# Patient Record
Sex: FEMALE | Employment: UNEMPLOYED | URBAN - METROPOLITAN AREA
[De-identification: names, ages, dates, MRNs, and addresses within clinical notes are randomized per-mention and may not be internally consistent; named-entity substitution may affect disease eponyms.]

---

## 2017-01-22 ENCOUNTER — APPOINTMENT (OUTPATIENT)
Dept: MRI IMAGING | Age: 67
End: 2017-01-22
Attending: EMERGENCY MEDICINE
Payer: COMMERCIAL

## 2017-01-22 ENCOUNTER — HOSPITAL ENCOUNTER (EMERGENCY)
Age: 67
Discharge: HOME | End: 2017-01-22
Attending: EMERGENCY MEDICINE
Payer: COMMERCIAL

## 2017-01-22 ENCOUNTER — APPOINTMENT (OUTPATIENT)
Dept: CT IMAGING | Age: 67
End: 2017-01-22
Attending: EMERGENCY MEDICINE
Payer: COMMERCIAL

## 2017-01-22 VITALS
RESPIRATION RATE: 13 BRPM | HEART RATE: 58 BPM | HEIGHT: 62 IN | BODY MASS INDEX: 39.38 KG/M2 | DIASTOLIC BLOOD PRESSURE: 71 MMHG | WEIGHT: 214 LBS | TEMPERATURE: 98.5 F | OXYGEN SATURATION: 94 % | SYSTOLIC BLOOD PRESSURE: 141 MMHG

## 2017-01-22 DIAGNOSIS — N39.0 URINARY TRACT INFECTION, SITE UNSPECIFIED: ICD-10-CM

## 2017-01-22 DIAGNOSIS — R56.9 CONVULSIONS, UNSPECIFIED CONVULSION TYPE (HCC): Primary | ICD-10-CM

## 2017-01-22 DIAGNOSIS — G43.009 NONINTRACTABLE MIGRAINE, UNSPECIFIED MIGRAINE TYPE: ICD-10-CM

## 2017-01-22 LAB
ALBUMIN SERPL BCP-MCNC: 3.1 G/DL (ref 3.4–5)
ALBUMIN/GLOB SERPL: 0.9 {RATIO} (ref 0.8–1.7)
ALP SERPL-CCNC: 82 U/L (ref 45–117)
ALT SERPL-CCNC: 27 U/L (ref 13–56)
ANION GAP BLD CALC-SCNC: 8 MMOL/L (ref 3–18)
APPEARANCE UR: CLEAR
AST SERPL W P-5'-P-CCNC: 19 U/L (ref 15–37)
BACTERIA URNS QL MICRO: ABNORMAL /HPF
BASOPHILS # BLD AUTO: 0 K/UL (ref 0–0.06)
BASOPHILS # BLD: 0 % (ref 0–2)
BILIRUB DIRECT SERPL-MCNC: 0.1 MG/DL (ref 0–0.2)
BILIRUB SERPL-MCNC: 0.4 MG/DL (ref 0.2–1)
BILIRUB UR QL: NEGATIVE
BUN SERPL-MCNC: 21 MG/DL (ref 7–18)
BUN/CREAT SERPL: 23 (ref 12–20)
CALCIUM SERPL-MCNC: 8.6 MG/DL (ref 8.5–10.1)
CHLORIDE SERPL-SCNC: 104 MMOL/L (ref 100–108)
CK MB CFR SERPL CALC: 2.1 % (ref 0–4)
CK MB SERPL-MCNC: 1 NG/ML (ref 0.5–3.6)
CK SERPL-CCNC: 47 U/L (ref 26–192)
CO2 SERPL-SCNC: 28 MMOL/L (ref 21–32)
COLOR UR: ABNORMAL
CREAT SERPL-MCNC: 0.91 MG/DL (ref 0.6–1.3)
DIFFERENTIAL METHOD BLD: ABNORMAL
EOSINOPHIL # BLD: 0.1 K/UL (ref 0–0.4)
EOSINOPHIL NFR BLD: 1 % (ref 0–5)
EPITH CASTS URNS QL MICRO: ABNORMAL /LPF (ref 0–5)
ERYTHROCYTE [DISTWIDTH] IN BLOOD BY AUTOMATED COUNT: 14.3 % (ref 11.6–14.5)
GLOBULIN SER CALC-MCNC: 3.4 G/DL (ref 2–4)
GLUCOSE SERPL-MCNC: 119 MG/DL (ref 74–99)
GLUCOSE UR STRIP.AUTO-MCNC: NEGATIVE MG/DL
HCT VFR BLD AUTO: 38.6 % (ref 35–45)
HGB BLD-MCNC: 12.9 G/DL (ref 12–16)
HGB UR QL STRIP: NEGATIVE
KETONES UR QL STRIP.AUTO: NEGATIVE MG/DL
LEUKOCYTE ESTERASE UR QL STRIP.AUTO: ABNORMAL
LYMPHOCYTES # BLD AUTO: 19 % (ref 21–52)
LYMPHOCYTES # BLD: 1.6 K/UL (ref 0.9–3.6)
MAGNESIUM SERPL-MCNC: 2.3 MG/DL (ref 1.8–2.4)
MCH RBC QN AUTO: 32.3 PG (ref 24–34)
MCHC RBC AUTO-ENTMCNC: 33.4 G/DL (ref 31–37)
MCV RBC AUTO: 96.5 FL (ref 74–97)
MONOCYTES # BLD: 0.8 K/UL (ref 0.05–1.2)
MONOCYTES NFR BLD AUTO: 10 % (ref 3–10)
NEUTS SEG # BLD: 5.9 K/UL (ref 1.8–8)
NEUTS SEG NFR BLD AUTO: 70 % (ref 40–73)
NITRITE UR QL STRIP.AUTO: NEGATIVE
PH UR STRIP: 6.5 [PH] (ref 5–8)
PLATELET # BLD AUTO: 181 K/UL (ref 135–420)
PMV BLD AUTO: 9.5 FL (ref 9.2–11.8)
POTASSIUM SERPL-SCNC: 3.3 MMOL/L (ref 3.5–5.5)
PROT SERPL-MCNC: 6.5 G/DL (ref 6.4–8.2)
PROT UR STRIP-MCNC: NEGATIVE MG/DL
RBC # BLD AUTO: 4 M/UL (ref 4.2–5.3)
RBC #/AREA URNS HPF: ABNORMAL /HPF (ref 0–5)
SODIUM SERPL-SCNC: 140 MMOL/L (ref 136–145)
SP GR UR REFRACTOMETRY: 1.03 (ref 1–1.03)
TROPONIN I SERPL-MCNC: <0.02 NG/ML (ref 0–0.04)
UROBILINOGEN UR QL STRIP.AUTO: 1 EU/DL (ref 0.2–1)
VALPROATE SERPL-MCNC: 25 UG/ML (ref 50–100)
WBC # BLD AUTO: 8.4 K/UL (ref 4.6–13.2)
WBC URNS QL MICRO: ABNORMAL /HPF (ref 0–4)

## 2017-01-22 PROCEDURE — 82550 ASSAY OF CK (CPK): CPT | Performed by: EMERGENCY MEDICINE

## 2017-01-22 PROCEDURE — 74011250636 HC RX REV CODE- 250/636: Performed by: EMERGENCY MEDICINE

## 2017-01-22 PROCEDURE — 74011000250 HC RX REV CODE- 250: Performed by: EMERGENCY MEDICINE

## 2017-01-22 PROCEDURE — 85025 COMPLETE CBC W/AUTO DIFF WBC: CPT | Performed by: EMERGENCY MEDICINE

## 2017-01-22 PROCEDURE — 80076 HEPATIC FUNCTION PANEL: CPT | Performed by: EMERGENCY MEDICINE

## 2017-01-22 PROCEDURE — 96361 HYDRATE IV INFUSION ADD-ON: CPT

## 2017-01-22 PROCEDURE — 81001 URINALYSIS AUTO W/SCOPE: CPT | Performed by: EMERGENCY MEDICINE

## 2017-01-22 PROCEDURE — 96366 THER/PROPH/DIAG IV INF ADDON: CPT

## 2017-01-22 PROCEDURE — 74011250637 HC RX REV CODE- 250/637: Performed by: EMERGENCY MEDICINE

## 2017-01-22 PROCEDURE — 80164 ASSAY DIPROPYLACETIC ACD TOT: CPT | Performed by: EMERGENCY MEDICINE

## 2017-01-22 PROCEDURE — 80048 BASIC METABOLIC PNL TOTAL CA: CPT | Performed by: EMERGENCY MEDICINE

## 2017-01-22 PROCEDURE — 93005 ELECTROCARDIOGRAM TRACING: CPT

## 2017-01-22 PROCEDURE — 83735 ASSAY OF MAGNESIUM: CPT | Performed by: EMERGENCY MEDICINE

## 2017-01-22 PROCEDURE — 96365 THER/PROPH/DIAG IV INF INIT: CPT

## 2017-01-22 PROCEDURE — 70450 CT HEAD/BRAIN W/O DYE: CPT

## 2017-01-22 PROCEDURE — 74011000258 HC RX REV CODE- 258: Performed by: EMERGENCY MEDICINE

## 2017-01-22 PROCEDURE — 99285 EMERGENCY DEPT VISIT HI MDM: CPT

## 2017-01-22 RX ORDER — SULFAMETHOXAZOLE AND TRIMETHOPRIM 800; 160 MG/1; MG/1
1 TABLET ORAL
Status: COMPLETED | OUTPATIENT
Start: 2017-01-22 | End: 2017-01-22

## 2017-01-22 RX ORDER — ACETAMINOPHEN AND CODEINE PHOSPHATE 300; 30 MG/1; MG/1
2 TABLET ORAL
Status: COMPLETED | OUTPATIENT
Start: 2017-01-22 | End: 2017-01-22

## 2017-01-22 RX ORDER — HYDROCODONE BITARTRATE AND ACETAMINOPHEN 5; 325 MG/1; MG/1
1 TABLET ORAL ONCE
Status: COMPLETED | OUTPATIENT
Start: 2017-01-22 | End: 2017-01-22

## 2017-01-22 RX ORDER — POTASSIUM CHLORIDE 20MEQ/15ML
20 LIQUID (ML) ORAL
Status: COMPLETED | OUTPATIENT
Start: 2017-01-22 | End: 2017-01-22

## 2017-01-22 RX ORDER — TOPIRAMATE 50 MG/1
100 TABLET, FILM COATED ORAL 2 TIMES DAILY
Qty: 20 TAB | Refills: 0 | Status: SHIPPED | OUTPATIENT
Start: 2017-01-22 | End: 2020-01-23

## 2017-01-22 RX ORDER — SULFAMETHOXAZOLE AND TRIMETHOPRIM 800; 160 MG/1; MG/1
1 TABLET ORAL 2 TIMES DAILY
Qty: 14 TAB | Refills: 0 | Status: SHIPPED | OUTPATIENT
Start: 2017-01-22 | End: 2017-01-29

## 2017-01-22 RX ADMIN — POTASSIUM CHLORIDE 20 MEQ: 1.5 SOLUTION ORAL at 13:19

## 2017-01-22 RX ADMIN — SODIUM CHLORIDE 1000 MG: 900 INJECTION, SOLUTION INTRAVENOUS at 16:41

## 2017-01-22 RX ADMIN — ACETAMINOPHEN AND CODEINE PHOSPHATE 2 TABLET: 300; 30 TABLET ORAL at 13:19

## 2017-01-22 RX ADMIN — SODIUM CHLORIDE 500 ML: 900 INJECTION, SOLUTION INTRAVENOUS at 12:03

## 2017-01-22 RX ADMIN — SULFAMETHOXAZOLE AND TRIMETHOPRIM 1 TABLET: 800; 160 TABLET ORAL at 16:22

## 2017-01-22 RX ADMIN — HYDROCODONE BITARTRATE AND ACETAMINOPHEN 1 TABLET: 5; 325 TABLET ORAL at 17:06

## 2017-01-22 NOTE — ED NOTES
Called pharmacy to see if patient depacon was ready. Per Gal Flowers from the pharmacy, is not ready at this time. Manisha Kiser will call ED once medication is ready to be picked up.

## 2017-01-22 NOTE — ED TRIAGE NOTES
Per EMS patient was at [employer] standing up singing when she fell backwards and had a \"tonic clonic\" seizure. Per EMS once the shaking stopped the patient appeared confused and had another seizure soon after.

## 2017-01-22 NOTE — ED NOTES
I have reviewed discharge instructions with the patient. The patient verbalized understanding. Patient armband removed and given to patient to take home. Patient was informed of the privacy risks if armband lost or stolen    The patient Martin Nunez is a 78 y.o. female who  has a past medical history of Asthma; Bipolar 1 disorder ([facility] [address] Breast cancer ([facility] [address] (2012); Cancer ([facility] [address] Hypertension; Seizures ([facility] [address] and UTI (lower urinary tract infection). .  She   The patient's medications were reviewed and discussed prior to discharge. The patient was very interactive and did understand their medications. The following medications were discussed in details with the patient. The [facility] said [facility]. [unfilled]    ROSLYN [employer] Activation    Thank you for requesting access to [facility]. Please follow the instructions below to securely access and download your online medical record. [facility] allows you to send messages to your doctor, view your test results, renew your prescriptions, schedule appointments, and more. How Do I Sign Up? 1. In your internet browser, go to [email or web address]  2. Click on the First Time User? Click Here link in the Sign In box. You will be redirect to the New Member Sign Up page. 3. Enter your [facility] Access Code exactly as it appears below. You will not need to use this code after youve completed the sign-up process. If you do not sign up before the expiration date, you must request a new code. [facility] Access Code: [unfilled] (This is the date your [facility]ake access code will )    4. Enter the last four digits of your Social Security Number (xxxx) and Date of Birth (mm/dd/yyyy) as indicated and click Submit. You will be taken to the next sign-up page. 5. Create a [facility]ake ID. This will be your [facility]ake login ID and cannot be changed, so think of one that is secure and easy to remember.   6. Create a [facility] password. You can change your password at any time. 7. Enter your Password Reset Question and Answer. This can be used at a later time if you forget your password. 8. Enter your e-mail address. You will receive e-mail notification when new information is available in [address] 9. Click Sign Up. You can now view and download portions of your medical record. 10. Click the Download Summary menu link to download a portable copy of your medical information. Additional Information    If you have questions, please visit the Frequently Asked Questions section of the [employer] website at [email or web address] Remember, [employer] is NOT to be used for urgent needs. For medical emergencies, dial 911.

## 2017-01-22 NOTE — DISCHARGE INSTRUCTIONS
Recurring Migraine Headache: Care Instructions  Your Care Instructions  Migraines are painful, throbbing headaches. They often start on one side of the head. They may cause nausea and vomiting and make you sensitive to light, sound, or smell. Some people may have only a few migraines throughout life. Others have them as often as several times a month. The goal of treatment is to reduce the number of migraines you have and relieve your symptoms. Even with treatment, you may continue to have migraines. You play an important role in dealing with your headaches. Work on avoiding things that seem to trigger your migraines. When you feel a headache coming on, act quickly to stop it before it gets worse. Follow-up care is a key part of your treatment and safety. Be sure to make and go to all appointments, and call your doctor if you are having problems. It's also a good idea to know your test results and keep a list of the medicines you take. How can you care for yourself at home? · Do not drive if you have taken a prescription pain medicine. · Rest in a quiet, dark room until your headache is gone. Close your eyes and try to relax or go to sleep. Do not watch TV or read. · Put a cold, moist cloth or cold pack on the painful area for 10 to 20 minutes at a time. Put a thin cloth between the cold pack and your skin. · Have someone gently massage your neck and shoulders. · Take your medicines exactly as prescribed. Call your doctor if you think you are having a problem with your medicine. You will get more details on the specific medicines your doctor prescribes. To prevent migraines  · Keep a headache diary so you can figure out what triggers your headaches. Avoiding triggers may help you prevent headaches. Record when each headache began, how long it lasted, and what the pain was like. Use words like throbbing, aching, stabbing, or dull. Write down any other symptoms you had with the headache.  These may include nausea, flashing lights or dark spots, or sensitivity to bright light or loud noise. Note if the headache occurred near your period. List anything that might have triggered the headache. Triggers may include certain foods (chocolate, cheese, wine) or odors, smoke, bright light, stress, or lack of sleep. · If your doctor has prescribed medicine for your migraines, take it as directed. You may have medicine that you take only when you get a migraine and medicine that you take all the time to help prevent migraines. ¨ If your doctor has prescribed medicine for when you get a headache, take it at the first sign of a migraine, unless your doctor has given you other instructions. ¨ If your doctor has prescribed medicine to prevent migraines, take it exactly as prescribed. Call your doctor if you think you are having a problem with your medicine. · Find healthy ways to deal with stress. Migraines are most common during or right after stressful times. Take time to relax before and after you do something that has caused a migraine in the past.  · Try to keep your muscles relaxed by keeping good posture. Check your jaw, face, neck, and shoulder muscles for tension. Try to relax them. When sitting at a desk, change positions often. Stretch for 30 seconds each hour. · Get regular sleep and exercise. · Eat regular meals, and avoid foods and drinks that often trigger migraines. These include chocolate and alcohol, especially red wine and port. Chemicals used in food, such as aspartame and monosodium glutamate (MSG), also can trigger migraines. So can some food additives, such as those found in hot dogs, craig, cold cuts, aged cheeses, and pickled foods. · Limit caffeine by not drinking too much coffee, tea, or soda. Do not quit caffeine suddenly, because that can also give you migraines. · Do not smoke or allow others to smoke around you.  If you need help quitting, talk to your doctor about stop-smoking programs and medicines. These can increase your chances of quitting for good. · If you are taking birth control pills or hormone therapy, talk to your doctor about whether they are triggering your migraines. When should you call for help? Call 911 anytime you think you may need emergency care. For example, call if:  · You have symptoms of a stroke. These may include:  ¨ Sudden numbness, tingling, weakness, or loss of movement in your face, arm, or leg, especially on only one side of your body. ¨ Sudden vision changes. ¨ Sudden trouble speaking. ¨ Sudden confusion or trouble understanding simple statements. ¨ Sudden problems with walking or balance. ¨ A sudden, severe headache that is different from past headaches. Call your doctor now or seek immediate medical care if:  · You develop a fever and a stiff neck. · You have new nausea and vomiting, or you cannot keep down food or liquids. Watch closely for changes in your health, and be sure to contact your doctor if:  · You have a headache that does not get better within 1 or 2 days. · Your headaches get worse or happen more often. Where can you learn more? Go to http://ramón-ayden.info/. Enter V975 in the search box to learn more about \"Recurring Migraine Headache: Care Instructions. \"  Current as of: February 19, 2016  Content Version: 11.1  © 3387-7833 [employer]. Care instructions adapted under license by [employer] (which disclaims liability or warranty for this information). If you have questions about a medical condition or this instruction, always ask your healthcare professional. [place] [address] any warranty or liability for your use of this information. Seizure: Care Instructions  Your Care Instructions    Seizures are caused by abnormal patterns of electrical signals in the brain. They are different for each person. Seizures can affect movement, speech, vision, or awareness.  Some people have only slight shaking of a hand and do not pass out. Other people may pass out and have violent shaking of the whole body. Some people appear to stare into space. They are awake, but they can't respond normally. Later, they may not remember what happened. You may need tests to identify the type and cause of the seizures. A seizure may occur only once, or you may have them more than one time. Taking medicines as directed and following up with your doctor may help keep you from having more seizures. The doctor has checked you carefully, but problems can develop later. If you notice any problems or new symptoms, get medical treatment right away. Follow-up care is a key part of your treatment and safety. Be sure to make and go to all appointments, and call your doctor if you are having problems. It's also a good idea to know your test results and keep a list of the medicines you take. How can you care for yourself at home? · Be safe with medicines. Take your medicines exactly as prescribed. Call your doctor if you think you are having a problem with your medicine. · Do not do any activity that could be dangerous to you or others until your doctor says it is safe to do so. For example, do not drive a car, operate machinery, swim, or climb ladders. · Be sure that anyone treating you for any health problem knows that you have had a seizure and what medicines you are taking for it. · Identify and avoid things that may make you more likely to have a seizure. These may include lack of sleep, alcohol or drug use, stress, or not eating. · Make sure you go to your follow-up appointment. When should you call for help? Call 911 anytime you think you may need emergency care. For example, call if:  · You have another seizure. · You have more than one seizure in 24 hours. · You have new symptoms, such as trouble walking, speaking, or thinking clearly.   Call your doctor now or seek immediate medical care if:  · You are not acting normally. Watch closely for changes in your health, and be sure to contact your doctor if you have any problems. Where can you learn more? Go to http://ramón-ayden.info/. Enter E423 in the search box to learn more about \"Seizure: Care Instructions. \"  Current as of: February 19, 2016  Content Version: 11.1  © 3769-2265 [employer]. Care instructions adapted under license by [employer] (which disclaims liability or warranty for this information). If you have questions about a medical condition or this instruction, always ask your healthcare professional. [place] [address] any warranty or liability for your use of this information. Urinary Tract Infection in Women: Care Instructions  Your Care Instructions    A urinary tract infection, or UTI, is a general term for an infection anywhere between the kidneys and the urethra (where urine comes out). Most UTIs are bladder infections. They often cause pain or burning when you urinate. UTIs are caused by bacteria and can be cured with antibiotics. Be sure to complete your treatment so that the infection goes away. Follow-up care is a key part of your treatment and safety. Be sure to make and go to all appointments, and call your doctor if you are having problems. It's also a good idea to know your test results and keep a list of the medicines you take. How can you care for yourself at home? · Take your antibiotics as directed. Do not stop taking them just because you feel better. You need to take the full course of antibiotics. · Drink extra water and other fluids for the next day or two. This may help wash out the bacteria that are causing the infection. (If you have kidney, heart, or liver disease and have to limit fluids, talk with your doctor before you increase your fluid intake.)  · Avoid drinks that are carbonated or have caffeine.  They can irritate the bladder. · Urinate often. Try to empty your bladder each time. · To relieve pain, take a hot bath or lay a heating pad set on low over your lower belly or genital area. Never go to sleep with a heating pad in place. To prevent UTIs  · Drink plenty of water each day. This helps you urinate often, which clears bacteria from your system. (If you have kidney, heart, or liver disease and have to limit fluids, talk with your doctor before you increase your fluid intake.)  · Consider adding cranberry juice to your diet. · Urinate when you need to. · Urinate right after you have sex. · Change sanitary pads often. · Avoid douches, bubble baths, feminine hygiene sprays, and other feminine hygiene products that have deodorants. · After going to the bathroom, wipe from front to back. When should you call for help? Call your doctor now or seek immediate medical care if:  · Symptoms such as fever, chills, nausea, or vomiting get worse or appear for the first time. · You have new pain in your back just below your rib cage. This is called flank pain. · There is new blood or pus in your urine. · You have any problems with your antibiotic medicine. Watch closely for changes in your health, and be sure to contact your doctor if:  · You are not getting better after taking an antibiotic for 2 days. · Your symptoms go away but then come back. Where can you learn more? Go to http://ramón-ayden.info/. Enter E906 in the search box to learn more about \"Urinary Tract Infection in Women: Care Instructions. \"  Current as of: August 12, 2016  Content Version: 11.1  © 4459-6901 [employer]. Care instructions adapted under license by [employer] (which disclaims liability or warranty for this information).  If you have questions about a medical condition or this instruction, always ask your healthcare professional. [address] any warranty or liability for your use of this information.

## 2017-01-22 NOTE — ED PROVIDER NOTES
HPI Comments: 12:15 PM female 78 y.o. with a history of hypertension, seizure, asthma, bipolar disorder, UTI and breast cancer and migranes presenting to the ED for evaluation after 2x witessed seizure while standing in [employer] today. She was standing and singing a hymnal when she experienced the seizure. She states she fell into the pew and did not fall to the ground; she denies head trauma. On EMS arrival, she apparently had a second seizure with tonic clonic type movements. She now reports pounding headache which is worse than her usual pain with migraines and endorses photosensitivity which however is consistent with migraine. She takes topamax for migraines. She denies any speech difficulty or disorientation but is unable to identify the current date or year at this time. She cannot recall her last seizure occurrence but notes that her seizures usually onset \"when I get hot\". She currently takes Depakote which was prescribed by her PCP. She notes that she has a neurologist but that her neurologist \"refuses to treat my seizures\" and only treats her migraines. She denies any dizziness or lightheadedness at this time. No other associated symptoms or other concerns at this time. PCP: Frankey Kaska, MD          The history is provided by the patient.         Past Medical History:   Diagnosis Date    Asthma     Bipolar 1 disorder ([address]     Breast cancer ([address] 2012     left side    Cancer ([address]     Hypertension     Seizures ([address]     UTI (lower urinary tract infection)        Past Surgical History:   Procedure Laterality Date    Hx mastectomy Left 03/29/2012     complete    Hx back surgery       Dr Agustin Vazquez, july 30 2013    Hx orthopaedic       LEFT FOOT SX.    Hx cholecystectomy      Hx partial hysterectomy           Family History:   Problem Relation Age of Onset    Family history unknown: Yes       Social History     Social History    Marital status:      Spouse name: N/A    Number of children: N/A    Years of education: N/A     Occupational History    Not on file. Social History Main Topics    Smoking status: Never Smoker    Smokeless tobacco: Not on file    Alcohol use No    Drug use: No    Sexual activity: Not on file     Other Topics Concern    Not on file     Social History Narrative         ALLERGIES: Carvedilol; Cephalexin; Ciprofloxacin; Epipen [epinephrine]; Other medication; Percocet [oxycodone-acetaminophen]; Stings [sting, bee]; Talwin [pentazocine lactate]; Tetanus toxoid, adsorbed; and Trileptal [oxcarbazepine]    Review of Systems   Constitutional: Negative for fever. HENT: Negative for sore throat. Eyes: Negative for redness. Respiratory: Negative for shortness of breath and wheezing. Gastrointestinal: Negative for abdominal pain and nausea. Genitourinary: Negative for dysuria. Musculoskeletal: Negative for neck stiffness. Skin: Negative for pallor. Neurological: Positive for seizures and headaches. Negative for dizziness and speech difficulty. Hematological: Does not bruise/bleed easily. All other systems reviewed and are negative. Vitals:    01/22/17 1145   BP: 113/60   Pulse: 82   Resp: 20   Temp: 98.5 °F (36.9 °C)   SpO2: 96%   Weight: 97.1 kg (214 lb)   Height: 5' 2\" (1.575 m)            Physical Exam   Constitutional: She appears well-developed and well-nourished. No distress. HENT:   Head: Normocephalic and atraumatic. Mouth/Throat: Oropharynx is clear and moist.   No tongue laceration   Eyes: Conjunctivae and EOM are normal. Pupils are equal, round, and reactive to light. No scleral icterus. Pupil 4mm bilaterally   Neck: Normal range of motion. Neck supple. Cardiovascular: Intact distal pulses. Capillary refill < 3 seconds   Pulmonary/Chest: Effort normal and breath sounds normal. No respiratory distress. She has no wheezes. Abdominal: Soft. Bowel sounds are normal. She exhibits no distension. There is no tenderness. Musculoskeletal: Normal range of motion. She exhibits no edema. Lymphadenopathy:     She has no cervical adenopathy. Neurological: She is alert. She has normal strength. She displays tremor (resting tremor of the left hand). No cranial nerve deficit or sensory deficit. Alert. Oriented to name and place but not to day or year. No slurred speech. Abnormal finger to nose test. Strength 5/5. No drifting of the lower extremities. Pt somewhat slow to respond to some questions. Still appears postictal   Skin: Skin is warm and dry. She is not diaphoretic. Nursing note and vitals reviewed. MDM  Number of Diagnoses or Management Options  Convulsions, unspecified convulsion type ([facility]):   Nonintractable migraine, unspecified migraine type:   Urinary tract infection, site unspecified:   Diagnosis management comments: Seizure like activity at [place]. headache  Seizure precautions, IVF, labs, analgesia    Pt with left hand tremor on exam.     Will consult teleneurology. CT head  Pt appears somewhat post ictal in that she doesn't recall year or day. However when I entered room pt is reading a book. ?pseudo seizure               Amount and/or Complexity of Data Reviewed  Clinical lab tests: ordered and reviewed  Tests in the radiology section of CPT®: ordered and reviewed  Tests in the medicine section of CPT®: ordered and reviewed  Discussion of test results with the performing providers: yes  Discuss the patient with other providers: yes    Risk of Complications, Morbidity, and/or Mortality  Presenting problems: moderate  Diagnostic procedures: moderate  Management options: moderate    Patient Progress  Patient progress: stable    ED Course       Procedures    PROGRESS NOTES  12:15 PM: Waylon Wilkinson DO arrives to the bedside to evaluate the patient. Answered the patient's questions regarding the treatment plan. 12:47 PM Discussed care with Dr. Cherri Harper, Specialty: Teleneurology.  Standard discussion; including history of patients chief complaint, available diagnostic results and treatment course. Accepts consult and will evaluate the pt.    1:15 PM Discussed care with Dr. Paulo Tran, Specialty: Teleneurology. Standard discussion; including history of patients chief complaint, available diagnostic results and treatment course. Patient's symptoms and signs cease when she is directed to do something else. She does not recommend admission. She will review further notes about patient. If anything else comes back she will call back immediately. However, if everything is clear she will recommend discharge and have her follow up with neurologist tomorrow. 1:45 PM Discussed care with Dr. Paulo Tran, Specialty: Teleneurology. Standard discussion; including history of patients chief complaint, available diagnostic results and treatment course. She states pt has no recent MRI of the brain. In light of pt history of breast cancer, she recommend MRI with contrast of the brain for evaluation of possible brain mass which can be the cause of progressive seizures and headache. If non, recommends increase of Topomax to 100mg bid and f/u with neurologist    4:15 PM MRI tech called back and states htat in light of multiple stimulators, they are unable to do the MRI. Discussed care with Dr. Benjamin Arango, Specialty: Neurology. Standard discussion; including history of patients chief complaint, available diagnostic results and treatment course. She reviewed of the pt's chart. Discussed MRI's decision to not get the MRI due to multiple stimulators. She thinks it is appropriate to give 1g of Depacon IV and increase Topamax as neurology recommends making a ticket for follow up with Dr. Vasquez Bolanos and d/c pt home. 4:18 PM: Reassessed patient and patient is feeling well. Discussed their results and diagnosis. Patient will be discharged in stable condition. Patient is to return to the emergency department for any new or worsening conditions.  Patient understands and agrees with discharge plan. 4:56 PM Asked her question regarding allergy to Vicodin. She states she is not allergic to Vicodin and can in fact take it. Discussed with pt and will increase Topomax for her headaches. Discusssed her abd discomfort is likely due to UTI. Abdomen is soft with minimal tenderness. No guarding or rebound. I have reassessed the patient. I have discussed the workup, results and plan with the patient and patient is in agreement. Patient is feeling much better. Patient will be prescribed increased dose of topimax, and bactrim (she states she can take bactrim), sent rx's directly to her pharmacy of choice. Patient was discharge in stable condition. Patient was given outpatient follow up. Patient is to return to emergency department if any new or worsening condition. ED PHYSICIAN ORDERS  Orders Placed This Encounter    CT HEAD WO CONT     Standing Status:   Standing     Number of Occurrences:   1     Order Specific Question:   Transport     Answer:   Stretcher [[id number]]     Order Specific Question:   Is Patient Allergic to Contrast Dye? Answer:   No    MRI BRAIN W WO CONT     Standing Status:   Standing     Number of Occurrences:   1     Order Specific Question:   Transport     Answer:   Stretcher [5]     Order Specific Question:   Is Patient Allergic to Contrast Dye? Answer:   No     Order Specific Question:   Does patient have history of Renal Disease?      Answer:   No    CBC WITH AUTOMATED DIFF     Standing Status:   Standing     Number of Occurrences:   1    METABOLIC PANEL, BASIC     Standing Status:   Standing     Number of Occurrences:   1    MAGNESIUM     Standing Status:   Standing     Number of Occurrences:   1    URINALYSIS W/ RFLX MICROSCOPIC     Standing Status:   Standing     Number of Occurrences:   1    VALPROIC ACID     Standing Status:   Standing     Number of Occurrences:   1    HEPATIC FUNCTION PANEL     Standing Status:   Standing Number of Occurrences:   1    CARDIAC PANEL,(CK, CKMB & TROPONIN)     Standing Status:   Standing     Number of Occurrences:   1    URINE MICROSCOPIC ONLY     Standing Status:   Standing     Number of Occurrences:   1    CARDIAC MONITORING     Standing Status:   Standing     Number of Occurrences:   1     Order Specific Question:   Type: Answer:   Bedside    IP CONSULT TO TELE-NEUROLOGY     Standing Status:   Standing     Number of Occurrences:   1     Order Specific Question:   Reason for Consult: Answer:   multiple seizures     Order Specific Question:   Did you call or speak to the consulting provider? Answer:   No     Order Specific Question:   Consult To     Answer:   tele neurology     Order Specific Question:   Schedule When?      Answer:   TODAY    EKG, 12 LEAD, INITIAL     Standing Status:   Standing     Number of Occurrences:   1     Order Specific Question:   Reason for Exam:     Answer:   seizure    INSERT PERIPHERAL IV ONE TIME STAT     Standing Status:   Standing     Number of Occurrences:   1    SEIZURE PRECAUTIONS     Standing Status:   Standing     Number of Occurrences:   1    sodium chloride 0.9 % bolus infusion 500 mL    acetaminophen-codeine (TYLENOL #3) per tablet 2 Tab    potassium chloride (KAON 10%) 20 mEq/15 mL oral liquid 20 mEq    trimethoprim-sulfamethoxazole (BACTRIM DS, SEPTRA DS) 160-800 mg per tablet 1 Tab     Order Specific Question:   Antibiotic Indications     Answer:   Urinary Tract Infection    valproate (DEPACON) 1,000 mg in 0.9% sodium chloride 50 mL IVPB           MEDICATIONS ORDERED  Medications   trimethoprim-sulfamethoxazole (BACTRIM DS, SEPTRA DS) 160-800 mg per tablet 1 Tab (not administered)   valproate (DEPACON) 1,000 mg in 0.9% sodium chloride 50 mL IVPB (not administered)   sodium chloride 0.9 % bolus infusion 500 mL (0 mL IntraVENous IV Completed 1/22/17 1320)   acetaminophen-codeine (TYLENOL #3) per tablet 2 Tab (2 Tabs Oral Given 1/22/17 1319)   potassium chloride (KAON 10%) 20 mEq/15 mL oral liquid 20 mEq (20 mEq Oral Given 1/22/17 1319)           RADIOLOGY INTERPRETATIONS  CT HEAD WO CONT   Final Result   IMPRESSION:     1. No acute intracranial abnormalities. LABORATORY RESULTS  Recent Results (from the past 12 hour(s))   CBC WITH AUTOMATED DIFF    Collection Time: 01/22/17 11:40 AM   Result Value Ref Range    WBC 8.4 4.6 - 13.2 K/uL    RBC 4.00 (L) 4.20 - 5.30 M/uL    HGB 12.9 12.0 - 16.0 g/dL    HCT 38.6 35.0 - 45.0 %    MCV 96.5 74.0 - 97.0 FL    MCH 32.3 24.0 - 34.0 PG    MCHC 33.4 31.0 - 37.0 g/dL    RDW 14.3 11.6 - 14.5 %    PLATELET 996 360 - 645 K/uL    MPV 9.5 9.2 - 11.8 FL    NEUTROPHILS 70 40 - 73 %    LYMPHOCYTES 19 (L) 21 - 52 %    MONOCYTES 10 3 - 10 %    EOSINOPHILS 1 0 - 5 %    BASOPHILS 0 0 - 2 %    ABS. NEUTROPHILS 5.9 1.8 - 8.0 K/UL    ABS. LYMPHOCYTES 1.6 0.9 - 3.6 K/UL    ABS. MONOCYTES 0.8 0.05 - 1.2 K/UL    ABS. EOSINOPHILS 0.1 0.0 - 0.4 K/UL    ABS.  BASOPHILS 0.0 0.0 - 0.06 K/UL    DF AUTOMATED     METABOLIC PANEL, BASIC    Collection Time: 01/22/17 11:40 AM   Result Value Ref Range    Sodium 140 136 - 145 mmol/L    Potassium 3.3 (L) 3.5 - 5.5 mmol/L    Chloride 104 100 - 108 mmol/L    CO2 28 21 - 32 mmol/L    Anion gap 8 3.0 - 18 mmol/L    Glucose 119 (H) 74 - 99 mg/dL    BUN 21 (H) 7.0 - 18 MG/DL    Creatinine 0.91 0.6 - 1.3 MG/DL    BUN/Creatinine ratio 23 (H) 12 - 20      GFR est AA >60 >60 ml/min/1.73m2    GFR est non-AA >60 >60 ml/min/1.73m2    Calcium 8.6 8.5 - 10.1 MG/DL   MAGNESIUM    Collection Time: 01/22/17 11:40 AM   Result Value Ref Range    Magnesium 2.3 1.8 - 2.4 mg/dL   VALPROIC ACID    Collection Time: 01/22/17 11:40 AM   Result Value Ref Range    Valproic acid 25 (L) 50 - 100 ug/ml   HEPATIC FUNCTION PANEL    Collection Time: 01/22/17 11:40 AM   Result Value Ref Range    Protein, total 6.5 6.4 - 8.2 g/dL    Albumin 3.1 (L) 3.4 - 5.0 g/dL    Globulin 3.4 2.0 - 4.0 g/dL    A-G Ratio 0.9 0.8 - 1.7      Bilirubin, total 0.4 0.2 - 1.0 MG/DL    Bilirubin, direct 0.1 0.0 - 0.2 MG/DL    Alk. phosphatase 82 45 - 117 U/L    AST 19 15 - 37 U/L    ALT 27 13 - 56 U/L   CARDIAC PANEL,(CK, CKMB & TROPONIN)    Collection Time: 01/22/17 11:40 AM   Result Value Ref Range    CK 47 26 - 192 U/L    CK - MB 1.0 0.5 - 3.6 ng/ml    CK-MB Index 2.1 0.0 - 4.0 %    Troponin-I, Qt. <0.02 0.0 - 0.045 NG/ML   EKG, 12 LEAD, INITIAL    Collection Time: 01/22/17 12:06 PM   Result Value Ref Range    Ventricular Rate 80 BPM    Atrial Rate 80 BPM    P-R Interval 152 ms    QRS Duration 102 ms    Q-T Interval 386 ms    QTC Calculation (Bezet) 445 ms    Calculated P Axis 48 degrees    Calculated R Axis -38 degrees    Calculated T Axis 29 degrees    Diagnosis       Normal sinus rhythm  Left axis deviation  Voltage criteria for left ventricular hypertrophy  Abnormal ECG  When compared with ECG of 26-AUG-2016 21:02,  No significant change was found     URINALYSIS W/ RFLX MICROSCOPIC    Collection Time: 01/22/17  1:30 PM   Result Value Ref Range    Color DARK YELLOW      Appearance CLEAR      Specific gravity 1.028 1.005 - 1.030      pH (UA) 6.5 5.0 - 8.0      Protein NEGATIVE  NEG mg/dL    Glucose NEGATIVE  NEG mg/dL    Ketone NEGATIVE  NEG mg/dL    Bilirubin NEGATIVE  NEG      Blood NEGATIVE  NEG      Urobilinogen 1.0 0.2 - 1.0 EU/dL    Nitrites NEGATIVE  NEG      Leukocyte Esterase MODERATE (A) NEG     URINE MICROSCOPIC ONLY    Collection Time: 01/22/17  1:30 PM   Result Value Ref Range    WBC 21 to 35 0 - 4 /hpf    RBC 0 to 1 0 - 5 /hpf    Epithelial cells 1+ 0 - 5 /lpf    Bacteria 1+ (A) NEG /hpf             ED DIAGNOSIS & DISPOSITION INFORMATION  Diagnosis:   1. Convulsions, unspecified convulsion type ([address]    2. Urinary tract infection, site unspecified    3.  Nonintractable migraine, unspecified migraine type          Disposition: discharged    Follow-up Information     Follow up With Details Comments [address] [facility] EMERGENCY DEPT Go to If symptoms worsen [id number] Edilma Luke MD Call in 2 days FOR FOLLOW  [address]      Sheyla Perkins MD Call in 1 day FOR IMMEDIATE FOLLOW  SJessica Mehta.  [facility] [address]            Patient's Medications   Start Taking    No medications on file   Continue Taking    ALBUTEROL IN    Take  by inhalation. ASCORBIC ACID (VITAMIN C) 500 MG TABLET    Take 500 mg by mouth. ASPIRIN DELAYED-RELEASE 81 MG TABLET    Take  by mouth daily. CHLORTHALIDONE (HYGROTEN) 25 MG TABLET    Take 25 mg by mouth every other day. CICLOPIROX (LOPROX) 0.77 % TOPICAL GEL    Apply  to affected area two (2) times a day. CITALOPRAM (CELEXA) 10 MG TABLET    Take 10 mg by mouth nightly. DICYCLOMINE (BENTYL) 10 MG CAPSULE    Take 1 Cap by mouth four (4) times daily as needed (abd cramps). EPINEPHRINE (EPIPEN) 0.3 MG/0.3 ML (1:1,000) INJECTION    0.3 mg by IntraMUSCular route once as needed. ERYTHROMYCIN WITH ETHANOL (ERYGEL) 2 % TOPICAL GEL    Apply  to affected area two (2) times a day. FLUOCINONIDE (LIDEX) 0.05 % OINTMENT    Apply  to affected area two (2) times a day. FLUTICASONE-SALMETEROL (ADVAIR) 250-50 MCG/DOSE DISKUS INHALER    Take 1 Puff by inhalation every twelve (12) hours. GABAPENTIN (NEURONTIN) 100 MG CAPSULE    Take 200 mg by mouth two (2) times a day. GABAPENTIN (NEURONTIN) 100 MG CAPSULE    Take 300 mg by mouth nightly. GLIPIZIDE SR (GLUCOTROL) 5 MG CR TABLET    Take 5 mg by mouth daily. IPRATROPIUM/ALBUTEROL SULFATE (COMBIVENT IN)    Take  by inhalation. LETROZOLE (FEMARA) 2.5 MG TABLET    Take 2.5 mg by mouth daily. LEVOTHYROXINE (SYNTHROID) 50 MCG TABLET    Take 50 mcg by mouth Daily (before breakfast). METHYLCELLULOSE (FIBER THERAPY)    500 mg by Does Not Apply route two (2) times a day.     OMEPRAZOLE DELAYED RELEASE (PRILOSEC D/R) 20 MG TABLET    Take 40 mg by mouth daily. POTASSIUM CHLORIDE (K-DUR, KLOR-CON) 20 MEQ TABLET    Take 1 Tab by mouth two (2) times a day. ROSUVASTATIN (CRESTOR) 20 MG TABLET    Take 20 mg by mouth nightly. TOPIRAMATE (TOPAMAX) 50 MG TABLET    Take 50 mg by mouth two (2) times a day. VERAPAMIL (CALAN) 40 MG TABLET    Take 80 mg by mouth three (3) times daily (with meals). These Medications have changed    No medications on file   Stop Taking    No medications on file           Nicole Corey scribing for and in the presence of Lulú Villalobos,   01/22/17. Physician Attestation  I personally performed the services described in the documentation, reviewed the documentation, as recorded by the scribe in my presence, and it accurately and completely records my words and actions.     Lulú Villalobos DO 01/22/17

## 2017-01-22 NOTE — ED NOTES
Side rails up x 2: YES  Bed in low position and wheels locked: YES  Call bell within reach: YES  Comfort addressed: YES   Toileting needs addressed: YES  Plan of care reviewed/updated with patient and or family members: YES  IV site assessed: YES  Pain assessed and addressed: YES, 1

## 2017-01-23 LAB
ATRIAL RATE: 80 BPM
CALCULATED P AXIS, ECG09: 48 DEGREES
CALCULATED R AXIS, ECG10: -38 DEGREES
CALCULATED T AXIS, ECG11: 29 DEGREES
DIAGNOSIS, 93000: NORMAL
P-R INTERVAL, ECG05: 152 MS
Q-T INTERVAL, ECG07: 386 MS
QRS DURATION, ECG06: 102 MS
QTC CALCULATION (BEZET), ECG08: 445 MS
VENTRICULAR RATE, ECG03: 80 BPM

## 2017-03-01 ENCOUNTER — HOSPITAL ENCOUNTER (EMERGENCY)
Age: 67
Discharge: HOME | End: 2017-03-02
Attending: EMERGENCY MEDICINE | Admitting: EMERGENCY MEDICINE
Payer: COMMERCIAL

## 2017-03-01 ENCOUNTER — APPOINTMENT (OUTPATIENT)
Dept: CT IMAGING | Age: 67
End: 2017-03-01
Attending: EMERGENCY MEDICINE
Payer: COMMERCIAL

## 2017-03-01 DIAGNOSIS — N39.0 ACUTE UTI: ICD-10-CM

## 2017-03-01 DIAGNOSIS — R10.9 ACUTE ABDOMINAL PAIN IN RIGHT FLANK: Primary | ICD-10-CM

## 2017-03-01 LAB
ALBUMIN SERPL BCP-MCNC: 3.5 G/DL (ref 3.4–5)
ALBUMIN/GLOB SERPL: 1.1 {RATIO} (ref 0.8–1.7)
ALP SERPL-CCNC: 80 U/L (ref 45–117)
ALT SERPL-CCNC: 46 U/L (ref 13–56)
ANION GAP BLD CALC-SCNC: 9 MMOL/L (ref 3–18)
APPEARANCE UR: ABNORMAL
AST SERPL W P-5'-P-CCNC: 15 U/L (ref 15–37)
BACTERIA URNS QL MICRO: ABNORMAL /HPF
BASOPHILS # BLD AUTO: 0 K/UL (ref 0–0.06)
BASOPHILS # BLD: 0 % (ref 0–2)
BILIRUB DIRECT SERPL-MCNC: 0.1 MG/DL (ref 0–0.2)
BILIRUB SERPL-MCNC: 0.4 MG/DL (ref 0.2–1)
BILIRUB UR QL: NEGATIVE
BUN SERPL-MCNC: 22 MG/DL (ref 7–18)
BUN/CREAT SERPL: 27 (ref 12–20)
CALCIUM SERPL-MCNC: 9 MG/DL (ref 8.5–10.1)
CHLORIDE SERPL-SCNC: 107 MMOL/L (ref 100–108)
CO2 SERPL-SCNC: 29 MMOL/L (ref 21–32)
COLOR UR: YELLOW
CREAT SERPL-MCNC: 0.83 MG/DL (ref 0.6–1.3)
DIFFERENTIAL METHOD BLD: ABNORMAL
EOSINOPHIL # BLD: 0.4 K/UL (ref 0–0.4)
EOSINOPHIL NFR BLD: 7 % (ref 0–5)
EPITH CASTS URNS QL MICRO: ABNORMAL /LPF (ref 0–5)
ERYTHROCYTE [DISTWIDTH] IN BLOOD BY AUTOMATED COUNT: 14.1 % (ref 11.6–14.5)
GLOBULIN SER CALC-MCNC: 3.2 G/DL (ref 2–4)
GLUCOSE BLD STRIP.AUTO-MCNC: 78 MG/DL (ref 70–110)
GLUCOSE SERPL-MCNC: 68 MG/DL (ref 74–99)
GLUCOSE UR STRIP.AUTO-MCNC: NEGATIVE MG/DL
HCT VFR BLD AUTO: 41.3 % (ref 35–45)
HGB BLD-MCNC: 13.7 G/DL (ref 12–16)
HGB UR QL STRIP: NEGATIVE
KETONES UR QL STRIP.AUTO: NEGATIVE MG/DL
LEUKOCYTE ESTERASE UR QL STRIP.AUTO: ABNORMAL
LIPASE SERPL-CCNC: 168 U/L (ref 73–393)
LYMPHOCYTES # BLD AUTO: 37 % (ref 21–52)
LYMPHOCYTES # BLD: 2 K/UL (ref 0.9–3.6)
MCH RBC QN AUTO: 32.4 PG (ref 24–34)
MCHC RBC AUTO-ENTMCNC: 33.2 G/DL (ref 31–37)
MCV RBC AUTO: 97.6 FL (ref 74–97)
MONOCYTES # BLD: 0.6 K/UL (ref 0.05–1.2)
MONOCYTES NFR BLD AUTO: 11 % (ref 3–10)
NEUTS SEG # BLD: 2.5 K/UL (ref 1.8–8)
NEUTS SEG NFR BLD AUTO: 45 % (ref 40–73)
NITRITE UR QL STRIP.AUTO: NEGATIVE
PH UR STRIP: 5 [PH] (ref 5–8)
PLATELET # BLD AUTO: 174 K/UL (ref 135–420)
PMV BLD AUTO: 9.6 FL (ref 9.2–11.8)
POTASSIUM SERPL-SCNC: 3.3 MMOL/L (ref 3.5–5.5)
PROT SERPL-MCNC: 6.7 G/DL (ref 6.4–8.2)
PROT UR STRIP-MCNC: NEGATIVE MG/DL
RBC # BLD AUTO: 4.23 M/UL (ref 4.2–5.3)
RBC #/AREA URNS HPF: 0 /HPF (ref 0–5)
SODIUM SERPL-SCNC: 145 MMOL/L (ref 136–145)
SP GR UR REFRACTOMETRY: 1.02 (ref 1–1.03)
UROBILINOGEN UR QL STRIP.AUTO: 0.2 EU/DL (ref 0.2–1)
WBC # BLD AUTO: 5.5 K/UL (ref 4.6–13.2)
WBC URNS QL MICRO: ABNORMAL /HPF (ref 0–4)

## 2017-03-01 PROCEDURE — 96365 THER/PROPH/DIAG IV INF INIT: CPT

## 2017-03-01 PROCEDURE — 74011250637 HC RX REV CODE- 250/637: Performed by: EMERGENCY MEDICINE

## 2017-03-01 PROCEDURE — 96366 THER/PROPH/DIAG IV INF ADDON: CPT

## 2017-03-01 PROCEDURE — 80076 HEPATIC FUNCTION PANEL: CPT | Performed by: EMERGENCY MEDICINE

## 2017-03-01 PROCEDURE — 81001 URINALYSIS AUTO W/SCOPE: CPT | Performed by: EMERGENCY MEDICINE

## 2017-03-01 PROCEDURE — 96375 TX/PRO/DX INJ NEW DRUG ADDON: CPT

## 2017-03-01 PROCEDURE — 82962 GLUCOSE BLOOD TEST: CPT

## 2017-03-01 PROCEDURE — 74177 CT ABD & PELVIS W/CONTRAST: CPT

## 2017-03-01 PROCEDURE — 85025 COMPLETE CBC W/AUTO DIFF WBC: CPT | Performed by: EMERGENCY MEDICINE

## 2017-03-01 PROCEDURE — 80048 BASIC METABOLIC PNL TOTAL CA: CPT | Performed by: EMERGENCY MEDICINE

## 2017-03-01 PROCEDURE — 87086 URINE CULTURE/COLONY COUNT: CPT | Performed by: EMERGENCY MEDICINE

## 2017-03-01 PROCEDURE — 83690 ASSAY OF LIPASE: CPT | Performed by: EMERGENCY MEDICINE

## 2017-03-01 PROCEDURE — 96376 TX/PRO/DX INJ SAME DRUG ADON: CPT

## 2017-03-01 PROCEDURE — 74011636320 HC RX REV CODE- 636/320: Performed by: EMERGENCY MEDICINE

## 2017-03-01 PROCEDURE — 74011250636 HC RX REV CODE- 250/636: Performed by: EMERGENCY MEDICINE

## 2017-03-01 PROCEDURE — 99285 EMERGENCY DEPT VISIT HI MDM: CPT

## 2017-03-01 RX ORDER — HYDROMORPHONE HYDROCHLORIDE 1 MG/ML
1 INJECTION, SOLUTION INTRAMUSCULAR; INTRAVENOUS; SUBCUTANEOUS
Status: COMPLETED | OUTPATIENT
Start: 2017-03-01 | End: 2017-03-01

## 2017-03-01 RX ORDER — POTASSIUM CHLORIDE 20 MEQ/1
40 TABLET, EXTENDED RELEASE ORAL
Status: COMPLETED | OUTPATIENT
Start: 2017-03-01 | End: 2017-03-01

## 2017-03-01 RX ORDER — ONDANSETRON 2 MG/ML
4 INJECTION INTRAMUSCULAR; INTRAVENOUS
Status: COMPLETED | OUTPATIENT
Start: 2017-03-01 | End: 2017-03-01

## 2017-03-01 RX ORDER — KETOROLAC TROMETHAMINE 30 MG/ML
15 INJECTION, SOLUTION INTRAMUSCULAR; INTRAVENOUS
Status: COMPLETED | OUTPATIENT
Start: 2017-03-01 | End: 2017-03-01

## 2017-03-01 RX ORDER — LORAZEPAM 2 MG/ML
1 INJECTION INTRAMUSCULAR
Status: COMPLETED | OUTPATIENT
Start: 2017-03-01 | End: 2017-03-01

## 2017-03-01 RX ORDER — HYDROMORPHONE HYDROCHLORIDE 1 MG/ML
0.5 INJECTION, SOLUTION INTRAMUSCULAR; INTRAVENOUS; SUBCUTANEOUS
Status: COMPLETED | OUTPATIENT
Start: 2017-03-01 | End: 2017-03-01

## 2017-03-01 RX ADMIN — LORAZEPAM 1 MG: 2 INJECTION, SOLUTION INTRAMUSCULAR; INTRAVENOUS at 23:37

## 2017-03-01 RX ADMIN — KETOROLAC TROMETHAMINE 15 MG: 30 INJECTION, SOLUTION INTRAMUSCULAR at 23:18

## 2017-03-01 RX ADMIN — HYDROMORPHONE HYDROCHLORIDE 0.5 MG: 1 INJECTION, SOLUTION INTRAMUSCULAR; INTRAVENOUS; SUBCUTANEOUS at 22:13

## 2017-03-01 RX ADMIN — HYDROMORPHONE HYDROCHLORIDE 1 MG: 1 INJECTION, SOLUTION INTRAMUSCULAR; INTRAVENOUS; SUBCUTANEOUS at 23:18

## 2017-03-01 RX ADMIN — POTASSIUM CHLORIDE 40 MEQ: 20 TABLET, EXTENDED RELEASE ORAL at 23:18

## 2017-03-01 RX ADMIN — GENTAMICIN SULFATE 200 MG: 40 INJECTION, SOLUTION INTRAMUSCULAR; INTRAVENOUS at 22:48

## 2017-03-01 RX ADMIN — ONDANSETRON 4 MG: 2 INJECTION INTRAMUSCULAR; INTRAVENOUS at 22:12

## 2017-03-01 RX ADMIN — IOVERSOL 100 ML: 678 INJECTION INTRA-ARTERIAL; INTRAVENOUS at 23:58

## 2017-03-02 ENCOUNTER — HOSPITAL ENCOUNTER (EMERGENCY)
Age: 67
Discharge: HOME | End: 2017-03-02
Attending: EMERGENCY MEDICINE
Payer: COMMERCIAL

## 2017-03-02 VITALS
OXYGEN SATURATION: 92 % | RESPIRATION RATE: 18 BRPM | HEART RATE: 74 BPM | BODY MASS INDEX: 38.78 KG/M2 | WEIGHT: 212 LBS | TEMPERATURE: 98.3 F | DIASTOLIC BLOOD PRESSURE: 71 MMHG | SYSTOLIC BLOOD PRESSURE: 116 MMHG

## 2017-03-02 VITALS
SYSTOLIC BLOOD PRESSURE: 135 MMHG | HEART RATE: 85 BPM | TEMPERATURE: 97.6 F | OXYGEN SATURATION: 100 % | DIASTOLIC BLOOD PRESSURE: 89 MMHG | RESPIRATION RATE: 14 BRPM

## 2017-03-02 DIAGNOSIS — I82.890 THROMBOSIS OF OVARIAN VEIN: Primary | ICD-10-CM

## 2017-03-02 LAB
APTT PPP: 26.4 SEC (ref 23–36.4)
GLUCOSE BLD STRIP.AUTO-MCNC: 83 MG/DL (ref 70–110)
INR PPP: 1.1 (ref 0.8–1.2)
PROTHROMBIN TIME: 13.9 SEC (ref 11.5–15.2)

## 2017-03-02 PROCEDURE — 99282 EMERGENCY DEPT VISIT SF MDM: CPT

## 2017-03-02 PROCEDURE — 96372 THER/PROPH/DIAG INJ SC/IM: CPT

## 2017-03-02 PROCEDURE — 85730 THROMBOPLASTIN TIME PARTIAL: CPT | Performed by: EMERGENCY MEDICINE

## 2017-03-02 PROCEDURE — 85610 PROTHROMBIN TIME: CPT | Performed by: EMERGENCY MEDICINE

## 2017-03-02 PROCEDURE — 82962 GLUCOSE BLOOD TEST: CPT

## 2017-03-02 PROCEDURE — 74011250636 HC RX REV CODE- 250/636: Performed by: EMERGENCY MEDICINE

## 2017-03-02 RX ORDER — HYDROCODONE BITARTRATE AND ACETAMINOPHEN 5; 325 MG/1; MG/1
1 TABLET ORAL
Qty: 10 TAB | Refills: 0 | Status: SHIPPED | OUTPATIENT
Start: 2017-03-02 | End: 2017-05-21

## 2017-03-02 RX ORDER — SULFAMETHOXAZOLE AND TRIMETHOPRIM 800; 160 MG/1; MG/1
1 TABLET ORAL 2 TIMES DAILY
Qty: 14 TAB | Refills: 0 | Status: SHIPPED | OUTPATIENT
Start: 2017-03-02 | End: 2017-03-09

## 2017-03-02 RX ORDER — ENOXAPARIN SODIUM 150 MG/ML
150 INJECTION SUBCUTANEOUS
Status: COMPLETED | OUTPATIENT
Start: 2017-03-02 | End: 2017-03-02

## 2017-03-02 RX ORDER — NAPROXEN 375 MG/1
375 TABLET ORAL 2 TIMES DAILY WITH MEALS
Qty: 20 TAB | Refills: 0 | Status: SHIPPED | OUTPATIENT
Start: 2017-03-02 | End: 2017-05-21

## 2017-03-02 RX ADMIN — ENOXAPARIN SODIUM 150 MG: 150 INJECTION SUBCUTANEOUS at 11:41

## 2017-03-02 NOTE — ED PROVIDER NOTES
HPI Comments: 10:26 AM Emely Pugh is a 78 y.o. female with a h/o HTN, Biplar disorder, Breast cancer, UTI, and Seizures presents to the ED for a Left ovarian vein thrombosis that was discovered on a CT from 3/1/2017 that she came to the ED for right flank pain. Pt states that she is on 81mg ASA for a previous clot. Dr. Srinath Cabrera requested that the pt be put on Xarelto that the pt denied wanting to take and arrange for an OB/GYN f/u. Pt denies nausea, vomiting, diarrhea, chest pain, or cough. All other sx denied. No other complaints at this time. John Barajas MD      Patient is a 78 y.o. female presenting with other event. The history is provided by the patient. Other   Pertinent negatives include no chest pain, no abdominal pain, no headaches and no shortness of breath. Past Medical History:   Diagnosis Date    Asthma     [facility] 1 [facility] [address]     Breast cancer ([facility] [address] [facility] 2012    left side    Cancer ([facility] [address] [facility]     Hypertension     Seizures ([facility] [address] [facility]     UTI (lower urinary tract infection)        Past Surgical History:   Procedure Laterality Date    Mima Morocho, july 30 2013    HX CHOLECYSTECTOMY      HX MASTECTOMY Left 03/29/2012    complete    HX ORTHOPAEDIC      LEFT FOOT SX.    HX PARTIAL HYSTERECTOMY           Family History:   Problem Relation Age of Onset    Family history unknown: Yes       Social History     Social History    Marital status:      Spouse name: N/A    Number of children: N/A    Years of education: N/A     Occupational History    Not on file. Social History Main Topics    Smoking status: Never Smoker    Smokeless tobacco: Not on file    Alcohol use No    Drug use: No    Sexual activity: Not on file     Other Topics Concern    Not on file     Social History Narrative         ALLERGIES: Carvedilol; Cephalexin; Ciprofloxacin; Epipen [epinephrine]; Other medication; Percocet [oxycodone-acetaminophen]; Stings [sting, bee];  Talwin [pentazocine lactate]; Tetanus toxoid, adsorbed; and Trileptal [oxcarbazepine]    Review of Systems   Constitutional: Negative for chills and fever. HENT: Negative for sore throat. Eyes: Negative for redness. Respiratory: Negative for cough, shortness of breath and wheezing. Cardiovascular: Negative for chest pain. Gastrointestinal: Negative for abdominal pain, diarrhea, nausea and vomiting. Genitourinary: Negative for dysuria. Musculoskeletal: Negative for neck stiffness. Skin: Negative for pallor. Neurological: Negative for headaches. Hematological: Does not bruise/bleed easily. All other systems reviewed and are negative. Vitals:    03/02/17 1007   BP: 135/89   Pulse: 85   Resp: 14   Temp: 97.6 °F (36.4 °C)   SpO2: 100%            Physical Exam   Constitutional: She is oriented to person, place, and time. She appears well-developed and well-nourished. No distress. HENT:   Head: Normocephalic and atraumatic. Mouth/Throat: Oropharynx is clear and moist.   Eyes: Conjunctivae and EOM are normal. Pupils are equal, round, and reactive to light. No scleral icterus. Neck: Normal range of motion. Neck supple. Cardiovascular: Normal rate, regular rhythm and normal heart sounds. No murmur heard. Pulmonary/Chest: Effort normal and breath sounds normal. No respiratory distress. Abdominal: Soft. Bowel sounds are normal. She exhibits no distension. There is no tenderness. Musculoskeletal: She exhibits no edema. Lymphadenopathy:     She has no cervical adenopathy. Neurological: She is alert and oriented to person, place, and time. Coordination normal.   Skin: Skin is warm and dry. No rash noted. Psychiatric: She has a normal mood and affect. Her behavior is normal.   Nursing note and vitals reviewed.        MDM  Number of Diagnoses or Management Options  Thrombosis of ovarian vein:   Diagnosis management comments: Called back for L venous vein thrombosis pt denies any symptoms at present. Pt refused Terri Neal in ED will give dose of heparin 1.5 mg/kg to start anticoagulation until treatment plan can be agreed upon by PCP. Labs drawn last night coags drawn today         Amount and/or Complexity of Data Reviewed  Clinical lab tests: ordered and reviewed  Tests in the radiology section of CPT®: reviewed      ED Course       Procedures  Vitals:  Patient Vitals for the past 12 hrs:   Temp Pulse Resp BP SpO2   03/02/17 1007 97.6 °F (36.4 °C) 85 14 135/89 100 %         Medications ordered:   Medications   enoxaparin (LOVENOX) injection 150 mg (not administered)         Lab findings:  Recent Results (from the past 12 hour(s))   GLUCOSE, POC    Collection Time: 03/01/17 11:33 PM   Result Value Ref Range    Glucose (POC) 78 70 - 110 mg/dL       EKG interpretation by ED Physician:      X-Ray, CT or other radiology findings or impressions:  No orders to display       Progress notes, Consult notes or additional Procedure notes:       Reevaluation of patient:       Disposition:  Diagnosis:   1. Thrombosis of ovarian vein        Disposition:     Follow-up Information     Follow up With Details Comments Contact Info    [id number] EMERGENCY DEPT  As needed, If symptoms worsen [id number] Canelo Jerez MD  keep appointment as scheduled tomorrow [address] [facility] [address] [phone]              Patient's Medications   Start Taking    No medications on file   Continue Taking    ALBUTEROL IN    Take  by inhalation. ASCORBIC ACID (VITAMIN C) 500 MG TABLET    Take 500 mg by mouth. ASPIRIN DELAYED-RELEASE 81 MG TABLET    Take  by mouth daily. CHLORTHALIDONE (HYGROTEN) 25 MG TABLET    Take 25 mg by mouth every other day. CICLOPIROX (LOPROX) 0.77 % TOPICAL GEL    Apply  to affected area two (2) times a day. CITALOPRAM (CELEXA) 10 MG TABLET    Take 10 mg by mouth nightly.     EPINEPHRINE (EPIPEN) 0.3 MG/0.3 ML (1:1,000) INJECTION    0.3 mg by IntraMUSCular route once as needed. ERYTHROMYCIN WITH ETHANOL (ERYGEL) 2 % TOPICAL GEL    Apply  to affected area two (2) times a day. FLUOCINONIDE (LIDEX) 0.05 % OINTMENT    Apply  to affected area two (2) times a day. FLUTICASONE-SALMETEROL (ADVAIR) 250-50 MCG/DOSE DISKUS INHALER    Take 1 Puff by inhalation every twelve (12) hours. GABAPENTIN (NEURONTIN) 100 MG CAPSULE    Take 200 mg by mouth two (2) times a day. GABAPENTIN (NEURONTIN) 100 MG CAPSULE    Take 300 mg by mouth nightly. GLIPIZIDE SR (GLUCOTROL) 5 MG CR TABLET    Take 5 mg by mouth daily. HYDROCODONE-ACETAMINOPHEN (NORCO) 5-325 MG PER TABLET    Take 1 Tab by mouth every four (4) hours as needed for Pain. Max Daily Amount: 6 Tabs. IPRATROPIUM/ALBUTEROL SULFATE (COMBIVENT IN)    Take  by inhalation. LETROZOLE (FEMARA) 2.5 MG TABLET    Take 2.5 mg by mouth daily. LEVOTHYROXINE (SYNTHROID) 50 MCG TABLET    Take 50 mcg by mouth Daily (before breakfast). METHYLCELLULOSE (FIBER THERAPY)    500 mg by Does Not Apply route two (2) times a day. NAPROXEN (NAPROSYN) 375 MG TABLET    Take 1 Tab by mouth two (2) times daily (with meals). OMEPRAZOLE DELAYED RELEASE (PRILOSEC D/R) 20 MG TABLET    Take 40 mg by mouth daily. POTASSIUM CHLORIDE (K-DUR, KLOR-CON) 20 MEQ TABLET    Take 1 Tab by mouth two (2) times a day. ROSUVASTATIN (CRESTOR) 20 MG TABLET    Take 20 mg by mouth nightly. TOPIRAMATE (TOPAMAX) 50 MG TABLET    Take 2 Tabs by mouth two (2) times a day. TRIMETHOPRIM-SULFAMETHOXAZOLE (BACTRIM DS, SEPTRA DS) 160-800 MG PER TABLET    Take 1 Tab by mouth two (2) times a day for 7 days. VERAPAMIL (CALAN) 40 MG TABLET    Take 80 mg by mouth three (3) times daily (with meals). These Medications have changed    No medications on file   Stop Taking    No medications on file     [address] for and in the presence of Ryan Salazar MD 10:33 AM, 03/02/17.     Physician Attestation  I personally performed the services described in the documentation, reviewed the documentation, as recorded by the scribe in my presence, and it accurately and completely records my words and actions.     Allan Flowers MD 10:33 AM 03/02/17        Signed by : Tierney Salinas, 03/02/17 at 10:33 AM

## 2017-03-02 NOTE — ED NOTES
9:04 AM  3/2/2017      Called back re radiology over read L ovarian vein thrombosis. Recommended pt to be brought back to the ED for anticoagulation.  D/w Dr Katina Barajas office will arrange for gyn follow up requested we start Hilda Jackson

## 2017-03-02 NOTE — ED NOTES
Patient reportedly had a seizure/anxiety like episode with intense muscle twitching and itching at her face. Patient fully responsive and answering questions during episode.  Dr Sophie Burton informed and at bedside for episode, per . Sophie Burton will order 1mg Ativan     Blood glucose checked, results 78mg/dL

## 2017-03-02 NOTE — ED NOTES
[facility] EMERGENCY DEPT      78 y.o. female with noted past medical history who presents to the emergency department with RUQ abd pain x 1 day. I performed a brief evaluation, including history and physical, of the patient here in triage and I have determined that pt will need further treatment and evaluation from the main side ER physician. I have placed initial orders to help in expediting patients care. Virgel Ross L. Clifm Libman, M.D.

## 2017-03-02 NOTE — ED PROVIDER NOTES
HPI Comments: Meryle Brady is a 78 y.o. Female with h/o \"liver issues\" in past, req ercp for stone removal, stent with c/o onset of right upper abd cramping that started earlier today and has gotten progressively worse tonight with no associated nvd, urinary sx. No fcs, cough, cp, back pain, syncope. Worse with palpation, movement. No sig abd surgeries. Nothing taken prior to arrival. Felt well yesterday    The history is provided by the patient. Past Medical History:   Diagnosis Date    Asthma     [facility] 1 [facility] [address]     Breast cancer ([facility] [address] 2012    left side    Cancer ([facility] [address]     Hypertension     Seizures ([facility] [address]     UTI (lower urinary tract infection)        Past Surgical History:   Procedure Laterality Date    Tuyet Gross, july 30 2013    HX CHOLECYSTECTOMY      HX MASTECTOMY Left 03/29/2012    complete    HX ORTHOPAEDIC      LEFT FOOT SX.    HX PARTIAL HYSTERECTOMY           Family History:   Problem Relation Age of Onset    Family history unknown: Yes       Social History     Social History    Marital status:      Spouse name: N/A    Number of children: N/A    Years of education: N/A     Occupational History    Not on file. Social History Main Topics    Smoking status: Never Smoker    Smokeless tobacco: Not on file    Alcohol use No    Drug use: No    Sexual activity: Not on file     Other Topics Concern    Not on file     Social History Narrative         ALLERGIES: Carvedilol; Cephalexin; Ciprofloxacin; Epipen [epinephrine]; Other medication; Percocet [oxycodone-acetaminophen]; Stings [sting, bee]; Talwin [pentazocine lactate]; Tetanus toxoid, adsorbed; and Trileptal [oxcarbazepine]    Review of Systems   Constitutional: Negative for fever. HENT: Negative for sore throat. Eyes: Negative for visual disturbance. Respiratory: Negative for cough and shortness of breath. Cardiovascular: Negative for chest pain.    Gastrointestinal: Positive for abdominal pain. Negative for blood in stool and constipation. Endocrine: Negative for polyuria. Genitourinary: Negative for dysuria and hematuria. Musculoskeletal: Negative for back pain and gait problem. Skin: Negative for rash. Neurological: Negative for syncope. Hematological: Does not bruise/bleed easily. Psychiatric/Behavioral: Positive for sleep disturbance. Vitals:    03/02/17 0045 03/02/17 0100 03/02/17 0115 03/02/17 0130   BP: 138/75 100/76 114/74 116/71   Pulse:       Resp:       Temp:       SpO2: 92% 91% 93% 92%   Weight:                Physical Exam   Constitutional: She is oriented to person, place, and time. She appears well-developed and well-nourished. She appears distressed (appears in mod pain, gripping ruq abd). HENT:   Head: Normocephalic and atraumatic. Right Ear: External ear normal.   Left Ear: External ear normal.   Nose: Nose normal.   Mouth/Throat: Uvula is midline, oropharynx is clear and moist and mucous membranes are normal.   Eyes: Conjunctivae are normal. No scleral icterus. Neck: Neck supple. Cardiovascular: Normal rate, regular rhythm, normal heart sounds and intact distal pulses. Pulmonary/Chest: Effort normal and breath sounds normal.   Abdominal: Soft. Normal appearance. She exhibits no distension and no mass. There is no hepatosplenomegaly. There is tenderness in the right upper quadrant. There is guarding. There is no rigidity, no rebound and no CVA tenderness. Musculoskeletal: She exhibits no edema. Lymphadenopathy:     She has no cervical adenopathy. Neurological: She is alert and oriented to person, place, and time. Gait normal.   Skin: Skin is warm and dry. She is not diaphoretic. Psychiatric: Her behavior is normal.   Nursing note and vitals reviewed.        [facility]  ED Course       Procedures  Vitals:  Patient Vitals for the past 12 hrs:   Temp Pulse Resp BP SpO2   03/02/17 0130 - - - 116/71 92 %   03/02/17 0115 - - - 114/74 93 % 03/02/17 0100 - - - 100/76 91 %   03/02/17 0045 - - - 138/75 92 %   03/02/17 0034 - 74 18 140/78 100 %   03/02/17 0030 - - - 140/78 -   03/01/17 2043 98.3 °F (36.8 °C) 82 18 132/71 96 %         Medications ordered:   Medications   HYDROmorphone (PF) (DILAUDID) injection 0.5 mg (0.5 mg IntraVENous Given 3/1/17 2213)   ondansetron (ZOFRAN) injection 4 mg (4 mg IntraVENous Given 3/1/17 2212)   gentamicin (GARAMYCIN) 200 mg in 0.9% sodium chloride 100 mL IVPB (0 mg IntraVENous IV Completed 3/2/17 0032)   potassium chloride (K-DUR, KLOR-CON) SR tablet 40 mEq (40 mEq Oral Given 3/1/17 2318)   ketorolac (TORADOL) injection 15 mg (15 mg IntraVENous Given 3/1/17 2318)   HYDROmorphone (PF) (DILAUDID) injection 1 mg (1 mg IntraVENous Given 3/1/17 2318)   LORazepam (ATIVAN) injection 1 mg (1 mg IntraVENous Given 3/1/17 2337)   ioversol (OPTIRAY) 320 mg iodine/mL contrast injection 125 mL (100 mL IntraVENous Given 3/1/17 2358)         Lab findings:  Recent Results (from the past 12 hour(s))   URINALYSIS W/ RFLX MICROSCOPIC    Collection Time: 03/01/17  9:30 PM   Result Value Ref Range    Color YELLOW      Appearance CLOUDY      Specific gravity 1.024 1.005 - 1.030      pH (UA) 5.0 5.0 - 8.0      Protein NEGATIVE  NEG mg/dL    Glucose NEGATIVE  NEG mg/dL    Ketone NEGATIVE  NEG mg/dL    Bilirubin NEGATIVE  NEG      Blood NEGATIVE  NEG      Urobilinogen 0.2 0.2 - 1.0 EU/dL    Nitrites NEGATIVE  NEG      Leukocyte Esterase LARGE (A) NEG     URINE MICROSCOPIC ONLY    Collection Time: 03/01/17  9:30 PM   Result Value Ref Range    WBC TOO NUMEROUS TO COUNT 0 - 4 /hpf    RBC 0 0 - 5 /hpf    Epithelial cells FEW 0 - 5 /lpf    Bacteria 1+ (A) NEG /hpf   CBC WITH AUTOMATED DIFF    Collection Time: 03/01/17 10:00 PM   Result Value Ref Range    WBC 5.5 4.6 - 13.2 K/uL    RBC 4.23 4.20 - 5.30 M/uL    HGB 13.7 12.0 - 16.0 g/dL    HCT 41.3 35.0 - 45.0 %    MCV 97.6 (H) 74.0 - 97.0 FL    MCH 32.4 24.0 - 34.0 PG    MCHC 33.2 31.0 - 37.0 g/dL RDW 14.1 11.6 - 14.5 %    PLATELET 375 009 - 500 K/uL    MPV 9.6 9.2 - 11.8 FL    NEUTROPHILS 45 40 - 73 %    LYMPHOCYTES 37 21 - 52 %    MONOCYTES 11 (H) 3 - 10 %    EOSINOPHILS 7 (H) 0 - 5 %    BASOPHILS 0 0 - 2 %    ABS. NEUTROPHILS 2.5 1.8 - 8.0 K/UL    ABS. LYMPHOCYTES 2.0 0.9 - 3.6 K/UL    ABS. MONOCYTES 0.6 0.05 - 1.2 K/UL    ABS. EOSINOPHILS 0.4 0.0 - 0.4 K/UL    ABS. BASOPHILS 0.0 0.0 - 0.06 K/UL    DF AUTOMATED     LIPASE    Collection Time: 03/01/17 10:00 PM   Result Value Ref Range    Lipase 168 73 - 368 U/L   METABOLIC PANEL, BASIC    Collection Time: 03/01/17 10:00 PM   Result Value Ref Range    Sodium 145 136 - 145 mmol/L    Potassium 3.3 (L) 3.5 - 5.5 mmol/L    Chloride 107 100 - 108 mmol/L    CO2 29 21 - 32 mmol/L    Anion gap 9 3.0 - 18 mmol/L    Glucose 68 (L) 74 - 99 mg/dL    BUN 22 (H) 7.0 - 18 MG/DL    Creatinine 0.83 0.6 - 1.3 MG/DL    BUN/Creatinine ratio 27 (H) 12 - 20      GFR est AA >60 >60 ml/min/1.73m2    GFR est non-AA >60 >60 ml/min/1.73m2    Calcium 9.0 8.5 - 10.1 MG/DL   HEPATIC FUNCTION PANEL    Collection Time: 03/01/17 10:00 PM   Result Value Ref Range    Protein, total 6.7 6.4 - 8.2 g/dL    Albumin 3.5 3.4 - 5.0 g/dL    Globulin 3.2 2.0 - 4.0 g/dL    A-G Ratio 1.1 0.8 - 1.7      Bilirubin, total 0.4 0.2 - 1.0 MG/DL    Bilirubin, direct 0.1 0.0 - 0.2 MG/DL    Alk. 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 -Prominent dilation of the common hepatic duct, mild dilation of the common bile duct.  Status post cholecystectomy, this may be reservoir effect. Patient reported had ERCP with biliary stent removal in 2016. -Atherosclerosis. Hiatal hernia with fluid in the esophagus; query reflux. -1.3x0.8cm hypodense lesion in the right hepatic lobe measures higher than simple fluid density. -Subcentimeter renal hypodensities are too small to characterize; statistically cysts. -L4-S1 lumbar fusion; Grade I anterolithesis L5 on S1. Presumed neural stimulator in the soft-tissues of the lower back. Tiny fat containing umbilical hernia. Progress notes, Consult notes or additional Procedure notes:   Pain improved. Pt had adverse reaction to 2nd dose dilaudid where she became agitated which resolved with ativan  No rash, swelling, diff breathing  Doubt biliary issue as air more likely from previous ercp  Will treat for uti. Given previous culture results, allergies, septra best option at this time    Reevaluation of patient:   Stable for d/c     Disposition:  Diagnosis:   1. Acute abdominal pain in right flank    2. Acute UTI        Disposition: home      Follow-up Information     Follow up With Details Comments Contact Info    Soledad Cardenas MD Schedule an appointment as soon as possible for a visit  [address] EMERGENCY DEPT  If symptoms worsen [address] [facility].  [phone]            Discharge Medication List as of 3/2/2017  1:43 AM      START taking these medications    Details   HYDROcodone-acetaminophen (NORCO) 5-325 mg per tablet Take 1 Tab by mouth every four (4) hours as needed for Pain. Max Daily Amount: 6 Tabs., Print, Disp-10 Tab, R-0      trimethoprim-sulfamethoxazole (BACTRIM DS, SEPTRA DS) 160-800 mg per tablet Take 1 Tab by mouth two (2) times a day for 7 days. , Print, Disp-14 Tab, R-0      naproxen (NAPROSYN) 375 mg tablet Take 1 Tab by mouth two (2) times daily (with meals). , Print, Disp-20 Tab, R-0         CONTINUE these medications which have NOT CHANGED    Details topiramate (TOPAMAX) 50 mg tablet Take 2 Tabs by mouth two (2) times a day., Normal, Disp-20 Tab, R-0      glipiZIDE SR (GLUCOTROL) 5 mg CR tablet Take 5 mg by mouth daily. , Historical Med      erythromycin with ethanol (ERYGEL) 2 % topical gel Apply  to affected area two (2) times a day., Historical Med      ciclopirox (LOPROX) 0.77 % topical gel Apply  to affected area two (2) times a day., Historical Med      fluocinoNIDE (LIDEX) 0.05 % ointment Apply  to affected area two (2) times a day., Historical Med      !! gabapentin (NEURONTIN) 100 mg capsule Take 200 mg by mouth two (2) times a day., Historical Med      !! gabapentin (NEURONTIN) 100 mg capsule Take 300 mg by mouth nightly., Historical Med      citalopram (CELEXA) 10 mg tablet Take 10 mg by mouth nightly., Historical Med      fluticasone-salmeterol (ADVAIR) 250-50 mcg/dose diskus inhaler Take 1 Puff by inhalation every twelve (12) hours. , Historical Med      METHYLCELLULOSE (FIBER THERAPY) 500 mg by Does Not Apply route two (2) times a day., Historical Med      potassium chloride (K-DUR, KLOR-CON) 20 mEq tablet Take 1 Tab by mouth two (2) times a day., Print, Disp-14 Tab, R-0      aspirin delayed-release 81 mg tablet Take  by mouth daily. , Historical Med      Omeprazole delayed release (PRILOSEC D/R) 20 mg tablet Take 40 mg by mouth daily. , Historical Med      IPRATROPIUM/ALBUTEROL SULFATE (COMBIVENT IN) Take  by inhalation. , Historical Med      ALBUTEROL IN Take  by inhalation. , Historical Med      rosuvastatin (CRESTOR) 20 mg tablet Take 20 mg by mouth nightly., Historical Med      levothyroxine (SYNTHROID) 50 mcg tablet Take 50 mcg by mouth Daily (before breakfast). , Historical Med      verapamil (CALAN) 40 mg tablet Take 80 mg by mouth three (3) times daily (with meals). , Historical Med      chlorthalidone (HYGROTEN) 25 mg tablet Take 25 mg by mouth every other day., Historical Med      letrozole (FEMARA) 2.5 mg tablet Take 2.5 mg by mouth daily. , Historical Med      EPINEPHrine (EPIPEN) 0.3 mg/0.3 mL (1:1,000) injection 0.3 mg by IntraMUSCular route once as needed., Historical Med      ascorbic acid (VITAMIN C) 500 mg tablet Take 500 mg by mouth., Historical Med       !! - Potential duplicate medications found. Please discuss with provider.       STOP taking these medications       dicyclomine (BENTYL) 10 mg capsule Comments:   Reason for Stopping:

## 2017-03-03 LAB
BACTERIA SPEC CULT: ABNORMAL
SERVICE CMNT-IMP: ABNORMAL

## 2017-03-13 ENCOUNTER — HOSPITAL ENCOUNTER (OUTPATIENT)
Dept: GENERAL RADIOLOGY | Age: 67
Discharge: HOME | End: 2017-03-13
Payer: COMMERCIAL

## 2017-03-13 DIAGNOSIS — J18.9 UNRESOLVED PNEUMONIA: ICD-10-CM

## 2017-03-13 PROCEDURE — 71020 XR CHEST PA LAT: CPT

## 2017-05-21 ENCOUNTER — HOSPITAL ENCOUNTER (EMERGENCY)
Age: 67
Discharge: HOME | End: 2017-05-21
Attending: EMERGENCY MEDICINE
Payer: COMMERCIAL

## 2017-05-21 ENCOUNTER — APPOINTMENT (OUTPATIENT)
Dept: CT IMAGING | Age: 67
End: 2017-05-21
Attending: EMERGENCY MEDICINE
Payer: COMMERCIAL

## 2017-05-21 VITALS
SYSTOLIC BLOOD PRESSURE: 115 MMHG | OXYGEN SATURATION: 97 % | BODY MASS INDEX: 38.62 KG/M2 | TEMPERATURE: 98.1 F | DIASTOLIC BLOOD PRESSURE: 56 MMHG | WEIGHT: 218 LBS | HEIGHT: 63 IN | HEART RATE: 78 BPM | RESPIRATION RATE: 19 BRPM

## 2017-05-21 DIAGNOSIS — R56.9 SEIZURE (HCC): Primary | ICD-10-CM

## 2017-05-21 LAB
ANION GAP BLD CALC-SCNC: 5 MMOL/L (ref 3–18)
APTT PPP: 27 SEC (ref 23–36.4)
BASOPHILS # BLD AUTO: 0 K/UL (ref 0–0.06)
BASOPHILS # BLD: 0 % (ref 0–2)
BUN SERPL-MCNC: 21 MG/DL (ref 7–18)
BUN/CREAT SERPL: 25 (ref 12–20)
CALCIUM SERPL-MCNC: 8.6 MG/DL (ref 8.5–10.1)
CHLORIDE SERPL-SCNC: 108 MMOL/L (ref 100–108)
CO2 SERPL-SCNC: 29 MMOL/L (ref 21–32)
CREAT SERPL-MCNC: 0.84 MG/DL (ref 0.6–1.3)
DIFFERENTIAL METHOD BLD: ABNORMAL
EOSINOPHIL # BLD: 0.1 K/UL (ref 0–0.4)
EOSINOPHIL NFR BLD: 3 % (ref 0–5)
ERYTHROCYTE [DISTWIDTH] IN BLOOD BY AUTOMATED COUNT: 14.4 % (ref 11.6–14.5)
GLUCOSE SERPL-MCNC: 128 MG/DL (ref 74–99)
HCT VFR BLD AUTO: 37.2 % (ref 35–45)
HGB BLD-MCNC: 12.4 G/DL (ref 12–16)
INR PPP: 1.5 (ref 0.8–1.2)
LYMPHOCYTES # BLD AUTO: 25 % (ref 21–52)
LYMPHOCYTES # BLD: 1.1 K/UL (ref 0.9–3.6)
MAGNESIUM SERPL-MCNC: 2.2 MG/DL (ref 1.6–2.6)
MCH RBC QN AUTO: 31.6 PG (ref 24–34)
MCHC RBC AUTO-ENTMCNC: 33.3 G/DL (ref 31–37)
MCV RBC AUTO: 94.7 FL (ref 74–97)
MONOCYTES # BLD: 0.4 K/UL (ref 0.05–1.2)
MONOCYTES NFR BLD AUTO: 10 % (ref 3–10)
NEUTS SEG # BLD: 2.7 K/UL (ref 1.8–8)
NEUTS SEG NFR BLD AUTO: 62 % (ref 40–73)
PLATELET # BLD AUTO: 173 K/UL (ref 135–420)
PMV BLD AUTO: 9.1 FL (ref 9.2–11.8)
POTASSIUM SERPL-SCNC: 3.2 MMOL/L (ref 3.5–5.5)
PROTHROMBIN TIME: 17.5 SEC (ref 11.5–15.2)
RBC # BLD AUTO: 3.93 M/UL (ref 4.2–5.3)
SODIUM SERPL-SCNC: 142 MMOL/L (ref 136–145)
VALPROATE SERPL-MCNC: 23 UG/ML (ref 50–100)
WBC # BLD AUTO: 4.4 K/UL (ref 4.6–13.2)

## 2017-05-21 PROCEDURE — 74011250637 HC RX REV CODE- 250/637: Performed by: EMERGENCY MEDICINE

## 2017-05-21 PROCEDURE — 85730 THROMBOPLASTIN TIME PARTIAL: CPT | Performed by: EMERGENCY MEDICINE

## 2017-05-21 PROCEDURE — 83735 ASSAY OF MAGNESIUM: CPT | Performed by: EMERGENCY MEDICINE

## 2017-05-21 PROCEDURE — 85025 COMPLETE CBC W/AUTO DIFF WBC: CPT | Performed by: EMERGENCY MEDICINE

## 2017-05-21 PROCEDURE — 93005 ELECTROCARDIOGRAM TRACING: CPT

## 2017-05-21 PROCEDURE — 99285 EMERGENCY DEPT VISIT HI MDM: CPT

## 2017-05-21 PROCEDURE — 80164 ASSAY DIPROPYLACETIC ACD TOT: CPT | Performed by: EMERGENCY MEDICINE

## 2017-05-21 PROCEDURE — 70450 CT HEAD/BRAIN W/O DYE: CPT

## 2017-05-21 PROCEDURE — 80048 BASIC METABOLIC PNL TOTAL CA: CPT | Performed by: EMERGENCY MEDICINE

## 2017-05-21 PROCEDURE — 85610 PROTHROMBIN TIME: CPT | Performed by: EMERGENCY MEDICINE

## 2017-05-21 RX ORDER — DIVALPROEX SODIUM 125 MG/1
500 CAPSULE, COATED PELLETS ORAL ONCE
Status: COMPLETED | OUTPATIENT
Start: 2017-05-21 | End: 2017-05-21

## 2017-05-21 RX ORDER — LOSARTAN POTASSIUM 100 MG/1
100 TABLET ORAL DAILY
COMMUNITY
End: 2020-01-23

## 2017-05-21 RX ORDER — ACETAMINOPHEN 500 MG
1000 TABLET ORAL ONCE
Status: COMPLETED | OUTPATIENT
Start: 2017-05-21 | End: 2017-05-21

## 2017-05-21 RX ORDER — WARFARIN SODIUM 5 MG/1
5 TABLET ORAL DAILY
COMMUNITY
End: 2017-09-18

## 2017-05-21 RX ADMIN — DIVALPROEX SODIUM 500 MG: 125 CAPSULE, COATED PELLETS ORAL at 14:25

## 2017-05-21 RX ADMIN — ACETAMINOPHEN 1000 MG: 500 TABLET ORAL at 12:34

## 2017-05-21 NOTE — ED NOTES
Per Kelechi Rivera, CT, patient had another seizure in CT, provider made aware, patient A&O at the present time

## 2017-05-21 NOTE — DISCHARGE INSTRUCTIONS

## 2017-05-21 NOTE — ED PROVIDER NOTES
HPI Comments: 10:22 AM Henrik Lozano is a 78 y.o. female with a history of Seizures, HTN, Breast Cancer, HTN and Asthma who presents to the emergency department for evaluation after patient had a witnessed seizure today while in Sunday class during a loud musical performance and notes witnesses stated she was \"out\" for about 3 minutes. Pt denies urinary or bowel incontinence and confirms taking her medications this morning. Pt is currently c/o lower back pain and states that she has follow up already established regarding this. Pt denies fever, chest pain, SOB, visual disturbances or any other symptoms at this time. Pt is on coumadin. No other complaints or concerns at this time. PCP: Lyssa Lindsey MD      The history is provided by the patient. Past Medical History:   Diagnosis Date    Asthma     Bipolar 1 disorder ([address]     [facility] 2012    left side    Cancer ([facility] [facility]     Hypertension     Seizures ([facility] [facility]     UTI (lower urinary tract infection)        Past Surgical History:   Procedure Laterality Date    [facility]      Dr Melendez Shoulders, july 30 2013    HX CHOLECYSTECTOMY      HX MASTECTOMY Left 03/29/2012    complete    HX ORTHOPAEDIC      LEFT FOOT SX.    HX PARTIAL HYSTERECTOMY           Family History:   Problem Relation Age of Onset    Family history unknown: Yes       Social History     Social History    Marital status:      Spouse name: N/A    Number of children: N/A    Years of education: N/A     Occupational History    Not on file. Social History Main Topics    Smoking status: Never Smoker    Smokeless tobacco: Not on file    Alcohol use No    Drug use: No    Sexual activity: Not on file     Other Topics Concern    Not on file     Social History Narrative         ALLERGIES: Carvedilol; Cephalexin; Ciprofloxacin; Epipen [epinephrine]; Other medication; Percocet [oxycodone-acetaminophen]; Stings [sting, bee]; Talwin [pentazocine lactate];  Tetanus toxoid, adsorbed; and Trileptal [oxcarbazepine]    Review of Systems   Constitutional: Negative for diaphoresis and fever. HENT: Negative for congestion and sore throat. Eyes: Negative for pain, itching and visual disturbance. Respiratory: Negative for cough and shortness of breath. Cardiovascular: Negative for chest pain and palpitations. Gastrointestinal: Negative for abdominal pain and diarrhea. Endocrine: Negative for polydipsia and polyuria. Genitourinary: Negative for dysuria and hematuria. Musculoskeletal: Positive for back pain. Negative for arthralgias and myalgias. Skin: Negative for rash and wound. Neurological: Positive for seizures. Negative for syncope. Hematological: Does not bruise/bleed easily. Psychiatric/Behavioral: Negative for agitation and hallucinations. All other systems reviewed and are negative. Vitals:    05/21/17 1023 05/21/17 1030   BP: 112/57 90/50   Pulse: 97 89   Resp: 18 19   Temp: 98.1 °F (36.7 °C)    SpO2: 95% 95%   Weight: 98.9 kg (218 lb)    Height: 5' 3\" (1.6 m)             Physical Exam   Constitutional: She appears well-developed and well-nourished. HENT:   Head: Normocephalic and atraumatic. Eyes: Conjunctivae are normal. No scleral icterus. Neck: Normal range of motion. Neck supple. No JVD present. Cardiovascular: Normal rate, regular rhythm and normal heart sounds. 4 intact extremity pulses   Pulmonary/Chest: Effort normal and breath sounds normal.   Abdominal: Soft. Bowel sounds are normal.   Musculoskeletal: Normal range of motion. Lymphadenopathy:     She has no cervical adenopathy. Neurological: She is alert. She has normal strength. No cranial nerve deficit or sensory deficit. Sensation grossly intact, No cranial nerve or motor deficit   Skin: Skin is warm and dry. Nursing note and vitals reviewed.        MDM  Number of Diagnoses or Management Options  Diagnosis management comments: Documented history of seizures, CT scan in January was negative, doubt metastatic disease. However, she has fallen and is on coumadin so will get CT of head. Not consistent with cardiac syncope. ED Course   Numerous visits to the ED, many for seizure and syncope. According to notes this has been worked up, including cardiology consult and neg nuc med stress test in 2013 and w/o of syncope at [address] She's routinely discharged from the ER after these episodes. Procedures  Vitals:  Patient Vitals for the past 12 hrs:   Temp Pulse Resp BP SpO2   05/21/17 1350 - 78 - 112/59 95 %   05/21/17 1340 - 69 - 101/55 94 %   05/21/17 1320 - 71 - 106/57 96 %   05/21/17 1310 - 83 - 112/47 95 %   05/21/17 1300 - 73 - 106/57 96 %   05/21/17 1030 - 89 19 90/50 95 %   05/21/17 1023 98.1 °F (36.7 °C) 97 18 112/57 95 %   95 %. Percentage is within normal limits. Medications ordered:   Medications - No data to display      Lab findings:  Recent Results (from the past 12 hour(s))   CBC WITH AUTOMATED DIFF    Collection Time: 05/21/17 10:12 AM   Result Value Ref Range    WBC 4.4 (L) 4.6 - 13.2 K/uL    RBC 3.93 (L) 4.20 - 5.30 M/uL    HGB 12.4 12.0 - 16.0 g/dL    HCT 37.2 35.0 - 45.0 %    MCV 94.7 74.0 - 97.0 FL    MCH 31.6 24.0 - 34.0 PG    MCHC 33.3 31.0 - 37.0 g/dL    RDW 14.4 11.6 - 14.5 %    PLATELET 515 666 - 675 K/uL    MPV 9.1 (L) 9.2 - 11.8 FL    NEUTROPHILS 62 40 - 73 %    LYMPHOCYTES 25 21 - 52 %    MONOCYTES 10 3 - 10 %    EOSINOPHILS 3 0 - 5 %    BASOPHILS 0 0 - 2 %    ABS. NEUTROPHILS 2.7 1.8 - 8.0 K/UL    ABS. LYMPHOCYTES 1.1 0.9 - 3.6 K/UL    ABS. MONOCYTES 0.4 0.05 - 1.2 K/UL    ABS. EOSINOPHILS 0.1 0.0 - 0.4 K/UL    ABS.  BASOPHILS 0.0 0.0 - 0.06 K/UL    DF AUTOMATED     METABOLIC PANEL, BASIC    Collection Time: 05/21/17 10:12 AM   Result Value Ref Range    Sodium 142 136 - 145 mmol/L    Potassium 3.2 (L) 3.5 - 5.5 mmol/L    Chloride 108 100 - 108 mmol/L    CO2 29 21 - 32 mmol/L    Anion gap 5 3.0 - 18 mmol/L    Glucose 128 (H) 74 - 99 mg/dL    BUN 21 (H) 7.0 - 18 MG/DL    Creatinine 0.84 0.6 - 1.3 MG/DL    BUN/Creatinine ratio 25 (H) 12 - 20      GFR est AA >60 >60 ml/min/1.73m2    GFR est non-AA >60 >60 ml/min/1.73m2    Calcium 8.6 8.5 - 10.1 MG/DL   MAGNESIUM    Collection Time: 05/21/17 10:12 AM   Result Value Ref Range    Magnesium 2.2 1.6 - 2.6 mg/dL   PROTHROMBIN TIME + INR    Collection Time: 05/21/17 10:12 AM   Result Value Ref Range    Prothrombin time 17.5 (H) 11.5 - 15.2 sec    INR 1.5 (H) 0.8 - 1.2     PTT    Collection Time: 05/21/17 10:12 AM   Result Value Ref Range    aPTT 27.0 23.0 - 36.4 SEC   EKG, 12 LEAD, INITIAL    Collection Time: 05/21/17 10:20 AM   Result Value Ref Range    Ventricular Rate 87 BPM    Atrial Rate 87 BPM    P-R Interval 148 ms    QRS Duration 88 ms    Q-T Interval 370 ms    QTC Calculation (Bezet) 445 ms    Calculated P Axis 40 degrees    Calculated R Axis -46 degrees    Calculated T Axis 9 degrees    Diagnosis       Normal sinus rhythm  Left anterior fascicular block  Voltage criteria for left ventricular hypertrophy  Abnormal ECG  When compared with ECG of 22-JAN-2017 12:06,  ST now depressed in Inferior leads         EKG interpretation by ED Physician:   Sinus rhythm, no ischemia, no high degree black, no preexcitation, no prolonged  QTC or Brugada. X-Ray, CT or other radiology findings or impressions:  CT HEAD WO CONT   Final Result:    IMPRESSION:     1. No acute intracranial abnormalities are identified. No CT evidence to  suggest acute intracranial hematoma, cortical infarct, or mass effect/mass  lesion. 2. Mild cerebral atrophy/volume loss and periventricular white matter changes,  most commonly seen with chronic microvascular disease. Per radiology               Progress notes, Consult notes or additional Procedure notes:  11:42 AM Pt had a witnessed tonic clonic seizure while in CT.     2:08 PM  Labs and CT nonactionable except low depakote level. Will replete, pt will continue with regular dosing. Return to ED for uncontrolled seizures or other problems. States she's been compliant with meds. Happy with plan for discharge. Disposition:  Diagnosis:   1. Seizure [facility])        Disposition: home    Scribe Attestation:     I, [address] [place] [address] for and in the presence of Eros Barton MD May 21, 2017 at 11:42 AM     Physician Attestation:   I personally performed the services described in this documentation, reviewed and edited the documentation which was dictated to the scribe in my presence, and it accurately records my words and actions.  Eros Barton MD  May 21, 2017 at 11:42 AM    Signed by: Tierney Ivory May 21, 2017, 11:42 AM

## 2017-05-21 NOTE — ED NOTES
Purposeful rounding completed:    Side rails up x 2:  YES  Bed in low position and wheels locked: YES  Call bell within reach: YES  Comfort addressed: YES    Toileting needs addressed: YES  Plan of care reviewed/updated with patient and or family members: YES  IV site assessed: YES  Pain assessed and addressed: YES, 8  Family at bedside.

## 2017-05-22 LAB
ATRIAL RATE: 87 BPM
CALCULATED P AXIS, ECG09: 40 DEGREES
CALCULATED R AXIS, ECG10: -46 DEGREES
CALCULATED T AXIS, ECG11: 9 DEGREES
DIAGNOSIS, 93000: NORMAL
P-R INTERVAL, ECG05: 148 MS
Q-T INTERVAL, ECG07: 370 MS
QRS DURATION, ECG06: 88 MS
QTC CALCULATION (BEZET), ECG08: 445 MS
VENTRICULAR RATE, ECG03: 87 BPM

## 2017-07-29 ENCOUNTER — HOSPITAL ENCOUNTER (EMERGENCY)
Age: 67
Discharge: HOME | End: 2017-07-29
Attending: EMERGENCY MEDICINE
Payer: COMMERCIAL

## 2017-07-29 ENCOUNTER — APPOINTMENT (OUTPATIENT)
Dept: CT IMAGING | Age: 67
End: 2017-07-29
Attending: PHYSICIAN ASSISTANT
Payer: COMMERCIAL

## 2017-07-29 VITALS
BODY MASS INDEX: 38.45 KG/M2 | HEART RATE: 72 BPM | DIASTOLIC BLOOD PRESSURE: 99 MMHG | OXYGEN SATURATION: 99 % | HEIGHT: 63 IN | SYSTOLIC BLOOD PRESSURE: 155 MMHG | TEMPERATURE: 97.5 F | RESPIRATION RATE: 16 BRPM | WEIGHT: 217 LBS

## 2017-07-29 DIAGNOSIS — R10.13 ABDOMINAL PAIN, EPIGASTRIC: ICD-10-CM

## 2017-07-29 DIAGNOSIS — N39.0 URINARY TRACT INFECTION WITHOUT HEMATURIA, SITE UNSPECIFIED: Primary | ICD-10-CM

## 2017-07-29 LAB
ALBUMIN SERPL BCP-MCNC: 3.5 G/DL (ref 3.4–5)
ALBUMIN/GLOB SERPL: 1 {RATIO} (ref 0.8–1.7)
ALP SERPL-CCNC: 85 U/L (ref 45–117)
ALT SERPL-CCNC: 45 U/L (ref 13–56)
ANION GAP BLD CALC-SCNC: 17 MMOL/L (ref 10–20)
ANION GAP BLD CALC-SCNC: 8 MMOL/L (ref 3–18)
APPEARANCE UR: ABNORMAL
AST SERPL W P-5'-P-CCNC: 26 U/L (ref 15–37)
BACTERIA URNS QL MICRO: ABNORMAL /HPF
BASOPHILS # BLD AUTO: 0 K/UL (ref 0–0.06)
BASOPHILS # BLD: 0 % (ref 0–2)
BILIRUB SERPL-MCNC: 0.4 MG/DL (ref 0.2–1)
BILIRUB UR QL: NEGATIVE
BUN BLD-MCNC: 25 MG/DL (ref 7–18)
BUN SERPL-MCNC: 23 MG/DL (ref 7–18)
BUN/CREAT SERPL: 30 (ref 12–20)
CA-I BLD-MCNC: 1.15 MMOL/L (ref 1.12–1.32)
CALCIUM SERPL-MCNC: 8.4 MG/DL (ref 8.5–10.1)
CHLORIDE BLD-SCNC: 105 MMOL/L (ref 100–108)
CHLORIDE SERPL-SCNC: 108 MMOL/L (ref 100–108)
CO2 BLD-SCNC: 27 MMOL/L (ref 19–24)
CO2 SERPL-SCNC: 28 MMOL/L (ref 21–32)
COLOR UR: YELLOW
CREAT SERPL-MCNC: 0.76 MG/DL (ref 0.6–1.3)
CREAT UR-MCNC: 0.8 MG/DL (ref 0.6–1.3)
DIFFERENTIAL METHOD BLD: ABNORMAL
EOSINOPHIL # BLD: 0.2 K/UL (ref 0–0.4)
EOSINOPHIL NFR BLD: 3 % (ref 0–5)
EPITH CASTS URNS QL MICRO: ABNORMAL /LPF (ref 0–5)
ERYTHROCYTE [DISTWIDTH] IN BLOOD BY AUTOMATED COUNT: 15.1 % (ref 11.6–14.5)
GLOBULIN SER CALC-MCNC: 3.5 G/DL (ref 2–4)
GLUCOSE BLD STRIP.AUTO-MCNC: 83 MG/DL (ref 74–106)
GLUCOSE SERPL-MCNC: 88 MG/DL (ref 74–99)
GLUCOSE UR STRIP.AUTO-MCNC: NEGATIVE MG/DL
HCT VFR BLD AUTO: 39.7 % (ref 35–45)
HCT VFR BLD CALC: 36 % (ref 36–49)
HGB BLD-MCNC: 12.2 G/DL (ref 12–16)
HGB BLD-MCNC: 13.4 G/DL (ref 12–16)
HGB UR QL STRIP: NEGATIVE
KETONES UR QL STRIP.AUTO: NEGATIVE MG/DL
LEUKOCYTE ESTERASE UR QL STRIP.AUTO: ABNORMAL
LIPASE SERPL-CCNC: 208 U/L (ref 73–393)
LYMPHOCYTES # BLD AUTO: 34 % (ref 21–52)
LYMPHOCYTES # BLD: 1.7 K/UL (ref 0.9–3.6)
MCH RBC QN AUTO: 31.8 PG (ref 24–34)
MCHC RBC AUTO-ENTMCNC: 33.8 G/DL (ref 31–37)
MCV RBC AUTO: 94.1 FL (ref 74–97)
MONOCYTES # BLD: 0.6 K/UL (ref 0.05–1.2)
MONOCYTES NFR BLD AUTO: 12 % (ref 3–10)
NEUTS SEG # BLD: 2.5 K/UL (ref 1.8–8)
NEUTS SEG NFR BLD AUTO: 51 % (ref 40–73)
NITRITE UR QL STRIP.AUTO: NEGATIVE
PH UR STRIP: 8.5 [PH] (ref 5–8)
PLATELET # BLD AUTO: 195 K/UL (ref 135–420)
PMV BLD AUTO: 9.2 FL (ref 9.2–11.8)
POTASSIUM BLD-SCNC: 3.3 MMOL/L (ref 3.5–5.5)
POTASSIUM SERPL-SCNC: 3.7 MMOL/L (ref 3.5–5.5)
PROT SERPL-MCNC: 7 G/DL (ref 6.4–8.2)
PROT UR STRIP-MCNC: ABNORMAL MG/DL
RBC # BLD AUTO: 4.22 M/UL (ref 4.2–5.3)
RBC #/AREA URNS HPF: 0 /HPF (ref 0–5)
SODIUM BLD-SCNC: 144 MMOL/L (ref 136–145)
SODIUM SERPL-SCNC: 144 MMOL/L (ref 136–145)
SP GR UR REFRACTOMETRY: 1.02 (ref 1–1.03)
UROBILINOGEN UR QL STRIP.AUTO: 1 EU/DL (ref 0.2–1)
WBC # BLD AUTO: 5 K/UL (ref 4.6–13.2)
WBC URNS QL MICRO: ABNORMAL /HPF (ref 0–4)

## 2017-07-29 PROCEDURE — 96374 THER/PROPH/DIAG INJ IV PUSH: CPT

## 2017-07-29 PROCEDURE — 80047 BASIC METABLC PNL IONIZED CA: CPT

## 2017-07-29 PROCEDURE — 74011250636 HC RX REV CODE- 250/636: Performed by: EMERGENCY MEDICINE

## 2017-07-29 PROCEDURE — 74011250637 HC RX REV CODE- 250/637: Performed by: PHYSICIAN ASSISTANT

## 2017-07-29 PROCEDURE — 80053 COMPREHEN METABOLIC PANEL: CPT | Performed by: EMERGENCY MEDICINE

## 2017-07-29 PROCEDURE — 83690 ASSAY OF LIPASE: CPT | Performed by: EMERGENCY MEDICINE

## 2017-07-29 PROCEDURE — 87077 CULTURE AEROBIC IDENTIFY: CPT | Performed by: PHYSICIAN ASSISTANT

## 2017-07-29 PROCEDURE — 81001 URINALYSIS AUTO W/SCOPE: CPT | Performed by: PHYSICIAN ASSISTANT

## 2017-07-29 PROCEDURE — 87186 SC STD MICRODIL/AGAR DIL: CPT | Performed by: PHYSICIAN ASSISTANT

## 2017-07-29 PROCEDURE — 85025 COMPLETE CBC W/AUTO DIFF WBC: CPT | Performed by: EMERGENCY MEDICINE

## 2017-07-29 PROCEDURE — 99283 EMERGENCY DEPT VISIT LOW MDM: CPT

## 2017-07-29 PROCEDURE — 74011250636 HC RX REV CODE- 250/636: Performed by: PHYSICIAN ASSISTANT

## 2017-07-29 PROCEDURE — 96361 HYDRATE IV INFUSION ADD-ON: CPT

## 2017-07-29 PROCEDURE — 87086 URINE CULTURE/COLONY COUNT: CPT | Performed by: PHYSICIAN ASSISTANT

## 2017-07-29 PROCEDURE — 74011636320 HC RX REV CODE- 636/320: Performed by: EMERGENCY MEDICINE

## 2017-07-29 PROCEDURE — 74177 CT ABD & PELVIS W/CONTRAST: CPT

## 2017-07-29 RX ORDER — KETOROLAC TROMETHAMINE 30 MG/ML
15 INJECTION, SOLUTION INTRAMUSCULAR; INTRAVENOUS
Status: DISCONTINUED | OUTPATIENT
Start: 2017-07-29 | End: 2017-07-29

## 2017-07-29 RX ORDER — NITROFURANTOIN 25; 75 MG/1; MG/1
100 CAPSULE ORAL
Status: COMPLETED | OUTPATIENT
Start: 2017-07-29 | End: 2017-07-29

## 2017-07-29 RX ORDER — NITROFURANTOIN 25; 75 MG/1; MG/1
100 CAPSULE ORAL 2 TIMES DAILY
Qty: 14 CAP | Refills: 0 | Status: SHIPPED | OUTPATIENT
Start: 2017-07-29 | End: 2017-08-05

## 2017-07-29 RX ORDER — KETOROLAC TROMETHAMINE 30 MG/ML
30 INJECTION, SOLUTION INTRAMUSCULAR; INTRAVENOUS
Status: COMPLETED | OUTPATIENT
Start: 2017-07-29 | End: 2017-07-29

## 2017-07-29 RX ADMIN — KETOROLAC TROMETHAMINE 30 MG: 30 INJECTION, SOLUTION INTRAMUSCULAR at 19:55

## 2017-07-29 RX ADMIN — IOPAMIDOL 100 ML: 612 INJECTION, SOLUTION INTRAVENOUS at 19:12

## 2017-07-29 RX ADMIN — NITROFURANTOIN (MONOHYDRATE/MACROCRYSTALS) 100 MG: 75; 25 CAPSULE ORAL at 20:21

## 2017-07-29 RX ADMIN — SODIUM CHLORIDE 1000 ML: 900 INJECTION, SOLUTION INTRAVENOUS at 17:59

## 2017-07-29 NOTE — ED PROVIDER NOTES
Patient is a 78 y.o. female presenting with diarrhea and abdominal pain. Diarrhea    Associated symptoms include diarrhea. Pertinent negatives include no fever, no nausea, no vomiting, no dysuria, no hematuria and no chest pain. Abdominal Pain    Associated symptoms include diarrhea. Pertinent negatives include no fever, no nausea, no vomiting, no dysuria, no hematuria and no chest pain. Óscar Russ is a 78 y.o. female with hx of diabetes, cholecystectomy and prior hx of pancreatitis, breat cancer presents with diarrhea and epigastric pain started yesterday around 0200 am and currently pain is sharp epigastric currently 8/10. Denies of any fever, chills, n/v or blood in stool, no hx of gastric ulcer. Pt seen by GI in the past and underwent upper and lower scope \"they told me it was normal\"    Past Medical History:   Diagnosis Date    Asthma     [facility] [address] [facility]     Breast cancer ([facility] [address] 2012    left side    Cancer ([facility] [address] [facility]     Hypertension     Seizures ([facility] [facility]     UTI (lower urinary tract infection)        Past Surgical History:   Procedure Laterality Date    Heber Valle, july 30 2013    HX CHOLECYSTECTOMY      HX MASTECTOMY Left 03/29/2012    complete    HX ORTHOPAEDIC      LEFT FOOT SX.    HX PARTIAL HYSTERECTOMY           Family History:   Problem Relation Age of Onset    Family history unknown: Yes       Social History     Social History    Marital status:      Spouse name: N/A    Number of children: N/A    Years of education: N/A     Occupational History    Not on file. Social History Main Topics    Smoking status: Never Smoker    Smokeless tobacco: Not on file    Alcohol use No    Drug use: No    Sexual activity: Not on file     Other Topics Concern    Not on file     Social History Narrative         ALLERGIES: Valium [diazepam]; Carafate [sucralfate]; Carvedilol; Cephalexin; Ciprofloxacin; Eliquis [apixaban]; Epipen [epinephrine];  Other medication; Percocet [oxycodone-acetaminophen]; [employer], bee]; Talwin [pentazocine lactate]; Tetanus toxoid, adsorbed; and Trileptal [oxcarbazepine]    Review of Systems   Constitutional: Negative for chills and fever. HENT: Negative for ear pain, facial swelling, rhinorrhea and sinus pressure. Respiratory: Negative for cough, chest tightness, shortness of breath and wheezing. Cardiovascular: Negative for chest pain and palpitations. Gastrointestinal: Positive for abdominal pain and diarrhea. Negative for anal bleeding, blood in stool, nausea, rectal pain and vomiting. Genitourinary: Negative for difficulty urinating, dysuria, hematuria, pelvic pain and urgency. Neurological: Negative for tremors, seizures, syncope, weakness and light-headedness. Vitals:    07/29/17 1755   BP: 124/78   Pulse: 81   Resp: 15   Temp: 99.1 °F (37.3 °C)   SpO2: 96%   Weight: 98.4 kg (217 lb)   Height: 5' 3\" (1.6 m)            Physical Exam   Constitutional: She is oriented to person, place, and time. She appears well-developed and well-nourished. No distress. HENT:   Head: Normocephalic and atraumatic. Eyes: Conjunctivae and EOM are normal. Pupils are equal, round, and reactive to light. Cardiovascular: Normal rate, regular rhythm and normal heart sounds. Pulmonary/Chest: Effort normal and breath sounds normal. No respiratory distress. She has no wheezes. She has no rales. She exhibits no tenderness. Abdominal: Soft. Bowel sounds are normal. She exhibits no distension. There is tenderness. There is guarding. There is no rebound. Epigastric TTP. Neurological: She is alert and oriented to person, place, and time. Skin: Skin is warm. She is not diaphoretic. Psychiatric: She has a normal mood and affect.  Her behavior is normal. Judgment and thought content normal.        MDM  Number of Diagnoses or Management Options  Abdominal pain, epigastric:   Urinary tract infection without hematuria, site unspecified:   Diagnosis management comments: With prior [facility] records showing elevated lipase in the past and being diabetic being on oral antihyperglycemic will check lipase, Ct abd and pelvic   IV fluids and labs ordered. 8:16 PM   CT abd and pelvic   \"- History of left ovarian vein thrombosis. Ovarian vasculature nonopacified today. - Nothing else acute    - Stable chronic findings, including: cholecystectomy, hepatomegally, likely benign hepatic and renal cysts, hiatal hernia, nonobstructive nephrolithiasis, diverticulosis, and postoperative sacrolumbar degenerative changes. \"    Updated patient on results   Will treat for UTI and recommend follow up with GI and PCP as OP   8:18 PM re evaluated with pain currently 4/10 will discharge. ED Course       Procedures      Recent Results (from the past 12 hour(s))   CBC WITH AUTOMATED DIFF    Collection Time: 07/29/17  6:35 PM   Result Value Ref Range    WBC 5.0 4.6 - 13.2 K/uL    RBC 4.22 4.20 - 5.30 M/uL    HGB 13.4 12.0 - 16.0 g/dL    HCT 39.7 35.0 - 45.0 %    MCV 94.1 74.0 - 97.0 FL    MCH 31.8 24.0 - 34.0 PG    MCHC 33.8 31.0 - 37.0 g/dL    RDW 15.1 (H) 11.6 - 14.5 %    PLATELET 217 710 - 097 K/uL    MPV 9.2 9.2 - 11.8 FL    NEUTROPHILS 51 40 - 73 %    LYMPHOCYTES 34 21 - 52 %    MONOCYTES 12 (H) 3 - 10 %    EOSINOPHILS 3 0 - 5 %    BASOPHILS 0 0 - 2 %    ABS. NEUTROPHILS 2.5 1.8 - 8.0 K/UL    ABS. LYMPHOCYTES 1.7 0.9 - 3.6 K/UL    ABS. MONOCYTES 0.6 0.05 - 1.2 K/UL    ABS. EOSINOPHILS 0.2 0.0 - 0.4 K/UL    ABS.  BASOPHILS 0.0 0.0 - 0.06 K/UL    DF AUTOMATED     METABOLIC PANEL, COMPREHENSIVE    Collection Time: 07/29/17  6:35 PM   Result Value Ref Range    Sodium 144 136 - 145 mmol/L    Potassium 3.7 3.5 - 5.5 mmol/L    Chloride 108 100 - 108 mmol/L    CO2 28 21 - 32 mmol/L    Anion gap 8 3.0 - 18 mmol/L    Glucose 88 74 - 99 mg/dL    BUN 23 (H) 7.0 - 18 MG/DL    Creatinine 0.76 0.6 - 1.3 MG/DL    BUN/Creatinine ratio 30 (H) 12 - 20      GFR est AA >60 >60 ml/min/1.73m2    GFR est non-AA >60 >60 ml/min/1.73m2    Calcium 8.4 (L) 8.5 - 10.1 MG/DL    Bilirubin, total 0.4 0.2 - 1.0 MG/DL    ALT (SGPT) 45 13 - 56 U/L    AST (SGOT) 26 15 - 37 U/L    Alk. phosphatase 85 45 - 117 U/L    Protein, total 7.0 6.4 - 8.2 g/dL    Albumin 3.5 3.4 - 5.0 g/dL    Globulin 3.5 2.0 - 4.0 g/dL    A-G Ratio 1.0 0.8 - 1.7     LIPASE    Collection Time: 07/29/17  6:35 PM   Result Value Ref Range    Lipase 208 73 - 393 U/L   POC CHEM8    Collection Time: 07/29/17  6:49 PM   Result Value Ref Range    CO2, POC 27 (H) 19 - 24 MMOL/L    Glucose, POC 83 74 - 106 MG/DL    BUN, POC 25 (H) 7 - 18 MG/DL    Creatinine, POC 0.8 0.6 - 1.3 MG/DL    GFRAA, POC >60 >60 ml/min/1.73m2    GFRNA, POC >60 >60 ml/min/1.73m2    Sodium,  136 - 145 MMOL/L    Potassium, POC 3.3 (L) 3.5 - 5.5 MMOL/L    Calcium, ionized (POC) 1.15 1.12 - 1.32 MMOL/L    Chloride,  100 - 108 MMOL/L    Anion gap, POC 17 10 - 20      Hematocrit, POC 36 36 - 49 %    Hemoglobin, POC 12.2 12 - 16 G/DL   URINALYSIS W/ RFLX MICROSCOPIC    Collection Time: 07/29/17  7:00 PM   Result Value Ref Range    Color YELLOW      Appearance CLOUDY      Specific gravity 1.022 1.005 - 1.030      pH (UA) 8.5 (H) 5.0 - 8.0      Protein TRACE (A) NEG mg/dL    Glucose NEGATIVE  NEG mg/dL    Ketone NEGATIVE  NEG mg/dL    Bilirubin NEGATIVE  NEG      Blood NEGATIVE  NEG      Urobilinogen 1.0 0.2 - 1.0 EU/dL    Nitrites NEGATIVE  NEG      Leukocyte Esterase LARGE (A) NEG     URINE MICROSCOPIC ONLY    Collection Time: 07/29/17  7:00 PM   Result Value Ref Range    WBC TOO NUMEROUS TO COUNT 0 - 4 /hpf    RBC 0 0 - 5 /hpf    Epithelial cells 1+ 0 - 5 /lpf    Bacteria 4+ (A) NEG /hpf           DISCHARGE NOTE:  8:22 PM    Joseline Barillas's  results have been reviewed with her. She has been counseled regarding her diagnosis, treatment, and plan.   She verbally conveys understanding and agreement of the signs, symptoms, diagnosis, treatment and prognosis and additionally agrees to follow up as discussed. She also agrees with the care-plan and conveys that all of her questions have been answered. I have also provided discharge instructions for her that include: educational information regarding their diagnosis and treatment, and list of reasons why they would want to return to the ED prior to their follow-up appointment, should her condition change. CLINICAL IMPRESSION:    1. Urinary tract infection without hematuria, site unspecified    2. Abdominal pain, epigastric        AFTER VISIT PLAN:    Current Discharge Medication List      START taking these medications    Details   nitrofurantoin, macrocrystal-monohydrate, (MACROBID) 100 mg capsule Take 1 Cap by mouth two (2) times a day for 7 days. Qty: 14 Cap, Refills: 0              Follow-up Information     Follow up With Details Comments Contact Info    Suzen Carrel, MD In 2 days  [address] [id number] [facility]      Pari Jeffrey MD Schedule an appointment as soon as possible for a visit please follow up with gastroentrology for epigastric pain if it does not get better in the next 2-3 days.   [address] [address] [phone]      [facility] EMERGENCY DEPT  If symptoms worsen [address]           Written by Leon Escamilla PA-C

## 2017-07-29 NOTE — ED TRIAGE NOTES
Provider in triage: diarrhea since 2:30 this morning  Ordered: fluids lft cmp cbc  DDx: diarrhea  Sent to (MAIN treatment area, FAST track): fast

## 2017-07-30 NOTE — DISCHARGE INSTRUCTIONS
Abdominal Pain: Care Instructions  Your Care Instructions    Abdominal pain has many possible causes. Some aren't serious and get better on their own in a few days. Others need more testing and treatment. If your pain continues or gets worse, you need to be rechecked and may need more tests to find out what is wrong. You may need surgery to correct the problem. Don't ignore new symptoms, such as fever, nausea and vomiting, urination problems, pain that gets worse, and dizziness. These may be signs of a more serious problem. Your doctor may have recommended a follow-up visit in the next 8 to 12 hours. If you are not getting better, you may need more tests or treatment. The doctor has checked you carefully, but problems can develop later. If you notice any problems or new symptoms, get medical treatment right away. Follow-up care is a key part of your treatment and safety. Be sure to make and go to all appointments, and call your doctor if you are having problems. It's also a good idea to know your test results and keep a list of the medicines you take. How can you care for yourself at home? · Rest until you feel better. · To prevent dehydration, drink plenty of fluids, enough so that your urine is light yellow or clear like water. Choose water and other caffeine-free clear liquids until you feel better. If you have kidney, heart, or liver disease and have to limit fluids, talk with your doctor before you increase the amount of fluids you drink. · If your stomach is upset, eat mild foods, such as rice, dry toast or crackers, bananas, and applesauce. Try eating several small meals instead of two or three large ones. · Wait until 48 hours after all symptoms have gone away before you have spicy foods, alcohol, and drinks that contain caffeine. · Do not eat foods that are high in fat. · Avoid anti-inflammatory medicines such as aspirin, ibuprofen (Advil, Motrin), and naproxen (Aleve).  These can cause stomach upset. Talk to your doctor if you take daily aspirin for another health problem. When should you call for help? Call 911 anytime you think you may need emergency care. For example, call if:  · You passed out (lost consciousness). · You pass maroon or very bloody stools. · You vomit blood or what looks like coffee grounds. · You have new, severe belly pain. Call your doctor now or seek immediate medical care if:  · Your pain gets worse, especially if it becomes focused in one area of your belly. · You have a new or higher fever. · Your stools are black and look like tar, or they have streaks of blood. · You have unexpected vaginal bleeding. · You have symptoms of a urinary tract infection. These may include:  ¨ Pain when you urinate. ¨ Urinating more often than usual.  ¨ Blood in your urine. · You are dizzy or lightheaded, or you feel like you may faint. Watch closely for changes in your health, and be sure to contact your doctor if:  · You are not getting better after 1 day (24 hours). Where can you learn more? Go to http://[employer].info/. Enter W229 in the search box to learn more about \"Abdominal Pain: Care Instructions. \"  Current as of: March 20, 2017  Content Version: 11.3  © 5667-2182 [employer]. Care instructions adapted under license by [employer] (which disclaims liability or warranty for this information). If you have questions about a medical condition or this instruction, always ask your healthcare professional. [place] [address] any warranty or liability for your use of this information. Indigestion (Dyspepsia or Heartburn): Care Instructions  Your Care Instructions  Sometimes it can be hard to pinpoint the cause of indigestion (dyspepsia or heartburn). Most cases of an upset stomach with bloating, burning, burping, and nausea are minor and go away within several hours.  Home treatment and over-the-counter medicine often are able to control symptoms. But if you take medicine to relieve your indigestion without making diet and lifestyle changes, your symptoms are likely to return again and again. If you get indigestion often, it may be a sign of a more serious medical problem. Be sure to follow up with your doctor, who may want to do tests to be sure of the cause of your indigestion. Follow-up care is a key part of your treatment and safety. Be sure to make and go to all appointments, and call your doctor if you are having problems. It's also a good idea to know your test results and keep a list of the medicines you take. How can you care for yourself at home? · Your doctor may recommend over-the-counter medicine. For mild or occasional indigestion, antacids such as Tums, Gaviscon, Mylanta, or Maalox may help. Be careful when you take over-the-counter antacid medicines. Many of these medicines have aspirin in them. Read the label to make sure that you are not taking more than the recommended dose. Too much aspirin can be harmful. · Your doctor also may recommend over-the-counter acid reducers, such as Pepcid AC, Tagamet HB, Zantac 75, or Prilosec. Read and follow all instructions on the label. If you use these medicines often, talk with your doctor. · Change your eating habits. ¨ It's best to eat several small meals instead of two or three large meals. ¨ After you eat, wait 2 to 3 hours before you lie down. ¨ Chocolate, mint, and alcohol can make GERD worse. ¨ Spicy foods, foods that have a lot of acid (like tomatoes and oranges), and coffee can make GERD symptoms worse in some people. If your symptoms are worse after you eat a certain food, you may want to stop eating that food to see if your symptoms get better. · Do not smoke or chew tobacco. Smoking can make GERD worse. If you need help quitting, talk to your doctor about stop-smoking programs and medicines.  These can increase your chances of quitting for good. · If you have GERD symptoms at night, raise the head of your bed 6 to 8 inches by putting the frame on blocks or placing a foam wedge under the head of your mattress. (Adding extra pillows does not work.)  · Do not wear tight clothing around your middle. · Lose weight if you need to. Losing just 5 to 10 pounds can help. · Do not take anti-inflammatory medicines, such as aspirin, ibuprofen (Advil, Motrin), or naproxen (Aleve). These can irritate the stomach. If you need a pain medicine, try acetaminophen (Tylenol), which does not cause stomach upset. When should you call for help? Call 911 anytime you think you may need emergency care. For example, call if:  · You passed out (lost consciousness). · You vomit blood or what looks like coffee grounds. · You pass maroon or very bloody stools. · You have chest pain or pressure. This may occur with:  ¨ Sweating. ¨ Shortness of breath. ¨ Nausea or vomiting. ¨ Pain that spreads from the chest to the neck, jaw, or one or both shoulders or arms. ¨ Feeling dizzy or lightheaded. ¨ A fast or uneven pulse. After calling 911, chew 1 adult-strength aspirin. Wait for an ambulance. Do not try to drive yourself. Call your doctor now or seek immediate medical care if:  · You have severe belly pain. · Your stools are black and tarlike or have streaks of blood. · You have trouble swallowing. · You are losing weight and do not know why. Watch closely for changes in your health, and be sure to contact your doctor if:  · You do not get better as expected. Where can you learn more? Go to http://ram[employer]-ayden.info/. Enter U503 in the search box to learn more about \"Indigestion (Dyspepsia or Heartburn): Care Instructions. \"  Current as of: November 16, 2016  Content Version: 11.3  © 3156-7562 [employer]. Care instructions adapted under license by [employer] (which disclaims liability or warranty for this information).  If you have questions about a medical condition or this instruction, always ask your healthcare professional. [place] [address] any warranty or liability for your use of this information.

## 2017-08-01 LAB
BACTERIA SPEC CULT: ABNORMAL
BACTERIA SPEC CULT: ABNORMAL
SERVICE CMNT-IMP: ABNORMAL

## 2017-08-01 NOTE — PROGRESS NOTES
Pt was placed on macrobid at the time of the ED visit which is susceptible.  Urszula Lehman PA-C 1:58 PM

## 2017-09-13 PROBLEM — M48.061 LUMBAR STENOSIS: Status: ACTIVE | Noted: 2017-09-13

## 2017-11-29 ENCOUNTER — HOSPITAL ENCOUNTER (OUTPATIENT)
Dept: LAB | Age: 67
Discharge: HOME | End: 2017-11-29
Payer: COMMERCIAL

## 2017-11-29 LAB
BASOPHILS # BLD: 0 K/UL (ref 0–0.06)
BASOPHILS NFR BLD: 0 % (ref 0–2)
DIFFERENTIAL METHOD BLD: ABNORMAL
EOSINOPHIL # BLD: 0.2 K/UL (ref 0–0.4)
EOSINOPHIL NFR BLD: 4 % (ref 0–5)
ERYTHROCYTE [DISTWIDTH] IN BLOOD BY AUTOMATED COUNT: 15.7 % (ref 11.6–14.5)
HCT VFR BLD AUTO: 38.6 % (ref 35–45)
HGB BLD-MCNC: 12 G/DL (ref 12–16)
LYMPHOCYTES # BLD: 1.6 K/UL (ref 0.9–3.6)
LYMPHOCYTES NFR BLD: 30 % (ref 21–52)
MCH RBC QN AUTO: 30 PG (ref 24–34)
MCHC RBC AUTO-ENTMCNC: 31.1 G/DL (ref 31–37)
MCV RBC AUTO: 96.5 FL (ref 74–97)
MONOCYTES # BLD: 0.5 K/UL (ref 0.05–1.2)
MONOCYTES NFR BLD: 10 % (ref 3–10)
NEUTS SEG # BLD: 2.9 K/UL (ref 1.8–8)
NEUTS SEG NFR BLD: 56 % (ref 40–73)
PLATELET # BLD AUTO: 187 K/UL (ref 135–420)
PMV BLD AUTO: 9.4 FL (ref 9.2–11.8)
RBC # BLD AUTO: 4 M/UL (ref 4.2–5.3)
WBC # BLD AUTO: 5.2 K/UL (ref 4.6–13.2)

## 2017-11-29 PROCEDURE — 86003 ALLG SPEC IGE CRUDE XTRC EA: CPT | Performed by: INTERNAL MEDICINE

## 2017-11-29 PROCEDURE — 85025 COMPLETE CBC W/AUTO DIFF WBC: CPT | Performed by: INTERNAL MEDICINE

## 2017-11-29 PROCEDURE — 82785 ASSAY OF IGE: CPT | Performed by: INTERNAL MEDICINE

## 2017-11-29 PROCEDURE — 36415 COLL VENOUS BLD VENIPUNCTURE: CPT | Performed by: INTERNAL MEDICINE

## 2017-11-30 LAB — IGE SERPL-ACNC: 541 IU/ML (ref 0–100)

## 2017-12-02 LAB
A ALTERNATA IGE QN: 0.32 KU/L
A FUMIGATUS IGE QN: <0.1 KU/L
AMER ROACH IGE QN: <0.1 KU/L
AMER SYCAMORE IGE QN: 0.12 KU/L
BAHIA GRASS IGE QN: 0.44 KU/L
BERMUDA GRASS IGE QN: 0.37 KU/L
BOXELDER IGE QN: <0.1 KU/L
C HERBARUM IGE QN: <0.1 KU/L
CAT DANDER IGG QN: 2.59 KU/L
CLASS DESCRIPTION, 600268: ABNORMAL
COMMON RAGWEED IGE QN: <0.1 KU/L
D FARINAE IGE QN: 59.1 KU/L
D PTERONYSS IGE QN: 73.7 KU/L
DEPRECATED IGE QN: <0.1 KU/L
DOG DANDER IGE QN: 29.2 KU/L
ENGL PLANTAIN IGE QN: <0.1 KU/L
JOHNSON GRASS IGE QN: 0.26 KU/L
M RACEMOSUS IGE QN: <0.1 KU/L
MT JUNIPER IGE QN: <0.1 KU/L
MUGWORT IGE QN: <0.1 KU/L
NETTLE IGE QN: <0.1 KU/L
P NOTATUM IGE QN: <0.1 KU/L
S BOTRYOSUM IGE QN: <0.1 KU/L
SHEEP SORREL IGE QN: <0.1 KU/L
SWEET GUM IGE QN: <0.1 KU/L
TIMOTHY IGE QN: 1 KU/L
WHITE BIRCH IGE QN: <0.1 KU/L
WHITE ELM IGG QN: <0.1 KU/L
WHITE HICKORY IGE QN: 0.37 KU/L
WHITE MULBERRY IGE QN: <0.1 KU/L
WHITE OAK IGE QN: <0.1 KU/L

## 2018-05-30 ENCOUNTER — HOSPITAL ENCOUNTER (OUTPATIENT)
Dept: ULTRASOUND IMAGING | Age: 68
Discharge: HOME | End: 2018-05-30
Attending: INTERNAL MEDICINE
Payer: COMMERCIAL

## 2018-05-30 DIAGNOSIS — R10.33 PERIUMBILICAL PAIN: ICD-10-CM

## 2018-05-30 PROCEDURE — 76700 US EXAM ABDOM COMPLETE: CPT

## 2018-06-11 ENCOUNTER — HOSPITAL ENCOUNTER (EMERGENCY)
Age: 68
Discharge: HOME | End: 2018-06-11
Attending: EMERGENCY MEDICINE
Payer: COMMERCIAL

## 2018-06-11 VITALS
HEART RATE: 69 BPM | OXYGEN SATURATION: 97 % | TEMPERATURE: 97.1 F | DIASTOLIC BLOOD PRESSURE: 74 MMHG | SYSTOLIC BLOOD PRESSURE: 122 MMHG | RESPIRATION RATE: 19 BRPM

## 2018-06-11 DIAGNOSIS — Z79.899 SEIZURE SECONDARY TO SUBTHERAPEUTIC ANTICONVULSANT MEDICATION (HCC): ICD-10-CM

## 2018-06-11 DIAGNOSIS — N30.00 ACUTE CYSTITIS WITHOUT HEMATURIA: ICD-10-CM

## 2018-06-11 DIAGNOSIS — R56.9 SEIZURE SECONDARY TO SUBTHERAPEUTIC ANTICONVULSANT MEDICATION (HCC): ICD-10-CM

## 2018-06-11 DIAGNOSIS — G40.909 SEIZURE DISORDER (HCC): Primary | ICD-10-CM

## 2018-06-11 LAB
ALBUMIN SERPL-MCNC: 3.3 G/DL (ref 3.4–5)
ALBUMIN/GLOB SERPL: 0.9 {RATIO} (ref 0.8–1.7)
ALP SERPL-CCNC: 65 U/L (ref 45–117)
ALT SERPL-CCNC: 27 U/L (ref 13–56)
ANION GAP SERPL CALC-SCNC: 8 MMOL/L (ref 3–18)
APPEARANCE UR: ABNORMAL
AST SERPL-CCNC: 19 U/L (ref 15–37)
ATRIAL RATE: 78 BPM
BACTERIA URNS QL MICRO: ABNORMAL /HPF
BASOPHILS # BLD: 0 K/UL (ref 0–0.06)
BASOPHILS NFR BLD: 0 % (ref 0–2)
BILIRUB SERPL-MCNC: 0.3 MG/DL (ref 0.2–1)
BILIRUB UR QL: NEGATIVE
BUN SERPL-MCNC: 18 MG/DL (ref 7–18)
BUN/CREAT SERPL: 22 (ref 12–20)
CALCIUM SERPL-MCNC: 8.2 MG/DL (ref 8.5–10.1)
CALCULATED P AXIS, ECG09: 39 DEGREES
CALCULATED R AXIS, ECG10: -45 DEGREES
CALCULATED T AXIS, ECG11: 25 DEGREES
CHLORIDE SERPL-SCNC: 111 MMOL/L (ref 100–108)
CO2 SERPL-SCNC: 26 MMOL/L (ref 21–32)
COLOR UR: ABNORMAL
CREAT SERPL-MCNC: 0.82 MG/DL (ref 0.6–1.3)
DIAGNOSIS, 93000: NORMAL
DIFFERENTIAL METHOD BLD: ABNORMAL
EOSINOPHIL # BLD: 0.1 K/UL (ref 0–0.4)
EOSINOPHIL NFR BLD: 3 % (ref 0–5)
EPITH CASTS URNS QL MICRO: ABNORMAL /LPF (ref 0–5)
ERYTHROCYTE [DISTWIDTH] IN BLOOD BY AUTOMATED COUNT: 15.7 % (ref 11.6–14.5)
GLOBULIN SER CALC-MCNC: 3.5 G/DL (ref 2–4)
GLUCOSE SERPL-MCNC: 148 MG/DL (ref 74–99)
GLUCOSE UR STRIP.AUTO-MCNC: NEGATIVE MG/DL
HCT VFR BLD AUTO: 38.8 % (ref 35–45)
HGB BLD-MCNC: 12.4 G/DL (ref 12–16)
HGB UR QL STRIP: ABNORMAL
KETONES UR QL STRIP.AUTO: NEGATIVE MG/DL
LEUKOCYTE ESTERASE UR QL STRIP.AUTO: ABNORMAL
LYMPHOCYTES # BLD: 1.5 K/UL (ref 0.9–3.6)
LYMPHOCYTES NFR BLD: 39 % (ref 21–52)
MCH RBC QN AUTO: 29.8 PG (ref 24–34)
MCHC RBC AUTO-ENTMCNC: 32 G/DL (ref 31–37)
MCV RBC AUTO: 93.3 FL (ref 74–97)
MONOCYTES # BLD: 0.3 K/UL (ref 0.05–1.2)
MONOCYTES NFR BLD: 9 % (ref 3–10)
NEUTS SEG # BLD: 1.8 K/UL (ref 1.8–8)
NEUTS SEG NFR BLD: 49 % (ref 40–73)
NITRITE UR QL STRIP.AUTO: POSITIVE
P-R INTERVAL, ECG05: 144 MS
PH UR STRIP: 6.5 [PH] (ref 5–8)
PLATELET # BLD AUTO: 184 K/UL (ref 135–420)
PMV BLD AUTO: 9.6 FL (ref 9.2–11.8)
POTASSIUM SERPL-SCNC: 3.8 MMOL/L (ref 3.5–5.5)
PROT SERPL-MCNC: 6.8 G/DL (ref 6.4–8.2)
PROT UR STRIP-MCNC: NEGATIVE MG/DL
Q-T INTERVAL, ECG07: 398 MS
QRS DURATION, ECG06: 94 MS
QTC CALCULATION (BEZET), ECG08: 453 MS
RBC # BLD AUTO: 4.16 M/UL (ref 4.2–5.3)
RBC #/AREA URNS HPF: ABNORMAL /HPF (ref 0–5)
SODIUM SERPL-SCNC: 145 MMOL/L (ref 136–145)
SP GR UR REFRACTOMETRY: 1.02 (ref 1–1.03)
UROBILINOGEN UR QL STRIP.AUTO: 0.2 EU/DL (ref 0.2–1)
VALPROATE SERPL-MCNC: 37 UG/ML (ref 50–100)
VENTRICULAR RATE, ECG03: 78 BPM
WBC # BLD AUTO: 3.7 K/UL (ref 4.6–13.2)
WBC URNS QL MICRO: ABNORMAL /HPF (ref 0–4)

## 2018-06-11 PROCEDURE — 80164 ASSAY DIPROPYLACETIC ACD TOT: CPT

## 2018-06-11 PROCEDURE — 80053 COMPREHEN METABOLIC PANEL: CPT

## 2018-06-11 PROCEDURE — 93005 ELECTROCARDIOGRAM TRACING: CPT

## 2018-06-11 PROCEDURE — 74011250637 HC RX REV CODE- 250/637: Performed by: EMERGENCY MEDICINE

## 2018-06-11 PROCEDURE — 85025 COMPLETE CBC W/AUTO DIFF WBC: CPT

## 2018-06-11 PROCEDURE — 99285 EMERGENCY DEPT VISIT HI MDM: CPT

## 2018-06-11 PROCEDURE — 81001 URINALYSIS AUTO W/SCOPE: CPT

## 2018-06-11 RX ORDER — NITROFURANTOIN 25; 75 MG/1; MG/1
100 CAPSULE ORAL 2 TIMES DAILY WITH MEALS
Status: DISCONTINUED | OUTPATIENT
Start: 2018-06-11 | End: 2018-06-11 | Stop reason: HOSPADM

## 2018-06-11 RX ORDER — NITROFURANTOIN 25; 75 MG/1; MG/1
100 CAPSULE ORAL 2 TIMES DAILY WITH MEALS
Status: DISCONTINUED | OUTPATIENT
Start: 2018-06-11 | End: 2018-06-11

## 2018-06-11 RX ORDER — DIVALPROEX SODIUM 250 MG/1
500 TABLET, DELAYED RELEASE ORAL ONCE
Status: COMPLETED | OUTPATIENT
Start: 2018-06-11 | End: 2018-06-11

## 2018-06-11 RX ORDER — NITROFURANTOIN 25; 75 MG/1; MG/1
100 CAPSULE ORAL 2 TIMES DAILY
Qty: 14 CAP | Refills: 0 | Status: SHIPPED | OUTPATIENT
Start: 2018-06-11 | End: 2018-06-18

## 2018-06-11 RX ADMIN — NITROFURANTOIN (MONOHYDRATE/MACROCRYSTALS) 100 MG: 75; 25 CAPSULE ORAL at 15:58

## 2018-06-11 RX ADMIN — DIVALPROEX SODIUM 500 MG: 250 TABLET, DELAYED RELEASE ORAL at 15:58

## 2018-06-11 NOTE — ED TRIAGE NOTES
Per EMS, Yoshi Lehman had a seizure at the lung dr office while her asthma was getting checked out. She was postictal on arrival, was a little combative but started to get better after a little while. \" Pt arrived, alert, slightly confused.

## 2018-06-11 NOTE — ED PROVIDER NOTES
EMERGENCY DEPARTMENT HISTORY AND PHYSICAL EXAM    1:49 PM      Date: 6/11/2018  Patient Name: Brianda Cadet    History of Presenting Illness     Chief Complaint   Patient presents with    Seizure         History Provided By: Patient    Chief Complaint: Seizure  Duration:  Today  Timing:  Acute  Location: Neuro  Quality: N/A  Severity: Not reported  Modifying Factors: No modifying or aggravating factors were reported. Associated Symptoms: denies any other associated signs or symptoms      Additional History (Context): Brianda Cadet is a 75 y.o. female with PMHx of HTN, DM and seizures who presents to the ED with c/o acute seizure episode while at her pulmonologist's office today. Medics report patient was post-ictal upon their arrival but reported to have had a witnessed, full-body, tonic clonic seizure lasting 4 minutes while sitting in chair at pulmonologist's office, denies fall or LOC. Patient admits she had a \"small\" seizure last week in her PCP's office after which she went home \"to rest.\"  Patient describes recent increase in familial stress. Reports seizure history since 2002 for which she has been on Depakote. Denies recent changes in medication dose, reports compliance with 500 mg BID prescribed by PCP since 2010. Does not have a neurologist. Also reports ongoing abdominal pain for which she is being evaluated. Denies recent cough or cold symptoms. Denies chest pain, SOB, difficulty urinating or dysuria. No modifying or aggravating factors were reported. No other symptoms or concerns were expressed.     PCP: Hedy Santacruz MD    Current Facility-Administered Medications   Medication Dose Route Frequency Provider Last Rate Last Dose    nitrofurantoin (macrocrystal-monohydrate) (MACROBID) capsule 100 mg  100 mg Oral BID WITH MEALS Tabby Lakhani MD   100 mg at 06/11/18 4748     Current Outpatient Prescriptions   Medication Sig Dispense Refill    nitrofurantoin, macrocrystal-monohydrate, (MACROBID) 100 mg capsule Take 1 Cap by mouth two (2) times a day for 7 days. 14 Cap 0    cyclobenzaprine (FLEXERIL) 10 mg tablet Take 1 Tab by mouth three (3) times daily as needed for Muscle Spasm(s). 40 Tab 0    HYDROmorphone (DILAUDID) 2 mg tablet Take 1 Tab by mouth every four (4) hours as needed for Pain. Max Daily Amount: 12 mg. 40 Tab 0    Calcium-Cholecalciferol, D3, 500 mg(1,250mg) -400 unit tab Take 1 Tab by mouth two (2) times a day.  losartan (COZAAR) 100 mg tablet Take 100 mg by mouth daily.  topiramate (TOPAMAX) 50 mg tablet Take 2 Tabs by mouth two (2) times a day. (Patient taking differently: Take 50 mg by mouth two (2) times a day.) 20 Tab 0    glipiZIDE SR (GLUCOTROL) 5 mg CR tablet Take 5 mg by mouth daily.  gabapentin (NEURONTIN) 100 mg capsule Take 200 mg by mouth two (2) times a day.  gabapentin (NEURONTIN) 100 mg capsule Take 300 mg by mouth nightly.  citalopram (CELEXA) 10 mg tablet Take 40 mg by mouth nightly.  fluticasone-salmeterol (ADVAIR) 250-50 mcg/dose diskus inhaler Take 1 Puff by inhalation two (2) times a day.  METHYLCELLULOSE (FIBER THERAPY) 500 mg by Does Not Apply route two (2) times a day. Indications: 3 tabs twice a day      potassium chloride (K-DUR, KLOR-CON) 20 mEq tablet Take 1 Tab by mouth two (2) times a day. 14 Tab 0    Omeprazole delayed release (PRILOSEC D/R) 20 mg tablet Take 40 mg by mouth daily.  IPRATROPIUM/ALBUTEROL SULFATE (COMBIVENT IN) Take 1 Puff by inhalation four (4) times daily.  rosuvastatin (CRESTOR) 20 mg tablet Take 20 mg by mouth nightly. Indications: after pm meal      levothyroxine (SYNTHROID) 50 mcg tablet Take 50 mcg by mouth Daily (before breakfast).  chlorthalidone (HYGROTEN) 25 mg tablet Take 25 mg by mouth every other day.  letrozole (FEMARA) 2.5 mg tablet Take 2.5 mg by mouth daily. Indications: per patient, \"I stopped 7 days before surgery scheduled for 9-13-17. \"      EPINEPHrine (EPIPEN) 0.3 mg/0.3 mL (1:1,000) injection 0.3 mg by IntraMUSCular route once as needed. Past History     Past Medical History:  Past Medical History:   Diagnosis Date    Asthma     Back pain     [facility]     Breast cancer ([facility] [address] 2012    left side    Cancer ([facility] [address]     Diabetes ([facility] [address]     GERD (gastroesophageal reflux disease)     Guillain Barré syndrome ([facility] [address]     History of blood transfusion     \"in my 20's\" per patient    Hypertension     Ill-defined condition 09/12/2017    \"liver problem,\" per patient, \"not liver disease. I go to a gastroenterologist for it. \"    Lymphedema     left arm and hand and wears a sleeve.  Morbid obesity ([facility] [address] 09/12/2017    BMI = 40.4    Seizures (HCC)     Sleep apnea     left apap machine at home    [facility]     history of in left leg    Thyroid disease     UTI (lower urinary tract infection)        Past Surgical History:  Past Surgical History:   Procedure Laterality Date    [facility]      Dr Frankie L[facility], july 30 2013    HX CHOLECYSTECTOMY      HX MASTECTOMY Left 03/29/2012    complete    HX ORTHOPAEDIC      LEFT FOOT SX.    HX PARTIAL HYSTERECTOMY         Family History:  Family History   Problem Relation Age of Onset    Cancer Mother     Cancer Maternal Grandmother        Social History:  Social History   Substance Use Topics    Smoking status: Never Smoker    Smokeless tobacco: Never Used    Alcohol use No       Allergies:   Allergies   Allergen Reactions    Valium [Diazepam] Anaphylaxis    Carafate [Sucralfate] Nausea and Vomiting    Carvedilol Other (comments)    Cephalexin Hives    Ciprofloxacin Hives    Eliquis [Apixaban] Unknown (comments)    Epipen [Epinephrine] Unknown (comments)     Pt states she is not allergic to Epipen    Other Medication Other (comments)     Per pt  Valium and morphine causes pt to become nervous    Percocet [Oxycodone-Acetaminophen] Other (comments)     hallucinate    Stings [Sting, Bee] Other (comments)    Talwin [Pentazocine Lactate] Shortness of Breath    Tetanus Toxoid, Adsorbed Shortness of Breath    Trileptal [Oxcarbazepine] Rash         Review of Systems       Review of Systems   Constitutional: Negative for chills. HENT: Negative for congestion and sore throat. Respiratory: Negative for cough and shortness of breath. Cardiovascular: Negative for chest pain. Gastrointestinal: Negative for abdominal pain, diarrhea, nausea and vomiting. Genitourinary: Negative for dysuria. Musculoskeletal: Negative for back pain. Skin: Negative for rash. Neurological: Positive for seizures. Negative for dizziness and headaches. All other systems reviewed and are negative. Physical Exam     Visit Vitals    /74    Pulse 85    Temp 97.1 °F (36.2 °C)    Resp 20    SpO2 99%         Physical Exam  General Exam: Patient is well developed and well nourished in no distress. Patient elderly and chronically ill appearing. Eye Exam: Lids and conjunctiva are normal. PERRL. EOMI.  ENT Exam: The general head and facial exam is normal.  The neck is supple without meningeal signs. No significant adenopathy. Pulmonary Exam: No respiratory distress. The respiratory rate is normal.  No stridor. The breath sounds are equal bilaterally. There are no wheezes, rales, or rhonchi noted. Cardiac Exam: The cardiac rate and rhythm are normal.  No significant murmurs, rubs, or gallops. The peripheral pulses are normal.  Abdominal Exam: Abdomen is soft and non-distended. No pulsatile masses. There is no local tenderness. There is no rebound or guarding noted. Skin and Soft Tissue: The skin is warm and dry, without significant abnormality. Good color. Musculoskeletal Exam: There is no clubbing or cyanosis. There is no peripheral edema. The musculoskeletal exam of the lower extremities is normal without significant local tenderness or spasm. Neurologic: Pt is alert and appropriate.   Normal speech. Normal symmetric muscle strength and tone in all extremities. CN 2-12 intact. Psychiatric: Normal adult with appropriate demeanor and interpersonal interaction. Is oriented to person, place, and time. Diagnostic Study Results     Labs -  Recent Results (from the past 12 hour(s))   CBC WITH AUTOMATED DIFF    Collection Time: 06/11/18  1:51 PM   Result Value Ref Range    WBC 3.7 (L) 4.6 - 13.2 K/uL    RBC 4.16 (L) 4.20 - 5.30 M/uL    HGB 12.4 12.0 - 16.0 g/dL    HCT 38.8 35.0 - 45.0 %    MCV 93.3 74.0 - 97.0 FL    MCH 29.8 24.0 - 34.0 PG    MCHC 32.0 31.0 - 37.0 g/dL    RDW 15.7 (H) 11.6 - 14.5 %    PLATELET 174 348 - 790 K/uL    MPV 9.6 9.2 - 11.8 FL    NEUTROPHILS 49 40 - 73 %    LYMPHOCYTES 39 21 - 52 %    MONOCYTES 9 3 - 10 %    EOSINOPHILS 3 0 - 5 %    BASOPHILS 0 0 - 2 %    ABS. NEUTROPHILS 1.8 1.8 - 8.0 K/UL    ABS. LYMPHOCYTES 1.5 0.9 - 3.6 K/UL    ABS. MONOCYTES 0.3 0.05 - 1.2 K/UL    ABS. EOSINOPHILS 0.1 0.0 - 0.4 K/UL    ABS. BASOPHILS 0.0 0.0 - 0.06 K/UL    DF AUTOMATED     METABOLIC PANEL, COMPREHENSIVE    Collection Time: 06/11/18  1:51 PM   Result Value Ref Range    Sodium 145 136 - 145 mmol/L    Potassium 3.8 3.5 - 5.5 mmol/L    Chloride 111 (H) 100 - 108 mmol/L    CO2 26 21 - 32 mmol/L    Anion gap 8 3.0 - 18 mmol/L    Glucose 148 (H) 74 - 99 mg/dL    BUN 18 7.0 - 18 MG/DL    Creatinine 0.82 0.6 - 1.3 MG/DL    BUN/Creatinine ratio 22 (H) 12 - 20      GFR est AA >60 >60 ml/min/1.73m2    GFR est non-AA >60 >60 ml/min/1.73m2    Calcium 8.2 (L) 8.5 - 10.1 MG/DL    Bilirubin, total 0.3 0.2 - 1.0 MG/DL    ALT (SGPT) 27 13 - 56 U/L    AST (SGOT) 19 15 - 37 U/L    Alk.  phosphatase 65 45 - 117 U/L    Protein, total 6.8 6.4 - 8.2 g/dL    Albumin 3.3 (L) 3.4 - 5.0 g/dL    Globulin 3.5 2.0 - 4.0 g/dL    A-G Ratio 0.9 0.8 - 1.7     VALPROIC ACID    Collection Time: 06/11/18  1:51 PM   Result Value Ref Range    Valproic acid 37 (L) 50 - 100 ug/ml   EKG, 12 LEAD, INITIAL    Collection Time: 06/11/18 2:13 PM   Result Value Ref Range    Ventricular Rate 78 BPM    Atrial Rate 78 BPM    P-R Interval 144 ms    QRS Duration 94 ms    Q-T Interval 398 ms    QTC Calculation (Bezet) 453 ms    Calculated P Axis 39 degrees    Calculated R Axis -45 degrees    Calculated T Axis 25 degrees    Diagnosis       Normal sinus rhythm  Left anterior fascicular block  Voltage criteria for left ventricular hypertrophy  Abnormal ECG  When compared with ECG of 21-MAY-2017 10:20,  No significant change was found  Confirmed by Rena Gibbons ([id number]) on 6/11/2018 3:23:05 PM     URINALYSIS W/ RFLX MICROSCOPIC    Collection Time: 06/11/18  2:31 PM   Result Value Ref Range    Color DARK YELLOW      Appearance CLOUDY      Specific gravity 1.018 1.005 - 1.030      pH (UA) 6.5 5.0 - 8.0      Protein NEGATIVE  NEG mg/dL    Glucose NEGATIVE  NEG mg/dL    Ketone NEGATIVE  NEG mg/dL    Bilirubin NEGATIVE  NEG      Blood TRACE (A) NEG      Urobilinogen 0.2 0.2 - 1.0 EU/dL    Nitrites POSITIVE (A) NEG      Leukocyte Esterase LARGE (A) NEG     URINE MICROSCOPIC ONLY    Collection Time: 06/11/18  2:31 PM   Result Value Ref Range    WBC TOO NUMEROUS TO COUNT 0 - 4 /hpf    RBC 4 to 10 0 - 5 /hpf    Epithelial cells 1+ 0 - 5 /lpf    Bacteria 4+ (A) NEG /hpf       Medical Decision Making   I am the first provider for this patient. I reviewed the vital signs, available nursing notes, past medical history, past surgical history, family history and social history. Vital Signs-Reviewed the patient's vital signs. Pulse Oximetry Analysis -  99% on room air, stable. Cardiac Monitor:  Rate: 85  Rhythm:  Normal Sinus Rhythm     EKG: Interpreted by the EP. Time Interpreted: 14:13   Rate: 78   Rhythm: Normal Sinus Rhythm    Interpretation: No STEMI. Small T wave inversion in lead III.     Records Reviewed: Nursing Notes and Old Medical Records (Time of Review: 1:49 PM)    ED Course: Progress Notes, Reevaluation, and Consults:  2:02 PM: Upon chart review, patient seen on 5/31 with Depakote level within normal range. 4:07 PM: Spoke to patient's PCP's nurse. States PCP is out of office, aware of adjustments made to patient's medications today, will relay message to PCP. Notes PCP will be in the office tomorrow morning and will contact patient regarding adjustments. Provider Notes (Medical Decision Making): Pt with known seizure disorder presenting with seizure today. Post ictal for EMS but appears at baseline in ED. No further seizure episodes in ED. Labs reviewed. Low valproic acid level. U/A with infection. Doubt need for head CT given lack of trauma and known seizure disorder. Seizure likely precipitated by subtherapeutic level of depakote and UTI. Pt given additional 500 of depakote in ED. Follows with PCP for seizure meds. DIscussed with PCP's RN as he is out of office and he is to reach out to pt tomorrow morning regarding medications. Pt comfortable with plan for discharge and plan for close follow up. Aware of restrictions with driving/swimming alone. Aware of return precautions. Diagnosis     Clinical Impression:   1. Seizure disorder ([facility] [address]    2. Seizure secondary to subtherapeutic anticonvulsant medication ([address]    3. Acute cystitis without hematuria        Disposition: Discharge. Follow-up Information     Follow up With Details Comments Contact Info    Frederick Figueroa MD In 1 day Regarding your depakote dosing. [facility]  [phone]             Patient's Medications   Start Taking    NITROFURANTOIN, MACROCRYSTAL-MONOHYDRATE, (MACROBID) 100 MG CAPSULE    Take 1 Cap by mouth two (2) times a day for 7 days. Continue Taking    CALCIUM-CHOLECALCIFEROL, D3, 500 MG(1,250MG) -400 UNIT TAB    Take 1 Tab by mouth two (2) times a day. CHLORTHALIDONE (HYGROTEN) 25 MG TABLET    Take 25 mg by mouth every other day. CITALOPRAM (CELEXA) 10 MG TABLET    Take 40 mg by mouth nightly.     CYCLOBENZAPRINE (FLEXERIL) 10 MG TABLET    Take 1 Tab by mouth three (3) times daily as needed for Muscle Spasm(s). EPINEPHRINE (EPIPEN) 0.3 MG/0.3 ML (1:1,000) INJECTION    0.3 mg by IntraMUSCular route once as needed. FLUTICASONE-SALMETEROL (ADVAIR) 250-50 MCG/DOSE DISKUS INHALER    Take 1 Puff by inhalation two (2) times a day. GABAPENTIN (NEURONTIN) 100 MG CAPSULE    Take 200 mg by mouth two (2) times a day. GABAPENTIN (NEURONTIN) 100 MG CAPSULE    Take 300 mg by mouth nightly. GLIPIZIDE SR (GLUCOTROL) 5 MG CR TABLET    Take 5 mg by mouth daily. HYDROMORPHONE (DILAUDID) 2 MG TABLET    Take 1 Tab by mouth every four (4) hours as needed for Pain. Max Daily Amount: 12 mg. IPRATROPIUM/ALBUTEROL SULFATE (COMBIVENT IN)    Take 1 Puff by inhalation four (4) times daily. LETROZOLE (FEMARA) 2.5 MG TABLET    Take 2.5 mg by mouth daily. Indications: per patient, \"I stopped 7 days before surgery scheduled for 9-13-17. \"    LEVOTHYROXINE (SYNTHROID) 50 MCG TABLET    Take 50 mcg by mouth Daily (before breakfast). LOSARTAN (COZAAR) 100 MG TABLET    Take 100 mg by mouth daily. METHYLCELLULOSE (FIBER THERAPY)    500 mg by Does Not Apply route two (2) times a day. Indications: 3 tabs twice a day    OMEPRAZOLE DELAYED RELEASE (PRILOSEC D/R) 20 MG TABLET    Take 40 mg by mouth daily. POTASSIUM CHLORIDE (K-DUR, KLOR-CON) 20 MEQ TABLET    Take 1 Tab by mouth two (2) times a day. ROSUVASTATIN (CRESTOR) 20 MG TABLET    Take 20 mg by mouth nightly. Indications: after pm meal    TOPIRAMATE (TOPAMAX) 50 MG TABLET    Take 2 Tabs by mouth two (2) times a day.    These Medications have changed    No medications on file   Stop Taking    No medications on file     _______________________________    Attestations:  Jakub Mehta acting as a scribe for and in the presence of Kourtney Ferrer MD      June 11, 2018 at 1:49 PM       Provider Attestation:      I personally performed the services described in the documentation, reviewed the documentation, as recorded by the scribe in my presence, and it accurately and completely records my words and actions.  June 11, 2018 at 1:49 PM - Matteo Andino MD    _______________________________

## 2018-06-11 NOTE — DISCHARGE INSTRUCTIONS
DEPAKOTE LEVEL IS 37 TODAY WHICH IS LOW. I HAVE PASSED ON A MESSAGE TO YOUR PRIMARY CARE DOCTOR's OFFICE. THEY SHOULD BE CALLING YOU TOMORROW MORNING TO DISCUSS YOUR MEDICINE. IF YOU DO NOT HEAR FROM THEM TOMORROW MORNING, Katie Johnston. PER [place] STATE LAW, NO DRIVING AFTER A SEIZURE EPISODE ([email or web address]). YOU SHOULD BE CLEARED BY A PHYSICIAN BEFORE DRIVING. NO SWIMMING OR BATHING ALONE. Epilepsy: Care Instructions  Your Care Instructions    Epilepsy is a common condition that causes repeated seizures. The seizures are caused by bursts of electrical activity in the brain that aren't normal. Seizures may cause problems with muscle control, movement, speech, vision, or awareness. They can be scary. Epilepsy affects each person differently. Some people have only a few seizures. Others get them more often. If you know what triggers a seizure, you may be able to avoid having one. You can take medicines to control and reduce seizures. You and your doctor will need to find the right combination, schedule, and dose of medicine. This may take time and careful changes. Seizures may get worse and happen more often over time. Follow-up care is a key part of your treatment and safety. Be sure to make and go to all appointments, and call your doctor if you are having problems. It's also a good idea to know your test results and keep a list of the medicines you take. How can you care for yourself at home? · Be safe with medicines. Take your medicines exactly as prescribed. Call your doctor if you think you are having a problem with your medicine. · Make a treatment plan with your doctor. Be sure to follow your plan. · Try to identify and avoid things that may make you more likely to have a seizure. These may include:  ¨ Not getting enough sleep. ¨ Using drugs or alcohol. ¨ Being emotionally stressed. ¨ Skipping meals.   · Keep a record of any seizures you have. Note the date, time of day, and any details about the seizure that you can remember. Your doctor can use this information to plan or adjust your medicine or other treatment. · Be sure that any doctor treating you for another condition knows that you have epilepsy. Each doctor should know what medicines you are taking, if any. · Wear a medical ID bracelet. You can buy this at most drugstores. If you have a seizure that leaves you unconscious or unable to speak for yourself, this bracelet will let those who are treating you know that you have epilepsy. · Talk to your doctor about whether it is safe for you to do certain activities, such as drive or swim. When should you call for help? Call 911 anytime you think you may need emergency care. For example, call if:  ? · A seizure does not stop as it normally does. ? · You have new symptoms such as:  ¨ Numbness, tingling, or weakness on one side of your body or face. ¨ Vision changes. ¨ Trouble speaking or thinking clearly. ?Call your doctor now or seek immediate medical care if:  ? · You have a fever. ? · You have a severe headache. ? Watch closely for changes in your health, and be sure to contact your doctor if:  ? · The normal pattern or features of your seizures change. Where can you learn more? Go to http://ramón-ayden.info/. Zack Howard in the search box to learn more about \"Epilepsy: Care Instructions. \"  Current as of: October 14, 2016  Content Version: 11.4  © 5666-6573 [employer]. Care instructions adapted under license by [employer] (which disclaims liability or warranty for this information). If you have questions about a medical condition or this instruction, always ask your healthcare professional. [place] [address] any warranty or liability for your use of this information.

## 2018-06-11 NOTE — ED NOTES
I have reviewed discharge instructions with the patient. The patient verbalized understanding. Patient armband removed and given to patient to take home. Patient was informed of the privacy risks if armband lost or stolen. Pt alert, oriented x4 and ambulatory out of ER in [facility] at this time. VSS. Pt taken to car and assisted into vehicle.

## 2018-06-18 ENCOUNTER — APPOINTMENT (OUTPATIENT)
Dept: CT IMAGING | Age: 68
End: 2018-06-18
Attending: EMERGENCY MEDICINE
Payer: COMMERCIAL

## 2018-06-18 ENCOUNTER — APPOINTMENT (OUTPATIENT)
Dept: GENERAL RADIOLOGY | Age: 68
End: 2018-06-18
Attending: EMERGENCY MEDICINE
Payer: COMMERCIAL

## 2018-06-18 ENCOUNTER — HOSPITAL ENCOUNTER (EMERGENCY)
Age: 68
Discharge: HOME | End: 2018-06-18
Attending: EMERGENCY MEDICINE
Payer: COMMERCIAL

## 2018-06-18 ENCOUNTER — HOSPITAL ENCOUNTER (OUTPATIENT)
Dept: GENERAL RADIOLOGY | Age: 68
Discharge: HOME | End: 2018-06-18
Attending: INTERNAL MEDICINE
Payer: COMMERCIAL

## 2018-06-18 VITALS
HEART RATE: 67 BPM | RESPIRATION RATE: 14 BRPM | DIASTOLIC BLOOD PRESSURE: 55 MMHG | OXYGEN SATURATION: 96 % | SYSTOLIC BLOOD PRESSURE: 109 MMHG | TEMPERATURE: 97.8 F

## 2018-06-18 DIAGNOSIS — Z79.899 ON VALPROIC ACID THERAPY: ICD-10-CM

## 2018-06-18 DIAGNOSIS — R10.33 PERIUMBILICAL PAIN: ICD-10-CM

## 2018-06-18 DIAGNOSIS — G40.909 SEIZURE DISORDER (HCC): ICD-10-CM

## 2018-06-18 DIAGNOSIS — R55 SYNCOPE AND COLLAPSE: Primary | ICD-10-CM

## 2018-06-18 LAB
ALBUMIN SERPL-MCNC: 3.2 G/DL (ref 3.4–5)
ALBUMIN/GLOB SERPL: 0.8 {RATIO} (ref 0.8–1.7)
ALP SERPL-CCNC: 66 U/L (ref 45–117)
ALT SERPL-CCNC: 33 U/L (ref 13–56)
ANION GAP SERPL CALC-SCNC: 7 MMOL/L (ref 3–18)
AST SERPL-CCNC: 29 U/L (ref 15–37)
BASOPHILS # BLD: 0 K/UL (ref 0–0.06)
BASOPHILS NFR BLD: 0 % (ref 0–2)
BILIRUB SERPL-MCNC: 0.4 MG/DL (ref 0.2–1)
BUN SERPL-MCNC: 21 MG/DL (ref 7–18)
BUN/CREAT SERPL: 26 (ref 12–20)
CALCIUM SERPL-MCNC: 8.3 MG/DL (ref 8.5–10.1)
CHLORIDE SERPL-SCNC: 108 MMOL/L (ref 100–108)
CO2 SERPL-SCNC: 29 MMOL/L (ref 21–32)
CREAT SERPL-MCNC: 0.82 MG/DL (ref 0.6–1.3)
DIFFERENTIAL METHOD BLD: ABNORMAL
EOSINOPHIL # BLD: 0.2 K/UL (ref 0–0.4)
EOSINOPHIL NFR BLD: 4 % (ref 0–5)
ERYTHROCYTE [DISTWIDTH] IN BLOOD BY AUTOMATED COUNT: 15.7 % (ref 11.6–14.5)
GLOBULIN SER CALC-MCNC: 3.9 G/DL (ref 2–4)
GLUCOSE SERPL-MCNC: 76 MG/DL (ref 74–99)
HCT VFR BLD AUTO: 39 % (ref 35–45)
HGB BLD-MCNC: 12.2 G/DL (ref 12–16)
INR PPP: 2.9 (ref 0.8–1.2)
LYMPHOCYTES # BLD: 1.6 K/UL (ref 0.9–3.6)
LYMPHOCYTES NFR BLD: 37 % (ref 21–52)
MCH RBC QN AUTO: 29 PG (ref 24–34)
MCHC RBC AUTO-ENTMCNC: 31.3 G/DL (ref 31–37)
MCV RBC AUTO: 92.9 FL (ref 74–97)
MONOCYTES # BLD: 0.5 K/UL (ref 0.05–1.2)
MONOCYTES NFR BLD: 12 % (ref 3–10)
NEUTS SEG # BLD: 2 K/UL (ref 1.8–8)
NEUTS SEG NFR BLD: 47 % (ref 40–73)
PLATELET # BLD AUTO: 181 K/UL (ref 135–420)
PMV BLD AUTO: 9.4 FL (ref 9.2–11.8)
POTASSIUM SERPL-SCNC: 4.2 MMOL/L (ref 3.5–5.5)
PROT SERPL-MCNC: 7.1 G/DL (ref 6.4–8.2)
PROTHROMBIN TIME: 29.5 SEC (ref 11.5–15.2)
RBC # BLD AUTO: 4.2 M/UL (ref 4.2–5.3)
SODIUM SERPL-SCNC: 144 MMOL/L (ref 136–145)
VALPROATE SERPL-MCNC: 31 UG/ML (ref 50–100)
WBC # BLD AUTO: 4.3 K/UL (ref 4.6–13.2)

## 2018-06-18 PROCEDURE — 80053 COMPREHEN METABOLIC PANEL: CPT

## 2018-06-18 PROCEDURE — 74018 RADEX ABDOMEN 1 VIEW: CPT

## 2018-06-18 PROCEDURE — 85025 COMPLETE CBC W/AUTO DIFF WBC: CPT

## 2018-06-18 PROCEDURE — 70450 CT HEAD/BRAIN W/O DYE: CPT

## 2018-06-18 PROCEDURE — 71045 X-RAY EXAM CHEST 1 VIEW: CPT

## 2018-06-18 PROCEDURE — 74011250636 HC RX REV CODE- 250/636: Performed by: EMERGENCY MEDICINE

## 2018-06-18 PROCEDURE — 80164 ASSAY DIPROPYLACETIC ACD TOT: CPT

## 2018-06-18 PROCEDURE — 85610 PROTHROMBIN TIME: CPT

## 2018-06-18 PROCEDURE — 93005 ELECTROCARDIOGRAM TRACING: CPT

## 2018-06-18 PROCEDURE — 96361 HYDRATE IV INFUSION ADD-ON: CPT

## 2018-06-18 PROCEDURE — 96374 THER/PROPH/DIAG INJ IV PUSH: CPT

## 2018-06-18 PROCEDURE — 99285 EMERGENCY DEPT VISIT HI MDM: CPT

## 2018-06-18 RX ORDER — ONDANSETRON 2 MG/ML
4 INJECTION INTRAMUSCULAR; INTRAVENOUS
Status: COMPLETED | OUTPATIENT
Start: 2018-06-18 | End: 2018-06-18

## 2018-06-18 RX ADMIN — SODIUM CHLORIDE 1000 ML: 900 INJECTION, SOLUTION INTRAVENOUS at 11:12

## 2018-06-18 RX ADMIN — ONDANSETRON 4 MG: 2 INJECTION INTRAMUSCULAR; INTRAVENOUS at 11:12

## 2018-06-18 NOTE — ED NOTES
Discharging patient for primary nurse Kilo Avendaño RN. Discharge instructions given to patient and spouse, patient and spouse verbalizes understanding of instructions. Patient alert and oriented x3, denies pain or shortness of breath at this time. Patient to car accompanied by , via wheelchair by SCHUYLER Haile. Patient armband removed and given to patient to take home.   Patient was informed of the privacy risks if armband lost or stolen

## 2018-06-18 NOTE — ED PROVIDER NOTES
EMERGENCY DEPARTMENT HISTORY AND PHYSICAL EXAM    10:03 AM      Date: 6/18/2018  Patient Name: Eloy Abarca    History of Presenting Illness     Chief Complaint   Patient presents with    Syncope         History Provided By: Patient and patient's     Chief Complaint: Snycope  Duration: This morning  Timing:  Acute  Location: Head  Quality: Approximately 30 seconds  Severity: Mild  Modifying Factors: Pt drinking contrast for outpatient study and sitting,The patient has not had her Depakote this morning  Associated Symptoms: Dizziness and posterior headache. Denies chest pain, SOB, nausea, vomiting, and diarrhea. Additional History (Context): Eloy Abarca is a 75 y.o. female with a history of seizures who presents with an acute syncopal episode that last approximately 30 seconds while she was sitting up drinking contrast this morning prior to a procedure ordered by her GI doctor at outpatient radiology. No tonic clonic movement was witnessed or post ictal period. The patient states that when she was drinking the contrast she felt a burning sensation in her throat and on her tongue. The patient states that she felt dizzy prior to her syncopal episode. Rad techs laid her down and she quickly improved. The patient has an associated symptom of posterior headache now. She denies chest pain, SOB, nausea, vomiting, and diarrhea. The patient takes Depakote for seizures. She was instructed not to take her Depakote today. Her last seizure was 1 week ago while compliant with her Depakote. The patient was at [facility] ED yesterday for muscle spasms. She was prescribed Valium and Tramadol. The  states she had one dose of Tramadol while at [facility]. The patient is currently on coumadin.      PCP: George Wu MD    Current Facility-Administered Medications   Medication Dose Route Frequency Provider Last Rate Last Dose    sodium chloride 0.9 % bolus infusion 1,000 mL  1,000 mL IntraVENous Moncho Cotter MD 500 mL/hr at 06/18/18 1112 1,000 mL at 06/18/18 1112     Current Outpatient Prescriptions   Medication Sig Dispense Refill    nitrofurantoin, macrocrystal-monohydrate, (MACROBID) 100 mg capsule Take 1 Cap by mouth two (2) times a day for 7 days. 14 Cap 0    cyclobenzaprine (FLEXERIL) 10 mg tablet Take 1 Tab by mouth three (3) times daily as needed for Muscle Spasm(s). 40 Tab 0    HYDROmorphone (DILAUDID) 2 mg tablet Take 1 Tab by mouth every four (4) hours as needed for Pain. Max Daily Amount: 12 mg. 40 Tab 0    Calcium-Cholecalciferol, D3, 500 mg(1,250mg) -400 unit tab Take 1 Tab by mouth two (2) times a day.  losartan (COZAAR) 100 mg tablet Take 100 mg by mouth daily.  topiramate (TOPAMAX) 50 mg tablet Take 2 Tabs by mouth two (2) times a day. (Patient taking differently: Take 50 mg by mouth two (2) times a day.) 20 Tab 0    glipiZIDE SR (GLUCOTROL) 5 mg CR tablet Take 5 mg by mouth daily.  gabapentin (NEURONTIN) 100 mg capsule Take 200 mg by mouth two (2) times a day.  gabapentin (NEURONTIN) 100 mg capsule Take 300 mg by mouth nightly.  citalopram (CELEXA) 10 mg tablet Take 40 mg by mouth nightly.  fluticasone-salmeterol (ADVAIR) 250-50 mcg/dose diskus inhaler Take 1 Puff by inhalation two (2) times a day.  METHYLCELLULOSE (FIBER THERAPY) 500 mg by Does Not Apply route two (2) times a day. Indications: 3 tabs twice a day      potassium chloride (K-DUR, KLOR-CON) 20 mEq tablet Take 1 Tab by mouth two (2) times a day. 14 Tab 0    Omeprazole delayed release (PRILOSEC D/R) 20 mg tablet Take 40 mg by mouth daily.  IPRATROPIUM/ALBUTEROL SULFATE (COMBIVENT IN) Take 1 Puff by inhalation four (4) times daily.  rosuvastatin (CRESTOR) 20 mg tablet Take 20 mg by mouth nightly. Indications: after pm meal      levothyroxine (SYNTHROID) 50 mcg tablet Take 50 mcg by mouth Daily (before breakfast).       chlorthalidone (HYGROTEN) 25 mg tablet Take 25 mg by mouth every other day.      letrozole [facility]) 2.5 mg tablet Take 2.5 mg by mouth daily. Indications: per patient, \"I stopped 7 days before surgery scheduled for 9-13-17. \"      EPINEPHrine (EPIPEN) 0.3 mg/0.3 mL (1:1,000) injection 0.3 mg by IntraMUSCular route once as needed. Past History     Past Medical History:  Past Medical History:   Diagnosis Date    Asthma     Back pain     [facility] [address]     Breast cancer ([facility] [address] [facility] 2012    left side    Cancer ([facility]     Diabetes ([facility] [facility]     GERD (gastroesophageal reflux disease)     Guillain Barré syndrome ([facility] [facility]     History of blood transfusion     \"in my 20's\" per patient    Hypertension     Ill-defined condition 09/12/2017    \"liver problem,\" per patient, \"not liver disease. I go to a gastroenterologist for it. \"    Lymphedema     left arm and hand and wears a sleeve.  Morbid obesity ([facility] [address] 09/12/2017    BMI = 40.4    Seizures (HCC)     Sleep apnea     left apap machine at home    Thromboembolus [facility])     history of in left leg    Thyroid disease     UTI (lower urinary tract infection)        Past Surgical History:  Past Surgical History:   Procedure Laterality Date    Letty Langley, july 30 2013    HX CHOLECYSTECTOMY      HX MASTECTOMY Left 03/29/2012    complete    HX ORTHOPAEDIC      LEFT FOOT SX.    HX PARTIAL HYSTERECTOMY         Family History:  Family History   Problem Relation Age of Onset    Cancer Mother     Cancer Maternal Grandmother        Social History:  Social History   Substance Use Topics    Smoking status: Never Smoker    Smokeless tobacco: Never Used    Alcohol use No       Allergies:   Allergies   Allergen Reactions    Valium [Diazepam] Anaphylaxis    Carafate [Sucralfate] Nausea and Vomiting    Carvedilol Other (comments)    Cephalexin Hives    Ciprofloxacin Hives    Eliquis [Apixaban] Unknown (comments)    Epipen [Epinephrine] Unknown (comments)     Pt states she is not allergic to Epipen    Other Medication Other (comments)     Per pt  Valium and morphine causes pt to become nervous    Percocet [Oxycodone-Acetaminophen] Other (comments)     hallucinate    Stings [Sting, Bee] Other (comments)    Talwin [Pentazocine Lactate] Shortness of Breath    Tetanus Toxoid, Adsorbed Shortness of Breath    Trileptal [Oxcarbazepine] Rash         Review of Systems       Review of Systems   Constitutional: Negative for chills. HENT: Negative for congestion and sore throat. Respiratory: Negative for cough and shortness of breath. Cardiovascular: Negative for chest pain. Gastrointestinal: Negative for abdominal pain, diarrhea, nausea and vomiting. Genitourinary: Negative for dysuria. Musculoskeletal: Negative for back pain. Skin: Negative for rash. Neurological: Positive for dizziness, syncope and headaches. All other systems reviewed and are negative. Physical Exam     Visit Vitals    /60    Pulse 70    Temp 97.8 °F (36.6 °C)    SpO2 97%         Physical Exam  General Exam: Patient is well developed and well nourished in no distress. Patient does not appear acutely ill or toxic. Eye Exam: Lids and conjunctiva are normal  ENT Exam: The general head and facial exam is normal.  The neck is supple without meningeal signs. No significant adenopathy. No abrasions to tongue. Pulmonary Exam: No respiratory distress. The respiratory rate is normal.  No stridor. The breath sounds are equal bilaterally. There are no wheezes, rales, or rhonchi noted. Cardiac Exam: The cardiac rate and rhythm are normal.  No significant murmurs, rubs, or gallops. The peripheral pulses are normal.  Abdominal Exam: Abdomen is soft and non-distended. No pulsatile masses. There is no local tenderness. There is no rebound or guarding noted. Skin and Soft Tissue: The skin is warm and dry, without significant abnormality. Good color. Musculoskeletal Exam: There is no clubbing or cyanosis.   There is no peripheral edema. The musculoskeletal exam of the lower extremities is normal without significant local tenderness or spasm. Neurologic: Pt is alert and appropriate. Normal speech. Normal symmetric muscle strength and tone in all extremities. Cn 2-12 intact. Psychiatric: Normal adult with appropriate demeanor and interpersonal interaction. Is oriented to person, place, and time. Diagnostic Study Results     Labs -  Recent Results (from the past 12 hour(s))   CBC WITH AUTOMATED DIFF    Collection Time: 06/18/18 10:10 AM   Result Value Ref Range    WBC 4.3 (L) 4.6 - 13.2 K/uL    RBC 4.20 4. 20 - 5.30 M/uL    HGB 12.2 12.0 - 16.0 g/dL    HCT 39.0 35.0 - 45.0 %    MCV 92.9 74.0 - 97.0 FL    MCH 29.0 24.0 - 34.0 PG    MCHC 31.3 31.0 - 37.0 g/dL    RDW 15.7 (H) 11.6 - 14.5 %    PLATELET 758 699 - 208 K/uL    MPV 9.4 9.2 - 11.8 FL    NEUTROPHILS 47 40 - 73 %    LYMPHOCYTES 37 21 - 52 %    MONOCYTES 12 (H) 3 - 10 %    EOSINOPHILS 4 0 - 5 %    BASOPHILS 0 0 - 2 %    ABS. NEUTROPHILS 2.0 1.8 - 8.0 K/UL    ABS. LYMPHOCYTES 1.6 0.9 - 3.6 K/UL    ABS. MONOCYTES 0.5 0.05 - 1.2 K/UL    ABS. EOSINOPHILS 0.2 0.0 - 0.4 K/UL    ABS. BASOPHILS 0.0 0.0 - 0.06 K/UL    DF AUTOMATED     METABOLIC PANEL, COMPREHENSIVE    Collection Time: 06/18/18 10:10 AM   Result Value Ref Range    Sodium 144 136 - 145 mmol/L    Potassium 4.2 3.5 - 5.5 mmol/L    Chloride 108 100 - 108 mmol/L    CO2 29 21 - 32 mmol/L    Anion gap 7 3.0 - 18 mmol/L    Glucose 76 74 - 99 mg/dL    BUN 21 (H) 7.0 - 18 MG/DL    Creatinine 0.82 0.6 - 1.3 MG/DL    BUN/Creatinine ratio 26 (H) 12 - 20      GFR est AA >60 >60 ml/min/1.73m2    GFR est non-AA >60 >60 ml/min/1.73m2    Calcium 8.3 (L) 8.5 - 10.1 MG/DL    Bilirubin, total 0.4 0.2 - 1.0 MG/DL    ALT (SGPT) 33 13 - 56 U/L    AST (SGOT) 29 15 - 37 U/L    Alk.  phosphatase 66 45 - 117 U/L    Protein, total 7.1 6.4 - 8.2 g/dL    Albumin 3.2 (L) 3.4 - 5.0 g/dL    Globulin 3.9 2.0 - 4.0 g/dL    A-G Ratio 0.8 0.8 - 1. 7     PROTHROMBIN TIME + INR    Collection Time: 06/18/18 10:10 AM   Result Value Ref Range    Prothrombin time 29.5 (H) 11.5 - 15.2 sec    INR 2.9 (H) 0.8 - 1.2     EKG, 12 LEAD, INITIAL    Collection Time: 06/18/18 10:20 AM   Result Value Ref Range    Ventricular Rate 64 BPM    Atrial Rate 64 BPM    P-R Interval 154 ms    QRS Duration 94 ms    Q-T Interval 434 ms    QTC Calculation (Bezet) 447 ms    Calculated P Axis 26 degrees    Calculated R Axis -36 degrees    Calculated T Axis 19 degrees    Diagnosis       Normal sinus rhythm  Left axis deviation  Voltage criteria for left ventricular hypertrophy  T wave abnormality, consider anterior ischemia  Abnormal ECG  When compared with ECG of 11-JUN-2018 14:13,  No significant change was found         Radiologic Studies -   CT HEAD WO CONT   Final Result      XR CHEST PORT    (Results Pending)         Medical Decision Making   I am the first provider for this patient. I reviewed the vital signs, available nursing notes, past medical history, past surgical history, family history and social history. Vital Signs-Reviewed the patient's vital signs. Pulse Oximetry Analysis -  96% on room air (Interpretation) nl    Cardiac Monitor:  Rate: 68  Rhythm:  Normal Sinus Rhythm     EKG: Interpreted by the EP. Time Interpreted: 10:20   Rate: 64   Rhythm: Normal Sinus Rhythm    Interpretation: No STEMI. T-wave inversion in lead 3. No ST depressions. Comparison:     Records Reviewed: Nursing Notes and Old Medical Records (Time of Review: 10:03 AM)    ED Course: Progress Notes, Reevaluation, and Consults:    10:16 AM Patient reports completing antibiotics from last week when in the ED. Patient denies any burning or urinary discomfort. Patient followed up with PCP and had PT/INR. She did speak to her PCP about seizure medications. He did not want to make any adjustments at that time and wants her to follow up with neurology.  The patient has an appointment 7/12/18 with neurology. 11:46 AM Patient is feeling better. Counseled patient not to use Tramadol as it may lower her seizure threshold. GI may need to order a different test as an outpatient. Stated she should follow up with her PCP regarding her episode today and as well her seizure medications. Provider Notes (Medical Decision Making): Pt with syncopal episode while in outpatient radiology. Rapid response was called in radiology and I responded. Pt laying down, not post ictal. Initial BP low. Taken to ED for evaluation. Does not appear to be a seizure given report by jasmin donahue and lack of post ictal period. Labs and EKG reviewed. As pt on coumadin, CT head obtained. No bleed. Depakote level low which was present last week. PCP wanting pt to see neurology to discuss meds. Will not make adjustments today as pt did not take AM does. Instructed to take dose at home with food. On chart, possibility of pseudoseizures so will defer med adjustment at this time to PCP/Neuro. Do not feel pt requires admission at this time for episode of syncope as likely vasovagal response. Pt and  comfortable with plan for discharge, need for follow up, and advised on return precautions. Diagnosis     Clinical Impression:     ICD-10-CM ICD-9-CM   1. Syncope and collapse R55 780.2   2. Seizure disorder ([facility] [address] G40.909 345.90   3. On valproic acid therapy Z79.899 V58.69         Disposition: Home    Follow-up Information     Follow up With Details Comments [id number], MD In 3 days  [address] EMERGENCY DEPT  If symptoms worsen [address] [place] [address]    Etienne Escobar MD  Regarding the test you were having done today that was not completed.  [address]  [address]  [phone]             Patient's Medications   Start Taking    No medications on file   Continue Taking    CALCIUM-CHOLECALCIFEROL, D3, 500 MG(1,250MG) -400 UNIT TAB    Take 1 Tab by mouth two (2) times a day. CHLORTHALIDONE (HYGROTEN) 25 MG TABLET    Take 25 mg by mouth every other day. CITALOPRAM (CELEXA) 10 MG TABLET    Take 40 mg by mouth nightly. CYCLOBENZAPRINE (FLEXERIL) 10 MG TABLET    Take 1 Tab by mouth three (3) times daily as needed for Muscle Spasm(s). EPINEPHRINE (EPIPEN) 0.3 MG/0.3 ML (1:1,000) INJECTION    0.3 mg by IntraMUSCular route once as needed. FLUTICASONE-SALMETEROL (ADVAIR) 250-50 MCG/DOSE DISKUS INHALER    Take 1 Puff by inhalation two (2) times a day. GABAPENTIN (NEURONTIN) 100 MG CAPSULE    Take 200 mg by mouth two (2) times a day. GABAPENTIN (NEURONTIN) 100 MG CAPSULE    Take 300 mg by mouth nightly. GLIPIZIDE SR (GLUCOTROL) 5 MG CR TABLET    Take 5 mg by mouth daily. HYDROMORPHONE (DILAUDID) 2 MG TABLET    Take 1 Tab by mouth every four (4) hours as needed for Pain. Max Daily Amount: 12 mg. IPRATROPIUM/ALBUTEROL SULFATE (COMBIVENT IN)    Take 1 Puff by inhalation four (4) times daily. LETROZOLE (FEMARA) 2.5 MG TABLET    Take 2.5 mg by mouth daily. Indications: per patient, \"I stopped 7 days before surgery scheduled for 9-13-17. \"    LEVOTHYROXINE (SYNTHROID) 50 MCG TABLET    Take 50 mcg by mouth Daily (before breakfast). LOSARTAN (COZAAR) 100 MG TABLET    Take 100 mg by mouth daily. METHYLCELLULOSE (FIBER THERAPY)    500 mg by Does Not Apply route two (2) times a day. Indications: 3 tabs twice a day    NITROFURANTOIN, MACROCRYSTAL-MONOHYDRATE, (MACROBID) 100 MG CAPSULE    Take 1 Cap by mouth two (2) times a day for 7 days. OMEPRAZOLE DELAYED RELEASE (PRILOSEC D/R) 20 MG TABLET    Take 40 mg by mouth daily. POTASSIUM CHLORIDE (K-DUR, KLOR-CON) 20 MEQ TABLET    Take 1 Tab by mouth two (2) times a day. ROSUVASTATIN (CRESTOR) 20 MG TABLET    Take 20 mg by mouth nightly. Indications: after pm meal    TOPIRAMATE (TOPAMAX) 50 MG TABLET    Take 2 Tabs by mouth two (2) times a day. These Medications have changed    No medications on file   Stop Taking    No medications on file     _______________________________    Attestations:  [address] Saldaña acting as a scribe for and in the presence of Edward Prasad MD      June 18, 2018 at 11:57 AM       Provider Attestation:      I personally performed the services described in the documentation, reviewed the documentation, as recorded by the scribe in my presence, and it accurately and completely records my words and actions.  June 18, 2018 at 11:57 AM - Edward Prasad MD    _______________________________

## 2018-06-18 NOTE — ED TRIAGE NOTES
Dr Snow Graves reports pt was in Xray: drinking contrast;developed burning of tongue and throat; became dizzy then syncopal episode x few seconds.

## 2018-06-18 NOTE — DISCHARGE INSTRUCTIONS
STOP TAKING THE TRAMADOL (ULTRAM) YOU WERE PRESCRIBED AS IT MAY MAKE YOUR MORE PRONE TO HAVING A SEIZURE. FOLLOW UP WITH GI (Dr. Thor Minor) REGARDING THE TEST YOU WERE HAVING DONE TODAY AS AN OUTPATIENT AS HE MAY NEED TO ORDER IT AGAIN OR ORDER A DIFFERENT TEST. FOLLOW UP WITH YOUR FAMILY DOCTOR REGARDING YOUR EPISODE TODAY. PLEASE ALSO KEEP YOUR APPOINTMENT WITH THE NEUROLOGIST. Fainting: Care Instructions  Your Care Instructions    When you faint, or pass out, you lose consciousness for a short time. A brief drop in blood flow to the brain often causes it. When you fall or lie down, more blood flows to your brain and you regain consciousness. Emotional stress, pain, or overheating-especially if you have been standing-can make you faint. In these cases, fainting is usually not serious. But fainting can be a sign of a more serious problem. Your doctor may want you to have more tests to rule out other causes. The treatment you need depends on the reason why you fainted. The doctor has checked you carefully, but problems can develop later. If you notice any problems or new symptoms, get medical treatment right away. Follow-up care is a key part of your treatment and safety. Be sure to make and go to all appointments, and call your doctor if you are having problems. It's also a good idea to know your test results and keep a list of the medicines you take. How can you care for yourself at home? · Drink plenty of fluids to prevent dehydration. If you have kidney, heart, or liver disease and have to limit fluids, talk with your doctor before you increase your fluid intake. When should you call for help? Call 911 anytime you think you may need emergency care. For example, call if:  ? · You have symptoms of a heart problem. These may include:  ¨ Chest pain or pressure. ¨ Severe trouble breathing. ¨ A fast or irregular heartbeat. ¨ Lightheadedness or sudden weakness.   ¨ Coughing up pink, foamy mucus.  ¨ Passing out. After you call 911, the  may tell you to chew 1 adult-strength or 2 to 4 low-dose aspirin. Wait for an ambulance. Do not try to drive yourself. ? · You have symptoms of a stroke. These may include:  ¨ Sudden numbness, tingling, weakness, or loss of movement in your face, arm, or leg, especially on only one side of your body. ¨ Sudden vision changes. ¨ Sudden trouble speaking. ¨ Sudden confusion or trouble understanding simple statements. ¨ Sudden problems with walking or balance. ¨ A sudden, severe headache that is different from past headaches. ? · You passed out (lost consciousness) again. ? Watch closely for changes in your health, and be sure to contact your doctor if:  ? · You do not get better as expected. Where can you learn more? Go to http://ramón-ayden.info/. Enter K712 in the search box to learn more about \"Fainting: Care Instructions. \"  Current as of: March 20, 2017  Content Version: 11.4  © 1402-9664 [employer]. Care instructions adapted under license by [employer] (which disclaims liability or warranty for this information). If you have questions about a medical condition or this instruction, always ask your healthcare professional. [address] any warranty or liability for your use of this information.

## 2018-06-18 NOTE — ED NOTES
While giving discharge instructions patient rolling eyes, loud, and states,\"I am supposed to get some damn depakote. You don't know what the hell you're doing. \" Offered support to patient, notified DR Mario Ernandez. DR Mario Ernandez states patient is to be discharged to home, eat, and then take depakote dose from home prescription, notified patient of DR Burgos's instructions.

## 2018-06-19 LAB
ATRIAL RATE: 64 BPM
CALCULATED P AXIS, ECG09: 26 DEGREES
CALCULATED R AXIS, ECG10: -36 DEGREES
CALCULATED T AXIS, ECG11: 19 DEGREES
DIAGNOSIS, 93000: NORMAL
P-R INTERVAL, ECG05: 154 MS
Q-T INTERVAL, ECG07: 434 MS
QRS DURATION, ECG06: 94 MS
QTC CALCULATION (BEZET), ECG08: 447 MS
VENTRICULAR RATE, ECG03: 64 BPM

## 2019-01-06 ENCOUNTER — HOSPITAL ENCOUNTER (EMERGENCY)
Age: 69
Discharge: HOME | End: 2019-01-06
Attending: EMERGENCY MEDICINE
Payer: COMMERCIAL

## 2019-01-06 VITALS
RESPIRATION RATE: 19 BRPM | WEIGHT: 222 LBS | OXYGEN SATURATION: 99 % | DIASTOLIC BLOOD PRESSURE: 63 MMHG | HEART RATE: 92 BPM | BODY MASS INDEX: 39.34 KG/M2 | TEMPERATURE: 98.2 F | SYSTOLIC BLOOD PRESSURE: 132 MMHG | HEIGHT: 63 IN

## 2019-01-06 DIAGNOSIS — R56.9 CONVULSIONS, UNSPECIFIED CONVULSION TYPE (HCC): Primary | ICD-10-CM

## 2019-01-06 DIAGNOSIS — N30.90 CYSTITIS: ICD-10-CM

## 2019-01-06 LAB
ALBUMIN SERPL-MCNC: 3.4 G/DL (ref 3.4–5)
ALBUMIN/GLOB SERPL: 0.9 {RATIO} (ref 0.8–1.7)
ALP SERPL-CCNC: 131 U/L (ref 45–117)
ALT SERPL-CCNC: 65 U/L (ref 13–56)
ANION GAP SERPL CALC-SCNC: 5 MMOL/L (ref 3–18)
APPEARANCE UR: CLEAR
AST SERPL-CCNC: 32 U/L (ref 15–37)
BACTERIA URNS QL MICRO: ABNORMAL /HPF
BASOPHILS # BLD: 0 K/UL (ref 0–0.1)
BASOPHILS NFR BLD: 0 % (ref 0–2)
BILIRUB SERPL-MCNC: 0.3 MG/DL (ref 0.2–1)
BILIRUB UR QL: NEGATIVE
BUN SERPL-MCNC: 20 MG/DL (ref 7–18)
BUN/CREAT SERPL: 24 (ref 12–20)
CALCIUM SERPL-MCNC: 8.5 MG/DL (ref 8.5–10.1)
CHLORIDE SERPL-SCNC: 110 MMOL/L (ref 100–108)
CO2 SERPL-SCNC: 26 MMOL/L (ref 21–32)
COLOR UR: YELLOW
CREAT SERPL-MCNC: 0.83 MG/DL (ref 0.6–1.3)
DIFFERENTIAL METHOD BLD: ABNORMAL
EOSINOPHIL # BLD: 0.1 K/UL (ref 0–0.4)
EOSINOPHIL NFR BLD: 3 % (ref 0–5)
EPITH CASTS URNS QL MICRO: ABNORMAL /LPF (ref 0–5)
ERYTHROCYTE [DISTWIDTH] IN BLOOD BY AUTOMATED COUNT: 15.6 % (ref 11.6–14.5)
GLOBULIN SER CALC-MCNC: 3.6 G/DL (ref 2–4)
GLUCOSE SERPL-MCNC: 118 MG/DL (ref 74–99)
GLUCOSE UR STRIP.AUTO-MCNC: NEGATIVE MG/DL
HCT VFR BLD AUTO: 38.6 % (ref 35–45)
HGB BLD-MCNC: 12.3 G/DL (ref 12–16)
HGB UR QL STRIP: NEGATIVE
KETONES UR QL STRIP.AUTO: NEGATIVE MG/DL
LEUKOCYTE ESTERASE UR QL STRIP.AUTO: ABNORMAL
LYMPHOCYTES # BLD: 1.3 K/UL (ref 0.9–3.6)
LYMPHOCYTES NFR BLD: 27 % (ref 21–52)
MCH RBC QN AUTO: 30.1 PG (ref 24–34)
MCHC RBC AUTO-ENTMCNC: 31.9 G/DL (ref 31–37)
MCV RBC AUTO: 94.4 FL (ref 74–97)
MONOCYTES # BLD: 0.5 K/UL (ref 0.05–1.2)
MONOCYTES NFR BLD: 11 % (ref 3–10)
NEUTS SEG # BLD: 2.9 K/UL (ref 1.8–8)
NEUTS SEG NFR BLD: 59 % (ref 40–73)
NITRITE UR QL STRIP.AUTO: POSITIVE
PH UR STRIP: 6 [PH] (ref 5–8)
PLATELET # BLD AUTO: 177 K/UL (ref 135–420)
PMV BLD AUTO: 9.5 FL (ref 9.2–11.8)
POTASSIUM SERPL-SCNC: 3.8 MMOL/L (ref 3.5–5.5)
PROT SERPL-MCNC: 7 G/DL (ref 6.4–8.2)
PROT UR STRIP-MCNC: NEGATIVE MG/DL
RBC # BLD AUTO: 4.09 M/UL (ref 4.2–5.3)
RBC #/AREA URNS HPF: ABNORMAL /HPF (ref 0–5)
SODIUM SERPL-SCNC: 141 MMOL/L (ref 136–145)
SP GR UR REFRACTOMETRY: 1.02 (ref 1–1.03)
UROBILINOGEN UR QL STRIP.AUTO: 0.2 EU/DL (ref 0.2–1)
WBC # BLD AUTO: 4.9 K/UL (ref 4.6–13.2)
WBC URNS QL MICRO: ABNORMAL /HPF (ref 0–4)

## 2019-01-06 PROCEDURE — 99284 EMERGENCY DEPT VISIT MOD MDM: CPT

## 2019-01-06 PROCEDURE — 81001 URINALYSIS AUTO W/SCOPE: CPT

## 2019-01-06 PROCEDURE — 74011250637 HC RX REV CODE- 250/637: Performed by: EMERGENCY MEDICINE

## 2019-01-06 PROCEDURE — 80053 COMPREHEN METABOLIC PANEL: CPT

## 2019-01-06 PROCEDURE — 85025 COMPLETE CBC W/AUTO DIFF WBC: CPT

## 2019-01-06 RX ORDER — SULFAMETHOXAZOLE AND TRIMETHOPRIM 800; 160 MG/1; MG/1
1 TABLET ORAL
Status: COMPLETED | OUTPATIENT
Start: 2019-01-06 | End: 2019-01-06

## 2019-01-06 RX ORDER — SULFAMETHOXAZOLE AND TRIMETHOPRIM 800; 160 MG/1; MG/1
1 TABLET ORAL 2 TIMES DAILY
Qty: 14 TAB | Refills: 0 | Status: SHIPPED | OUTPATIENT
Start: 2019-01-06 | End: 2019-01-13

## 2019-01-06 RX ADMIN — SULFAMETHOXAZOLE AND TRIMETHOPRIM 1 TABLET: 800; 160 TABLET ORAL at 14:09

## 2019-01-06 NOTE — DISCHARGE INSTRUCTIONS
Patient Education        Urinary Tract Infection in Women: Care Instructions  Your Care Instructions    A urinary tract infection, or UTI, is a general term for an infection anywhere between the kidneys and the urethra (where urine comes out). Most UTIs are bladder infections. They often cause pain or burning when you urinate. UTIs are caused by bacteria and can be cured with antibiotics. Be sure to complete your treatment so that the infection goes away. Follow-up care is a key part of your treatment and safety. Be sure to make and go to all appointments, and call your doctor if you are having problems. It's also a good idea to know your test results and keep a list of the medicines you take. How can you care for yourself at home? · Take your antibiotics as directed. Do not stop taking them just because you feel better. You need to take the full course of antibiotics. · Drink extra water and other fluids for the next day or two. This may help wash out the bacteria that are causing the infection. (If you have kidney, heart, or liver disease and have to limit fluids, talk with your doctor before you increase your fluid intake.)  · Avoid drinks that are carbonated or have caffeine. They can irritate the bladder. · Urinate often. Try to empty your bladder each time. · To relieve pain, take a hot bath or lay a heating pad set on low over your lower belly or genital area. Never go to sleep with a heating pad in place. To prevent UTIs  · Drink plenty of water each day. This helps you urinate often, which clears bacteria from your system. (If you have kidney, heart, or liver disease and have to limit fluids, talk with your doctor before you increase your fluid intake.)  · Urinate when you need to. · Urinate right after you have sex. · Change sanitary pads often. · Avoid douches, bubble baths, feminine hygiene sprays, and other feminine hygiene products that have deodorants.   · After going to the bathroom, wipe from front to back. When should you call for help? Call your doctor now or seek immediate medical care if:    · Symptoms such as fever, chills, nausea, or vomiting get worse or appear for the first time.     · You have new pain in your back just below your rib cage. This is called flank pain.     · There is new blood or pus in your urine.     · You have any problems with your antibiotic medicine.    Watch closely for changes in your health, and be sure to contact your doctor if:    · You are not getting better after taking an antibiotic for 2 days.     · Your symptoms go away but then come back. Where can you learn more? Go to http://ramón-ayden.info/. Enter K257 in the search box to learn more about \"Urinary Tract Infection in Women: Care Instructions. \"  Current as of: March 21, 2018  Content Version: 11.8  © 6087-1574 [employer]. Care instructions adapted under license by [employer] (which disclaims liability or warranty for this information). If you have questions about a medical condition or this instruction, always ask your healthcare professional. [place] [address] any warranty or liability for your use of this information. Patient Education        Seizure: Care Instructions  Your Care Instructions    Seizures are caused by abnormal patterns of electrical signals in the brain. They are different for each person. Seizures can affect movement, speech, vision, or awareness. Some people have only slight shaking of a hand and do not pass out. Other people may pass out and have violent shaking of the whole body. Some people appear to stare into space. They are awake, but they can't respond normally. Later, they may not remember what happened. You may need tests to identify the type and cause of the seizures. A seizure may occur only once, or you may have them more than one time.  Taking medicines as directed and following up with your doctor may help keep you from having more seizures. The doctor has checked you carefully, but problems can develop later. If you notice any problems or new symptoms, get medical treatment right away. Follow-up care is a key part of your treatment and safety. Be sure to make and go to all appointments, and call your doctor if you are having problems. It's also a good idea to know your test results and keep a list of the medicines you take. How can you care for yourself at home? · Be safe with medicines. Take your medicines exactly as prescribed. Call your doctor if you think you are having a problem with your medicine. · Do not do any activity that could be dangerous to you or others until your doctor says it is safe to do so. For example, do not drive a car, operate machinery, swim, or climb ladders. · Be sure that anyone treating you for any health problem knows that you have had a seizure and what medicines you are taking for it. · Identify and avoid things that may make you more likely to have a seizure. These may include lack of sleep, alcohol or drug use, stress, or not eating. · Make sure you go to your follow-up appointment. When should you call for help? Call 911 anytime you think you may need emergency care. For example, call if:    · You have another seizure.     · You have more than one seizure in 24 hours.     · You have new symptoms, such as trouble walking, speaking, or thinking clearly.    Call your doctor now or seek immediate medical care if:    · You are not acting normally.    Watch closely for changes in your health, and be sure to contact your doctor if you have any problems. Where can you learn more? Go to http://ramón-ayden.info/. Enter S853 in the search box to learn more about \"Seizure: Care Instructions. \"  Current as of: June 4, 2018  Content Version: 11.8  © 7059-1070 [employer].  Care instructions adapted under license by [employer] (which disclaims liability or warranty for this information). If you have questions about a medical condition or this instruction, always ask your healthcare professional. [address] any warranty or liability for your use of this information.

## 2019-01-06 NOTE — ED TRIAGE NOTES
Pt arrives via EMS for c/o witnessed, seizure like activity. Per medics, pt has these episodes when she is in public (was in [employer] today) and is brought on by stress. Pt not post ictal. A/o x 4.

## 2019-01-06 NOTE — ED PROVIDER NOTES
EMERGENCY DEPARTMENT HISTORY AND PHYSICAL EXAM 
 
11:52 AM 
 
 
Date: 1/6/2019 Patient Name: Mehran Dominguez History of Presenting Illness Chief Complaint Patient presents with  Seizure History Provided By: Patient Chief Complaint: sz 
Duration: PTA Timing:  Acute Location: generalized Severity: 3 out of 10 Associated Symptoms: HA and heart palpitations Additional History (Context): Mehran Dominguez is a 70 y.o. female with PMHx significant for HTN, Asthma, breast cancer, obesity, DM, Thromboembolus who presents via ems with CC of acute generalized sz episodes that occurred PTA which lasted 5-10 minutes with associated 3/10 HA and palpitations. Last sz was yesterday and iwona jin. Denies biting tongue and incontinence. She takes Topamax. Pt does not smoke or drink. PCP: Genesis Rodriguez MD 
 
Current Facility-Administered Medications Medication Dose Route Frequency Provider Last Rate Last Dose  trimethoprim-sulfamethoxazole (BACTRIM DS, SEPTRA DS) 160-800 mg per tablet 1 Tab  1 Tab Oral NOW Simon Craanthony, MD      
 
Current Outpatient Medications Medication Sig Dispense Refill  trimethoprim-sulfamethoxazole (BACTRIM DS) 160-800 mg per tablet Take 1 Tab by mouth two (2) times a day for 7 days. 14 Tab 0  cyclobenzaprine (FLEXERIL) 10 mg tablet Take 1 Tab by mouth three (3) times daily as needed for Muscle Spasm(s). 40 Tab 0  
 HYDROmorphone (DILAUDID) 2 mg tablet Take 1 Tab by mouth every four (4) hours as needed for Pain. Max Daily Amount: 12 mg. 40 Tab 0  
 Calcium-Cholecalciferol, D3, 500 mg(1,250mg) -400 unit tab Take 1 Tab by mouth two (2) times a day.  losartan (COZAAR) 100 mg tablet Take 100 mg by mouth daily.  topiramate (TOPAMAX) 50 mg tablet Take 2 Tabs by mouth two (2) times a day. (Patient taking differently: Take 50 mg by mouth two (2) times a day.) 20 Tab 0  
 glipiZIDE SR (GLUCOTROL) 5 mg CR tablet Take 5 mg by mouth daily.  gabapentin (NEURONTIN) 100 mg capsule Take 200 mg by mouth two (2) times a day.  gabapentin (NEURONTIN) 100 mg capsule Take 300 mg by mouth nightly.  citalopram (CELEXA) 10 mg tablet Take 40 mg by mouth nightly.  fluticasone-salmeterol (ADVAIR) 250-50 mcg/dose diskus inhaler Take 1 Puff by inhalation two (2) times a day.  METHYLCELLULOSE (FIBER THERAPY) 500 mg by Does Not Apply route two (2) times a day. Indications: 3 tabs twice a day  potassium chloride (K-DUR, KLOR-CON) 20 mEq tablet Take 1 Tab by mouth two (2) times a day. 14 Tab 0  
 Omeprazole delayed release (PRILOSEC D/R) 20 mg tablet Take 40 mg by mouth daily.  IPRATROPIUM/ALBUTEROL SULFATE (COMBIVENT IN) Take 1 Puff by inhalation four (4) times daily.  rosuvastatin (CRESTOR) 20 mg tablet Take 20 mg by mouth nightly. Indications: after pm meal    
 levothyroxine (SYNTHROID) 50 mcg tablet Take 50 mcg by mouth Daily (before breakfast).  chlorthalidone (HYGROTEN) 25 mg tablet Take 25 mg by mouth every other day.  letrozole (FEMARA) 2.5 mg tablet Take 2.5 mg by mouth daily. Indications: per patient, \"I stopped 7 days before surgery scheduled for 9-13-17. \"  EPINEPHrine (EPIPEN) 0.3 mg/0.3 mL (1:1,000) injection 0.3 mg by IntraMUSCular route once as needed. Past History Past Medical History: 
Past Medical History:  
Diagnosis Date  Asthma  Back pain  Bipolar 1 disorder ([facility]  Breast cancer ([facility] 2012  
 left side  Cancer ([facility]  Diabetes ([facility]  GERD (gastroesophageal reflux disease)  Guillain Barré syndrome ([address]  History of blood transfusion \"in my 20's\" per patient  Hypertension  Ill-defined condition 09/12/2017 \"liver problem,\" per patient, \"not liver disease. I go to a gastroenterologist for it. \"  Lymphedema   
 left arm and hand and wears a sleeve.  Morbid obesity ([address] 09/12/2017 BMI = 40.4  [facility]  Sleep apnea left apap machine at home  Thromboembolus ([address]   
 history of in left leg  Thyroid disease  UTI (lower urinary tract infection) Past Surgical History: 
Past Surgical History:  
Procedure Laterality Date  HX BACK SURGERY Dr Ezio Bradley, july 30 2013  HX CHOLECYSTECTOMY  HX MASTECTOMY Left 03/29/2012  
 complete  HX ORTHOPAEDIC    
 LEFT FOOT SX.  
 HX PARTIAL HYSTERECTOMY Family History: 
Family History Problem Relation Age of Onset  Cancer Mother  Cancer Maternal Grandmother Social History: 
Social History Tobacco Use  Smoking status: Never Smoker  Smokeless tobacco: Never Used Substance Use Topics  Alcohol use: No  
 Drug use: No  
 
 
Allergies: Allergies Allergen Reactions  Valium [Diazepam] Anaphylaxis  Carafate [Sucralfate] Nausea and Vomiting  Carvedilol Other (comments)  Cephalexin Hives  Ciprofloxacin Hives  Eliquis [Apixaban] Unknown (comments)  Epipen [Epinephrine] Unknown (comments) Pt states she is not allergic to Epipen  Other Medication Other (comments) Per pt  Valium and morphine causes pt to become nervous  Percocet [Oxycodone-Acetaminophen] Other (comments)  
  hallucinate  Stings [Sting, Bee] Other (comments)  Talwin [Pentazocine Lactate] Shortness of Breath  Tetanus Toxoid, Adsorbed Shortness of Breath  Trileptal [Oxcarbazepine] Rash Review of Systems Review of Systems Constitutional: Negative for chills, fatigue and fever. HENT: Negative for congestion, rhinorrhea and sore throat. Negative for biting tongue Eyes: Negative for visual disturbance. Respiratory: Negative for cough, shortness of breath and wheezing. Cardiovascular: Positive for palpitations. Negative for chest pain and leg swelling. Gastrointestinal: Negative for abdominal pain, diarrhea, nausea and vomiting. Genitourinary: Negative for difficulty urinating and dysuria. Negative for incontinence. Musculoskeletal: Negative for arthralgias. Skin: Negative for rash. Neurological: Positive for seizures and headaches. Negative for weakness. All other systems reviewed and are negative. Physical Exam  
 
Visit Vitals /63 Pulse 92 Temp 98.2 °F (36.8 °C) Resp 19 Ht 5' 3\" (1.6 m) Wt 100.7 kg (222 lb) SpO2 99% BMI 39.33 kg/m² Physical Exam  
Constitutional: She is oriented to person, place, and time. She appears well-developed and well-nourished. No distress. Obese Very Hersuit A lot of facial hair HENT:  
Head: Normocephalic and atraumatic. Mouth/Throat: Oropharynx is clear and moist and mucous membranes are normal. No oropharyngeal exudate. Eyes: Conjunctivae and EOM are normal. No scleral icterus. Neck: Normal range of motion. Neck supple. No JVD present. No thyromegaly present. Cardiovascular: Normal rate, regular rhythm, S1 normal, S2 normal, normal heart sounds and intact distal pulses. Exam reveals no gallop and no friction rub. No murmur heard. Pulmonary/Chest: Effort normal and breath sounds normal. No accessory muscle usage. No respiratory distress. She has no wheezes. She has no rhonchi. She has no rales. She exhibits no tenderness. Abdominal: Soft. Normal appearance and bowel sounds are normal. She exhibits no distension and no pulsatile midline mass. There is no tenderness. There is no rebound and no guarding. Musculoskeletal: Normal range of motion. Lymphadenopathy:  
     Head (right side): No submandibular adenopathy present. She has no cervical adenopathy. Neurological: She is alert and oriented to person, place, and time. Moving all extremities. No obvious deficits or facial asymmetry. Skin: Skin is warm, dry and intact. No rash noted. No erythema. Psychiatric: She has a normal mood and affect. Her speech is normal and behavior is normal.  
Nursing note and vitals reviewed. Diagnostic Study Results Labs - Recent Results (from the past 12 hour(s)) URINALYSIS W/ RFLX MICROSCOPIC Collection Time: 01/06/19 12:21 PM  
Result Value Ref Range Color YELLOW Appearance CLEAR Specific gravity 1.019 1.005 - 1.030    
 pH (UA) 6.0 5.0 - 8.0 Protein NEGATIVE  NEG mg/dL Glucose NEGATIVE  NEG mg/dL Ketone NEGATIVE  NEG mg/dL Bilirubin NEGATIVE  NEG Blood NEGATIVE  NEG Urobilinogen 0.2 0.2 - 1.0 EU/dL Nitrites POSITIVE (A) NEG Leukocyte Esterase MODERATE (A) NEG URINE MICROSCOPIC ONLY Collection Time: 01/06/19 12:21 PM  
Result Value Ref Range WBC 36 to 50 0 - 4 /hpf  
 RBC 0 to 3 0 - 5 /hpf Epithelial cells 2+ 0 - 5 /lpf Bacteria 4+ (A) NEG /hpf  
CBC WITH AUTOMATED DIFF Collection Time: 01/06/19 12:40 PM  
Result Value Ref Range WBC 4.9 4.6 - 13.2 K/uL  
 RBC 4.09 (L) 4.20 - 5.30 M/uL  
 HGB 12.3 12.0 - 16.0 g/dL HCT 38.6 35.0 - 45.0 % MCV 94.4 74.0 - 97.0 FL  
 MCH 30.1 24.0 - 34.0 PG  
 MCHC 31.9 31.0 - 37.0 g/dL  
 RDW 15.6 (H) 11.6 - 14.5 % PLATELET 575 714 - 861 K/uL MPV 9.5 9.2 - 11.8 FL  
 NEUTROPHILS 59 40 - 73 % LYMPHOCYTES 27 21 - 52 % MONOCYTES 11 (H) 3 - 10 % EOSINOPHILS 3 0 - 5 % BASOPHILS 0 0 - 2 %  
 ABS. NEUTROPHILS 2.9 1.8 - 8.0 K/UL  
 ABS. LYMPHOCYTES 1.3 0.9 - 3.6 K/UL  
 ABS. MONOCYTES 0.5 0.05 - 1.2 K/UL  
 ABS. EOSINOPHILS 0.1 0.0 - 0.4 K/UL  
 ABS. BASOPHILS 0.0 0.0 - 0.1 K/UL  
 DF AUTOMATED METABOLIC PANEL, COMPREHENSIVE Collection Time: 01/06/19 12:40 PM  
Result Value Ref Range Sodium 141 136 - 145 mmol/L Potassium 3.8 3.5 - 5.5 mmol/L Chloride 110 (H) 100 - 108 mmol/L  
 CO2 26 21 - 32 mmol/L Anion gap 5 3.0 - 18 mmol/L Glucose 118 (H) 74 - 99 mg/dL BUN 20 (H) 7.0 - 18 MG/DL Creatinine 0.83 0.6 - 1.3 MG/DL  
 BUN/Creatinine ratio 24 (H) 12 - 20 GFR est AA >60 >60 ml/min/1.73m2 GFR est non-AA >60 >60 ml/min/1.73m2 Calcium 8.5 8.5 - 10.1 MG/DL Bilirubin, total 0.3 0.2 - 1.0 MG/DL  
 ALT (SGPT) 65 (H) 13 - 56 U/L  
 AST (SGOT) 32 15 - 37 U/L Alk. phosphatase 131 (H) 45 - 117 U/L Protein, total 7.0 6.4 - 8.2 g/dL Albumin 3.4 3.4 - 5.0 g/dL Globulin 3.6 2.0 - 4.0 g/dL A-G Ratio 0.9 0.8 - 1.7 Radiologic Studies - No results found. Medical Decision Making I am the first provider for this patient. I reviewed the vital signs, available nursing notes, past medical history, past surgical history, family history and social history. Vital Signs-Reviewed the patient's vital signs. Pulse Oximetry Analysis -  99% on room air Records Reviewed: Nursing Notes (Time of Review: 11:52 AM) Provider Notes (Medical Decision Making): ASSESSMENT / PLAN: 
    63y/o  woman, PMHx significant for PNES (psychogenic non-epileptic seizures), morbid obesity, HTN, COPD, DM, Hypothyroid, Bipolar Disorder, Lymphedema LUE (wears sleeve) who presents with another seizure like episode while at [employer]. Was in [employer], felt funny sensation in back of head like she was going to have seizure. Family helped her to lobby where she started shaking and was lowered to ground. No incontinence or tongue biting, lasted less than 1min and had no post ictal state. Brougt to ED by ems, normal bs. She has these episodes at least 1-2/week and is followed by neurology. She is only on Topomax (has been taking off any traditional anti-seizure meds due to the psychogenic nature). Can often do breathing exercises to rodrick them. Reports overall emotional stress past few months and family stress. Past week has felt generally unwell and dcreased PO intake but nothing else specific. On exam, morbid obese, sitting up in bed NAD, pleasant. Vitals normal. HEENT w/hirsuitism, otherwise normal. CV, lung, abd benign. Mentaing well, no facial asymmetry, moving all extremities. Pink Lymphedema sleeve on LUE. Sounds like PNES episode again. Could be underlying stressor, electrolyte disturbance, uti, anemia as cause. May just be break thru episode. Nonfocal neuro exam. 
 
-CBC 
-CMP 
-UA 
-Observe 
-Reassess, anticipate dc 
 
 
Deonna Best MD 
EM-IM Physician ED Course: Progress Notes, Reevaluation, and Consults: 
UPDATE 1:55 PM 
-CBC, CMP bening 
-UA w/infection 
-Chart review, several pansensitive Klebsiella and Ecoli UTI in past. Multiple abx allergies per pt. -Will place on Bactrim DS BID x 7dys 
-DC home Jaycee Bishop MD 
EM-IM Physician Diagnosis Clinical Impression: 1. Convulsions, unspecified convulsion type ([address] 2. Cystitis Disposition: HOME Follow-up Information Follow up With Specialties Details Why Contact Info Thad Smiley MD Internal Medicine Call in 1 day  [phone] [phone] [facility] EMERGENCY DEPT Emergency Medicine  As needed, If symptoms worsen [address] 
[phone] Medication List  
  
START taking these medications   
trimethoprim-sulfamethoxazole 160-800 mg per tablet Commonly known as:  BACTRIM DS Take 1 Tab by mouth two (2) times a day for 7 days. CHANGE how you take these medications   
topiramate 50 mg tablet Commonly known as:  TOPAMAX Take 2 Tabs by mouth two (2) times a day. What changed:  how much to take CONTINUE taking these medications Calcium-Cholecalciferol (D3) 500 mg(1,250mg) -400 unit Tab 
  
chlorthalidone 25 mg tablet Commonly known as:  HYGROTEN 
  
citalopram 10 mg tablet Commonly known as:  CELEXA 
  
COMBIVENT IN 
  
CRESTOR 20 mg tablet Generic drug:  rosuvastatin 
  
cyclobenzaprine 10 mg tablet Commonly known as:  FLEXERIL Take 1 Tab by mouth three (3) times daily as needed for Muscle Spasm(s). EPIPEN 0.3 mg/0.3 mL injection Generic drug:  EPINEPHrine FIBER THERAPY fluticasone-salmeterol 250-50 mcg/dose diskus inhaler Commonly known as:  ADVAIR 
  
* gabapentin 100 mg capsule Commonly known as:  NEURONTIN 
  
* gabapentin 100 mg capsule Commonly known as:  NEURONTIN 
  
glipiZIDE SR 5 mg CR tablet Commonly known as:  GLUCOTROL XL 
  
HYDROmorphone 2 mg tablet Commonly known as:  DILAUDID Take 1 Tab by mouth every four (4) hours as needed for Pain. Max Daily Amount: 12 mg. 
  
letrozole 2.5 mg tablet Commonly known as:  FEMARA 
  
levothyroxine 50 mcg tablet Commonly known as:  SYNTHROID 
  
losartan 100 mg tablet Commonly known as:  COZAAR Omeprazole delayed release 20 mg tablet Commonly known as:  PRILOSEC D/R 
  
potassium chloride 20 mEq tablet Commonly known as:  K-DUR, KLOR-CON Take 1 Tab by mouth two (2) times a day. * This list has 2 medication(s) that are the same as other medications prescribed for you. Read the directions carefully, and ask your doctor or other care provider to review them with you. Where to Get Your Medications Information about where to get these medications is not yet available Ask your nurse or doctor about these medications · trimethoprim-sulfamethoxazole 160-800 mg per tablet 
  
 
_______________________________ Attestations: 
Scribe Attestation Nils Roberts acting as a scribe for and in the presence of Jeffory Baumgarten, MD     
January 06, 2019 at 11:52 AM 
    
Provider Attestation:     
I personally performed the services described in the documentation, reviewed the documentation, as recorded by the scribe in my presence, and it accurately and completely records my words and actions. January 06, 2019 at 11:52 AM - Jeffory Baumgarten, MD   
_______________________________

## 2019-02-27 ENCOUNTER — HOSPITAL ENCOUNTER (OUTPATIENT)
Dept: LAB | Age: 69
Discharge: HOME | End: 2019-02-27
Payer: COMMERCIAL

## 2019-02-27 LAB
ALBUMIN SERPL-MCNC: 3.5 G/DL (ref 3.4–5)
ALBUMIN/GLOB SERPL: 1 {RATIO} (ref 0.8–1.7)
ALP SERPL-CCNC: 138 U/L (ref 45–117)
ALT SERPL-CCNC: 62 U/L (ref 13–56)
AST SERPL-CCNC: 30 U/L (ref 15–37)
BILIRUB DIRECT SERPL-MCNC: <0.1 MG/DL (ref 0–0.2)
BILIRUB SERPL-MCNC: 0.3 MG/DL (ref 0.2–1)
GLOBULIN SER CALC-MCNC: 3.4 G/DL (ref 2–4)
PROT SERPL-MCNC: 6.9 G/DL (ref 6.4–8.2)

## 2019-02-27 PROCEDURE — 80076 HEPATIC FUNCTION PANEL: CPT

## 2019-02-27 PROCEDURE — 36415 COLL VENOUS BLD VENIPUNCTURE: CPT

## 2019-03-11 ENCOUNTER — HOSPITAL ENCOUNTER (OUTPATIENT)
Dept: LAB | Age: 69
Discharge: HOME | End: 2019-03-11
Payer: COMMERCIAL

## 2019-03-11 LAB
BASOPHILS # BLD: 0 K/UL (ref 0–0.1)
BASOPHILS NFR BLD: 0 % (ref 0–2)
DIFFERENTIAL METHOD BLD: ABNORMAL
EOSINOPHIL # BLD: 0.2 K/UL (ref 0–0.4)
EOSINOPHIL NFR BLD: 3 % (ref 0–5)
ERYTHROCYTE [DISTWIDTH] IN BLOOD BY AUTOMATED COUNT: 15.8 % (ref 11.6–14.5)
HCT VFR BLD AUTO: 38.1 % (ref 35–45)
HGB BLD-MCNC: 11.8 G/DL (ref 12–16)
LYMPHOCYTES # BLD: 1.7 K/UL (ref 0.9–3.6)
LYMPHOCYTES NFR BLD: 32 % (ref 21–52)
MCH RBC QN AUTO: 29.1 PG (ref 24–34)
MCHC RBC AUTO-ENTMCNC: 31 G/DL (ref 31–37)
MCV RBC AUTO: 94.1 FL (ref 74–97)
MONOCYTES # BLD: 0.5 K/UL (ref 0.05–1.2)
MONOCYTES NFR BLD: 9 % (ref 3–10)
NEUTS SEG # BLD: 3 K/UL (ref 1.8–8)
NEUTS SEG NFR BLD: 56 % (ref 40–73)
PLATELET # BLD AUTO: 182 K/UL (ref 135–420)
PMV BLD AUTO: 9.7 FL (ref 9.2–11.8)
RBC # BLD AUTO: 4.05 M/UL (ref 4.2–5.3)
WBC # BLD AUTO: 5.3 K/UL (ref 4.6–13.2)

## 2019-03-11 PROCEDURE — 86003 ALLG SPEC IGE CRUDE XTRC EA: CPT

## 2019-03-11 PROCEDURE — 36415 COLL VENOUS BLD VENIPUNCTURE: CPT

## 2019-03-11 PROCEDURE — 85025 COMPLETE CBC W/AUTO DIFF WBC: CPT

## 2019-03-11 PROCEDURE — 82785 ASSAY OF IGE: CPT

## 2019-03-14 LAB — IGE SERPL-ACNC: 361 IU/ML (ref 6–495)

## 2019-03-17 ENCOUNTER — HOSPITAL ENCOUNTER (EMERGENCY)
Age: 69
Discharge: HOME | End: 2019-03-17
Attending: EMERGENCY MEDICINE
Payer: COMMERCIAL

## 2019-03-17 ENCOUNTER — APPOINTMENT (OUTPATIENT)
Dept: CT IMAGING | Age: 69
End: 2019-03-17
Attending: EMERGENCY MEDICINE
Payer: COMMERCIAL

## 2019-03-17 VITALS
BODY MASS INDEX: 38.62 KG/M2 | HEIGHT: 63 IN | WEIGHT: 218 LBS | SYSTOLIC BLOOD PRESSURE: 111 MMHG | TEMPERATURE: 97.8 F | HEART RATE: 74 BPM | OXYGEN SATURATION: 97 % | RESPIRATION RATE: 14 BRPM | DIASTOLIC BLOOD PRESSURE: 53 MMHG

## 2019-03-17 DIAGNOSIS — R29.90 STROKE-LIKE SYMPTOM: Primary | ICD-10-CM

## 2019-03-17 LAB
ABO + RH BLD: NORMAL
ALBUMIN SERPL-MCNC: 3.4 G/DL (ref 3.4–5)
ALBUMIN/GLOB SERPL: 0.9 {RATIO} (ref 0.8–1.7)
ALP SERPL-CCNC: 124 U/L (ref 45–117)
ALT SERPL-CCNC: 55 U/L (ref 13–56)
ANION GAP SERPL CALC-SCNC: 7 MMOL/L (ref 3–18)
ASPIRIN TEST, ASPIRN: 418 ARU (ref 620–672)
AST SERPL-CCNC: 31 U/L (ref 15–37)
ATRIAL RATE: 71 BPM
BILIRUB SERPL-MCNC: 0.4 MG/DL (ref 0.2–1)
BLOOD GROUP ANTIBODIES SERPL: NORMAL
BUN SERPL-MCNC: 20 MG/DL (ref 7–18)
BUN/CREAT SERPL: 22 (ref 12–20)
CALCIUM SERPL-MCNC: 8.7 MG/DL (ref 8.5–10.1)
CALCULATED P AXIS, ECG09: 26 DEGREES
CALCULATED R AXIS, ECG10: -37 DEGREES
CALCULATED T AXIS, ECG11: 13 DEGREES
CHLORIDE SERPL-SCNC: 106 MMOL/L (ref 100–108)
CO2 SERPL-SCNC: 28 MMOL/L (ref 21–32)
CREAT SERPL-MCNC: 0.92 MG/DL (ref 0.6–1.3)
DIAGNOSIS, 93000: NORMAL
ERYTHROCYTE [DISTWIDTH] IN BLOOD BY AUTOMATED COUNT: 15.5 % (ref 11.6–14.5)
ETHANOL SERPL-MCNC: <3 MG/DL (ref 0–3)
FIBRINOGEN PPP-MCNC: 480 MG/DL (ref 210–451)
GLOBULIN SER CALC-MCNC: 3.9 G/DL (ref 2–4)
GLUCOSE BLD STRIP.AUTO-MCNC: 96 MG/DL (ref 70–110)
GLUCOSE SERPL-MCNC: 95 MG/DL (ref 74–99)
HCT VFR BLD AUTO: 37.8 % (ref 35–45)
HGB BLD-MCNC: 11.9 G/DL (ref 12–16)
INR PPP: 1 (ref 0.8–1.2)
MCH RBC QN AUTO: 29.1 PG (ref 24–34)
MCHC RBC AUTO-ENTMCNC: 31.5 G/DL (ref 31–37)
MCV RBC AUTO: 92.4 FL (ref 74–97)
P-R INTERVAL, ECG05: 152 MS
PLATELET # BLD AUTO: 169 K/UL (ref 135–420)
PMV BLD AUTO: 9.1 FL (ref 9.2–11.8)
POTASSIUM SERPL-SCNC: 3.3 MMOL/L (ref 3.5–5.5)
PROT SERPL-MCNC: 7.3 G/DL (ref 6.4–8.2)
PROTHROMBIN TIME: 12.7 SEC (ref 11.5–15.2)
Q-T INTERVAL, ECG07: 430 MS
QRS DURATION, ECG06: 98 MS
QTC CALCULATION (BEZET), ECG08: 467 MS
RBC # BLD AUTO: 4.09 M/UL (ref 4.2–5.3)
SODIUM SERPL-SCNC: 141 MMOL/L (ref 136–145)
SPECIMEN EXP DATE BLD: NORMAL
THROMBIN TIME: 16.4 SECS (ref 13.8–18.2)
VENTRICULAR RATE, ECG03: 71 BPM
WBC # BLD AUTO: 4.7 K/UL (ref 4.6–13.2)

## 2019-03-17 PROCEDURE — 85027 COMPLETE CBC AUTOMATED: CPT

## 2019-03-17 PROCEDURE — 80053 COMPREHEN METABOLIC PANEL: CPT

## 2019-03-17 PROCEDURE — 99285 EMERGENCY DEPT VISIT HI MDM: CPT

## 2019-03-17 PROCEDURE — 82962 GLUCOSE BLOOD TEST: CPT

## 2019-03-17 PROCEDURE — 70450 CT HEAD/BRAIN W/O DYE: CPT

## 2019-03-17 PROCEDURE — 85384 FIBRINOGEN ACTIVITY: CPT

## 2019-03-17 PROCEDURE — 85576 BLOOD PLATELET AGGREGATION: CPT

## 2019-03-17 PROCEDURE — 85610 PROTHROMBIN TIME: CPT

## 2019-03-17 PROCEDURE — 80307 DRUG TEST PRSMV CHEM ANLYZR: CPT

## 2019-03-17 PROCEDURE — 93005 ELECTROCARDIOGRAM TRACING: CPT

## 2019-03-17 PROCEDURE — 85670 THROMBIN TIME PLASMA: CPT

## 2019-03-17 PROCEDURE — 86900 BLOOD TYPING SEROLOGIC ABO: CPT

## 2019-03-17 NOTE — ED NOTES
Pt extremely agitated by questioning by teleneuro. Pt refusing to answer any questions. Pt making it clear that she does not want this neurologist anymore at her bedside.  Pt demanding

## 2019-03-17 NOTE — ED PROVIDER NOTES
EMERGENCY DEPARTMENT HISTORY AND PHYSICAL EXAM    1:48 PM      Date: 3/17/2019  Patient Name: Carola Chan    History of Presenting Illness     Chief Complaint   Patient presents with    Dysarthria         History Provided By: Patient and paramedics    Additional History (Context): Carola Chan is a 70 y.o. female presents with stroke symptoms. She was at [facility] with family, and symptoms began at 1310, consisting of slurred speech, and when paramedics had her ambulate to the cot she had weakness of the right leg. Her blood sugar was checked and she is not hypoglycemic  Paramedics note recent stroke workup within the last week at [facility].    PCP: Mariaa Contreras MD          Current Outpatient Medications   Medication Sig Dispense Refill    cyclobenzaprine (FLEXERIL) 10 mg tablet Take 1 Tab by mouth three (3) times daily as needed for Muscle Spasm(s). 40 Tab 0    HYDROmorphone (DILAUDID) 2 mg tablet Take 1 Tab by mouth every four (4) hours as needed for Pain. Max Daily Amount: 12 mg. 40 Tab 0    Calcium-Cholecalciferol, D3, 500 mg(1,250mg) -400 unit tab Take 1 Tab by mouth two (2) times a day.  losartan (COZAAR) 100 mg tablet Take 100 mg by mouth daily.  topiramate (TOPAMAX) 50 mg tablet Take 2 Tabs by mouth two (2) times a day. (Patient taking differently: Take 50 mg by mouth two (2) times a day.) 20 Tab 0    glipiZIDE SR (GLUCOTROL) 5 mg CR tablet Take 5 mg by mouth daily.  gabapentin (NEURONTIN) 100 mg capsule Take 200 mg by mouth two (2) times a day.  gabapentin (NEURONTIN) 100 mg capsule Take 300 mg by mouth nightly.  citalopram (CELEXA) 10 mg tablet Take 40 mg by mouth nightly.  fluticasone-salmeterol (ADVAIR) 250-50 mcg/dose diskus inhaler Take 1 Puff by inhalation two (2) times a day.  METHYLCELLULOSE (FIBER THERAPY) 500 mg by Does Not Apply route two (2) times a day.  Indications: 3 tabs twice a day      potassium chloride (K-DUR, KLOR-CON) 20 mEq tablet Take 1 Tab by mouth two (2) times a day. 14 Tab 0    Omeprazole delayed release (PRILOSEC D/R) 20 mg tablet Take 40 mg by mouth daily.  IPRATROPIUM/ALBUTEROL SULFATE (COMBIVENT IN) Take 1 Puff by inhalation four (4) times daily.  rosuvastatin (CRESTOR) 20 mg tablet Take 20 mg by mouth nightly. Indications: after pm meal      levothyroxine (SYNTHROID) 50 mcg tablet Take 50 mcg by mouth Daily (before breakfast).  chlorthalidone (HYGROTEN) 25 mg tablet Take 25 mg by mouth every other day.  letrozole (FEMARA) 2.5 mg tablet Take 2.5 mg by mouth daily. Indications: per patient, \"I stopped 7 days before surgery scheduled for 9-13-17. \"      EPINEPHrine (EPIPEN) 0.3 mg/0.3 mL (1:1,000) injection 0.3 mg by IntraMUSCular route once as needed. Past History     Past Medical History:  Past Medical History:   Diagnosis Date    Asthma     Back pain     Bipolar 1 disorder ([address]     Breast cancer ([address] 2012    left side    Cancer ([address]     Diabetes ([address]     GERD (gastroesophageal reflux disease)     Guillain Barré syndrome ([address]     History of blood transfusion     \"in my 20's\" per patient    Hypertension     Ill-defined condition 09/12/2017    \"liver problem,\" per patient, \"not liver disease. I go to a gastroenterologist for it. \"    Lymphedema     left arm and hand and wears a sleeve.      Morbid obesity ([address] 09/12/2017    BMI = 40.4    Seizures (HCC)     Sleep apnea     left apap machine at home    Thromboembolus [facility])     history of in left leg    Thyroid disease     UTI (lower urinary tract infection)        Past Surgical History:  Past Surgical History:   Procedure Laterality Date    HX BACK SURGERY      Dr Po Mora, july 30 2013    HX CHOLECYSTECTOMY      HX MASTECTOMY Left 03/29/2012    complete    HX ORTHOPAEDIC      LEFT FOOT SX.    HX PARTIAL HYSTERECTOMY         Family History:  Family History   Problem Relation Age of Onset    Cancer Mother     Cancer Maternal Grandmother        Social History:  Social History     Tobacco Use    Smoking status: Never Smoker    Smokeless tobacco: Never Used   Substance Use Topics    Alcohol use: No    Drug use: No       Allergies: Allergies   Allergen Reactions    Valium [Diazepam] Anaphylaxis    Carafate [Sucralfate] Nausea and Vomiting    Carvedilol Other (comments)    Cephalexin Hives    Ciprofloxacin Hives    Eliquis [Apixaban] Unknown (comments)    Epipen [Epinephrine] Unknown (comments)     Pt states she is not allergic to Epipen    Other Medication Other (comments)     Per pt  Valium and morphine causes pt to become nervous    Percocet [Oxycodone-Acetaminophen] Other (comments)     hallucinate    Stings [Sting, Bee] Other (comments)    Talwin [Pentazocine Lactate] Shortness of Breath    Tetanus Toxoid, Adsorbed Shortness of Breath    Trileptal [Oxcarbazepine] Rash         Review of Systems     Review of Systems   Unable to perform ROS: Acuity of condition         Physical Exam     Patient Vitals for the past 12 hrs:   Temp Pulse Resp BP SpO2   03/17/19 1405 97.8 °F (36.6 °C) 82 15 131/75 99 %       1:50 PM  Initial exam in the hallway on EMS stretcher prior to CT scan:  Tremulous in all 4 extremities most pronounced in the right lower extremity. With encouragement good strength in all 4 extremities. Patient does not cooperate with sensory exam.  Cranial nerves are intact. Psychiatric the patient is very anxious and a bit confrontational.  When asked to smile she says \"oh, you have nice teeth. \"  But did not cooperate initially with the exam.  She says she does not want to be all that are called a liar. Upgrade to stroke level 2    Physical Exam   Constitutional: She appears well-developed and well-nourished. HENT:   Head: Normocephalic and atraumatic. Eyes: Conjunctivae are normal. No scleral icterus. Neck: Normal range of motion. Neck supple. No JVD present.    Cardiovascular: Normal rate, regular rhythm and normal heart sounds. 4 intact extremity pulses   Pulmonary/Chest: Effort normal and breath sounds normal.   Abdominal: Soft. She exhibits no mass. There is no tenderness. Musculoskeletal: Normal range of motion. Lymphadenopathy:     She has no cervical adenopathy. Neurological: She is alert. Skin: Skin is warm and dry. Psychiatric:   Anxious   Nursing note and vitals reviewed. Diagnostic Study Results   Labs -  Recent Results (from the past 12 hour(s))   CBC W/O DIFF    Collection Time: 03/17/19  1:45 PM   Result Value Ref Range    WBC 4.7 4.6 - 13.2 K/uL    RBC 4.09 (L) 4.20 - 5.30 M/uL    HGB 11.9 (L) 12.0 - 16.0 g/dL    HCT 37.8 35.0 - 45.0 %    MCV 92.4 74.0 - 97.0 FL    MCH 29.1 24.0 - 34.0 PG    MCHC 31.5 31.0 - 37.0 g/dL    RDW 15.5 (H) 11.6 - 14.5 %    PLATELET 396 004 - 367 K/uL    MPV 9.1 (L) 9.2 - 75.5 FL   METABOLIC PANEL, COMPREHENSIVE    Collection Time: 03/17/19  1:45 PM   Result Value Ref Range    Sodium 141 136 - 145 mmol/L    Potassium 3.3 (L) 3.5 - 5.5 mmol/L    Chloride 106 100 - 108 mmol/L    CO2 28 21 - 32 mmol/L    Anion gap 7 3.0 - 18 mmol/L    Glucose 95 74 - 99 mg/dL    BUN 20 (H) 7.0 - 18 MG/DL    Creatinine 0.92 0.6 - 1.3 MG/DL    BUN/Creatinine ratio 22 (H) 12 - 20      GFR est AA >60 >60 ml/min/1.73m2    GFR est non-AA >60 >60 ml/min/1.73m2    Calcium 8.7 8.5 - 10.1 MG/DL    Bilirubin, total 0.4 0.2 - 1.0 MG/DL    ALT (SGPT) 55 13 - 56 U/L    AST (SGOT) 31 15 - 37 U/L    Alk.  phosphatase 124 (H) 45 - 117 U/L    Protein, total 7.3 6.4 - 8.2 g/dL    Albumin 3.4 3.4 - 5.0 g/dL    Globulin 3.9 2.0 - 4.0 g/dL    A-G Ratio 0.9 0.8 - 1.7     PROTHROMBIN TIME + INR    Collection Time: 03/17/19  1:45 PM   Result Value Ref Range    Prothrombin time 12.7 11.5 - 15.2 sec    INR 1.0 0.8 - 1.2     THROMBIN TIME    Collection Time: 03/17/19  1:45 PM   Result Value Ref Range    Thrombin time 16.4 13.8 - 18.2 SECS   FIBRINOGEN    Collection Time: 03/17/19  1:45 PM   Result Value Ref Range    Fibrinogen 480 (H) 210 - 451 mg/dL   TYPE & SCREEN    Collection Time: 03/17/19  1:45 PM   Result Value Ref Range    Crossmatch Expiration 03/20/2019     ABO/Rh(D) A POSITIVE     Antibody screen NEG    ASPIRIN TEST    Collection Time: 03/17/19  1:45 PM   Result Value Ref Range    Aspirin test 418 (L) 620 - 672 ARU   ETHYL ALCOHOL    Collection Time: 03/17/19  1:45 PM   Result Value Ref Range    ALCOHOL(ETHYL),SERUM <3 0 - 3 MG/DL   GLUCOSE, POC    Collection Time: 03/17/19  1:45 PM   Result Value Ref Range    Glucose (POC) 96 70 - 110 mg/dL       Radiologic Studies -   CT HEAD WO CONT   Final Result   IMPRESSION:      No acute intracranial process, specifically, no evidence of intracranial   hemorrhage, mass effect, or midline shift. Stroke protocol report provided to Dr. Fercho Vallecillo at 1:58 PM on 03/17/2019. Ct Head Wo Cont    Result Date: 3/17/2019  EXAM: CT head CLINICAL INDICATION/HISTORY: Slurred speech. COMPARISON: 06/18/2018. TECHNIQUE: Axial CT imaging of the head was performed without intravenous contrast. One or more dose reduction techniques were used on this CT: automated exposure control, adjustment of the mAs and/or kVp according to patient size, and iterative reconstruction techniques. The specific techniques used on this CT exam have been documented in the patient's electronic medical record. Digital Imaging and Communications in Medicine (DICOM) format image data are available to nonaffiliated external healthcare facilities or entities on a secure, media free, reciprocally searchable basis with patient authorization for at least a 12-month period after this study. _______________ FINDINGS: BRAIN AND POSTERIOR FOSSA: There is no intracranial hemorrhage, mass effect, or midline shift. The sulci, folia, ventricles and basal cisterns are within normal limits. There are no areas of abnormal parenchymal attenuation.  EXTRA-AXIAL SPACES AND MENINGES: There are no abnormal extra-axial fluid collections. CALVARIUM: Intact. SINUSES: Chronic right maxillary opacification similar to prior imaging. OTHER: None. _______________     IMPRESSION: No acute intracranial process, specifically, no evidence of intracranial hemorrhage, mass effect, or midline shift. Stroke protocol report provided to Dr. Prateek Mcqueen at 1:58 PM on 03/17/2019. Medications ordered:   Medications - No data to display      Medical Decision Making   Initial Medical Decision Making and DDx:      ED Course: Progress Notes, Reevaluation, and Consults:     1:59 PM  Discussed with Dr. Alisa Hicks telling neurology, described symptoms, some limited cooperation, and tremulous with motor exam and better with encouragement. Discussed with radiology, no acute findings on her head CT.      2:04 PM nurse notified me, the patient got angry with the neurologist, and told him to get out of the room. 2:09 PM discussed with Dr. Alisa Hicks, the patient was yelling very loudly, without slurred speech, and waving her arms symmetrically without evident weakness. We will reconsult him if the patient is more agreeable. At this point her symptoms seem to have resolved, and more consistent with psychogenic cause. We will cancel the Code S. She will not be a TPA candidate. 2:58 PM  Discussed lab results and negative head CT with the patient and her . Reassuring that she had a recent workup at [facility].  Her symptoms have entirely resolved and she feels fine at this point. She is very preoccupied with her interaction with Dr. Alisa Hicks, and thinks he was reviewed, and is asking for the nursing supervisor to complain. It should be noted that she was confrontational with paramedics, myself, and Dr. Alisa Hicks. Nurse will get her up for a trial of ambulation and she will be discharged. Clinical picture is not consistent with stroke or TIA. I am the first provider for this patient.     I reviewed the vital signs, available nursing notes, past medical history, past surgical history, family history and social history. Vital Signs-Reviewed the patient's vital signs. Pulse Oximetry Analysis - 99% RA    Cardiac Monitor:  Rate/Rhythm:  71 sinus rhythm    EKG: Interpreted by the EP.   3889, sinus rhythm at 71 no acute process    Records Reviewed: Old Medical Records (Time of Review: 1:48 PM)      Diagnosis     Clinical Impression:   1. Stroke-like symptom        Disposition: home    Follow-up Information     Follow up With Specialties Details Why Contact Info    Kyleigh Horner MD Internal Medicine In 3 days  [facility]  [phone]                Medication List      ASK your doctor about these medications    Calcium-Cholecalciferol (D3) 500 mg(1,250mg) -400 unit Tab     chlorthalidone 25 mg tablet  Commonly known as:  HYGROTEN     citalopram 10 mg tablet  Commonly known as:  CELEXA     COMBIVENT IN     CRESTOR 20 mg tablet  Generic drug:  rosuvastatin     cyclobenzaprine 10 mg tablet  Commonly known as:  FLEXERIL  Take 1 Tab by mouth three (3) times daily as needed for Muscle Spasm(s). EPIPEN 0.3 mg/0.3 mL injection  Generic drug:  EPINEPHrine     FIBER THERAPY     fluticasone propion-salmeterol 250-50 mcg/dose diskus inhaler  Commonly known as:  ADVAIR     * gabapentin 100 mg capsule  Commonly known as:  NEURONTIN     * gabapentin 100 mg capsule  Commonly known as:  NEURONTIN     glipiZIDE SR 5 mg CR tablet  Commonly known as:  GLUCOTROL XL     HYDROmorphone 2 mg tablet  Commonly known as:  DILAUDID  Take 1 Tab by mouth every four (4) hours as needed for Pain.  Max Daily Amount: 12 mg.     letrozole 2.5 mg tablet  Commonly known as:  FEMARA     levothyroxine 50 mcg tablet  Commonly known as:  SYNTHROID     losartan 100 mg tablet  Commonly known as:  COZAAR     Omeprazole delayed release 20 mg tablet  Commonly known as:  PRILOSEC D/R     potassium chloride 20 mEq tablet  Commonly known as: K-DUR, KLOR-CON  Take 1 Tab by mouth two (2) times a day. topiramate 50 mg tablet  Commonly known as:  TOPAMAX  Take 2 Tabs by mouth two (2) times a day. * This list has 2 medication(s) that are the same as other medications prescribed for you. Read the directions carefully, and ask your doctor or other care provider to review them with you.              _______________________________    Attestations:  Herve Montes MD acting as a scribe for and in the presence of Natanael Mayers MD      March 17, 2019 at 2:56 PM       Provider Attestation:      I personally performed the services described in the documentation, reviewed the documentation, as recorded by the scribe in my presence, and it accurately and completely records my words and actions.  March 17, 2019 at 2:56 PM - Natanael Mayers MD    _______________________________

## 2019-03-17 NOTE — ED TRIAGE NOTES
Pt presents via EMS for witnessed slurred speech, HA, and RLE weakness since 1300 3/17/19.  Seen at [facility] Thursday for same sx and d/c/d

## 2019-03-20 LAB
A ALTERNATA IGE QN: <0.1 KU/L
A FUMIGATUS IGE QN: <0.1 KU/L
AMER ROACH IGE QN: <0.1 KU/L
AMER SYCAMORE IGE QN: <0.1 KU/L
BAHIA GRASS IGE QN: 0.22 KU/L
BERMUDA GRASS IGE QN: 0.22 KU/L
BOXELDER IGE QN: <0.1 KU/L
C HERBARUM IGE QN: <0.1 KU/L
CAT DANDER IGG QN: 0.93 KU/L
CLASS DESCRIPTION, 600268: ABNORMAL
COMMON RAGWEED IGE QN: <0.1 KU/L
D FARINAE IGE QN: 37.9 KU/L
D PTERONYSS IGE QN: 54.4 KU/L
DEPRECATED IGE QN: <0.1 KU/L
DOG DANDER IGE QN: 12.5 KU/L
ENGL PLANTAIN IGE QN: <0.1 KU/L
JOHNSON GRASS IGE QN: 0.13 KU/L
M RACEMOSUS IGE QN: <0.1 KU/L
MT JUNIPER IGE QN: <0.1 KU/L
MUGWORT IGE QN: <0.1 KU/L
NETTLE IGE QN: <0.1 KU/L
P NOTATUM IGE QN: <0.1 KU/L
S BOTRYOSUM IGE QN: <0.1 KU/L
SHEEP SORREL IGE QN: <0.1 KU/L
SWEET GUM IGE QN: <0.1 KU/L
TIMOTHY IGE QN: 0.44 KU/L
WHITE BIRCH IGE QN: <0.1 KU/L
WHITE ELM IGG QN: <0.1 KU/L
WHITE HICKORY IGE QN: 0.17 KU/L
WHITE MULBERRY IGE QN: <0.1 KU/L
WHITE OAK IGE QN: <0.1 KU/L

## 2019-03-29 ENCOUNTER — HOSPITAL ENCOUNTER (OUTPATIENT)
Dept: LAB | Age: 69
Discharge: HOME | End: 2019-03-29
Payer: COMMERCIAL

## 2019-03-29 DIAGNOSIS — R94.5 ABNORMAL RESULTS OF LIVER FUNCTION STUDIES: ICD-10-CM

## 2019-03-29 LAB
ALBUMIN SERPL-MCNC: 3.5 G/DL (ref 3.4–5)
ALBUMIN/GLOB SERPL: 1 {RATIO} (ref 0.8–1.7)
ALP SERPL-CCNC: 129 U/L (ref 45–117)
ALT SERPL-CCNC: 54 U/L (ref 13–56)
AST SERPL-CCNC: 31 U/L (ref 15–37)
BILIRUB DIRECT SERPL-MCNC: <0.1 MG/DL (ref 0–0.2)
BILIRUB SERPL-MCNC: 0.3 MG/DL (ref 0.2–1)
GLOBULIN SER CALC-MCNC: 3.4 G/DL (ref 2–4)
PROT SERPL-MCNC: 6.9 G/DL (ref 6.4–8.2)

## 2019-03-29 PROCEDURE — 80076 HEPATIC FUNCTION PANEL: CPT

## 2019-03-29 PROCEDURE — 36415 COLL VENOUS BLD VENIPUNCTURE: CPT

## 2019-04-19 ENCOUNTER — HOSPITAL ENCOUNTER (OUTPATIENT)
Dept: LAB | Age: 69
Discharge: HOME | End: 2019-04-19
Payer: COMMERCIAL

## 2019-04-19 DIAGNOSIS — R94.5 ABNORMAL RESULTS OF LIVER FUNCTION STUDIES: ICD-10-CM

## 2019-04-19 LAB
ALBUMIN SERPL-MCNC: 3.3 G/DL (ref 3.4–5)
ALBUMIN/GLOB SERPL: 0.9 {RATIO} (ref 0.8–1.7)
ALP SERPL-CCNC: 135 U/L (ref 45–117)
ALT SERPL-CCNC: 54 U/L (ref 13–56)
AST SERPL-CCNC: 32 U/L (ref 15–37)
BILIRUB DIRECT SERPL-MCNC: 0.1 MG/DL (ref 0–0.2)
BILIRUB SERPL-MCNC: 0.3 MG/DL (ref 0.2–1)
GLOBULIN SER CALC-MCNC: 3.7 G/DL (ref 2–4)
PROT SERPL-MCNC: 7 G/DL (ref 6.4–8.2)

## 2019-04-19 PROCEDURE — 80076 HEPATIC FUNCTION PANEL: CPT

## 2019-04-19 PROCEDURE — 36415 COLL VENOUS BLD VENIPUNCTURE: CPT

## 2019-06-11 ENCOUNTER — HOSPITAL ENCOUNTER (EMERGENCY)
Age: 69
Discharge: HOME | End: 2019-06-11
Attending: EMERGENCY MEDICINE
Payer: COMMERCIAL

## 2019-06-11 VITALS
DIASTOLIC BLOOD PRESSURE: 80 MMHG | TEMPERATURE: 98.5 F | HEART RATE: 89 BPM | OXYGEN SATURATION: 98 % | RESPIRATION RATE: 18 BRPM | SYSTOLIC BLOOD PRESSURE: 132 MMHG

## 2019-06-11 DIAGNOSIS — N39.0 URINARY TRACT INFECTION WITHOUT HEMATURIA, SITE UNSPECIFIED: ICD-10-CM

## 2019-06-11 DIAGNOSIS — R56.9 SEIZURE (HCC): Primary | ICD-10-CM

## 2019-06-11 LAB
ALBUMIN SERPL-MCNC: 3.8 G/DL (ref 3.4–5)
ALBUMIN/GLOB SERPL: 1 {RATIO} (ref 0.8–1.7)
ALP SERPL-CCNC: 128 U/L (ref 45–117)
ALT SERPL-CCNC: 48 U/L (ref 13–56)
ANION GAP SERPL CALC-SCNC: 6 MMOL/L (ref 3–18)
APPEARANCE UR: ABNORMAL
AST SERPL-CCNC: 24 U/L (ref 15–37)
BACTERIA URNS QL MICRO: ABNORMAL /HPF
BASOPHILS # BLD: 0 K/UL (ref 0–0.1)
BASOPHILS NFR BLD: 0 % (ref 0–2)
BILIRUB SERPL-MCNC: 0.3 MG/DL (ref 0.2–1)
BILIRUB UR QL: NEGATIVE
BUN SERPL-MCNC: 17 MG/DL (ref 7–18)
BUN/CREAT SERPL: 18 (ref 12–20)
CALCIUM SERPL-MCNC: 8.7 MG/DL (ref 8.5–10.1)
CAOX CRY URNS QL MICRO: ABNORMAL
CHLORIDE SERPL-SCNC: 111 MMOL/L (ref 100–108)
CO2 SERPL-SCNC: 26 MMOL/L (ref 21–32)
COLOR UR: ABNORMAL
CREAT SERPL-MCNC: 0.96 MG/DL (ref 0.6–1.3)
DIFFERENTIAL METHOD BLD: ABNORMAL
EOSINOPHIL # BLD: 0.2 K/UL (ref 0–0.4)
EOSINOPHIL NFR BLD: 3 % (ref 0–5)
EPITH CASTS URNS QL MICRO: ABNORMAL /LPF (ref 0–5)
ERYTHROCYTE [DISTWIDTH] IN BLOOD BY AUTOMATED COUNT: 15.7 % (ref 11.6–14.5)
GLOBULIN SER CALC-MCNC: 3.8 G/DL (ref 2–4)
GLUCOSE SERPL-MCNC: 74 MG/DL (ref 74–99)
GLUCOSE UR STRIP.AUTO-MCNC: NEGATIVE MG/DL
HCT VFR BLD AUTO: 40.6 % (ref 35–45)
HGB BLD-MCNC: 12.8 G/DL (ref 12–16)
HGB UR QL STRIP: ABNORMAL
KETONES UR QL STRIP.AUTO: NEGATIVE MG/DL
LEUKOCYTE ESTERASE UR QL STRIP.AUTO: ABNORMAL
LYMPHOCYTES # BLD: 1.5 K/UL (ref 0.9–3.6)
LYMPHOCYTES NFR BLD: 23 % (ref 21–52)
MCH RBC QN AUTO: 29.3 PG (ref 24–34)
MCHC RBC AUTO-ENTMCNC: 31.5 G/DL (ref 31–37)
MCV RBC AUTO: 92.9 FL (ref 74–97)
MONOCYTES # BLD: 0.7 K/UL (ref 0.05–1.2)
MONOCYTES NFR BLD: 11 % (ref 3–10)
NEUTS SEG # BLD: 4.2 K/UL (ref 1.8–8)
NEUTS SEG NFR BLD: 63 % (ref 40–73)
NITRITE UR QL STRIP.AUTO: NEGATIVE
PH UR STRIP: 5.5 [PH] (ref 5–8)
PLATELET # BLD AUTO: 195 K/UL (ref 135–420)
PMV BLD AUTO: 10 FL (ref 9.2–11.8)
POTASSIUM SERPL-SCNC: 3.6 MMOL/L (ref 3.5–5.5)
PROT SERPL-MCNC: 7.6 G/DL (ref 6.4–8.2)
PROT UR STRIP-MCNC: NEGATIVE MG/DL
RBC # BLD AUTO: 4.37 M/UL (ref 4.2–5.3)
RBC #/AREA URNS HPF: 0 /HPF (ref 0–5)
SODIUM SERPL-SCNC: 143 MMOL/L (ref 136–145)
SP GR UR REFRACTOMETRY: 1.02 (ref 1–1.03)
UROBILINOGEN UR QL STRIP.AUTO: 1 EU/DL (ref 0.2–1)
WBC # BLD AUTO: 6.5 K/UL (ref 4.6–13.2)
WBC URNS QL MICRO: ABNORMAL /HPF (ref 0–4)

## 2019-06-11 PROCEDURE — 93005 ELECTROCARDIOGRAM TRACING: CPT

## 2019-06-11 PROCEDURE — 99284 EMERGENCY DEPT VISIT MOD MDM: CPT

## 2019-06-11 PROCEDURE — 85025 COMPLETE CBC W/AUTO DIFF WBC: CPT

## 2019-06-11 PROCEDURE — 81001 URINALYSIS AUTO W/SCOPE: CPT

## 2019-06-11 PROCEDURE — 80053 COMPREHEN METABOLIC PANEL: CPT

## 2019-06-11 RX ORDER — NITROFURANTOIN 25; 75 MG/1; MG/1
100 CAPSULE ORAL 2 TIMES DAILY
Qty: 14 CAP | Refills: 0 | Status: SHIPPED | OUTPATIENT
Start: 2019-06-11 | End: 2019-06-18

## 2019-06-11 NOTE — ED NOTES
I have reviewed discharge instruction and prescriptions with patient. Patient verbalized understanding and has no further questions at this time. Education taught and patient verbalized understanding of education. Teach back method used. Right ac IV removed, catheter tip intact on removal.  Armband removed and shredded per patients request.    Patients pain 8/10. Belongings given to patient. Patient discharged with  to home.

## 2019-06-11 NOTE — DISCHARGE INSTRUCTIONS
Urinary Tract Infection in Women: Care Instructions  Your Care Instructions    A urinary tract infection, or UTI, is a general term for an infection anywhere between the kidneys and the urethra (where urine comes out). Most UTIs are bladder infections. They often cause pain or burning when you urinate. UTIs are caused by bacteria and can be cured with antibiotics. Be sure to complete your treatment so that the infection goes away. Follow-up care is a key part of your treatment and safety. Be sure to make and go to all appointments, and call your doctor if you are having problems. It's also a good idea to know your test results and keep a list of the medicines you take. How can you care for yourself at home? · Take your antibiotics as directed. Do not stop taking them just because you feel better. You need to take the full course of antibiotics. · Drink extra water and other fluids for the next day or two. This may help wash out the bacteria that are causing the infection. (If you have kidney, heart, or liver disease and have to limit fluids, talk with your doctor before you increase your fluid intake.)  · Avoid drinks that are carbonated or have caffeine. They can irritate the bladder. · Urinate often. Try to empty your bladder each time. · To relieve pain, take a hot bath or lay a heating pad set on low over your lower belly or genital area. Never go to sleep with a heating pad in place. To prevent UTIs  · Drink plenty of water each day. This helps you urinate often, which clears bacteria from your system. (If you have kidney, heart, or liver disease and have to limit fluids, talk with your doctor before you increase your fluid intake.)  · Urinate when you need to. · Urinate right after you have sex. · Change sanitary pads often. · Avoid douches, bubble baths, feminine hygiene sprays, and other feminine hygiene products that have deodorants.   · After going to the bathroom, wipe from front to back.  When should you call for help? Call your doctor now or seek immediate medical care if:    · Symptoms such as fever, chills, nausea, or vomiting get worse or appear for the first time.     · You have new pain in your back just below your rib cage. This is called flank pain.     · There is new blood or pus in your urine.     · You have any problems with your antibiotic medicine.    Watch closely for changes in your health, and be sure to contact your doctor if:    · You are not getting better after taking an antibiotic for 2 days.     · Your symptoms go away but then come back. Where can you learn more? Go to http://ramón-ayden.info/. Enter Z588 in the search box to learn more about \"Urinary Tract Infection in Women: Care Instructions. \"  Current as of: March 20, 2018  Content Version: 11.9  © 6661-9163 [employer]. Care instructions adapted under license by [employer] (which disclaims liability or warranty for this information). If you have questions about a medical condition or this instruction, always ask your healthcare professional. [place] [address] any warranty or liability for your use of this information. Patient Education        Seizure: Care Instructions  Your Care Instructions    Seizures are caused by abnormal patterns of electrical signals in the brain. They are different for each person. Seizures can affect movement, speech, vision, or awareness. Some people have only slight shaking of a hand and do not pass out. Other people may pass out and have violent shaking of the whole body. Some people appear to stare into space. They are awake, but they can't respond normally. Later, they may not remember what happened. You may need tests to identify the type and cause of the seizures. A seizure may occur only once, or you may have them more than one time.  Taking medicines as directed and following up with your doctor may help keep you from having more seizures. The doctor has checked you carefully, but problems can develop later. If you notice any problems or new symptoms, get medical treatment right away. Follow-up care is a key part of your treatment and safety. Be sure to make and go to all appointments, and call your doctor if you are having problems. It's also a good idea to know your test results and keep a list of the medicines you take. How can you care for yourself at home? · Be safe with medicines. Take your medicines exactly as prescribed. Call your doctor if you think you are having a problem with your medicine. · Do not do any activity that could be dangerous to you or others until your doctor says it is safe to do so. For example, do not drive a car, operate machinery, swim, or climb ladders. · Be sure that anyone treating you for any health problem knows that you have had a seizure and what medicines you are taking for it. · Identify and avoid things that may make you more likely to have a seizure. These may include lack of sleep, alcohol or drug use, stress, or not eating. · Make sure you go to your follow-up appointment. When should you call for help? Call 911 anytime you think you may need emergency care. For example, call if:    · You have another seizure.     · You have more than one seizure in 24 hours.     · You have new symptoms, such as trouble walking, speaking, or thinking clearly.    Call your doctor now or seek immediate medical care if:    · You are not acting normally.    Watch closely for changes in your health, and be sure to contact your doctor if you have any problems. Where can you learn more? Go to http://ramón-ayden.info/. Enter B464 in the search box to learn more about \"Seizure: Care Instructions. \"  Current as of: Destiney 3, 2018  Content Version: 11.9  © 7049-1609 [employer].  Care instructions adapted under license by [employer] (which disclaims liability or warranty for this information). If you have questions about a medical condition or this instruction, always ask your healthcare professional. [place] [address] any warranty or liability for your use of this information.

## 2019-06-11 NOTE — ED TRIAGE NOTES
Pt brought in via EMS. At [facility] with family and fainted. Told she had a seizure lasting 30 seconds. Also c/o of lower back pain.

## 2019-06-11 NOTE — ED PROVIDER NOTES
EMERGENCY DEPARTMENT HISTORY AND PHYSICAL EXAM      Date: 6/11/2019  Patient Name: Anna Van    History of Presenting Illness     Chief Complaint   Patient presents with    Seizure    Back Pain       History Provided By: Patient      HPI/Chief Complaint: (Context):who presents with seizure episode today. Patient states she usually just gets very weak. Stops talking and does not have shaking episodes but can get headache in the back of the head and also sometimes just focused on. Today patient was shopping at [employer] and states she did pass from her seizure. Patient denies any headache chest pain shortness of breath abdominal pain arm or leg weakness or back pain. No vomiting or diarrhea  Patient denies any head injury no blood thinners. Patient states she does not need to take any seizure medications. Patient denies any exertional chest pain or shortness of breath. There is no pain associated patient's symptoms right now. There is no radiation of symptoms. Patient is completely stable in the emergency department and speaking in full sentences without any difficulty.    ----------------------  Patient's triage note is reviewed  Patient with allergy to multiple medication including Trileptal Eliquis ciprofloxacin  Patient's home medication include Celexa Flexeril Neurontin Glucotrol Combivent Synthroid Cozaar Topamax  Patient's past medical history includes hypertension asthma bipolar seizure diabetes Guyon Barré  Surgical history includes mastectomy back surgery orthopedic surgery including foot surgery, cholecystectomy, hysterectomy  Social history is no smoking no alcohol use            PCP: Aide Ventura MD    Current Outpatient Medications   Medication Sig Dispense Refill    nitrofurantoin, macrocrystal-monohydrate, (MACROBID) 100 mg capsule Take 1 Cap by mouth two (2) times a day for 7 days.  14 Cap 0    cyclobenzaprine (FLEXERIL) 10 mg tablet Take 1 Tab by mouth three (3) times daily as needed for Muscle Spasm(s). 40 Tab 0    HYDROmorphone (DILAUDID) 2 mg tablet Take 1 Tab by mouth every four (4) hours as needed for Pain. Max Daily Amount: 12 mg. 40 Tab 0    Calcium-Cholecalciferol, D3, 500 mg(1,250mg) -400 unit tab Take 1 Tab by mouth two (2) times a day.  losartan (COZAAR) 100 mg tablet Take 100 mg by mouth daily.  topiramate (TOPAMAX) 50 mg tablet Take 2 Tabs by mouth two (2) times a day. (Patient taking differently: Take 50 mg by mouth two (2) times a day.) 20 Tab 0    glipiZIDE SR (GLUCOTROL) 5 mg CR tablet Take 5 mg by mouth daily.  gabapentin (NEURONTIN) 100 mg capsule Take 200 mg by mouth two (2) times a day.  gabapentin (NEURONTIN) 100 mg capsule Take 300 mg by mouth nightly.  citalopram (CELEXA) 10 mg tablet Take 40 mg by mouth nightly.  fluticasone-salmeterol (ADVAIR) 250-50 mcg/dose diskus inhaler Take 1 Puff by inhalation two (2) times a day.  METHYLCELLULOSE (FIBER THERAPY) 500 mg by Does Not Apply route two (2) times a day. Indications: 3 tabs twice a day      potassium chloride (K-DUR, KLOR-CON) 20 mEq tablet Take 1 Tab by mouth two (2) times a day. 14 Tab 0    Omeprazole delayed release (PRILOSEC D/R) 20 mg tablet Take 40 mg by mouth daily.  IPRATROPIUM/ALBUTEROL SULFATE (COMBIVENT IN) Take 1 Puff by inhalation four (4) times daily.  rosuvastatin (CRESTOR) 20 mg tablet Take 20 mg by mouth nightly. Indications: after pm meal      levothyroxine (SYNTHROID) 50 mcg tablet Take 50 mcg by mouth Daily (before breakfast).  chlorthalidone (HYGROTEN) 25 mg tablet Take 25 mg by mouth every other day.  letrozole (FEMARA) 2.5 mg tablet Take 2.5 mg by mouth daily. Indications: per patient, \"I stopped 7 days before surgery scheduled for 9-13-17. \"      EPINEPHrine (EPIPEN) 0.3 mg/0.3 mL (1:1,000) injection 0.3 mg by IntraMUSCular route once as needed.          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 I go to a gastroenterologist for it. \"    Lymphedema     left arm and hand and wears a sleeve.  Morbid obesity ([facility] [address] 09/12/2017    BMI = 40.4    Seizures (HCC)     Sleep apnea     left apap machine at home    Thromboembolus [facility])     history of in left leg    Thyroid disease     UTI (lower urinary tract infection)        Past Surgical History:  Past Surgical History:   Procedure Laterality Date    Cherylene Crooks Dr Audrey Fujisawa, july 30 2013    HX CHOLECYSTECTOMY      HX MASTECTOMY Left 03/29/2012    complete    HX ORTHOPAEDIC      LEFT FOOT SX.    HX PARTIAL HYSTERECTOMY         Family History:  Family History   Problem Relation Age of Onset    Cancer Mother     Cancer Maternal Grandmother        Social History:  Social History     Tobacco Use    Smoking status: Never Smoker    Smokeless tobacco: Never Used   Substance Use Topics    Alcohol use: No    Drug use: No       Allergies:   Allergies   Allergen Reactions    Valium [Diazepam] Anaphylaxis    Carafate [Sucralfate] Nausea and Vomiting    Carvedilol Other (comments)    Cephalexin Hives    Ciprofloxacin Hives    Eliquis [Apixaban] Unknown (comments)    Epipen [Epinephrine] Unknown (comments)     Pt states she is not allergic to Epipen    Other Medication Other (comments)     Per pt  Valium and morphine causes pt to become nervous    Percocet [Oxycodone-Acetaminophen] Other (comments)     hallucinate    Stings [Sting, Bee] Other (comments)    Talwin [Pentazocine Lactate] Shortness of Breath    Tetanus Toxoid, Adsorbed Shortness of Breath    Trileptal [Oxcarbazepine] Rash         Review of Systems   Review of Systems   Constitutional: Negative for activity change, fatigue and fever. HENT: Negative for congestion and rhinorrhea. Eyes: Negative for visual disturbance. Respiratory: Negative for shortness of breath. Cardiovascular: Negative for chest pain and palpitations. Gastrointestinal: Negative for abdominal pain, diarrhea, nausea and vomiting. Genitourinary: Negative for dysuria and hematuria. Musculoskeletal: Negative for back pain. Skin: Negative for rash. Neurological: Positive for seizures. Negative for dizziness, weakness and light-headedness. Psychiatric/Behavioral: Negative for agitation. All other systems reviewed and are negative. Physical Exam     Physical Exam   Constitutional: She is oriented to person, place, and time. She appears well-developed and well-nourished. No distress. HENT:   Head: Normocephalic and atraumatic. Right Ear: External ear normal.   Left Ear: External ear normal.   Nose: Nose normal.   Mouth/Throat: Oropharynx is clear and moist.   Eyes: Pupils are equal, round, and reactive to light. Conjunctivae and EOM are normal. No scleral icterus. Neck: Normal range of motion. Neck supple. No JVD present. No tracheal deviation present. No thyromegaly present. Cardiovascular: Normal rate and regular rhythm. Exam reveals no friction rub. No murmur heard. Pulmonary/Chest: Effort normal and breath sounds normal. No stridor. She exhibits no tenderness. Abdominal: Soft. Bowel sounds are normal. She exhibits no distension. There is no tenderness. There is no rebound and no guarding. Musculoskeletal: Normal range of motion. She exhibits no edema or tenderness. Lymphadenopathy:     She has no cervical adenopathy. Neurological: She is alert and oriented to person, place, and time. She has normal strength. No cranial nerve deficit or sensory deficit. She displays a negative Romberg sign.  Coordination and gait normal. GCS eye subscore is 4. GCS verbal subscore is 5. GCS motor subscore is 6. Skin: Skin is warm and dry. Psychiatric: She has a normal mood and affect. Her behavior is normal. Judgment and thought content normal.   Nursing note and vitals reviewed. Medical Decision Making   I am the first provider for this patient. I reviewed the vital signs, available nursing notes, past medical history, past surgical history, family history and social history. Provider Notes (Medical Decision Making): Seizure activity. Consistent with absence seizure. Patient not on medication  Patient states history of the same seizures in the past.  Patient denies any chest pain or exertional symptoms. Patient denies any vomiting or diarrhea. EKG is nonspecific and with no acute pathology. I will check patient's labs and consider discharge after evaluation and follow-up with neurology. Vital Signs-Reviewed the patient's vital signs. Pulse Oximetry Analysis -patient's pulse ox is 98%, room air, normal    Cardiac Monitor:  Rate/Rhythm: Patient's heart rate is 76, sinus rhythm  EKG: Interpreted by the EP. Patient's EKG is done at 1742, 76, left axis, normal intervals, LVH     . Patient's EKG is reviewed with March 2019 EKG. Patient's morphology of EKG from today is similar to the prior. Vitals:    06/11/19 1527   BP: 132/80   Pulse: 89   Resp: 18   Temp: 98.5 °F (36.9 °C)   SpO2: 98%       Records Reviewed: Nursing Notes    ED Course:   5:46 PM  Has no acute complaints awake alert oriented no distress all questions answered patient is aware that she will be discharged  She is aware that there is question of early urine infection  She does states she has history of kidney stone although there is no flank pain  She does need to follow-up with urology. I will give her her urology follow-up as well. Patient no distress no focal deficits no seizure episodes in the emergency department. Diagnostic Study Results     Labs -     Recent Results (from the past 12 hour(s))   CBC WITH AUTOMATED DIFF    Collection Time: 06/11/19  4:00 PM   Result Value Ref Range    WBC 6.5 4.6 - 13.2 K/uL    RBC 4.37 4.20 - 5.30 M/uL    HGB 12.8 12.0 - 16.0 g/dL    HCT 40.6 35.0 - 45.0 %    MCV 92.9 74.0 - 97.0 FL    MCH 29.3 24.0 - 34.0 PG    MCHC 31.5 31.0 - 37.0 g/dL    RDW 15.7 (H) 11.6 - 14.5 %    PLATELET 057 067 - 911 K/uL    MPV 10.0 9.2 - 11.8 FL    NEUTROPHILS 63 40 - 73 %    LYMPHOCYTES 23 21 - 52 %    MONOCYTES 11 (H) 3 - 10 %    EOSINOPHILS 3 0 - 5 %    BASOPHILS 0 0 - 2 %    ABS. NEUTROPHILS 4.2 1.8 - 8.0 K/UL    ABS. LYMPHOCYTES 1.5 0.9 - 3.6 K/UL    ABS. MONOCYTES 0.7 0.05 - 1.2 K/UL    ABS. EOSINOPHILS 0.2 0.0 - 0.4 K/UL    ABS. BASOPHILS 0.0 0.0 - 0.1 K/UL    DF AUTOMATED     METABOLIC PANEL, COMPREHENSIVE    Collection Time: 06/11/19  4:00 PM   Result Value Ref Range    Sodium 143 136 - 145 mmol/L    Potassium 3.6 3.5 - 5.5 mmol/L    Chloride 111 (H) 100 - 108 mmol/L    CO2 26 21 - 32 mmol/L    Anion gap 6 3.0 - 18 mmol/L    Glucose 74 74 - 99 mg/dL    BUN 17 7.0 - 18 MG/DL    Creatinine 0.96 0.6 - 1.3 MG/DL    BUN/Creatinine ratio 18 12 - 20      GFR est AA >60 >60 ml/min/1.73m2    GFR est non-AA 58 (L) >60 ml/min/1.73m2    Calcium 8.7 8.5 - 10.1 MG/DL    Bilirubin, total 0.3 0.2 - 1.0 MG/DL    ALT (SGPT) 48 13 - 56 U/L    AST (SGOT) 24 15 - 37 U/L    Alk.  phosphatase 128 (H) 45 - 117 U/L    Protein, total 7.6 6.4 - 8.2 g/dL    Albumin 3.8 3.4 - 5.0 g/dL    Globulin 3.8 2.0 - 4.0 g/dL    A-G Ratio 1.0 0.8 - 1.7     URINALYSIS W/ RFLX MICROSCOPIC    Collection Time: 06/11/19  4:00 PM   Result Value Ref Range    Color DARK YELLOW      Appearance CLOUDY      Specific gravity 1.018 1.005 - 1.030      pH (UA) 5.5 5.0 - 8.0      Protein NEGATIVE  NEG mg/dL    Glucose NEGATIVE  NEG mg/dL    Ketone NEGATIVE  NEG mg/dL    Bilirubin NEGATIVE  NEG      Blood TRACE (A) NEG      Urobilinogen 1.0 0.2 - 1.0 EU/dL    Nitrites NEGATIVE  NEG      Leukocyte Esterase MODERATE (A) NEG     URINE MICROSCOPIC ONLY    Collection Time: 06/11/19  4:00 PM   Result Value Ref Range    WBC 8 to 10 0 - 4 /hpf    RBC 0 0 - 5 /hpf    Epithelial cells 1+ 0 - 5 /lpf    Bacteria 3+ (A) NEG /hpf    CA Oxalate crystals FEW (A) NEG     EKG, 12 LEAD, INITIAL    Collection Time: 06/11/19  5:42 PM   Result Value Ref Range    Ventricular Rate 76 BPM    Atrial Rate 76 BPM    P-R Interval 150 ms    QRS Duration 102 ms    Q-T Interval 414 ms    QTC Calculation (Bezet) 465 ms    Calculated P Axis 44 degrees    Calculated R Axis -35 degrees    Calculated T Axis 28 degrees    Diagnosis       Normal sinus rhythm  Left axis deviation  Voltage criteria for left ventricular hypertrophy  T wave abnormality, consider anterior ischemia  Abnormal ECG  When compared with ECG of 17-MAR-2019 15:08,  No significant change was found         Radiologic Studies -   No orders to display     CT Results  (Last 48 hours)    None        CXR Results  (Last 48 hours)    None              Discharge     Clinical Impression:   1. Seizure ([address]    2.  Urinary tract infection without hematuria, site unspecified        Disposition:  Home    It should be noted that I will be the provider of record for this patient  Heron Baker MD, RDMS, FACEP    Follow-up Information     Follow up With Specialties Details Why [address] [facility] EMERGENCY DEPT Emergency Medicine  If symptoms worsen [address] [place] [address] 70 Call in 1 day Follow Up From Emergency Department [address] [facility]  [facility]    Kj Starr MD Neurology Call in 1 day Follow Up From Emergency Department [address] [facility] Sunday  [facility]  [phone]            Current Discharge Medication List      START taking these medications    Details   nitrofurantoin, macrocrystal-monohydrate, (MACROBID) 100 mg capsule Take 1 Cap by mouth two (2) times a day for 7 days.   Qty: 14 Cap, Refills: 0

## 2019-06-12 LAB
ATRIAL RATE: 76 BPM
CALCULATED P AXIS, ECG09: 44 DEGREES
CALCULATED R AXIS, ECG10: -35 DEGREES
CALCULATED T AXIS, ECG11: 28 DEGREES
DIAGNOSIS, 93000: NORMAL
P-R INTERVAL, ECG05: 150 MS
Q-T INTERVAL, ECG07: 414 MS
QRS DURATION, ECG06: 102 MS
QTC CALCULATION (BEZET), ECG08: 465 MS
VENTRICULAR RATE, ECG03: 76 BPM

## 2019-10-01 ENCOUNTER — HOSPITAL ENCOUNTER (OUTPATIENT)
Dept: INFUSION THERAPY | Age: 69
Discharge: HOME | End: 2019-10-01
Payer: MEDICARE

## 2019-10-01 VITALS
TEMPERATURE: 99.5 F | OXYGEN SATURATION: 96 % | SYSTOLIC BLOOD PRESSURE: 133 MMHG | RESPIRATION RATE: 18 BRPM | HEART RATE: 85 BPM | DIASTOLIC BLOOD PRESSURE: 83 MMHG

## 2019-10-01 PROCEDURE — 96372 THER/PROPH/DIAG INJ SC/IM: CPT

## 2019-10-01 PROCEDURE — 74011250636 HC RX REV CODE- 250/636: Performed by: NURSE PRACTITIONER

## 2019-10-01 RX ORDER — ERGOCALCIFEROL 1.25 MG/1
50000 CAPSULE ORAL
COMMUNITY

## 2019-10-01 RX ORDER — ATORVASTATIN CALCIUM 40 MG/1
40 TABLET, FILM COATED ORAL DAILY
COMMUNITY

## 2019-10-01 RX ORDER — DICYCLOMINE HYDROCHLORIDE 20 MG/1
20 TABLET ORAL 3 TIMES DAILY
COMMUNITY

## 2019-10-01 RX ORDER — DIVALPROEX SODIUM 250 MG/1
500 TABLET, DELAYED RELEASE ORAL 2 TIMES DAILY
COMMUNITY
End: 2019-12-27

## 2019-10-01 RX ADMIN — OMALIZUMAB 75 MG: 75 INJECTION, SOLUTION SUBCUTANEOUS at 15:17

## 2019-10-01 RX ADMIN — OMALIZUMAB 150 MG: 150 INJECTION, SOLUTION SUBCUTANEOUS at 15:14

## 2019-10-01 RX ADMIN — OMALIZUMAB 150 MG: 150 INJECTION, SOLUTION SUBCUTANEOUS at 15:17

## 2019-10-01 NOTE — PROGRESS NOTES
[facility] Progress Note    Date: 2019    Name: Jeannette Rhoades    MRN: [id number]         : 1950      Ms. Deepti Saldana was assessed and education was provided. Ms. Maynor Ferrell vitals were reviewed and patient was observed for 5 minutes prior to treatment. Visit Vitals  /83 (BP 1 Location: Right arm, BP Patient Position: Sitting)   Pulse 85   Temp 99.5 °F (37.5 °C)   Resp 18   SpO2 96%       Lab results were obtained and reviewed. No results found for this or any previous visit (from the past 12 hour(s)). Xolair 150 mg was administered subcutaneous in  Right upper arm. Xolair 150 mg was administered subcutaneous in  Right lower quadrant of abdomen. Xolair 75 mg was administered subcutaneous in  Left lower quadrant of abdomen. Total dose of 375mg     Patient was leaving facility when she had what was described as a seizure, she began to have tremors and was confused for a small period of time. She did not want to answer when we asked her her name, and she stated we were strangers and she was afraid. She was observed in the lobby for about 20 minutes, her  was with ehr the entire visit and throughout the episode. She did not want to go to the hospital and did not want emergency services contacted. She was able to ambulate and leave with her . Ms. Deepti Saldana tolerated well, and had no complaints. Patient armband removed and shredded. Ms. Deepti Saldana was discharged from [place] [address] in stable condition at 1600. She is to return on 10/15/19 at 1300 for her next appointment.     Maine Boucher RN  2019  4:11 PM

## 2019-10-02 ENCOUNTER — HOSPITAL ENCOUNTER (OUTPATIENT)
Dept: INFUSION THERAPY | Age: 69
Discharge: HOME | End: 2019-10-02
Payer: MEDICARE

## 2019-10-15 ENCOUNTER — HOSPITAL ENCOUNTER (OUTPATIENT)
Dept: INFUSION THERAPY | Age: 69
Discharge: HOME | End: 2019-10-15
Payer: MEDICARE

## 2019-10-15 VITALS
HEART RATE: 74 BPM | RESPIRATION RATE: 18 BRPM | SYSTOLIC BLOOD PRESSURE: 129 MMHG | DIASTOLIC BLOOD PRESSURE: 77 MMHG | OXYGEN SATURATION: 95 % | TEMPERATURE: 97.4 F

## 2019-10-15 PROCEDURE — 74011250636 HC RX REV CODE- 250/636: Performed by: NURSE PRACTITIONER

## 2019-10-15 PROCEDURE — 96372 THER/PROPH/DIAG INJ SC/IM: CPT

## 2019-10-15 RX ADMIN — OMALIZUMAB 75 MG: 75 INJECTION, SOLUTION SUBCUTANEOUS at 13:10

## 2019-10-15 RX ADMIN — OMALIZUMAB 150 MG: 150 INJECTION, SOLUTION SUBCUTANEOUS at 13:10

## 2019-10-15 NOTE — PROGRESS NOTES
[facility] Progress Note    Date: October 15, 2019    Name: Tammy Olguin    MRN: [id number]         : 1950     Xolair injection      Ms. Donaldo Soriano was assessed and education was provided. Ms. Lakesha Ny vitals were reviewed and patient was observed for 5 minutes prior to treatment. Visit Vitals  /77 (BP 1 Location: Right arm, BP Patient Position: Sitting)   Pulse 74   Temp 97.4 °F (36.3 °C)   Resp 18   SpO2 95%       Lab results were obtained and reviewed. No results found for this or any previous visit (from the past 12 hour(s)). Xolair 150 mg was administered subcutaneous in  Right upper arm. Xolair 150 mg was administered subcutaneous in  Right lower quadrant of abdomen. Xolair 75 mg was administered subcutaneous in  Left lower quadrant of abdomen. Total dose of 375mg       Ms. Barillas tolerated well, and had no complaints. Patient armband removed and shredded. Ms. Donaldo Soriano was discharged from [place] [address] in stable condition at 1320. She is to return on 10/29/19 at 1500 for her next appointment.     Lupillo Lee RN  October 15, 2019

## 2019-10-29 ENCOUNTER — HOSPITAL ENCOUNTER (OUTPATIENT)
Dept: INFUSION THERAPY | Age: 69
Discharge: HOME | End: 2019-10-29
Payer: MEDICARE

## 2019-10-29 VITALS
RESPIRATION RATE: 18 BRPM | SYSTOLIC BLOOD PRESSURE: 131 MMHG | DIASTOLIC BLOOD PRESSURE: 86 MMHG | OXYGEN SATURATION: 96 % | TEMPERATURE: 97 F | HEART RATE: 80 BPM

## 2019-10-29 PROCEDURE — 96372 THER/PROPH/DIAG INJ SC/IM: CPT

## 2019-10-29 PROCEDURE — 74011250636 HC RX REV CODE- 250/636: Performed by: NURSE PRACTITIONER

## 2019-10-29 RX ADMIN — OMALIZUMAB 150 MG: 150 INJECTION, SOLUTION SUBCUTANEOUS at 15:25

## 2019-10-29 RX ADMIN — OMALIZUMAB 75 MG: 75 INJECTION, SOLUTION SUBCUTANEOUS at 15:25

## 2019-10-29 NOTE — PROGRESS NOTES
[facility] Progress Note    Date: 2019    Name: Soni Rosa    MRN: [id number]         : 1950     Xolair injection      Ms. Rubin Miller was assessed and education was provided. Ms. Elizabeth Hartmann vitals were reviewed and patient was observed for 5 minutes prior to treatment. Visit Vitals  /86 (BP 1 Location: Right arm, BP Patient Position: Sitting)   Pulse 80   Temp 97 °F (36.1 °C)   Resp 18   SpO2 96%       Xolair 150 mg was administered subcutaneous in  Right upper arm. Xolair 150 mg was administered subcutaneous in  Right lower quadrant of abdomen. Xolair 75 mg was administered subcutaneous in  Left lower quadrant of abdomen. Total dose of 375mg       Ms. Barillas tolerated well, and had no complaints. Patient armband removed and shredded. Ms. Rubin Miller was discharged from [place] [address] in stable condition at [phone]. She is to return on 19 at 1500 for her next appointment.     Hina Zavala RN  2019  [phone]

## 2019-11-05 ENCOUNTER — HOSPITAL ENCOUNTER (EMERGENCY)
Age: 69
Discharge: HOME | End: 2019-11-05
Attending: EMERGENCY MEDICINE | Admitting: EMERGENCY MEDICINE
Payer: MEDICARE

## 2019-11-05 VITALS
SYSTOLIC BLOOD PRESSURE: 130 MMHG | HEART RATE: 73 BPM | WEIGHT: 218 LBS | TEMPERATURE: 98.5 F | OXYGEN SATURATION: 94 % | HEIGHT: 63 IN | RESPIRATION RATE: 18 BRPM | BODY MASS INDEX: 38.62 KG/M2 | DIASTOLIC BLOOD PRESSURE: 71 MMHG

## 2019-11-05 DIAGNOSIS — R56.9 SEIZURE-LIKE ACTIVITY (HCC): Primary | ICD-10-CM

## 2019-11-05 DIAGNOSIS — F43.0 STRESS REACTION: ICD-10-CM

## 2019-11-05 LAB
ALBUMIN SERPL-MCNC: 3 G/DL (ref 3.4–5)
ALBUMIN/GLOB SERPL: 0.9 {RATIO} (ref 0.8–1.7)
ALP SERPL-CCNC: 58 U/L (ref 45–117)
ALT SERPL-CCNC: 25 U/L (ref 13–56)
ANION GAP SERPL CALC-SCNC: 2 MMOL/L (ref 3–18)
AST SERPL-CCNC: 15 U/L (ref 10–38)
BASOPHILS # BLD: 0 K/UL (ref 0–0.1)
BASOPHILS NFR BLD: 0 % (ref 0–2)
BILIRUB SERPL-MCNC: 0.4 MG/DL (ref 0.2–1)
BUN SERPL-MCNC: 23 MG/DL (ref 7–18)
BUN/CREAT SERPL: 24 (ref 12–20)
CALCIUM SERPL-MCNC: 9.5 MG/DL (ref 8.5–10.1)
CHLORIDE SERPL-SCNC: 105 MMOL/L (ref 100–111)
CO2 SERPL-SCNC: 34 MMOL/L (ref 21–32)
CREAT SERPL-MCNC: 0.94 MG/DL (ref 0.6–1.3)
DIFFERENTIAL METHOD BLD: ABNORMAL
EOSINOPHIL # BLD: 0 K/UL (ref 0–0.4)
EOSINOPHIL NFR BLD: 0 % (ref 0–5)
ERYTHROCYTE [DISTWIDTH] IN BLOOD BY AUTOMATED COUNT: 19.4 % (ref 11.6–14.5)
GLOBULIN SER CALC-MCNC: 3.5 G/DL (ref 2–4)
GLUCOSE SERPL-MCNC: 170 MG/DL (ref 74–99)
HCT VFR BLD AUTO: 41.1 % (ref 35–45)
HGB BLD-MCNC: 12.9 G/DL (ref 12–16)
LYMPHOCYTES # BLD: 1 K/UL (ref 0.9–3.6)
LYMPHOCYTES NFR BLD: 30 % (ref 21–52)
MCH RBC QN AUTO: 29.3 PG (ref 24–34)
MCHC RBC AUTO-ENTMCNC: 31.4 G/DL (ref 31–37)
MCV RBC AUTO: 93.4 FL (ref 74–97)
MONOCYTES # BLD: 0.3 K/UL (ref 0.05–1.2)
MONOCYTES NFR BLD: 9 % (ref 3–10)
NEUTS SEG # BLD: 2 K/UL (ref 1.8–8)
NEUTS SEG NFR BLD: 61 % (ref 40–73)
PLATELET # BLD AUTO: 123 K/UL (ref 135–420)
PMV BLD AUTO: 9.1 FL (ref 9.2–11.8)
POTASSIUM SERPL-SCNC: 3.9 MMOL/L (ref 3.5–5.5)
PROT SERPL-MCNC: 6.5 G/DL (ref 6.4–8.2)
RBC # BLD AUTO: 4.4 M/UL (ref 4.2–5.3)
SODIUM SERPL-SCNC: 141 MMOL/L (ref 136–145)
VALPROATE SERPL-MCNC: 88 UG/ML (ref 50–100)
WBC # BLD AUTO: 3.2 K/UL (ref 4.6–13.2)

## 2019-11-05 PROCEDURE — 99285 EMERGENCY DEPT VISIT HI MDM: CPT

## 2019-11-05 PROCEDURE — 74011000250 HC RX REV CODE- 250: Performed by: EMERGENCY MEDICINE

## 2019-11-05 PROCEDURE — 80164 ASSAY DIPROPYLACETIC ACD TOT: CPT

## 2019-11-05 PROCEDURE — 74011250637 HC RX REV CODE- 250/637: Performed by: EMERGENCY MEDICINE

## 2019-11-05 PROCEDURE — 80053 COMPREHEN METABOLIC PANEL: CPT

## 2019-11-05 PROCEDURE — 85025 COMPLETE CBC W/AUTO DIFF WBC: CPT

## 2019-11-05 PROCEDURE — 93005 ELECTROCARDIOGRAM TRACING: CPT

## 2019-11-05 RX ORDER — ACETAMINOPHEN 500 MG
1000 TABLET ORAL
Status: COMPLETED | OUTPATIENT
Start: 2019-11-05 | End: 2019-11-05

## 2019-11-05 RX ORDER — LIDOCAINE 4 G/100G
1 PATCH TOPICAL EVERY 24 HOURS
Status: DISCONTINUED | OUTPATIENT
Start: 2019-11-05 | End: 2019-11-05 | Stop reason: HOSPADM

## 2019-11-05 RX ADMIN — ACETAMINOPHEN 1000 MG: 500 TABLET, FILM COATED ORAL at 14:22

## 2019-11-05 NOTE — ED TRIAGE NOTES
Pt presents s/p witnessed seizure. Denies injury. Has HX of seizures, and this is like typical seizure activity.

## 2019-11-05 NOTE — ED PROVIDER NOTES
PNES (psychogenic non-epileptic seizures), morbid obesity, HTN, COPD, DM, Hypothyroid, Bipolar Disorder, Lymphedema LUE     [address] HISTORY AND PHYSICAL EXAM       Date: 11/5/2019   Patient Name: Melissa Ornelas   YOB: 1950  Medical Record Number: [id number]    HISTORY OF PRESENTING ILLNESS:     Melissa Ornelas is a 70 y.o. female with hx of PNES (psychogenic non-epileptic seizures), morbid obesity, HTN, COPD, DM, Hypothyroid, Bipolar Disorder, Lymphedema LUE presents by EMS to the ED with reported witnessed tonic-clinic seizure activity lasting 1-2 minutes while in line to vote just PTA. Pt reports compliance with her Depakote. Primary Care Provider: Fito Welsh MD   Specialist:    Past Medical History:   Past Medical History:   Diagnosis Date    Asthma     Back pain     Bipolar 1 disorder ([address]     Breast cancer ([address] 2012    left side    Cancer ([address]     Diabetes ([address]     GERD (gastroesophageal reflux disease)     Guillain Barré syndrome ([address]     History of blood transfusion     \"in my 20's\" per patient    Hypertension     Ill-defined condition 09/12/2017    \"liver problem,\" per patient, \"not liver disease. I go to a gastroenterologist for it. \"    Lymphedema     left arm and hand and wears a sleeve.      Morbid obesity ([address] 09/12/2017    BMI = 40.4    Seizures (HCC)     Sleep apnea     left apap machine at home    Thromboembolus [facility])     history of in left leg    Thyroid disease     UTI (lower urinary tract infection)         Past Surgical History:   Past Surgical History:   Procedure Laterality Date    Milagros Maloney, july 30 2013    HX CHOLECYSTECTOMY      HX MASTECTOMY Left 03/29/2012    complete    HX ORTHOPAEDIC      LEFT FOOT SX.    HX PARTIAL HYSTERECTOMY          Social History:   Social History     Tobacco Use    Smoking status: Never Smoker    Smokeless tobacco: Never Used   Substance Use Topics    Alcohol use: No    Drug use: No        Allergies: Allergies   Allergen Reactions    Valium [Diazepam] Anaphylaxis    Carafate [Sucralfate] Nausea and Vomiting    Carvedilol Other (comments)    Cephalexin Hives    Ciprofloxacin Hives    Eliquis [Apixaban] Unknown (comments)    Epipen [Epinephrine] Unknown (comments)     Pt states she is not allergic to Epipen    Other Medication Other (comments)     Per pt  Valium and morphine causes pt to become nervous    Percocet [Oxycodone-Acetaminophen] Other (comments)     hallucinate    Stings [Sting, Bee] Other (comments)    Talwin [Pentazocine Lactate] Shortness of Breath    Tetanus Toxoid, Adsorbed Shortness of Breath    Trileptal [Oxcarbazepine] Rash        REVIEW OF SYSTEMS:  Review of Systems   Constitutional: Negative for chills and fever. HENT: Negative for ear pain and sore throat. Eyes: Negative for pain and visual disturbance. Respiratory: Negative for cough and shortness of breath. Cardiovascular: Negative for chest pain and palpitations. Gastrointestinal: Negative for abdominal pain, diarrhea, nausea and vomiting. Genitourinary: Negative for flank pain. Musculoskeletal: Negative for back pain and neck pain. Neurological: Positive for seizures. Negative for syncope and headaches. Psychiatric/Behavioral: Negative for agitation. The patient is not nervous/anxious. PHYSICAL EXAM:  Vitals:    11/05/19 1345 11/05/19 1353 11/05/19 1415 11/05/19 1445   BP: 119/66  107/68 128/64   Pulse: 82  83 76   Resp: 22  18 19   Temp: 98.5 °F (36.9 °C)      SpO2: 96%  96% 95%   Weight:  98.9 kg (218 lb)     Height:  5' 3\" (1.6 m)         Physical Exam   Constitutional: She is oriented to person, place, and time. She appears well-developed and well-nourished.   + facial hair   HENT:   Head: Normocephalic and atraumatic. Mouth/Throat: Oropharynx is clear and moist. No oropharyngeal exudate.    Eyes: Pupils are equal, round, and reactive to light. Conjunctivae and EOM are normal. No scleral icterus. Neck: Normal range of motion. Neck supple. No tracheal deviation present. Cardiovascular: Normal rate, regular rhythm and intact distal pulses. No murmur heard. Pulmonary/Chest: Effort normal and breath sounds normal. No respiratory distress. Abdominal: Soft. There is no tenderness. Musculoskeletal: Normal range of motion. She exhibits no edema, tenderness or deformity. Neurological: She is alert and oriented to person, place, and time. No cranial nerve deficit. Coordination normal.   No gross neuro deficit   Skin: Skin is warm and dry. No rash noted. She is not diaphoretic. Psychiatric:   Odd affect   Nursing note and vitals reviewed. Medications   lidocaine 4 % patch 1 Patch (1 Patch TransDERmal Apply Patch 11/5/19 1422)   acetaminophen (TYLENOL) tablet 1,000 mg (1,000 mg Oral Given 11/5/19 1422)       RESULTS:    Labs -   Labs Reviewed   METABOLIC PANEL, COMPREHENSIVE - Abnormal; Notable for the following components:       Result Value    CO2 34 (*)     Anion gap 2 (*)     Glucose 170 (*)     BUN 23 (*)     BUN/Creatinine ratio 24 (*)     GFR est non-AA 59 (*)     Albumin 3.0 (*)     All other components within normal limits   CBC WITH AUTOMATED DIFF - Abnormal; Notable for the following components:    WBC 3.2 (*)     RDW 19.4 (*)     PLATELET 011 (*)     MPV 9.1 (*)     All other components within normal limits   VALPROIC ACID       Radiologic Studies -  No orders to display       EKG interpretation:   Time 1406  Normal sinus rhythm rate 82. Left axis deviation with left anterior fascicular block. No acute ST elevation or depression noted. MEDICAL DECISION MAKING    70 y.o. female with noted past medical history who presented with witnessed seizure activity PTA. Pt and  immediately say during my initial history gathering \"See, that's why I hate coming here. We don't like coming to [facility], y'all never believe us. \"  states pt went to the ground first but didn't hit her head then began shaking. He states she always has head and neck pain after a seizure. When I asked the pt what may have triggered her seizure she says, Jose Walker is something coming up but I don't want to talk about it. \"    There are no significant abnormalities on the patient's work-up. She had no repeat seizure activity in the ED. Patient's  said they were in line to vote and her seizure activity happened right after they were told they would have to go back across town to another location to place their boat. This could have triggered her nonepileptic seizure activity or this upcoming stressful event. Either way I am not concerned for any acute emergent conditions today to warrant further work-up or admission. Patient is resting comfortably on reevaluation with no complaints, only saying she is slightly tired which is typical after her seizure activity. Patient has no new complaints, changes, or physical findings. Results were reviewed with the patient. Pt's questions and concerns were addressed. Care plan was outlined, including follow-up with PCP/specialist and return precautions were discussed. Patient is felt to be stable for discharge at this time. Diagnosis   Clinical Impression:   1.  Seizure-like activity ([address]    2. Stress reaction           Follow-up Information     Follow up With Specialties Details Why [facility] EMERGENCY DEPT Emergency Medicine  If symptoms worsen [address]  [phone]       Please follow-up with your outpatient doctors within the next 1 to 2 weeks           Current Discharge Medication List          _______________________________   Attestations:

## 2019-11-05 NOTE — ED NOTES
Pt inquiring about going home. Advised I will speak with MD to check on disposition. Pt states she is tired, but has no c/o otherwise. VSS.

## 2019-11-05 NOTE — ED NOTES
I have reviewed discharge instructions with the patient and spouse. The patient and spouse verbalized understanding. Pt agrees with DC plan, and was ambulatory for DC.

## 2019-11-06 LAB
ATRIAL RATE: 82 BPM
CALCULATED P AXIS, ECG09: 45 DEGREES
CALCULATED R AXIS, ECG10: -49 DEGREES
CALCULATED T AXIS, ECG11: 32 DEGREES
DIAGNOSIS, 93000: NORMAL
P-R INTERVAL, ECG05: 150 MS
Q-T INTERVAL, ECG07: 382 MS
QRS DURATION, ECG06: 98 MS
QTC CALCULATION (BEZET), ECG08: 446 MS
VENTRICULAR RATE, ECG03: 82 BPM

## 2019-11-07 PROBLEM — J45.50 SEVERE PERSISTENT ASTHMA: Status: ACTIVE | Noted: 2019-11-07

## 2019-11-13 ENCOUNTER — HOSPITAL ENCOUNTER (OUTPATIENT)
Dept: INFUSION THERAPY | Age: 69
Discharge: HOME | End: 2019-11-13
Payer: MEDICARE

## 2019-11-13 VITALS
RESPIRATION RATE: 18 BRPM | SYSTOLIC BLOOD PRESSURE: 131 MMHG | DIASTOLIC BLOOD PRESSURE: 87 MMHG | HEART RATE: 97 BPM | TEMPERATURE: 97.5 F | OXYGEN SATURATION: 97 %

## 2019-11-13 DIAGNOSIS — J45.50 SEVERE PERSISTENT ASTHMA, UNSPECIFIED WHETHER COMPLICATED: Primary | ICD-10-CM

## 2019-11-13 DIAGNOSIS — J45.50 SEVERE PERSISTENT ASTHMA, UNSPECIFIED WHETHER COMPLICATED: ICD-10-CM

## 2019-11-13 PROCEDURE — 96372 THER/PROPH/DIAG INJ SC/IM: CPT

## 2019-11-13 PROCEDURE — 74011250636 HC RX REV CODE- 250/636: Performed by: NURSE PRACTITIONER

## 2019-11-13 RX ADMIN — OMALIZUMAB 150 MG: 150 INJECTION, SOLUTION SUBCUTANEOUS at 15:07

## 2019-11-13 RX ADMIN — OMALIZUMAB 75 MG: 75 INJECTION, SOLUTION SUBCUTANEOUS at 15:09

## 2019-11-13 RX ADMIN — OMALIZUMAB 150 MG: 150 INJECTION, SOLUTION SUBCUTANEOUS at 15:08

## 2019-11-13 NOTE — PROGRESS NOTES
[facility] Progress Note    Date: 2019    Name: Fran Rosario    MRN: [id number]         : 1950    Xolair    Ms. Jo Ann Ang was assessed and education was provided. Ms. Joelle Herman vitals were reviewed and patient was observed for 5 minutes prior to treatment. Visit Vitals  /87 (BP 1 Location: Right arm, BP Patient Position: Sitting)   Pulse 97   Temp 97.5 °F (36.4 °C)   Resp 18   SpO2 97%       Lab results were obtained and reviewed. No results found for this or any previous visit (from the past 12 hour(s)). Xolair 75 mg was administered subcutaneous in  Right upper arm. Xolair 150 mg was administered subcutaneous in  Right lower quadrant of abdomen. Xolair 150 mg was administered subcutaneous in  Left lower quadrant of abdomen. Total dose of 375mg     Ms. Jo Ann Ang was observed for 15 minutes after injection without incident. Ms. Jo Ann Ang tolerated well, and had no complaints. Patient armband removed and shredded. Ms. Jo Ann Ang was discharged from [place] [address] in stable condition at 1600. She is to return on 10/15/19 at 1300 for her next appointment.     Jose Jiang RN  2019  4:11 PM

## 2019-11-27 ENCOUNTER — HOSPITAL ENCOUNTER (OUTPATIENT)
Dept: INFUSION THERAPY | Age: 69
Discharge: HOME | End: 2019-11-27
Payer: MEDICARE

## 2019-11-27 VITALS
SYSTOLIC BLOOD PRESSURE: 135 MMHG | DIASTOLIC BLOOD PRESSURE: 81 MMHG | TEMPERATURE: 97.5 F | RESPIRATION RATE: 18 BRPM | HEART RATE: 67 BPM

## 2019-11-27 DIAGNOSIS — J45.50 SEVERE PERSISTENT ASTHMA, UNSPECIFIED WHETHER COMPLICATED: ICD-10-CM

## 2019-11-27 DIAGNOSIS — J45.50 SEVERE PERSISTENT ASTHMA, UNSPECIFIED WHETHER COMPLICATED: Primary | ICD-10-CM

## 2019-11-27 PROCEDURE — 96372 THER/PROPH/DIAG INJ SC/IM: CPT

## 2019-11-27 PROCEDURE — 74011250636 HC RX REV CODE- 250/636: Performed by: NURSE PRACTITIONER

## 2019-11-27 RX ADMIN — OMALIZUMAB 150 MG: 202.5 INJECTION, SOLUTION SUBCUTANEOUS at 15:32

## 2019-11-27 RX ADMIN — OMALIZUMAB 75 MG: 202.5 INJECTION, SOLUTION SUBCUTANEOUS at 15:34

## 2019-11-27 RX ADMIN — OMALIZUMAB 150 MG: 202.5 INJECTION, SOLUTION SUBCUTANEOUS at 15:31

## 2019-11-27 NOTE — PROGRESS NOTES
[facility] Progress Note    Date: 2019    Name: Perlita Kern    MRN: [id number]         : 1950    Xolair    Ms. Sandra Castro was assessed and education was provided. Ms. Luis Daniel Cadena vitals were reviewed and patient was observed for 5 minutes prior to treatment. Visit Vitals  /81 (BP 1 Location: Right arm, BP Patient Position: Sitting)   Pulse 67   Temp 97.5 °F (36.4 °C)   Resp 18       Lab results were obtained and reviewed. No results found for this or any previous visit (from the past 12 hour(s)). Xolair 75 mg was administered subcutaneous in  Right upper arm. Xolair 150 mg was administered subcutaneous in  Right lower quadrant of abdomen. Xolair 150 mg was administered subcutaneous in  Left lower quadrant of abdomen. Total dose of 375mg     Ms. Barillas tolerated well, and had no complaints. Patient armband removed and shredded. Ms. Sandra Castro was discharged from [place] [address] in stable condition at 1540. She is to return on 19 at 1500 for her next appointment.     Janelle Fregoso RN  2019

## 2019-12-01 ENCOUNTER — HOSPITAL ENCOUNTER (EMERGENCY)
Age: 69
Discharge: HOME | End: 2019-12-01
Attending: EMERGENCY MEDICINE | Admitting: EMERGENCY MEDICINE
Payer: MEDICARE

## 2019-12-01 ENCOUNTER — APPOINTMENT (OUTPATIENT)
Dept: GENERAL RADIOLOGY | Age: 69
End: 2019-12-01
Attending: EMERGENCY MEDICINE
Payer: MEDICARE

## 2019-12-01 VITALS
SYSTOLIC BLOOD PRESSURE: 109 MMHG | HEART RATE: 66 BPM | RESPIRATION RATE: 14 BRPM | TEMPERATURE: 99.3 F | DIASTOLIC BLOOD PRESSURE: 51 MMHG | BODY MASS INDEX: 38.98 KG/M2 | HEIGHT: 63 IN | WEIGHT: 220 LBS | OXYGEN SATURATION: 93 %

## 2019-12-01 DIAGNOSIS — G40.909 SEIZURE DISORDER (HCC): Primary | ICD-10-CM

## 2019-12-01 LAB
ALBUMIN SERPL-MCNC: 3 G/DL (ref 3.4–5)
ALBUMIN/GLOB SERPL: 0.9 {RATIO} (ref 0.8–1.7)
ALP SERPL-CCNC: 54 U/L (ref 45–117)
ALT SERPL-CCNC: 27 U/L (ref 13–56)
ANION GAP SERPL CALC-SCNC: 3 MMOL/L (ref 3–18)
AST SERPL-CCNC: 20 U/L (ref 10–38)
BASOPHILS # BLD: 0 K/UL (ref 0–0.1)
BASOPHILS NFR BLD: 0 % (ref 0–2)
BILIRUB DIRECT SERPL-MCNC: 0.1 MG/DL (ref 0–0.2)
BILIRUB SERPL-MCNC: 0.4 MG/DL (ref 0.2–1)
BUN SERPL-MCNC: 20 MG/DL (ref 7–18)
BUN/CREAT SERPL: 23 (ref 12–20)
CALCIUM SERPL-MCNC: 9.1 MG/DL (ref 8.5–10.1)
CHLORIDE SERPL-SCNC: 106 MMOL/L (ref 100–111)
CK MB CFR SERPL CALC: NORMAL % (ref 0–4)
CK MB SERPL-MCNC: <1 NG/ML (ref 5–25)
CK SERPL-CCNC: 28 U/L (ref 26–192)
CO2 SERPL-SCNC: 32 MMOL/L (ref 21–32)
CREAT SERPL-MCNC: 0.87 MG/DL (ref 0.6–1.3)
DIFFERENTIAL METHOD BLD: ABNORMAL
EOSINOPHIL # BLD: 0 K/UL (ref 0–0.4)
EOSINOPHIL NFR BLD: 0 % (ref 0–5)
ERYTHROCYTE [DISTWIDTH] IN BLOOD BY AUTOMATED COUNT: 18.4 % (ref 11.6–14.5)
GLOBULIN SER CALC-MCNC: 3.4 G/DL (ref 2–4)
GLUCOSE SERPL-MCNC: 100 MG/DL (ref 74–99)
HCT VFR BLD AUTO: 43.1 % (ref 35–45)
HGB BLD-MCNC: 13.7 G/DL (ref 12–16)
LYMPHOCYTES # BLD: 1.3 K/UL (ref 0.9–3.6)
LYMPHOCYTES NFR BLD: 32 % (ref 21–52)
MCH RBC QN AUTO: 30.7 PG (ref 24–34)
MCHC RBC AUTO-ENTMCNC: 31.8 G/DL (ref 31–37)
MCV RBC AUTO: 96.6 FL (ref 74–97)
MONOCYTES # BLD: 0.5 K/UL (ref 0.05–1.2)
MONOCYTES NFR BLD: 12 % (ref 3–10)
NEUTS SEG # BLD: 2.2 K/UL (ref 1.8–8)
NEUTS SEG NFR BLD: 56 % (ref 40–73)
PLATELET # BLD AUTO: 126 K/UL (ref 135–420)
PMV BLD AUTO: 9.6 FL (ref 9.2–11.8)
POTASSIUM SERPL-SCNC: 4 MMOL/L (ref 3.5–5.5)
PROT SERPL-MCNC: 6.4 G/DL (ref 6.4–8.2)
RBC # BLD AUTO: 4.46 M/UL (ref 4.2–5.3)
SODIUM SERPL-SCNC: 141 MMOL/L (ref 136–145)
TROPONIN I SERPL-MCNC: <0.02 NG/ML (ref 0–0.04)
WBC # BLD AUTO: 4 K/UL (ref 4.6–13.2)

## 2019-12-01 PROCEDURE — 82550 ASSAY OF CK (CPK): CPT

## 2019-12-01 PROCEDURE — 99285 EMERGENCY DEPT VISIT HI MDM: CPT

## 2019-12-01 PROCEDURE — 71045 X-RAY EXAM CHEST 1 VIEW: CPT

## 2019-12-01 PROCEDURE — 96374 THER/PROPH/DIAG INJ IV PUSH: CPT

## 2019-12-01 PROCEDURE — 74011250636 HC RX REV CODE- 250/636: Performed by: EMERGENCY MEDICINE

## 2019-12-01 PROCEDURE — 93005 ELECTROCARDIOGRAM TRACING: CPT

## 2019-12-01 PROCEDURE — 80048 BASIC METABOLIC PNL TOTAL CA: CPT

## 2019-12-01 PROCEDURE — 85025 COMPLETE CBC W/AUTO DIFF WBC: CPT

## 2019-12-01 PROCEDURE — 96375 TX/PRO/DX INJ NEW DRUG ADDON: CPT

## 2019-12-01 PROCEDURE — 80076 HEPATIC FUNCTION PANEL: CPT

## 2019-12-01 RX ORDER — MORPHINE SULFATE 2 MG/ML
4 INJECTION, SOLUTION INTRAMUSCULAR; INTRAVENOUS ONCE
Status: COMPLETED | OUTPATIENT
Start: 2019-12-01 | End: 2019-12-01

## 2019-12-01 RX ORDER — ONDANSETRON 2 MG/ML
4 INJECTION INTRAMUSCULAR; INTRAVENOUS
Status: COMPLETED | OUTPATIENT
Start: 2019-12-01 | End: 2019-12-01

## 2019-12-01 RX ADMIN — ONDANSETRON 4 MG: 2 INJECTION INTRAMUSCULAR; INTRAVENOUS at 12:48

## 2019-12-01 RX ADMIN — MORPHINE SULFATE 4 MG: 2 INJECTION, SOLUTION INTRAMUSCULAR; INTRAVENOUS at 12:48

## 2019-12-01 NOTE — ED PROVIDER NOTES
[facility]  EMERGENCY DEPARTMENT HISTORY AND PHYSICAL EXAM       Date: 12/1/2019   Patient Name: Abdoulaye Rain   YOB: 1950  Medical Record Number: [id number]    HISTORY OF PRESENTING ILLNESS:     Abdoulaye Rain is a 78 y.o. female presenting with the noted PMH to the ED c/o seizure. Patient states she was at [employer] and started not feeling well. She went to the bathroom and then when she came back she started feeling lightheaded and noticed people coming out to catch her. She states she lost consciousness at that point but bystanders said she had a seizure. She is history of seizures. She takes Depakote for her seizures. She states she has been compliant. She states she has not hit her head at all. But she does complain of his posterior parietal headache. Patient states she is history of such pain after her seizures. She denies any vision changes. Denies any nausea or vomiting. Denies any neck or back pain. She states she does have tightness in her chest.  She states she gets this tightness from her hiatal hernia. She is trying to do certain positions to relieve it. But no increasing factors. No shortness of breath. No abdominal pain. No urine or bowel changes. Rest of 10 systems reviewed and negative. Patient denies being on blood thinners. Denies trauma or hitting her head. Primary Care Provider: Howie Grant MD   Specialist:    Past Medical History:   Past Medical History:   Diagnosis Date    Asthma     Back pain     Bipolar 1 disorder ([address]     Breast cancer ([facility] 2012    left side    Cancer ([facility]     Diabetes ([facility]     GERD (gastroesophageal reflux disease)     Guillain Barré syndrome ([facility]     History of blood transfusion     \"in my 20's\" per patient    Hypertension     Ill-defined condition 09/12/2017    \"liver problem,\" per patient, \"not liver disease. I go to a gastroenterologist for it. \"    Lymphedema     left arm and hand and wears a sleeve.  Morbid obesity ([facility] 09/12/2017    BMI = 40.4    Seizures (HCC)     Sleep apnea     left apap machine at home    Thromboembolus [facility])     history of in left leg    Thyroid disease     UTI (lower urinary tract infection)         Past Surgical History:   Past Surgical History:   Procedure Laterality Date    HX BACK SURGERY      Dr Nell Bermudez, july 30 2013    HX CHOLECYSTECTOMY      HX MASTECTOMY Left 03/29/2012    complete    HX ORTHOPAEDIC      LEFT FOOT SX.    HX PARTIAL HYSTERECTOMY          Social History:   Social History     Tobacco Use    Smoking status: Never Smoker    Smokeless tobacco: Never Used   Substance Use Topics    Alcohol use: No    Drug use: No        Allergies: Allergies   Allergen Reactions    Valium [Diazepam] Anaphylaxis    Carafate [Sucralfate] Nausea and Vomiting    Carvedilol Other (comments)    Cephalexin Hives    Ciprofloxacin Hives    Eliquis [Apixaban] Unknown (comments)    Epipen [Epinephrine] Unknown (comments)     Pt states she is not allergic to Epipen    Other Medication Other (comments)     Per pt  Valium and morphine causes pt to become nervous    Percocet [Oxycodone-Acetaminophen] Other (comments)     hallucinate    Stings [Sting, Bee] Other (comments)    Talwin [Pentazocine Lactate] Shortness of Breath    Tetanus Toxoid, Adsorbed Shortness of Breath    Trileptal [Oxcarbazepine] Rash        REVIEW OF SYSTEMS:  Review of Systems  See above. PHYSICAL EXAM:  Vitals:    12/01/19 1300 12/01/19 1309 12/01/19 1310 12/01/19 1403   BP: 103/51  109/51    Pulse: 71  70 66   Resp: 17  17 14   Temp:       SpO2:  93%  93%   Weight:       Height:           Physical Exam  Vital signs reviewed. Alert oriented x 3 in NAD. HEENT: normocephalic atraumatic. Eyes are PERRLA EOMI. Conjunctiva normal.    External ears and nose normal.    Neck: normal external exam. No midline neck or back TTP. Lungs are clear to ascultation bilaterally.   normal effort  Heart is regular rate and rhythm with no murmurs. Abdomen soft and nontender. No rebound rigidity or guarding. Extremities: Moves all 4 extremities and no distress. Full range of motion. 2+ pulses and BCR in all 4 extremities. Neuro: Normal gait. 5 out of 5 strength in all 4 extremities. No facial droop. tremulous in BUE. Pt states is her baseline. No pronator drift. Normal finger to nose. Skin examination: intact. no rashes. No petechia or purpura. Medications   morphine injection 4 mg (4 mg IntraVENous Given 12/1/19 1248)   ondansetron (ZOFRAN) injection 4 mg (4 mg IntraVENous Given 12/1/19 1248)       RESULTS:    Labs -   Labs Reviewed   CBC WITH AUTOMATED DIFF - Abnormal; Notable for the following components:       Result Value    WBC 4.0 (*)     RDW 18.4 (*)     PLATELET 852 (*)     MONOCYTES 12 (*)     All other components within normal limits   METABOLIC PANEL, BASIC - Abnormal; Notable for the following components:    Glucose 100 (*)     BUN 20 (*)     BUN/Creatinine ratio 23 (*)     All other components within normal limits   HEPATIC FUNCTION PANEL - Abnormal; Notable for the following components:    Albumin 3.0 (*)     All other components within normal limits   CARDIAC PANEL,(CK, CKMB & TROPONIN)   URINALYSIS W/ RFLX MICROSCOPIC       Radiologic Studies -  Xr Chest Port    Result Date: 12/1/2019  EXAM: XR CHEST PORT CLINICAL INDICATION/HISTORY: seizure   > Additional: None. COMPARISON: June 18, 2018 TECHNIQUE: Portable chest _______________ FINDINGS: SUPPORT DEVICES: None. HEART AND MEDIASTINUM: Normal size and contour. Normal pulmonary vasculature. LUNGS AND PLEURAL SPACES: The lungs are well expanded and clear. No focal consolidation, effusion, or pneumothorax. BONY THORAX AND SOFT TISSUES: Surgical clips project over the left lower hemithorax and left axilla. _______________     IMPRESSION: No active cardiopulmonary disease.       EKG interpretation:   Done at 1142 read myself is normal sinus rhythm at 87 bpm no ST. elevation mild LVH. MEDICAL DECISION MAKING    1400. Patient reassessed. Feeling better. Patient hungry. Denies any pain. Results and precautions explained. Patient states understanding and agrees with plan. She will call her regular doctor and her neurologist tomorrow to be rechecked. She will return if symptoms change or worsen. No evidence of any intracranial bleed or skull fracture. Patient states she gets usual pains after her seizure. Doing better now. Known history. No fevers or chills or meningitis. No trauma. No blood thinners. No ACS or STEMI. No pneumonia or pneumothorax. Abdomen soft and nontender. No signs or symptoms of acute appendicitis cholecystitis or pancreatitis. Patient is hungry here. Results and precautions explained. Patient states understanding agrees with plan. Patient has no new complaints, changes, or physical findings. Results were reviewed with the patient. Pt's questions and concerns were addressed. Care plan was outlined, including follow-up with PCP/specialist and return precautions were discussed. Patient is felt to be stable for discharge at this time. Diagnosis   Clinical Impression:   1. Seizure disorder ([address]     history of hiatal hernia  Bipolar  gerd  Diabetes type 2      Follow-up Information     Follow up With Specialties Details Why Contact Info    Janet Naqvi MD Internal Medicine Call in 1 day  [facility]  [phone]            Current Discharge Medication List          Discharged in stable and improved condition. This chart was completed using Dragon, a dictation transcription service. Errors may have resulted from using this device.

## 2019-12-01 NOTE — ED TRIAGE NOTES
Pt arrived to ed via ems for c/o witnessed seizure and chest pain. Pt states hx epilepsy, states takes depakote daily. Tremors noted, pt states baseline due to depakote use. Denies fall or hitting head during seizure. Pt also reports chest pain since seizure.

## 2019-12-01 NOTE — ED NOTES
I have reviewed discharge instructions with the patient. The patient verbalized understanding. Pt in no distress at time of discharge and agreeable to terms of discharge. Pt getting a ride home from her .

## 2019-12-01 NOTE — DISCHARGE INSTRUCTIONS
Thank you so much for visiting us today. Please make sure to call your doctor and your neurologist tomorrow to be rechecked in 1-3 days. Bring your results from here with you when you do. Return immediately if any fevers, headaches, chest pain, difficulty breathing, abdominal pain, worsening or changing symptoms, or any other concerns. Patient Education        Epilepsy: Care Instructions  Your Care Instructions    Epilepsy is a common condition that causes repeated seizures. The seizures are caused by bursts of electrical activity in the brain that aren't normal. Seizures may cause problems with muscle control, movement, speech, vision, or awareness. They can be scary. Epilepsy affects each person differently. Some people have only a few seizures. Others get them more often. If you know what triggers a seizure, you may be able to avoid having one. You can take medicines to control and reduce seizures. You and your doctor will need to find the right combination, schedule, and dose of medicine. This may take time and careful changes. Seizures may get worse and happen more often over time. Follow-up care is a key part of your treatment and safety. Be sure to make and go to all appointments, and call your doctor if you are having problems. It's also a good idea to know your test results and keep a list of the medicines you take. How can you care for yourself at home? · Be safe with medicines. Take your medicines exactly as prescribed. Call your doctor if you think you are having a problem with your medicine. · Make a treatment plan with your doctor. Be sure to follow your plan. · Try to identify and avoid things that may make you more likely to have a seizure. These may include:  ? Not getting enough sleep. ? Using drugs or alcohol. ? Being emotionally stressed. ? Skipping meals. · Keep a record of any seizures you have.  Note the date, time of day, and any details about the seizure that you can remember. Your doctor can use this information to plan or adjust your medicine or other treatment. · Be sure that any doctor treating you for another condition knows that you have epilepsy. Each doctor should know what medicines you are taking, if any. · Wear a medical ID bracelet. You can buy this at most drugstores. If you have a seizure that leaves you unconscious or unable to speak for yourself, this bracelet will let those who are treating you know that you have epilepsy. · Talk to your doctor about whether it is safe for you to do certain activities, such as drive or swim. When should you call for help? Call 911 anytime you think you may need emergency care. For example, call if:    · A seizure does not stop as it normally does.     · You have new symptoms such as:  ? Numbness, tingling, or weakness on one side of your body or face. ? Vision changes. ? Trouble speaking or thinking clearly.    Call your doctor now or seek immediate medical care if:    · You have a fever.     · You have a severe headache.    Watch closely for changes in your health, and be sure to contact your doctor if:    · The normal pattern or features of your seizures change. Where can you learn more? Go to http://ramón-ayden.info/. Yadi Allen in the search box to learn more about \"Epilepsy: Care Instructions. \"  Current as of: March 28, 2019  Content Version: 12.2  © 5875-2971 [employer]. Care instructions adapted under license by [employer] (which disclaims liability or warranty for this information). If you have questions about a medical condition or this instruction, always ask your healthcare professional. [address] any warranty or liability for your use of this information.

## 2019-12-02 LAB
ATRIAL RATE: 87 BPM
CALCULATED P AXIS, ECG09: 37 DEGREES
CALCULATED R AXIS, ECG10: -47 DEGREES
CALCULATED T AXIS, ECG11: 39 DEGREES
DIAGNOSIS, 93000: NORMAL
P-R INTERVAL, ECG05: 140 MS
Q-T INTERVAL, ECG07: 362 MS
QRS DURATION, ECG06: 80 MS
QTC CALCULATION (BEZET), ECG08: 435 MS
VENTRICULAR RATE, ECG03: 87 BPM

## 2019-12-11 ENCOUNTER — HOSPITAL ENCOUNTER (OUTPATIENT)
Dept: INFUSION THERAPY | Age: 69
Discharge: HOME | End: 2019-12-11
Payer: MEDICARE

## 2019-12-11 VITALS
OXYGEN SATURATION: 96 % | TEMPERATURE: 97.1 F | RESPIRATION RATE: 18 BRPM | SYSTOLIC BLOOD PRESSURE: 130 MMHG | HEART RATE: 65 BPM | DIASTOLIC BLOOD PRESSURE: 78 MMHG

## 2019-12-11 DIAGNOSIS — J45.50 SEVERE PERSISTENT ASTHMA, UNSPECIFIED WHETHER COMPLICATED: ICD-10-CM

## 2019-12-11 DIAGNOSIS — J45.50 SEVERE PERSISTENT ASTHMA, UNSPECIFIED WHETHER COMPLICATED: Primary | ICD-10-CM

## 2019-12-11 PROCEDURE — 96372 THER/PROPH/DIAG INJ SC/IM: CPT

## 2019-12-11 PROCEDURE — 74011250636 HC RX REV CODE- 250/636: Performed by: NURSE PRACTITIONER

## 2019-12-11 RX ADMIN — OMALIZUMAB 150 MG: 150 INJECTION, SOLUTION SUBCUTANEOUS at 15:22

## 2019-12-11 RX ADMIN — OMALIZUMAB 75 MG: 75 INJECTION, SOLUTION SUBCUTANEOUS at 15:22

## 2019-12-11 NOTE — PROGRESS NOTES
[facility] Progress Note    Date: 2019    Name: Ana Jordan    MRN: [id number]         : 1950     Xolair injection      Ms. Jaky Hong was assessed and education was provided. Ms. Efren Hand vitals were reviewed and patient was observed for 5 minutes prior to treatment. Visit Vitals  /78 (BP 1 Location: Right arm, BP Patient Position: Sitting)   Pulse 65   Temp 97.1 °F (36.2 °C)   Resp 18   SpO2 96%       Xolair 150 mg was administered subcutaneous in  Right upper arm. Xolair 150 mg was administered subcutaneous in  Right lower quadrant of abdomen. Xolair 75 mg was administered subcutaneous in  Left lower quadrant of abdomen. Total dose of 375mg       Ms. Barillas tolerated well, and had no complaints. Patient armband removed and shredded. Ms. Jaky Hong was discharged from [place] [address] in stable condition at 1525. She is to return on 19 at 1500 for her next appointment.     Florence Nathan RN  2019  1526

## 2019-12-22 ENCOUNTER — HOSPITAL ENCOUNTER (INPATIENT)
Age: 69
LOS: 3 days | Discharge: NURSING, REHAB OR LONG-TERM CARE | DRG: 948 | End: 2019-12-27
Attending: EMERGENCY MEDICINE | Admitting: HOSPITALIST
Payer: MEDICARE

## 2019-12-22 ENCOUNTER — APPOINTMENT (OUTPATIENT)
Dept: CT IMAGING | Age: 69
DRG: 948 | End: 2019-12-22
Attending: EMERGENCY MEDICINE
Payer: MEDICARE

## 2019-12-22 DIAGNOSIS — M79.604 LEG PAIN, RIGHT: Primary | ICD-10-CM

## 2019-12-22 DIAGNOSIS — M25.551 ACUTE RIGHT HIP PAIN: ICD-10-CM

## 2019-12-22 DIAGNOSIS — R53.1 WEAKNESS: ICD-10-CM

## 2019-12-22 DIAGNOSIS — R29.898 WEAKNESS OF RIGHT LOWER EXTREMITY: ICD-10-CM
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
## 2019-12-22 LAB
ANION GAP SERPL CALC-SCNC: 3 MMOL/L (ref 3–18)
BASOPHILS # BLD: 0 K/UL (ref 0–0.1)
BASOPHILS NFR BLD: 0 % (ref 0–2)
BUN SERPL-MCNC: 23 MG/DL (ref 7–18)
BUN/CREAT SERPL: 28 (ref 12–20)
CALCIUM SERPL-MCNC: 9.4 MG/DL (ref 8.5–10.1)
CHLORIDE SERPL-SCNC: 108 MMOL/L (ref 100–111)
CO2 SERPL-SCNC: 33 MMOL/L (ref 21–32)
CREAT SERPL-MCNC: 0.81 MG/DL (ref 0.6–1.3)
DIFFERENTIAL METHOD BLD: ABNORMAL
EOSINOPHIL # BLD: 0 K/UL (ref 0–0.4)
EOSINOPHIL NFR BLD: 1 % (ref 0–5)
ERYTHROCYTE [DISTWIDTH] IN BLOOD BY AUTOMATED COUNT: 17.7 % (ref 11.6–14.5)
GLUCOSE SERPL-MCNC: 126 MG/DL (ref 74–99)
HCT VFR BLD AUTO: 43 % (ref 35–45)
HGB BLD-MCNC: 13.7 G/DL (ref 12–16)
LYMPHOCYTES # BLD: 1.4 K/UL (ref 0.9–3.6)
LYMPHOCYTES NFR BLD: 33 % (ref 21–52)
MCH RBC QN AUTO: 31.9 PG (ref 24–34)
MCHC RBC AUTO-ENTMCNC: 31.9 G/DL (ref 31–37)
MCV RBC AUTO: 100.2 FL (ref 74–97)
MONOCYTES # BLD: 0.6 K/UL (ref 0.05–1.2)
MONOCYTES NFR BLD: 14 % (ref 3–10)
NEUTS SEG # BLD: 2.3 K/UL (ref 1.8–8)
NEUTS SEG NFR BLD: 52 % (ref 40–73)
PLATELET # BLD AUTO: 126 K/UL (ref 135–420)
PMV BLD AUTO: 9.6 FL (ref 9.2–11.8)
POTASSIUM SERPL-SCNC: 4.3 MMOL/L (ref 3.5–5.5)
RBC # BLD AUTO: 4.29 M/UL (ref 4.2–5.3)
SODIUM SERPL-SCNC: 144 MMOL/L (ref 136–145)
WBC # BLD AUTO: 4.3 K/UL (ref 4.6–13.2)

## 2019-12-22 PROCEDURE — 99285 EMERGENCY DEPT VISIT HI MDM: CPT

## 2019-12-22 PROCEDURE — 72131 CT LUMBAR SPINE W/O DYE: CPT

## 2019-12-22 PROCEDURE — 93005 ELECTROCARDIOGRAM TRACING: CPT

## 2019-12-22 PROCEDURE — 74011000250 HC RX REV CODE- 250: Performed by: EMERGENCY MEDICINE

## 2019-12-22 PROCEDURE — 74011250636 HC RX REV CODE- 250/636: Performed by: EMERGENCY MEDICINE

## 2019-12-22 PROCEDURE — 80048 BASIC METABOLIC PNL TOTAL CA: CPT

## 2019-12-22 PROCEDURE — 85025 COMPLETE CBC W/AUTO DIFF WBC: CPT

## 2019-12-22 PROCEDURE — 72192 CT PELVIS W/O DYE: CPT

## 2019-12-22 PROCEDURE — 94762 N-INVAS EAR/PLS OXIMTRY CONT: CPT

## 2019-12-22 PROCEDURE — 83036 HEMOGLOBIN GLYCOSYLATED A1C: CPT

## 2019-12-22 RX ORDER — LIDOCAINE 4 G/100G
1 PATCH TOPICAL EVERY 24 HOURS
Status: DISCONTINUED | OUTPATIENT
Start: 2019-12-22 | End: 2019-12-27 | Stop reason: HOSPADM

## 2019-12-22 RX ORDER — NALBUPHINE HYDROCHLORIDE 10 MG/ML
10 INJECTION, SOLUTION INTRAMUSCULAR; INTRAVENOUS; SUBCUTANEOUS
Status: DISCONTINUED | OUTPATIENT
Start: 2019-12-22 | End: 2019-12-27 | Stop reason: HOSPADM

## 2019-12-22 RX ORDER — ACETAMINOPHEN 500 MG
1000 TABLET ORAL
Status: ACTIVE | OUTPATIENT
Start: 2019-12-22 | End: 2019-12-23

## 2019-12-22 RX ORDER — PROPRANOLOL HYDROCHLORIDE 40 MG/1
40 TABLET ORAL 2 TIMES DAILY
Status: ON HOLD | COMMUNITY
End: 2020-01-20 | Stop reason: SDUPTHER

## 2019-12-22 RX ADMIN — NALBUPHINE HYDROCHLORIDE 10 MG: 10 INJECTION, SOLUTION INTRAMUSCULAR; INTRAVENOUS; SUBCUTANEOUS at 21:34

## 2019-12-23 PROBLEM — E11.9 DM (DIABETES MELLITUS) (HCC): Status: ACTIVE | Noted: 2019-12-23

## 2019-12-23 PROBLEM — Z86.69 HX OF GUILLAIN-BARRE SYNDROME: Status: ACTIVE | Noted: 2019-12-23

## 2019-12-23 PROBLEM — M79.604 LEG PAIN, RIGHT: Status: ACTIVE | Noted: 2019-12-23

## 2019-12-23 PROBLEM — K21.9 GERD (GASTROESOPHAGEAL REFLUX DISEASE): Status: ACTIVE | Noted: 2019-12-23

## 2019-12-23 LAB
ATRIAL RATE: 58 BPM
ATRIAL RATE: 67 BPM
CALCULATED P AXIS, ECG09: 61 DEGREES
CALCULATED P AXIS, ECG09: 68 DEGREES
CALCULATED R AXIS, ECG10: -38 DEGREES
CALCULATED R AXIS, ECG10: -49 DEGREES
CALCULATED T AXIS, ECG11: 34 DEGREES
CALCULATED T AXIS, ECG11: 61 DEGREES
DIAGNOSIS, 93000: NORMAL
DIAGNOSIS, 93000: NORMAL
EST. AVERAGE GLUCOSE BLD GHB EST-MCNC: 103 MG/DL
GLUCOSE BLD STRIP.AUTO-MCNC: 111 MG/DL (ref 70–110)
GLUCOSE BLD STRIP.AUTO-MCNC: 88 MG/DL (ref 70–110)
GLUCOSE BLD STRIP.AUTO-MCNC: 92 MG/DL (ref 70–110)
GLUCOSE BLD STRIP.AUTO-MCNC: 93 MG/DL (ref 70–110)
HBA1C MFR BLD: 5.2 % (ref 4.2–5.6)
P-R INTERVAL, ECG05: 146 MS
P-R INTERVAL, ECG05: 148 MS
Q-T INTERVAL, ECG07: 398 MS
Q-T INTERVAL, ECG07: 428 MS
QRS DURATION, ECG06: 88 MS
QRS DURATION, ECG06: 94 MS
QTC CALCULATION (BEZET), ECG08: 420 MS
QTC CALCULATION (BEZET), ECG08: 420 MS
VENTRICULAR RATE, ECG03: 58 BPM
VENTRICULAR RATE, ECG03: 67 BPM

## 2019-12-23 PROCEDURE — 74011250636 HC RX REV CODE- 250/636: Performed by: INTERNAL MEDICINE

## 2019-12-23 PROCEDURE — 74011250636 HC RX REV CODE- 250/636: Performed by: HOSPITALIST

## 2019-12-23 PROCEDURE — 82962 GLUCOSE BLOOD TEST: CPT

## 2019-12-23 PROCEDURE — 74011250637 HC RX REV CODE- 250/637: Performed by: HOSPITALIST

## 2019-12-23 PROCEDURE — 96374 THER/PROPH/DIAG INJ IV PUSH: CPT

## 2019-12-23 PROCEDURE — 99218 HC RM OBSERVATION: CPT

## 2019-12-23 PROCEDURE — 97162 PT EVAL MOD COMPLEX 30 MIN: CPT

## 2019-12-23 PROCEDURE — 96376 TX/PRO/DX INJ SAME DRUG ADON: CPT

## 2019-12-23 PROCEDURE — 74011000250 HC RX REV CODE- 250: Performed by: HOSPITALIST

## 2019-12-23 PROCEDURE — 74011000250 HC RX REV CODE- 250: Performed by: EMERGENCY MEDICINE

## 2019-12-23 PROCEDURE — 94640 AIRWAY INHALATION TREATMENT: CPT

## 2019-12-23 PROCEDURE — 96375 TX/PRO/DX INJ NEW DRUG ADDON: CPT

## 2019-12-23 PROCEDURE — 93005 ELECTROCARDIOGRAM TRACING: CPT

## 2019-12-23 RX ORDER — ARFORMOTEROL TARTRATE 15 UG/2ML
15 SOLUTION RESPIRATORY (INHALATION)
Status: DISCONTINUED | OUTPATIENT
Start: 2019-12-23 | End: 2019-12-27 | Stop reason: HOSPADM

## 2019-12-23 RX ORDER — MORPHINE SULFATE 4 MG/ML
4 INJECTION, SOLUTION INTRAMUSCULAR; INTRAVENOUS
Status: DISCONTINUED | OUTPATIENT
Start: 2019-12-23 | End: 2019-12-27 | Stop reason: HOSPADM

## 2019-12-23 RX ORDER — HYDROMORPHONE HYDROCHLORIDE 2 MG/1
1 TABLET ORAL
Status: DISCONTINUED | OUTPATIENT
Start: 2019-12-23 | End: 2019-12-27 | Stop reason: HOSPADM

## 2019-12-23 RX ORDER — CYCLOBENZAPRINE HCL 10 MG
10 TABLET ORAL
Status: DISCONTINUED | OUTPATIENT
Start: 2019-12-23 | End: 2019-12-27 | Stop reason: HOSPADM

## 2019-12-23 RX ORDER — CHLORTHALIDONE 25 MG/1
25 TABLET ORAL EVERY OTHER DAY
Status: DISCONTINUED | OUTPATIENT
Start: 2019-12-24 | End: 2019-12-24

## 2019-12-23 RX ORDER — ONDANSETRON 2 MG/ML
4 INJECTION INTRAMUSCULAR; INTRAVENOUS
Status: DISCONTINUED | OUTPATIENT
Start: 2019-12-23 | End: 2019-12-27 | Stop reason: HOSPADM

## 2019-12-23 RX ORDER — GABAPENTIN 100 MG/1
200 CAPSULE ORAL 2 TIMES DAILY
Status: DISCONTINUED | OUTPATIENT
Start: 2019-12-23 | End: 2019-12-27 | Stop reason: HOSPADM

## 2019-12-23 RX ORDER — FLUTICASONE PROPIONATE AND SALMETEROL 250; 50 UG/1; UG/1
1 POWDER RESPIRATORY (INHALATION) 2 TIMES DAILY
Status: DISCONTINUED | OUTPATIENT
Start: 2019-12-23 | End: 2019-12-23

## 2019-12-23 RX ORDER — MAGNESIUM SULFATE 100 %
4 CRYSTALS MISCELLANEOUS AS NEEDED
Status: DISCONTINUED | OUTPATIENT
Start: 2019-12-23 | End: 2019-12-27 | Stop reason: HOSPADM

## 2019-12-23 RX ORDER — PANTOPRAZOLE SODIUM 40 MG/1
40 TABLET, DELAYED RELEASE ORAL
Status: DISCONTINUED | OUTPATIENT
Start: 2019-12-23 | End: 2019-12-27 | Stop reason: HOSPADM

## 2019-12-23 RX ORDER — LOSARTAN POTASSIUM 25 MG/1
100 TABLET ORAL DAILY
Status: DISCONTINUED | OUTPATIENT
Start: 2019-12-23 | End: 2019-12-24

## 2019-12-23 RX ORDER — ATORVASTATIN CALCIUM 40 MG/1
40 TABLET, FILM COATED ORAL DAILY
Status: DISCONTINUED | OUTPATIENT
Start: 2019-12-23 | End: 2019-12-27 | Stop reason: HOSPADM

## 2019-12-23 RX ORDER — INSULIN LISPRO 100 [IU]/ML
INJECTION, SOLUTION INTRAVENOUS; SUBCUTANEOUS
Status: DISCONTINUED | OUTPATIENT
Start: 2019-12-23 | End: 2019-12-27 | Stop reason: HOSPADM

## 2019-12-23 RX ORDER — BUDESONIDE 0.5 MG/2ML
500 INHALANT ORAL
Status: DISCONTINUED | OUTPATIENT
Start: 2019-12-23 | End: 2019-12-27 | Stop reason: HOSPADM

## 2019-12-23 RX ORDER — PROPRANOLOL HYDROCHLORIDE 20 MG/1
40 TABLET ORAL 2 TIMES DAILY
Status: DISCONTINUED | OUTPATIENT
Start: 2019-12-23 | End: 2019-12-24

## 2019-12-23 RX ORDER — ACETAMINOPHEN 325 MG/1
650 TABLET ORAL
Status: DISCONTINUED | OUTPATIENT
Start: 2019-12-23 | End: 2019-12-27 | Stop reason: HOSPADM

## 2019-12-23 RX ORDER — DICYCLOMINE HYDROCHLORIDE 20 MG/1
20 TABLET ORAL 3 TIMES DAILY
Status: DISCONTINUED | OUTPATIENT
Start: 2019-12-23 | End: 2019-12-27 | Stop reason: HOSPADM

## 2019-12-23 RX ORDER — LEVOTHYROXINE SODIUM 25 UG/1
50 TABLET ORAL
Status: DISCONTINUED | OUTPATIENT
Start: 2019-12-23 | End: 2019-12-27 | Stop reason: HOSPADM

## 2019-12-23 RX ORDER — ENOXAPARIN SODIUM 100 MG/ML
40 INJECTION SUBCUTANEOUS EVERY 24 HOURS
Status: DISCONTINUED | OUTPATIENT
Start: 2019-12-23 | End: 2019-12-27

## 2019-12-23 RX ORDER — CITALOPRAM 20 MG/1
40 TABLET, FILM COATED ORAL
Status: DISCONTINUED | OUTPATIENT
Start: 2019-12-23 | End: 2019-12-27 | Stop reason: HOSPADM

## 2019-12-23 RX ORDER — GABAPENTIN 300 MG/1
300 CAPSULE ORAL
Status: DISCONTINUED | OUTPATIENT
Start: 2019-12-23 | End: 2019-12-27 | Stop reason: HOSPADM

## 2019-12-23 RX ORDER — DIVALPROEX SODIUM 500 MG/1
500 TABLET, DELAYED RELEASE ORAL 2 TIMES DAILY
Status: DISCONTINUED | OUTPATIENT
Start: 2019-12-23 | End: 2019-12-24

## 2019-12-23 RX ORDER — NALOXONE HYDROCHLORIDE 0.4 MG/ML
0.4 INJECTION, SOLUTION INTRAMUSCULAR; INTRAVENOUS; SUBCUTANEOUS AS NEEDED
Status: DISCONTINUED | OUTPATIENT
Start: 2019-12-23 | End: 2019-12-27 | Stop reason: HOSPADM

## 2019-12-23 RX ORDER — TOPIRAMATE 25 MG/1
50 TABLET ORAL 2 TIMES DAILY
Status: DISCONTINUED | OUTPATIENT
Start: 2019-12-23 | End: 2019-12-24

## 2019-12-23 RX ADMIN — ACETAMINOPHEN 650 MG: 325 TABLET, FILM COATED ORAL at 03:49

## 2019-12-23 RX ADMIN — GABAPENTIN 300 MG: 100 CAPSULE ORAL at 23:22

## 2019-12-23 RX ADMIN — TOPIRAMATE 50 MG: 25 TABLET, FILM COATED ORAL at 10:19

## 2019-12-23 RX ADMIN — CITALOPRAM HYDROBROMIDE 40 MG: 20 TABLET ORAL at 23:22

## 2019-12-23 RX ADMIN — DIVALPROEX SODIUM 500 MG: 250 TABLET, DELAYED RELEASE ORAL at 18:24

## 2019-12-23 RX ADMIN — SODIUM CHLORIDE 1000 ML: 900 INJECTION, SOLUTION INTRAVENOUS at 19:20

## 2019-12-23 RX ADMIN — DICYCLOMINE HYDROCHLORIDE 20 MG: 20 TABLET ORAL at 10:19

## 2019-12-23 RX ADMIN — MORPHINE SULFATE 4 MG: 4 INJECTION, SOLUTION INTRAMUSCULAR; INTRAVENOUS at 12:18

## 2019-12-23 RX ADMIN — ENOXAPARIN SODIUM 40 MG: 40 INJECTION SUBCUTANEOUS at 07:15

## 2019-12-23 RX ADMIN — DIVALPROEX SODIUM 500 MG: 250 TABLET, DELAYED RELEASE ORAL at 10:20

## 2019-12-23 RX ADMIN — GABAPENTIN 200 MG: 100 CAPSULE ORAL at 18:24

## 2019-12-23 RX ADMIN — MORPHINE SULFATE 4 MG: 4 INJECTION, SOLUTION INTRAMUSCULAR; INTRAVENOUS at 16:42

## 2019-12-23 RX ADMIN — ATORVASTATIN CALCIUM 40 MG: 40 TABLET, FILM COATED ORAL at 10:20

## 2019-12-23 RX ADMIN — DICYCLOMINE HYDROCHLORIDE 20 MG: 20 TABLET ORAL at 18:18

## 2019-12-23 RX ADMIN — PROPRANOLOL HYDROCHLORIDE 40 MG: 20 TABLET ORAL at 10:19

## 2019-12-23 RX ADMIN — GABAPENTIN 200 MG: 100 CAPSULE ORAL at 10:20

## 2019-12-23 RX ADMIN — ARFORMOTEROL TARTRATE 15 MCG: 15 SOLUTION RESPIRATORY (INHALATION) at 08:04

## 2019-12-23 RX ADMIN — MORPHINE SULFATE 4 MG: 4 INJECTION, SOLUTION INTRAMUSCULAR; INTRAVENOUS at 08:00

## 2019-12-23 RX ADMIN — PANTOPRAZOLE SODIUM 40 MG: 40 TABLET, DELAYED RELEASE ORAL at 10:20

## 2019-12-23 RX ADMIN — LOSARTAN POTASSIUM 100 MG: 50 TABLET, FILM COATED ORAL at 10:19

## 2019-12-23 RX ADMIN — BUDESONIDE 500 MCG: 0.5 INHALANT RESPIRATORY (INHALATION) at 08:04

## 2019-12-23 RX ADMIN — LEVOTHYROXINE SODIUM 50 MCG: 50 TABLET ORAL at 10:18

## 2019-12-23 RX ADMIN — DICYCLOMINE HYDROCHLORIDE 20 MG: 20 TABLET ORAL at 23:22

## 2019-12-23 NOTE — ED NOTES
Pt awake and alert. Pt appears to be disoriented due to medication. Pt stating she is ready to leave and \"doesn't know why she is here\". Pt reoriented. MD made aware.  remains at the bedside.

## 2019-12-23 NOTE — ED PROVIDER NOTES
EMERGENCY DEPARTMENT HISTORY AND PHYSICAL EXAM      Date: 12/22/2019  Patient Name: Princess Richter    History of Presenting Illness     Chief Complaint   Patient presents with    Hip Pain    Numbness       History (Context): Princess Richter is a 78 y.o. gentleman with complicated past medical history, who presents with acute onset, severe, persistent right hip pain and lower back pain after falling out of her chair at [employer] today. 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 The patient has had this chronic condition since 1972. The patient is very uncooperative with history taking, and is very upset that we would asked questions like \"what allergies do you have? \"  The patient will give me limited history because she says \"I need a break. \"    On review of systems, the patient denies headache, head pain, neck pain, facial pain, chest pain,  abdominal pain, pelvic pain.     PCP: Stanislaw Bush MD    Current Facility-Administered Medications   Medication Dose Route Frequency Provider Last Rate Last Dose    atorvastatin (LIPITOR) tablet 40 mg  40 mg Oral DAILY Alexys Ragland MD        [START ON 12/24/2019] chlorthalidone (HYGROTEN) tablet 25 mg  25 mg Oral EVERY OTHER DAY Alexys Ragland MD        citalopram (CELEXA) tablet 40 mg  40 mg Oral QHS Alexys Ragland MD        cyclobenzaprine (FLEXERIL) tablet 10 mg  10 mg Oral TID PRN Alexys Ragland MD        dicyclomine (BENTYL) tablet 20 mg  20 mg Oral TID Alexys Ragland MD        divalproex DR (DEPAKOTE) tablet 500 mg  500 mg Oral BID Alexys Ragland MD        gabapentin (NEURONTIN) capsule 200 mg  200 mg Oral BID Alexys Ragland MD        gabapentin (NEURONTIN) capsule 300 mg  300 mg Oral QHS Alexys Ragland MD        HYDROmorphone (DILAUDID) tablet 1 mg  1 mg Oral Q4H PRN Alexys Ragland MD        levothyroxine (SYNTHROID) tablet 50 mcg  50 mcg Oral Dinora Stein MD        losartan (COZAAR) tablet 100 mg  100 mg Oral DAILY Chandni Alcantar MD        pantoprazole (PROTONIX) tablet 40 mg  40 mg Oral ACB Chandni Alcantar MD        propranolol (INDERAL) tablet 40 mg  40 mg Oral BID Chandni Alcantar MD        topiramate (TOPAMAX) tablet 50 mg  50 mg Oral BID Chandni Alcantar MD        acetaminophen (TYLENOL) tablet 650 mg  650 mg Oral Q4H PRN Chandni Alcantar MD   650 mg at 12/23/19 0349    ondansetron (ZOFRAN) injection 4 mg  4 mg IntraVENous Q4H PRN Chandni Alcantar MD        enoxaparin (LOVENOX) injection 40 mg  40 mg SubCUTAneous Q24H Chandni Alcantar MD        morphine injection 4 mg  4 mg IntraVENous Q4H PRN Chandni Alcantar MD        insulin lispro (HUMALOG) injection   SubCUTAneous AC&HS Chandni Alcantar MD        glucose chewable tablet 16 g  4 Tab Oral PRN Chandni Alcantar MD        glucagon [facility]) injection 1 mg  1 mg IntraMUSCular PRN Chandni Alcantar MD        dextrose 10 % infusion 125-250 mL  125-250 mL IntraVENous PRN Varsha Moran MD        naloxone [facility]) injection 0.4 mg  0.4 mg IntraVENous PRN Varsha Moran MD        arformoterol [facility]) neb solution 15 mcg  15 mcg Nebulization BID RT Varsha Moran MD        And    budesonide (PULMICORT) 500 mcg/2 ml nebulizer suspension  500 mcg Nebulization BID RT Varsha Moran MD        acetaminophen (TYLENOL) tablet 1,000 mg  1,000 mg Oral NOW Chandni Alcantar MD        lidocaine 4 % patch 1 Patch  1 Patch TransDERmal Q24H Chandni Alcantar MD   1 Patch at 12/22/19 2134    nalbuphine (NUBAIN) injection 10 mg  10 mg IntraVENous Q3H PRN Chandni Alcantar MD   10 mg at 12/22/19 2134     Current Outpatient Medications   Medication Sig Dispense Refill    propranolol (INDERAL) 40 mg tablet Take 40 mg by mouth two (2) times a day.  iron bg,ps/vitC/B12/FA/calcium (ALIRIO FORTE PO) Take  by mouth.  atorvastatin (LIPITOR) 40 mg tablet Take 40 mg by mouth daily.       divalproex DR (DEPAKOTE) 250 mg tablet Take 500 mg by mouth two (2) times a day. Indications: 500 mg in am 1250 mg at night      dicyclomine (BENTYL) 20 mg tablet Take 20 mg by mouth three (3) times daily.  ergocalciferol (VITAMIN D2) 50,000 unit capsule Take 50,000 Units by mouth every seven (7) days.  glipiZIDE SR (GLUCOTROL) 5 mg CR tablet Take 5 mg by mouth daily.  citalopram (CELEXA) 10 mg tablet Take 40 mg by mouth nightly.  fluticasone-salmeterol (ADVAIR) 250-50 mcg/dose diskus inhaler Take 1 Puff by inhalation two (2) times a day.  METHYLCELLULOSE (FIBER THERAPY) 500 mg by Does Not Apply route two (2) times a day. Indications: 3 tabs twice a day      Omeprazole delayed release (PRILOSEC D/R) 20 mg tablet Take 40 mg by mouth daily.  levothyroxine (SYNTHROID) 50 mcg tablet Take 50 mcg by mouth Daily (before breakfast).  cyclobenzaprine (FLEXERIL) 10 mg tablet Take 1 Tab by mouth three (3) times daily as needed for Muscle Spasm(s). 40 Tab 0    HYDROmorphone (DILAUDID) 2 mg tablet Take 1 Tab by mouth every four (4) hours as needed for Pain. Max Daily Amount: 12 mg. 40 Tab 0    Calcium-Cholecalciferol, D3, 500 mg(1,250mg) -400 unit tab Take 1 Tab by mouth two (2) times a day.  losartan (COZAAR) 100 mg tablet Take 100 mg by mouth daily.  topiramate (TOPAMAX) 50 mg tablet Take 2 Tabs by mouth two (2) times a day. (Patient taking differently: Take 50 mg by mouth two (2) times a day.) 20 Tab 0    gabapentin (NEURONTIN) 100 mg capsule Take 200 mg by mouth two (2) times a day.  gabapentin (NEURONTIN) 100 mg capsule Take 300 mg by mouth nightly.  IPRATROPIUM/ALBUTEROL SULFATE (COMBIVENT IN) Take 1 Puff by inhalation four (4) times daily.  chlorthalidone (HYGROTEN) 25 mg tablet Take 25 mg by mouth every other day.  letrozole (FEMARA) 2.5 mg tablet Take 2.5 mg by mouth daily.  Indications: per patient, \"I stopped 7 days before surgery scheduled for 9-13-17. \"      EPINEPHrine (EPIPEN) 0.3 mg/0.3 mL (1:1,000) injection 0.3 mg by IntraMUSCular route once as needed. Past History     Past Medical History:  Past Medical History:   Diagnosis Date    Asthma     Back pain     Bipolar 1 disorder [facility]     Breast cancer ([facility] 2012    left side    Cancer ([facility]     Diabetes ([facility]     GERD (gastroesophageal reflux disease)     Guillain Barré syndrome ([facility]     History of blood transfusion     \"in my 20's\" per patient    Hypertension     Ill-defined condition 09/12/2017    \"liver problem,\" per patient, \"not liver disease. I go to a gastroenterologist for it. \"    Lymphedema     left arm and hand and wears a sleeve.  Morbid obesity ([facility] 09/12/2017    BMI = 40.4    Seizures (HCC)     Sleep apnea     left apap machine at home    [facility])     history of in left leg    Thyroid disease     UTI (lower urinary tract infection)        Past Surgical History:  Past Surgical History:   Procedure Laterality Date    Randy Lafleur, july 30 2013    HX CHOLECYSTECTOMY      HX MASTECTOMY Left 03/29/2012    complete    HX ORTHOPAEDIC      LEFT FOOT SX.    HX PARTIAL HYSTERECTOMY         Family History:  Family History   Problem Relation Age of Onset    Cancer Mother     Cancer Maternal Grandmother     Heart Attack Father     Migraines Father     Sudden Death Father        Social History:  Social History     Tobacco Use    Smoking status: Never Smoker    Smokeless tobacco: Never Used   Substance Use Topics    Alcohol use: No    Drug use: No       Allergies:   Allergies   Allergen Reactions    Valium [Diazepam] Anaphylaxis    Carafate [Sucralfate] Nausea and Vomiting    Carvedilol Other (comments)    Cephalexin Hives    Ciprofloxacin Hives    Eliquis [Apixaban] Unknown (comments)    Epipen [Epinephrine] Unknown (comments)     Pt states she is not allergic to Epipen    Other Medication Other (comments)     Per pt Valium and morphine causes pt to become nervous    Percocet [Oxycodone-Acetaminophen] Other (comments)     hallucinate    Stings [Sting, Bee] Other (comments)    Talwin [Pentazocine Lactate] Shortness of Breath    Tetanus Toxoid, Adsorbed Shortness of Breath    Trileptal [Oxcarbazepine] Rash       PMH, PSH, family history, social history, allergies reviewed with the patient with significant items noted above. Review of Systems   As per HPI, otherwise reviewed and negative. Physical Exam     Vitals:    12/23/19 0350 12/23/19 0400 12/23/19 0420 12/23/19 0430   BP:  146/63  145/71   Pulse:       Resp:       Temp:       SpO2: 100% 100% 97% 93%       Gen: Well-appearing, in no acute distress In clear pain  HEENT: Atraumatic , normocephalic, sclera anicteric, no sal sign, no raccoon eyes, no hemotympanum, trachea is midline. Cardiovascular: Normal rate, regular rhythm, no murmurs, rubs, gallops. Radial pulses 2+, dorsalis pedis pulses 2+  Pulmonary: No bruising or crepitus to the chest.  Bilateral breath sounds equal with equal chest rise. No respiratory distress. No stridor. Clear lungs. ABD: Soft, nontender, nondistended. No seatbelt sign. Neuro: GCS 15. Alert. Normal speech. Normal mentation. Full strength and sensation throughout. Psych: Normal thought content and thought processes. : No CVA tenderness. Pelvis stable  EXT: Moves all extremities well. No cyanosis or clubbing. Skin: Warm and well-perfused.           Diagnostic Study Results     Labs -     Recent Results (from the past 12 hour(s))   EKG, 12 LEAD, INITIAL    Collection Time: 12/22/19  8:43 PM   Result Value Ref Range    Ventricular Rate 67 BPM    Atrial Rate 67 BPM    P-R Interval 146 ms    QRS Duration 88 ms    Q-T Interval 398 ms    QTC Calculation (Bezet) 420 ms    Calculated P Axis 68 degrees    Calculated R Axis -49 degrees    Calculated T Axis 61 degrees    Diagnosis       Normal sinus rhythm  Left anterior fascicular block  Moderate voltage criteria for LVH, may be normal variant ( R in aVL , Sherif   product )  Abnormal ECG  When compared with ECG of 01-DEC-2019 11:42,  No significant change was found     CBC WITH AUTOMATED DIFF    Collection Time: 12/22/19  8:55 PM   Result Value Ref Range    WBC 4.3 (L) 4.6 - 13.2 K/uL    RBC 4.29 4. 20 - 5.30 M/uL    HGB 13.7 12.0 - 16.0 g/dL    HCT 43.0 35.0 - 45.0 %    .2 (H) 74.0 - 97.0 FL    MCH 31.9 24.0 - 34.0 PG    MCHC 31.9 31.0 - 37.0 g/dL    RDW 17.7 (H) 11.6 - 14.5 %    PLATELET 378 (L) 258 - 420 K/uL    MPV 9.6 9.2 - 11.8 FL    NEUTROPHILS 52 40 - 73 %    LYMPHOCYTES 33 21 - 52 %    MONOCYTES 14 (H) 3 - 10 %    EOSINOPHILS 1 0 - 5 %    BASOPHILS 0 0 - 2 %    ABS. NEUTROPHILS 2.3 1.8 - 8.0 K/UL    ABS. LYMPHOCYTES 1.4 0.9 - 3.6 K/UL    ABS. MONOCYTES 0.6 0.05 - 1.2 K/UL    ABS. EOSINOPHILS 0.0 0.0 - 0.4 K/UL    ABS. BASOPHILS 0.0 0.0 - 0.1 K/UL    DF AUTOMATED     METABOLIC PANEL, BASIC    Collection Time: 12/22/19  8:55 PM   Result Value Ref Range    Sodium 144 136 - 145 mmol/L    Potassium 4.3 3.5 - 5.5 mmol/L    Chloride 108 100 - 111 mmol/L    CO2 33 (H) 21 - 32 mmol/L    Anion gap 3 3.0 - 18 mmol/L    Glucose 126 (H) 74 - 99 mg/dL    BUN 23 (H) 7.0 - 18 MG/DL    Creatinine 0.81 0.6 - 1.3 MG/DL    BUN/Creatinine ratio 28 (H) 12 - 20      GFR est AA >60 >60 ml/min/1.73m2    GFR est non-AA >60 >60 ml/min/1.73m2    Calcium 9.4 8.5 - 10.1 MG/DL   HEMOGLOBIN A1C WITH EAG    Collection Time: 12/22/19  8:55 PM   Result Value Ref Range    Hemoglobin A1c 5.2 4.2 - 5.6 %    Est. average glucose 103 mg/dL       Radiologic Studies -   CT SPINE LUMB WO CONT    (Results Pending)   CT PELV WO CONT    (Results Pending)     CT Results  (Last 48 hours)    None        CXR Results  (Last 48 hours)    None            Medical Decision Making   I am the first provider for this patient.     I reviewed the vital signs, available nursing notes, past medical history, past surgical history, family history and social history. Vital Signs-Reviewed the patient's vital signs. Records Reviewed: Personally, on initial evaluation    MDM:   Patient presents with loss of coordination of legs associated with weakness and numbness of legs complicated by fall and right hip and lower back pain. Exam significant for no evidence of cauda equina syndrome, conus medullaris. However, the patient has reduced sensation in the right lower extremity and reduced weakness. Patient is poorly attentive to exam and is quite cantankerous, making neurologic exam very challenging. It is unclear whether her exam is limited by pain. When asked directly, she changes her mind on this. DDX considered likely in this particular patient: GBS, pelvic injury, hip fracture, spinal fracture, malingering  DDX always considered in trauma patient: Traumatic brain injury, skull fracture, facial fractures, pneumothorax, skeletal fractures, dislocations, intrathoracic or intra-abdominal bleeding or injury to organs, active bleeding, pelvic fracture, nonaccidental trauma. Patient condition on initial evaluation: Stable    Plan:   Pain Control  Close Observation    Orders as below:  Orders Placed This Encounter    CT SPINE LUMB WO CONT    CT PELV WO CONT    CBC WITH AUTOMATED DIFF    BASIC METABOLIC PANEL    METABOLIC PANEL, BASIC    CBC W/O DIFF    HEMOGLOBIN A1C    GLUCOSE, CSF  TUBE 1    PROTEIN, CSF  TUBE 1    CELL COUNT, CSF  TUBE 3    DIET NPO    DIET DIABETIC CONSISTENT CARB Regular    PULSE OXIMETRY CONTINUOUS    VITAL SIGNS PER UNIT ROUTINE    INTAKE AND OUTPUT    NURSING-MISCELLANEOUS: SEE HYPOGLYCEMIA PROTOCOL BELOW HYPOGLYCEMIA PROTOCOL: Initiate Hypoglycemia protocol if blood glucose is less than 70 mg/dL FOR CONSCIOUS PATIENT: Administer 4 ounces fruit juice OR regular soda OR 4 glucose tablets. FOR UN. ..    NURSING-MISCELLANEOUS: Blood glucose targets -- ICU: 140 - 180 mg/dL;  NON-ICU: 100 - 140 mg/dL CONTINUOUS    POC GLUCOSE - AC & HS    FULL CODE    IP CONSULT TO NEUROLOGY    IP CONSULT TO NEUROLOGY    OT EVALUATION    IP CONSULT TO PHYSICAL THERAPY    OXIMETRY, CONTINUOUS    EKG, 12 LEAD, INITIAL    EKG, 12 LEAD, INITIAL    acetaminophen (TYLENOL) tablet 1,000 mg    lidocaine 4 % patch 1 Patch    nalbuphine (NUBAIN) injection 10 mg    propranolol (INDERAL) 40 mg tablet    iron bg,ps/vitC/B12/FA/calcium (ALIRIO FORTE PO)    atorvastatin (LIPITOR) tablet 40 mg    chlorthalidone (HYGROTEN) tablet 25 mg    citalopram (CELEXA) tablet 40 mg    cyclobenzaprine (FLEXERIL) tablet 10 mg    dicyclomine (BENTYL) tablet 20 mg    divalproex DR (DEPAKOTE) tablet 500 mg    gabapentin (NEURONTIN) capsule 200 mg    gabapentin (NEURONTIN) capsule 300 mg    DISCONTD: fluticasone-salmeterol (ADVAIR/WIXELA) 250mcg-50mcg/puff    HYDROmorphone (DILAUDID) tablet 1 mg    levothyroxine (SYNTHROID) tablet 50 mcg    losartan (COZAAR) tablet 100 mg    pantoprazole (PROTONIX) tablet 40 mg    propranolol (INDERAL) tablet 40 mg    topiramate (TOPAMAX) tablet 50 mg    acetaminophen (TYLENOL) tablet 650 mg    ondansetron (ZOFRAN) injection 4 mg    enoxaparin (LOVENOX) injection 40 mg    morphine injection 4 mg    insulin lispro (HUMALOG) injection    glucose chewable tablet 16 g    glucagon (GLUCAGEN) injection 1 mg    dextrose 10 % infusion 125-250 mL    naloxone (NARCAN) injection 0.4 mg    AND Linked Order Group     arformoterol (BROVANA) neb solution 15 mcg     budesonide (PULMICORT) 500 mcg/2 ml nebulizer suspension    IP CONSULT TO HOSPITALIST    INITIAL PHYSICIAN ORDER: OBSERVATION/OUTPATIENT IN A BED OBSERVATION; Medical; leg pain        ED Course:   Patient requested pain medication, but she refused to take ibuprofen, Tylenol, Toradol because they would all \"cause her to bleed out through her stomach. \"  She is allergic to multiple narcotics. They all make her \"crazy. \"  Trying to give the patient nalbuphine, which made the patient delirious. Will not give that again. Spoke to tele-neurology at the request of Dr. Sheyla Stanley. Tele-neurology recommends admission and further evaluation. Consult to the hospitalist again for admission. ED Course as of Dec 23 0607   Mon Dec 23, 2019   0033 Dr. Sheyla Stanley is evaluating and managing the patient. [DT]      ED Course User Index  [DT] Jules Azevedo MD     Patient into the hospital in stable condition. Patient condition at time of disposition: Admit        Disposition: Admit    Diagnosis     Clinical Impression:   1. Weakness of right lower extremity    2. Fall, initial encounter    3. Acute right hip pain        Signed,  Miguel Lainez MD  Emergency Physician  [facility]    As a voice dictation software was utilized to dictate this note, minor word transpositions can occur. I apologize for confusing wording and typographic errors. Please feel free to contact me for clarification.

## 2019-12-23 NOTE — PROGRESS NOTES
Problem: Mobility Impaired (Adult and Pediatric)  Goal: *Acute Goals and Plan of Care (Insert Text)  Description  Physical Therapy Goals  Initiated 12/23/2019 and to be accomplished within 7 day(s)  1. Patient will move from supine to sit and sit to supine  in bed with supervision/set-up. 2.  Patient will transfer from bed to chair and chair to bed with minimal assistance/contact guard assist using the least restrictive device. 3.  Patient will perform sit to stand with contact guard assist.  4.  Patient will ambulate with minimal assistance/contact guard assist for 30 feet with the least restrictive device. PLOF: Pt was independent with mobility without an assistive device prior to admission, 2 story home with bedroom on second floor,  and brother available to assist at home   Outcome: Progressing Towards Goal     PHYSICAL THERAPY EVALUATION    Patient: Perlita Kern [de-identified]78 y.o. female)  Date: 12/23/2019  Primary Diagnosis: Leg pain, right [M79.604]        Precautions: fall, right knee lisa, tremors in all extremities       PLOF: see above    ASSESSMENT :  Based on the objective data described below, the patient presents with decreased strength, balance and activity tolerance resulting in decreased independence with functional mobility. In supine with manual muscle testing patient was demonstrating no active movement at ankle, hip or knee however was noted to have uncoordinated quad contraction. Pt required min A for supine to sit to assist with moving right LE. Pt sat edge of bed and stood with min A x2 for safety due to bed height. Immediately upon standing patient had buckling of B knees and was unable to compensate with UEs on the RW. Pt stood again with assistance to block R knee from buckling. Pt sidestepped 4 short shuffling steps and progress from mod A to move right LE to moving it on her own and no assistance needed to prevent buckling.   However after ambulating patient verbalized increased fatigue and began to buckle a her knees. Pt was returned sitting and transitioned to supine in bed with supervision. Pt was able to get her right LE into the bed independently. Pt was positioned with head of bed elevated and left with needs in reach. Patient will benefit from skilled intervention to address the above impairments. Patient's rehabilitation potential is considered to be Fair  Factors which may influence rehabilitation potential include:   []         None noted  [x]         Mental ability/status  [x]         Medical condition  []         Home/family situation and support systems  [x]         Safety awareness  []         Pain tolerance/management  []         Other:      PLAN :  Recommendations and Planned Interventions:   [x]           Bed Mobility Training             [x]    Neuromuscular Re-Education  [x]           Transfer Training                   []    Orthotic/Prosthetic Training  [x]           Gait Training                          []    Modalities  [x]           Therapeutic Exercises           []    Edema Management/Control  [x]           Therapeutic Activities            []    Family Training/Education  [x]           Patient Education  []           Other (comment):    Frequency/Duration: Patient will be followed by physical therapy 1-2 times per day/4-7 days per week to address goals. Discharge Recommendations: Skilled Nursing Facility  Further Equipment Recommendations for Discharge: N/A     SUBJECTIVE:   Patient stated I want to get back to moving by myself.     OBJECTIVE DATA SUMMARY:     Past Medical History:   Diagnosis Date    Asthma     Back pain     Bipolar 1 disorder ([facility]     Breast cancer ([facility] 2012    left side    Cancer ([facility]     Diabetes ([facility]     GERD (gastroesophageal reflux disease)     Guillain Barré syndrome ([facility]     History of blood transfusion     \"in my 20's\" per patient    Hypertension     Ill-defined condition 09/12/2017    \"liver problem,\" per patient, \"not liver disease. I go to a gastroenterologist for it. \"    Lymphedema     left arm and hand and wears a sleeve. Morbid obesity ([address] 09/12/2017    BMI = 40.4    Seizures (HCC)     Sleep apnea     left apap machine at home    Thromboembolus [facility])     history of in left leg    Thyroid disease     UTI (lower urinary tract infection)      Past Surgical History:   Procedure Laterality Date    Randy Lafleur, july 30 2013    HX CHOLECYSTECTOMY      HX MASTECTOMY Left 03/29/2012    complete    HX ORTHOPAEDIC      LEFT FOOT SX. HX PARTIAL HYSTERECTOMY       Barriers to Learning/Limitations: None  Compensate with: N/A  Home Situation:   Lives in 2 story home with  and brother    Critical Behavior:  Neurologic State: Alert  Orientation Level: Oriented X4  Cognition: Follows commands   Strength: Inconsistencies in right LE strength: patient had no active movement in supine however was able to move her leg when sidestepping and raise her leg into the bed independently but required assistance to get her right leg out of the bed. Left LE grossly 3+ to 4-/5   Tone & Sensation:   Tone: Abnormal   Sensation: Impaired   Range Of Motion:  AROM: Within functional limits(left LE, decreased right LE)   PROM: Within functional limits(B LEs)      Functional Mobility:  Bed Mobility:  Supine to Sit: Minimum assistance  Sit to Supine: Contact guard assistance     Transfers:  Sit to Stand: Minimum assistance  Stand to Sit: Minimum assistance  Balance:   Sitting - Static: Fair (occasional)  Standing: With support  Standing - Static: (fair-)  Standing - Dynamic : Poor    Ambulation/Gait Training:  Distance (ft): 2 Feet (ft)(sidestepping along the edge of the bed)  Assistive Device: Walker, rolling  Ambulation - Level of Assistance: Minimal assistance; Moderate assistance  Gait Abnormalities: Decreased step clearance  Pain:  Pain level pre-treatment: 0/10   Pain level post-treatment: 0/10   Pain Intervention(s) : N/A      Activity Tolerance:   fair  Please refer to the flowsheet for vital signs taken during this treatment. After treatment:   []         Patient left in no apparent distress sitting up in chair  [x]         Patient left in no apparent distress in bed  [x]         Call bell left within reach  [x]         Nursing notified  []         Caregiver present  []         Bed alarm activated  []         SCDs applied    COMMUNICATION/EDUCATION:   [x]         Role of Physical Therapy in the acute care setting. [x]         Fall prevention education was provided and the patient/caregiver indicated understanding. [x]         Patient/family have participated as able in goal setting and plan of care. [x]         Patient/family agree to work toward stated goals and plan of care. []         Patient understands intent and goals of therapy, but is neutral about his/her participation. []         Patient is unable to participate in goal setting/plan of care: ongoing with therapy staff.  []         Other:     Thank you for this referral.  Ilsa Bay, PT   Time Calculation: 18 mins      Eval Complexity: History: HIGH Complexity :3+ comorbidities / personal factors will impact the outcome/ POC Exam:MEDIUM Complexity : 3 Standardized tests and measures addressing body structure, function, activity limitation and / or participation in recreation  Presentation: MEDIUM Complexity : Evolving with changing characteristics  Clinical Decision Making:Medium Complexity    Overall Complexity:MEDIUM

## 2019-12-23 NOTE — ED TRIAGE NOTES
Patient slid out of a chair at a concert tonight. Right sided hip pain and decreased sensation to bilateral lower extremities.   Patient does feel painful stimuli

## 2019-12-23 NOTE — PROGRESS NOTES
Problem: Discharge Planning  Goal: *Discharge to safe environment  Outcome: Progressing Towards Goal  Plan is Home with [address]    Reason for Admission:   Right leg pain                  RRAT Score:  16                Do you (patient/family) have any concerns for transition/discharge? Plan for utilizing home health:  Yes     Current Advanced Directive/Advance Care Plan:  none            Transition of Care Plan:  Home with [address] pt. Demographics verified and updated. Lives with  and brother. Independent with ambulation prior to admission. She states her brother does household chores. No DME.  drives her to her appointments. Care Management Interventions  PCP Verified by CM: Yes(pt not sure last PCP visist)  Palliative Care Criteria Met (RRAT>21 & CHF Dx)?: No  Mode of Transport at Discharge: Other (see comment)(family)  Transition of Care Consult (CM Consult): Discharge Planning  Physical Therapy Consult: Yes  Occupational Therapy Consult: Yes  Speech Therapy Consult: No  Current Support Network: Lives with Spouse(and brother)  Confirm Follow Up Transport: Family  Discharge Location  Discharge Placement: Home with home health      Patient has designated ___husband___ to participate in his/her discharge plan and to receive any needed information. Name: Ori Russ  Address:  Phone number: [phone]    Patient and/or next of kin has been given and has signed the [facility] Outpatient Observation  Notification letter and all questions answered. Copy of this notice given to patient and copy placed on chart. Patient and/or next of kin has been given the Outpatient Observation Information and Notification letter and all questions answered.

## 2019-12-23 NOTE — PROGRESS NOTES
attempted to  Complete  the initial Spiritual Assessment of the patient in bed 2 of the emergency room and offer Pastoral Care support to the patient but found her resting peacefully at this time and unable to communicate now. No family members seen at this visit. Patient does not have any Jew/cultural needs that will affect patients preferences in health care. Chaplains will continue to follow and will provide pastoral care on an as needed/requested basis.     Alberto Neal  Spiritual Care Department  [phone]

## 2019-12-23 NOTE — CONSULTS
Neurology Consultation    Name: Eran Robison  MRN: [id number]  Age: 78 y.o., : 1950    Referring Physician:  Angel Coyne  Referring Service:  [unfilled]  Consulting Physician: Iris Solorio MD  Date of Consult: 2019    History of Present Illness:  68yo M with history as per below presenting with lower back pain and problems moving her legs along with weakness and numbness. Reported previous Guillan Aurora episodes as per notes  Patient states she fell off her chair. Unclear when weakness started. States she cannot walk. States most recent episode of [place] Bolls was a year and a half ago. Either has never had an LP or had one decades ago. Hard to obtain history from patient  Denies other symptoms. Denies recent illness, visual change. PAST MEDICAL HISTORY:  Past Medical History:   Diagnosis Date    Asthma     Back pain     Bipolar 1 disorder ([address]     Breast cancer ([address]     left side    Cancer ([address]     Diabetes ([address]     GERD (gastroesophageal reflux disease)     Guillain Barré syndrome (HCC)     History of blood transfusion     \"in my 20's\" per patient    Hypertension     Ill-defined condition 2017    \"liver problem,\" per patient, \"not liver disease. I go to a gastroenterologist for it. \"    Lymphedema     left arm and hand and wears a sleeve.  Morbid obesity ([address] 2017    BMI = 40.4    Seizures (HCC)     Sleep apnea     left apap machine at home    Thromboembolus [facility])     history of in left leg    Thyroid disease     UTI (lower urinary tract infection)       Past Surgical History:   Procedure Laterality Date    [facility] SURGERY      Dr Enedelia Peralta, 2013    HX CHOLECYSTECTOMY      HX MASTECTOMY Left 2012    complete    HX ORTHOPAEDIC      LEFT FOOT SX.    HX PARTIAL HYSTERECTOMY         IMAGIN2019   1.   No compression fracture.     2. Bilateral pedicle screw and saray fixation L3-4 with laminectomy and interbody  fusion L3-4 and L4-5. The interbody fusion device at L3-4 disc space is slightly  posteriorly displaced as described above, however without significant central  stenosis.     3. Fractured residual screw at the right S1 level, tip to the lateral cortex,  abutting the right common iliac vein, however without significant perivascular  fluid collections.     Preliminary report provided by on-call radiology resident.            Physical Examination:   GENERAL: No acute distress, appears stated age. Mentating well. Language intact. Obese  CN - PERRLA EOMI fields full face symmetric   Motor - at least 4-/5 throughout with more possible weakness in RLE. Impression:  New onset weakness in arms and legs, more on R leg. Unclear time of onset. Recommendations:  1) At this time can consider doing MRI Brain and Cervical spine to rule out lesion and would hold off on lumbar puncture and observe. Difficult to find objective findings on her exam since there were many functional components.   2) PT    CURRENT MEDICATIONS:    Current Facility-Administered Medications:     atorvastatin (LIPITOR) tablet 40 mg, 40 mg, Oral, DAILY, Dorene Griffin MD    [START ON 12/24/2019] chlorthalidone (HYGROTEN) tablet 25 mg, 25 mg, Oral, EVERY OTHER DAY, Dorene Griffin MD    citalopram (CELEXA) tablet 40 mg, 40 mg, Oral, QHS, Dorene Griffin MD    cyclobenzaprine (FLEXERIL) tablet 10 mg, 10 mg, Oral, TID PRN, Dorene Griffin MD    dicyclomine (BENTYL) tablet 20 mg, 20 mg, Oral, TID, Dorene Griffin MD    divalproex DR (DEPAKOTE) tablet 500 mg, 500 mg, Oral, BID, Dorene Griffin MD    gabapentin (NEURONTIN) capsule 200 mg, 200 mg, Oral, BID, Dorene Griffin MD    gabapentin (NEURONTIN) capsule 300 mg, 300 mg, Oral, QHS, Dorene Griffin MD    HYDROmorphone (DILAUDID) tablet 1 mg, 1 mg, Oral, Q4H PRN, Dorene Griffin MD    levothyroxine (SYNTHROID) tablet 50 mcg, 50 mcg, Oral, ACB, Dorene Griffin MD, Stopped at 12/23/19 0715   losartan (COZAAR) tablet 100 mg, 100 mg, Oral, DAILY, Marisa RICHARDS MD    pantoprazole (PROTONIX) tablet 40 mg, 40 mg, Oral, ACB, Marisa RICHARDS MD    propranolol (INDERAL) tablet 40 mg, 40 mg, Oral, BID, Epifanio Moreau MD    topiramate (TOPAMAX) tablet 50 mg, 50 mg, Oral, BID, Thom Mcgregor MD    acetaminophen (TYLENOL) tablet 650 mg, 650 mg, Oral, Q4H PRN, Thom Mcgregor MD, 650 mg at 12/23/19 0349    ondansetron [facility]) injection 4 mg, 4 mg, IntraVENous, Q4H PRN, Thom Mcgregor MD    enoxaparin (LOVENOX) injection 40 mg, 40 mg, SubCUTAneous, Q24H, Thom Mcgregor MD, 40 mg at 12/23/19 0715    morphine injection 4 mg, 4 mg, IntraVENous, Q4H PRN, Thom Mcgregor MD, 4 mg at 12/23/19 0800    insulin lispro (HUMALOG) injection, , SubCUTAneous, AC&HS, Thom Mcgregor MD, Stopped at 12/23/19 0730    glucose chewable tablet 16 g, 4 Tab, Oral, PRN, Thom Mcgregor MD    glucagon [facility]) injection 1 mg, 1 mg, IntraMUSCular, PRN, Thom Mcgregor MD    dextrose 10 % infusion 125-250 mL, 125-250 mL, IntraVENous, PRN, Reyes Delude, MD    naloxone [facility]) injection 0.4 mg, 0.4 mg, IntraVENous, PRN, Reyes Delude, MD    arformoterol (BROVANA) neb solution 15 mcg, 15 mcg, Nebulization, BID RT, 15 mcg at 12/23/19 0804 **AND** budesonide (PULMICORT) 500 mcg/2 ml nebulizer suspension, 500 mcg, Nebulization, BID RT, Reyes Delude, MD, 500 mcg at 12/23/19 0804    lidocaine 4 % patch 1 Patch, 1 Patch, TransDERmal, Q24H, Thom Mcgregor MD, 1 Patch at 12/22/19 2134    nalbuphine (NUBAIN) injection 10 mg, 10 mg, IntraVENous, Q3H PRN, Thom Mcgregor MD, 10 mg at 12/22/19 2134    Current Outpatient Medications:     propranolol (INDERAL) 40 mg tablet, Take 40 mg by mouth two (2) times a day., Disp: , Rfl:     iron bg,ps/vitC/B12/FA/calcium (ALIRIO FORTE PO), Take  by mouth., Disp: , Rfl:     atorvastatin (LIPITOR) 40 mg tablet, Take 40 mg by mouth daily. , Disp: , Rfl:     divalproex DR (DEPAKOTE) 250 mg tablet, Take 500 mg by mouth two (2) times a day. Indications: 500 mg in am 1250 mg at night, Disp: , Rfl:     dicyclomine (BENTYL) 20 mg tablet, Take 20 mg by mouth three (3) times daily. , Disp: , Rfl:     ergocalciferol (VITAMIN D2) 50,000 unit capsule, Take 50,000 Units by mouth every seven (7) days. , Disp: , Rfl:     glipiZIDE SR (GLUCOTROL) 5 mg CR tablet, Take 5 mg by mouth daily. , Disp: , Rfl:     citalopram (CELEXA) 10 mg tablet, Take 40 mg by mouth nightly., Disp: , Rfl:     fluticasone-salmeterol (ADVAIR) 250-50 mcg/dose diskus inhaler, Take 1 Puff by inhalation two (2) times a day., Disp: , Rfl:     METHYLCELLULOSE (FIBER THERAPY), 500 mg by Does Not Apply route two (2) times a day. Indications: 3 tabs twice a day, Disp: , Rfl:     Omeprazole delayed release (PRILOSEC D/R) 20 mg tablet, Take 40 mg by mouth daily. , Disp: , Rfl:     levothyroxine (SYNTHROID) 50 mcg tablet, Take 50 mcg by mouth Daily (before breakfast). , Disp: , Rfl:     cyclobenzaprine (FLEXERIL) 10 mg tablet, Take 1 Tab by mouth three (3) times daily as needed for Muscle Spasm(s). , Disp: 40 Tab, Rfl: 0    HYDROmorphone (DILAUDID) 2 mg tablet, Take 1 Tab by mouth every four (4) hours as needed for Pain. Max Daily Amount: 12 mg., Disp: 40 Tab, Rfl: 0    Calcium-Cholecalciferol, D3, 500 mg(1,250mg) -400 unit tab, Take 1 Tab by mouth two (2) times a day., Disp: , Rfl:     losartan (COZAAR) 100 mg tablet, Take 100 mg by mouth daily. , Disp: , Rfl:     topiramate (TOPAMAX) 50 mg tablet, Take 2 Tabs by mouth two (2) times a day. (Patient taking differently: Take 50 mg by mouth two (2) times a day.), Disp: 20 Tab, Rfl: 0    gabapentin (NEURONTIN) 100 mg capsule, Take 200 mg by mouth two (2) times a day., Disp: , Rfl:     gabapentin (NEURONTIN) 100 mg capsule, Take 300 mg by mouth nightly., Disp: , Rfl:     IPRATROPIUM/ALBUTEROL SULFATE (COMBIVENT IN), Take 1 Puff by inhalation four (4) times daily. , Disp: , Rfl:   chlorthalidone (HYGROTEN) 25 mg tablet, Take 25 mg by mouth every other day., Disp: , Rfl:     letrozole (FEMARA) 2.5 mg tablet, Take 2.5 mg by mouth daily. Indications: per patient, \"I stopped 7 days before surgery scheduled for 9-13-17. \", Disp: , Rfl:     EPINEPHrine (EPIPEN) 0.3 mg/0.3 mL (1:1,000) injection, 0.3 mg by IntraMUSCular route once as needed. , Disp: , Rfl:      ALLERGIES:  Allergies   Allergen Reactions    Valium [Diazepam] Anaphylaxis    Carafate [Sucralfate] Nausea and Vomiting    Carvedilol Other (comments)    Cephalexin Hives    Ciprofloxacin Hives    Eliquis [Apixaban] Unknown (comments)    Epipen [Epinephrine] Unknown (comments)     Pt states she is not allergic to Epipen    Other Medication Other (comments)     Per pt  Valium and morphine causes pt to become nervous    Percocet [Oxycodone-Acetaminophen] Other (comments)     hallucinate    Stings [Sting, Bee] Other (comments)    Talwin [Pentazocine Lactate] Shortness of Breath    Tetanus Toxoid, Adsorbed Shortness of Breath    Trileptal [Oxcarbazepine] Rash        PAST FAMILY AND SOCIAL HISTORY:  Family History   Problem Relation Age of Onset    Cancer Mother     Cancer Maternal Grandmother     Heart Attack Father     Migraines Father     Sudden Death Father       Social History     Socioeconomic History    Marital status:      Spouse name: Not on file    Number of children: Not on file    Years of education: Not on file    Highest education level: Not on file   Occupational History    Not on file   Social Needs    Financial resource strain: Not on file    Food insecurity:     Worry: Not on file     Inability: Not on file    Transportation needs:     Medical: Not on file     Non-medical: Not on file   Tobacco Use    Smoking status: Never Smoker    Smokeless tobacco: Never Used   Substance and Sexual Activity    Alcohol use: No    Drug use: No    Sexual activity: Not on file   Lifestyle    Physical activity:     Days per week: Not on file     Minutes per session: Not on file    Stress: Not on file   Relationships    Social connections:     Talks on phone: Not on file     Gets together: Not on file     Attends [employer] service: Not on file     Active member of club or organization: Not on file     Attends meetings of clubs or organizations: Not on file     Relationship status: Not on file    Intimate partner violence:     Fear of current or ex partner: Not on file     Emotionally abused: Not on file     Physically abused: Not on file     Forced sexual activity: Not on file   Other Topics Concern    Not on file   Social History Narrative    Not on file        REVIEW OF SYSTEMS:   A 10 point review of systems was completed and is that which was stated in the HPI and that which follows. LABS:   Results:   @LABRESULTRCNT(24H)@        Thank you for the opportunity to participate in the care of your patient.      Electronically signed by Sher Marquis MD  12/23/2019  10:05 AM

## 2019-12-23 NOTE — H&P
Medicine History and Physical    Patient: Jose Enrique Webb   Age:  78 y.o. Assessment   Principal Problem:    Leg pain, right (12/23/2019)    Active Problems:    DM (diabetes mellitus) ([address] (12/23/2019)      Hx of Guillain-[place] syndrome (12/23/2019)      GERD (gastroesophageal reflux disease) (12/23/2019)          Plan     1)  R leg pain and sensation loss   - PT / OT   - get neuro in am, if lp negative would dc home with PT/OT HH   - LP ordered   - Pain meds prn   - continuous pulse ox given hx of GB   - CT pelvis and lumbar spine negative    2)  GERD   - PPI    3)  DM   - SSI    DISPO    Anticipated Date of Discharge: tomorrow  Anticipated Disposition (home, SNF) : home    Chief Complaint:   Chief Complaint   Patient presents with    Hip Pain    Numbness         HPI:   Jose Enrique Webb is a 78y.o. year old female who presents with R leg pain and decreased sensation. No alarm symptoms. Was at a concert a couple days ago and reportedly fell out of her chair onto her right side. She since has had lower back pain and R sided leg pain and numbness/weakness. She has hx of GB, DM, chronic pain. Review of Systems - positive responses in bold type   Constitutional: Negative for fever, chills, diaphoresis and unexpected weight change. HENT: Negative for ear pain, congestion, sore throat, rhinorrhea, drooling, trouble swallowing, neck pain and tinnitus. Eyes: Negative for photophobia, pain, redness and visual disturbance. Respiratory: negative for shortness of breath, cough, choking, chest tightness, wheezing or stridor. Cardiovascular: Negative for chest pain, palpitations and leg swelling. Gastrointestinal: Negative for nausea, vomiting, abdominal pain, diarrhea, constipation, blood in stool, abdominal distention and anal bleeding. Genitourinary: Negative for dysuria, urgency, frequency, hematuria, flank pain and difficulty urinating. Musculoskeletal: Negative for back pain and arthralgias.    Skin: Negative for color change, rash and wound. Neurological: Negative for dizziness, seizures, syncope, speech difficulty, light-headedness or headaches. Hematological: Does not bruise/bleed easily. Psychiatric/Behavioral: Negative for suicidal ideas, hallucinations, behavioral problems, self-injury or agitation       Past Medical History:  Past Medical History:   Diagnosis Date    Asthma     Back pain     [facility]     Breast cancer ([facility] 2012    left side    Cancer ([facility]     Diabetes ([facility]     GERD (gastroesophageal reflux disease)     Guillain Barré syndrome ([facility]     History of blood transfusion     \"in my 20's\" per patient    Hypertension     Ill-defined condition 09/12/2017    \"liver problem,\" per patient, \"not liver disease. I go to a gastroenterologist for it. \"    Lymphedema     left arm and hand and wears a sleeve.  Morbid obesity ([facility] 09/12/2017    BMI = 40.4    Seizures (HCC)     Sleep apnea     left apap machine at home    Thromboembolus [facility])     history of in left leg    Thyroid disease     UTI (lower urinary tract infection)        Past Surgical History:  Past Surgical History:   Procedure Laterality Date    Simon Ulloa, july 30 2013    HX CHOLECYSTECTOMY      HX MASTECTOMY Left 03/29/2012    complete    HX ORTHOPAEDIC      LEFT FOOT SX.    HX PARTIAL HYSTERECTOMY         Family History:  Family History   Problem Relation Age of Onset    Cancer Mother     Cancer Maternal Grandmother     Heart Attack Father     Migraines Father     Sudden Death Father        Social History:  Social History     Socioeconomic History    Marital status:      Spouse name: Not on file    Number of children: Not on file    Years of education: Not on file    Highest education level: Not on file   Tobacco Use    Smoking status: Never Smoker    Smokeless tobacco: Never Used   Substance and Sexual Activity    Alcohol use: No    Drug use:  No Home Medications:  Prior to Admission medications    Medication Sig Start Date End Date Taking? Authorizing Provider   propranolol (INDERAL) 40 mg tablet Take 40 mg by mouth two (2) times a day. Yes Other, MD Heather   iron bg,ps/vitC/B12/FA/calcium (ALIRIO FORTE PO) Take  by mouth. Yes Other, MD Heather   atorvastatin (LIPITOR) 40 mg tablet Take 40 mg by mouth daily. Yes Provider, Historical   divalproex DR (DEPAKOTE) 250 mg tablet Take 500 mg by mouth two (2) times a day. Indications: 500 mg in am 1250 mg at night   Yes Provider, Historical   dicyclomine (BENTYL) 20 mg tablet Take 20 mg by mouth three (3) times daily. Yes Provider, Historical   ergocalciferol (VITAMIN D2) 50,000 unit capsule Take 50,000 Units by mouth every seven (7) days. Yes Provider, Historical   glipiZIDE SR (GLUCOTROL) 5 mg CR tablet Take 5 mg by mouth daily. Yes Other, MD Heather   citalopram (CELEXA) 10 mg tablet Take 40 mg by mouth nightly. Yes Other, MD Heather   fluticasone-salmeterol (ADVAIR) 250-50 mcg/dose diskus inhaler Take 1 Puff by inhalation two (2) times a day. Yes Other, MD Heather   METHYLCELLULOSE (FIBER THERAPY) 500 mg by Does Not Apply route two (2) times a day. Indications: 3 tabs twice a day   Yes Provider, Historical   Omeprazole delayed release (PRILOSEC D/R) 20 mg tablet Take 40 mg by mouth daily. Yes Provider, Historical   levothyroxine (SYNTHROID) 50 mcg tablet Take 50 mcg by mouth Daily (before breakfast). Yes Other, MD Heather   cyclobenzaprine (FLEXERIL) 10 mg tablet Take 1 Tab by mouth three (3) times daily as needed for Muscle Spasm(s). 9/18/17   Galina Garces PA-C   HYDROmorphone (DILAUDID) 2 mg tablet Take 1 Tab by mouth every four (4) hours as needed for Pain. Max Daily Amount: 12 mg. 9/18/17   Galina Garces PA-C   Calcium-Cholecalciferol, D3, 500 mg(1,250mg) -400 unit tab Take 1 Tab by mouth two (2) times a day.     Provider, Historical   losartan (COZAAR) 100 mg tablet Take 100 mg by mouth daily. Heather Guerrero MD   topiramate (TOPAMAX) 50 mg tablet Take 2 Tabs by mouth two (2) times a day. Patient taking differently: Take 50 mg by mouth two (2) times a day. 1/22/17   Alejandro Flannery,    gabapentin (NEURONTIN) 100 mg capsule Take 200 mg by mouth two (2) times a day. Heather Guerrero MD   gabapentin (NEURONTIN) 100 mg capsule Take 300 mg by mouth nightly. Heather Guerrero MD   IPRATROPIUM/ALBUTEROL SULFATE (COMBIVENT IN) Take 1 Puff by inhalation four (4) times daily. ProviderWillow   chlorthalidone (HYGROTEN) 25 mg tablet Take 25 mg by mouth every other day. Heather Guerrero MD   letrozole [facility]) 2.5 mg tablet Take 2.5 mg by mouth daily. Indications: per patient, \"I stopped 7 days before surgery scheduled for 9-13-17. \"    Heather Guerrero MD   EPINEPHrine (EPIPEN) 0.3 mg/0.3 mL (1:1,000) injection 0.3 mg by IntraMUSCular route once as needed. Heather Guerrero MD       Allergies:   Allergies   Allergen Reactions    Valium [Diazepam] Anaphylaxis    Carafate [Sucralfate] Nausea and Vomiting    Carvedilol Other (comments)    Cephalexin Hives    Ciprofloxacin Hives    Eliquis [Apixaban] Unknown (comments)    Epipen [Epinephrine] Unknown (comments)     Pt states she is not allergic to Epipen    Other Medication Other (comments)     Per pt  Valium and morphine causes pt to become nervous    Percocet [Oxycodone-Acetaminophen] Other (comments)     hallucinate    Stings [Sting, Bee] Other (comments)    Talwin [Pentazocine Lactate] Shortness of Breath    Tetanus Toxoid, Adsorbed Shortness of Breath    Trileptal [Oxcarbazepine] Rash           Physical Exam:     Visit Vitals  /69   Pulse (!) 58   Temp 98.6 °F (37 °C)   Resp 16   SpO2 98%       Physical Exam:  General appearance: alert, cooperative, no distress, appears stated age  Head: Normocephalic, without obvious abnormality, atraumatic  Neck: supple, trachea midline  Lungs: clear to auscultation bilaterally  Heart: regular rate and rhythm, S1, S2 normal, no murmur, click, rub or gallop  Abdomen: soft, non-tender. Bowel sounds normal. No masses,  no organomegaly  Extremities: extremities normal, atraumatic, no cyanosis or edema  Skin: Skin color, texture, turgor normal. No rashes or lesions  Neurologic: based on exam decreased sensation of R leg and weakness noted. Intake and Output:  Current Shift:  No intake/output data recorded. Last three shifts:  No intake/output data recorded.     Lab/Data Reviewed:  CMP:   Lab Results   Component Value Date/Time     12/22/2019 08:55 PM    K 4.3 12/22/2019 08:55 PM     12/22/2019 08:55 PM    CO2 33 (H) 12/22/2019 08:55 PM    AGAP 3 12/22/2019 08:55 PM     (H) 12/22/2019 08:55 PM    BUN 23 (H) 12/22/2019 08:55 PM    CREA 0.81 12/22/2019 08:55 PM    GFRAA >60 12/22/2019 08:55 PM    GFRNA >60 12/22/2019 08:55 PM    CA 9.4 12/22/2019 08:55 PM     CBC:   Lab Results   Component Value Date/Time    WBC 4.3 (L) 12/22/2019 08:55 PM    HGB 13.7 12/22/2019 08:55 PM    HCT 43.0 12/22/2019 08:55 PM     (L) 12/22/2019 08:55 PM     All Cardiac Markers in the last 24 hours: No results found for: CPK, CK, CKMMB, CKMB, RCK3, CKMBT, CKNDX, CKND1, RADHA, TROPT, TROIQ, EDGAR, TROPT, TNIPOC, BNP, BNPP      Kiran Pulliam MD    December 23, 2019

## 2019-12-23 NOTE — ED NOTES
Pt reports increased numbness, weakness, pain over time. Pt has limited movement.  Requesting pain meds 8/10

## 2019-12-23 NOTE — ED NOTES
Purposeful rounding completed. No needs at this time. Will continue to monitor.  Pt medicated w/ morphine for RLE pain

## 2019-12-24 ENCOUNTER — APPOINTMENT (OUTPATIENT)
Dept: MRI IMAGING | Age: 69
DRG: 948 | End: 2019-12-24
Attending: PHYSICIAN ASSISTANT
Payer: MEDICARE

## 2019-12-24 PROBLEM — R53.1 WEAKNESS: Status: ACTIVE | Noted: 2019-12-24

## 2019-12-24 PROBLEM — G61.0 GUILLAIN BARRÉ SYNDROME (HCC): Status: ACTIVE | Noted: 2019-12-24

## 2019-12-24 LAB
ANION GAP SERPL CALC-SCNC: 3 MMOL/L (ref 3–18)
ATRIAL RATE: 74 BPM
BUN SERPL-MCNC: 43 MG/DL (ref 7–18)
BUN/CREAT SERPL: 31 (ref 12–20)
CALCIUM SERPL-MCNC: 8.6 MG/DL (ref 8.5–10.1)
CALCULATED P AXIS, ECG09: 53 DEGREES
CALCULATED R AXIS, ECG10: -31 DEGREES
CALCULATED T AXIS, ECG11: 21 DEGREES
CHLORIDE SERPL-SCNC: 107 MMOL/L (ref 100–111)
CO2 SERPL-SCNC: 31 MMOL/L (ref 21–32)
CREAT SERPL-MCNC: 1.37 MG/DL (ref 0.6–1.3)
DIAGNOSIS, 93000: NORMAL
ERYTHROCYTE [DISTWIDTH] IN BLOOD BY AUTOMATED COUNT: 18.8 % (ref 11.6–14.5)
GLUCOSE BLD STRIP.AUTO-MCNC: 122 MG/DL (ref 70–110)
GLUCOSE BLD STRIP.AUTO-MCNC: 122 MG/DL (ref 70–110)
GLUCOSE BLD STRIP.AUTO-MCNC: 68 MG/DL (ref 70–110)
GLUCOSE BLD STRIP.AUTO-MCNC: 82 MG/DL (ref 70–110)
GLUCOSE BLD STRIP.AUTO-MCNC: 96 MG/DL (ref 70–110)
GLUCOSE SERPL-MCNC: 121 MG/DL (ref 74–99)
HCT VFR BLD AUTO: 41.3 % (ref 35–45)
HGB BLD-MCNC: 12.6 G/DL (ref 12–16)
MCH RBC QN AUTO: 32.1 PG (ref 24–34)
MCHC RBC AUTO-ENTMCNC: 30.5 G/DL (ref 31–37)
MCV RBC AUTO: 105.1 FL (ref 74–97)
P-R INTERVAL, ECG05: 150 MS
PLATELET # BLD AUTO: 122 K/UL (ref 135–420)
PMV BLD AUTO: 9.3 FL (ref 9.2–11.8)
POTASSIUM SERPL-SCNC: 4.6 MMOL/L (ref 3.5–5.5)
Q-T INTERVAL, ECG07: 390 MS
QRS DURATION, ECG06: 92 MS
QTC CALCULATION (BEZET), ECG08: 432 MS
RBC # BLD AUTO: 3.93 M/UL (ref 4.2–5.3)
SODIUM SERPL-SCNC: 141 MMOL/L (ref 136–145)
TROPONIN I SERPL-MCNC: <0.02 NG/ML (ref 0–0.04)
TROPONIN I SERPL-MCNC: <0.02 NG/ML (ref 0–0.04)
VENTRICULAR RATE, ECG03: 74 BPM
WBC # BLD AUTO: 4.6 K/UL (ref 4.6–13.2)

## 2019-12-24 PROCEDURE — 74011000250 HC RX REV CODE- 250: Performed by: INTERNAL MEDICINE

## 2019-12-24 PROCEDURE — 94640 AIRWAY INHALATION TREATMENT: CPT

## 2019-12-24 PROCEDURE — 65270000029 HC RM PRIVATE

## 2019-12-24 PROCEDURE — 93005 ELECTROCARDIOGRAM TRACING: CPT

## 2019-12-24 PROCEDURE — 36415 COLL VENOUS BLD VENIPUNCTURE: CPT

## 2019-12-24 PROCEDURE — 72141 MRI NECK SPINE W/O DYE: CPT

## 2019-12-24 PROCEDURE — 74011250637 HC RX REV CODE- 250/637: Performed by: INTERNAL MEDICINE

## 2019-12-24 PROCEDURE — 74011000250 HC RX REV CODE- 250: Performed by: EMERGENCY MEDICINE

## 2019-12-24 PROCEDURE — 97166 OT EVAL MOD COMPLEX 45 MIN: CPT

## 2019-12-24 PROCEDURE — 82962 GLUCOSE BLOOD TEST: CPT

## 2019-12-24 PROCEDURE — 74011250637 HC RX REV CODE- 250/637: Performed by: HOSPITALIST

## 2019-12-24 PROCEDURE — 84484 ASSAY OF TROPONIN QUANT: CPT

## 2019-12-24 PROCEDURE — 85027 COMPLETE CBC AUTOMATED: CPT

## 2019-12-24 PROCEDURE — 96375 TX/PRO/DX INJ NEW DRUG ADDON: CPT

## 2019-12-24 PROCEDURE — 99218 HC RM OBSERVATION: CPT

## 2019-12-24 PROCEDURE — 94761 N-INVAS EAR/PLS OXIMETRY MLT: CPT

## 2019-12-24 PROCEDURE — 74011250637 HC RX REV CODE- 250/637

## 2019-12-24 PROCEDURE — 74011250636 HC RX REV CODE- 250/636: Performed by: INTERNAL MEDICINE

## 2019-12-24 PROCEDURE — 74011250636 HC RX REV CODE- 250/636: Performed by: HOSPITALIST

## 2019-12-24 PROCEDURE — C9113 INJ PANTOPRAZOLE SODIUM, VIA: HCPCS | Performed by: INTERNAL MEDICINE

## 2019-12-24 PROCEDURE — 70551 MRI BRAIN STEM W/O DYE: CPT

## 2019-12-24 PROCEDURE — 97530 THERAPEUTIC ACTIVITIES: CPT

## 2019-12-24 PROCEDURE — 80048 BASIC METABOLIC PNL TOTAL CA: CPT

## 2019-12-24 PROCEDURE — 74011250636 HC RX REV CODE- 250/636: Performed by: PHYSICIAN ASSISTANT

## 2019-12-24 RX ORDER — PANTOPRAZOLE SODIUM 40 MG/10ML
40 INJECTION, POWDER, LYOPHILIZED, FOR SOLUTION INTRAVENOUS
Status: COMPLETED | OUTPATIENT
Start: 2019-12-24 | End: 2019-12-24

## 2019-12-24 RX ORDER — NITROGLYCERIN 0.4 MG/1
0.4 TABLET SUBLINGUAL AS NEEDED
Status: DISCONTINUED | OUTPATIENT
Start: 2019-12-24 | End: 2019-12-27 | Stop reason: HOSPADM

## 2019-12-24 RX ORDER — SODIUM CHLORIDE 9 MG/ML
500 INJECTION, SOLUTION INTRAVENOUS ONCE
Status: COMPLETED | OUTPATIENT
Start: 2019-12-24 | End: 2019-12-24

## 2019-12-24 RX ORDER — NITROGLYCERIN 0.4 MG/1
TABLET SUBLINGUAL
Status: COMPLETED
Start: 2019-12-24 | End: 2019-12-24

## 2019-12-24 RX ORDER — SODIUM CHLORIDE 9 MG/ML
75 INJECTION, SOLUTION INTRAVENOUS CONTINUOUS
Status: DISCONTINUED | OUTPATIENT
Start: 2019-12-24 | End: 2019-12-27 | Stop reason: HOSPADM

## 2019-12-24 RX ORDER — NITROGLYCERIN 0.4 MG/1
TABLET SUBLINGUAL
Status: COMPLETED | OUTPATIENT
Start: 2019-12-24 | End: 2019-12-24

## 2019-12-24 RX ORDER — DIVALPROEX SODIUM 500 MG/1
500 TABLET, DELAYED RELEASE ORAL DAILY
Status: DISCONTINUED | OUTPATIENT
Start: 2019-12-25 | End: 2019-12-27 | Stop reason: HOSPADM

## 2019-12-24 RX ORDER — KETOROLAC TROMETHAMINE 30 MG/ML
15 INJECTION, SOLUTION INTRAMUSCULAR; INTRAVENOUS
Status: DISPENSED | OUTPATIENT
Start: 2019-12-24 | End: 2019-12-24

## 2019-12-24 RX ADMIN — DICYCLOMINE HYDROCHLORIDE 20 MG: 20 TABLET ORAL at 17:31

## 2019-12-24 RX ADMIN — GABAPENTIN 200 MG: 100 CAPSULE ORAL at 17:31

## 2019-12-24 RX ADMIN — ARFORMOTEROL TARTRATE 15 MCG: 15 SOLUTION RESPIRATORY (INHALATION) at 08:29

## 2019-12-24 RX ADMIN — MORPHINE SULFATE 4 MG: 4 INJECTION, SOLUTION INTRAMUSCULAR; INTRAVENOUS at 23:58

## 2019-12-24 RX ADMIN — SODIUM CHLORIDE 75 ML/HR: 900 INJECTION, SOLUTION INTRAVENOUS at 11:09

## 2019-12-24 RX ADMIN — DICYCLOMINE HYDROCHLORIDE 20 MG: 20 TABLET ORAL at 09:18

## 2019-12-24 RX ADMIN — PANTOPRAZOLE SODIUM 40 MG: 40 INJECTION, POWDER, FOR SOLUTION INTRAVENOUS at 06:11

## 2019-12-24 RX ADMIN — BUDESONIDE 500 MCG: 0.5 INHALANT RESPIRATORY (INHALATION) at 08:29

## 2019-12-24 RX ADMIN — SODIUM CHLORIDE 500 ML: 900 INJECTION, SOLUTION INTRAVENOUS at 07:55

## 2019-12-24 RX ADMIN — BUDESONIDE 500 MCG: 0.5 INHALANT RESPIRATORY (INHALATION) at 21:03

## 2019-12-24 RX ADMIN — DIVALPROEX SODIUM 1250 MG: 500 TABLET, DELAYED RELEASE ORAL at 21:57

## 2019-12-24 RX ADMIN — GABAPENTIN 200 MG: 100 CAPSULE ORAL at 09:19

## 2019-12-24 RX ADMIN — CITALOPRAM HYDROBROMIDE 40 MG: 20 TABLET ORAL at 21:58

## 2019-12-24 RX ADMIN — LEVOTHYROXINE SODIUM 50 MCG: 50 TABLET ORAL at 06:11

## 2019-12-24 RX ADMIN — GABAPENTIN 300 MG: 100 CAPSULE ORAL at 21:58

## 2019-12-24 RX ADMIN — ATORVASTATIN CALCIUM 40 MG: 40 TABLET, FILM COATED ORAL at 09:18

## 2019-12-24 RX ADMIN — PROPRANOLOL HYDROCHLORIDE 40 MG: 20 TABLET ORAL at 09:19

## 2019-12-24 RX ADMIN — DICYCLOMINE HYDROCHLORIDE 20 MG: 20 TABLET ORAL at 21:56

## 2019-12-24 RX ADMIN — NITROGLYCERIN 0.4 MG: 0.4 TABLET SUBLINGUAL at 05:52

## 2019-12-24 RX ADMIN — LOSARTAN POTASSIUM 100 MG: 50 TABLET, FILM COATED ORAL at 09:18

## 2019-12-24 RX ADMIN — DIVALPROEX SODIUM 500 MG: 250 TABLET, DELAYED RELEASE ORAL at 09:19

## 2019-12-24 RX ADMIN — LIDOCAINE HYDROCHLORIDE 40 ML: 20 SOLUTION ORAL; TOPICAL at 06:12

## 2019-12-24 RX ADMIN — PANTOPRAZOLE SODIUM 40 MG: 40 TABLET, DELAYED RELEASE ORAL at 09:18

## 2019-12-24 RX ADMIN — ARFORMOTEROL TARTRATE 15 MCG: 15 SOLUTION RESPIRATORY (INHALATION) at 21:03

## 2019-12-24 NOTE — PROGRESS NOTES
Troponin retimed due to pt being off unit for MRI, Stevan Delvalle, phlebotomist aware and will come draw troponin. 1421: Pt's  states there are some changes to meds, discussed with Dr. Miles Calvillo MRI this Zamorano Pronto also paged to make changes as needed. 1426: Clement Novak informed pt is not taking BP meds, Depakote is 500 mg in the morning and 1250 mg @ bedtime. Informed pt is not taking Topamax. Report given to Colin Solorio RN.

## 2019-12-24 NOTE — PROGRESS NOTES
Problem: Falls - Risk of  Goal: *Absence of Falls  Description  Document Jose A Szymanski Fall Risk and appropriate interventions in the flowsheet. Outcome: Progressing Towards Goal  Note: Fall Risk Interventions:       Mentation Interventions: Adequate sleep, hydration, pain control              History of Falls Interventions: Door open when patient unattended         Problem: Patient Education: Go to Patient Education Activity  Goal: Patient/Family Education  Outcome: Progressing Towards Goal     Problem: Discharge Planning  Goal: *Discharge to safe environment  Outcome: Progressing Towards Goal     Problem: Patient Education: Go to Patient Education Activity  Goal: Patient/Family Education  Outcome: Progressing Towards Goal     Problem: Pressure Injury - Risk of  Goal: *Prevention of pressure injury  Description  Document Juan Scale and appropriate interventions in the flowsheet.   Outcome: Progressing Towards Goal  Note: Pressure Injury Interventions:  Sensory Interventions: Assess changes in LOC    Moisture Interventions: Absorbent underpads    Activity Interventions: Pressure redistribution bed/mattress(bed type)    Mobility Interventions: Pressure redistribution bed/mattress (bed type)    Nutrition Interventions: Document food/fluid/supplement intake    Friction and Shear Interventions: Minimize layers

## 2019-12-24 NOTE — PROGRESS NOTES
Problem: Self Care Deficits Care Plan (Adult)  Goal: *Acute Goals and Plan of Care (Insert Text)  Description  Occupational Therapy Goals  Initiated 12/24/2019 within 7 day(s). 1.  Patient will perform upper body dressing with moderate assistance . 2.  Patient will perform lower body dressing with moderate assistance using adaptive equipment . 3. Patient will perform bathing with moderate assistance using LH bathsponge. 4.  Patient will perform bedside commode transfers with minimal assistance/contact guard assist using RW with good safety awareness. 5.  Patient will perform all aspects of toileting with moderate assistance . 6. Patient will participate in upper extremity therapeutic exercise/activities with minimal assistance/contact guard assist for 10 minutes. 7.  Patient will utilize energy conservation techniques during functional activities with minimal verbal cues. 12/24/2019 1700 by Connie Lea  Outcome: Progressing Towards Goal    OCCUPATIONAL THERAPY EVALUATION    Patient: Georgi Eddy (8 y.o. 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 Bed mobility: CGA supine <-> sit edge of bed, F- static sitting balance, doffed R sock w/ CGA & dep to don w F- dynamic sitting balance, scooting towards left side while seated edge of bed w/ CGA, standing deferrred d/t pt c/o fatigue and feeling dizzy, bed mobility: CGA sit to supine, pt reports good comfort w/o c/o pain and/dizziness.  R lateral pillow splinting to prevent R lateral lean. Patient assisted into sitting position in hospital bed, reports good comfort, call bell within reach. Patient will benefit from skilled intervention to address the above impairments. Patient's rehabilitation potential is considered to be Excellent  Factors which may influence rehabilitation potential include:   [x]             None noted  []             Mental ability/status  []             Medical condition  []             Home/family situation and support systems  []             Safety awareness  []             Pain tolerance/management  []             Other:      PLAN :  Recommendations and Planned Interventions:     [x]               Self Care Training                  [x]      Therapeutic Activities  [x]               Functional Mobility Training   []      Cognitive Retraining  [x]               Therapeutic Exercises           [x]      Endurance Activities  [x]               Balance Training                    [x]      Neuromuscular Re-Education  []               Visual/Perceptual Training     []      Home Safety Training  [x]               Patient Education                   []      Family Training/Education  []               Other (comment):    Frequency/Duration: Patient will be followed by occupational therapy 3 - 5 times a week to address goals. Discharge Recommendations: Skilled Nursing Facility  Further Equipment Recommendations for Discharge: to be determined     SUBJECTIVE:   Patient stated \"I want to get stronger so I can go home with my family.     OBJECTIVE DATA SUMMARY:     Past Medical History:   Diagnosis Date    Asthma     Back pain     Bipolar 1 disorder ([facility]     Breast cancer ([facility] 2012    left side    Cancer ([facility]     Diabetes ([facility]     GERD (gastroesophageal reflux disease)     Guillain Barré syndrome ([facility]     History of blood transfusion     \"in my 20's\" per patient    Hypertension     Ill-defined condition 09/12/2017    \"liver problem,\" per patient, \"not liver disease.  I go to a gastroenterologist for it. \"    Lymphedema     left arm and hand and wears a sleeve. Morbid obesity ([facility] 09/12/2017    BMI = 40.4    Seizures (HCC)     Sleep apnea     left apap machine at home    Thromboembolus [facility])     history of in left leg    Thyroid disease     UTI (lower urinary tract infection)      Past Surgical History:   Procedure Laterality Date    Alka Garza, july 30 2013    HX CHOLECYSTECTOMY      HX MASTECTOMY Left 03/29/2012    complete    HX ORTHOPAEDIC      LEFT FOOT SX. HX PARTIAL HYSTERECTOMY       Barriers to Learning/Limitations: None  Compensate with: visual, verbal, tactile, kinesthetic cues/model    Home Situation:   Home Situation  Home Environment: Private residence  One/Two Story Residence: One story  Living Alone: No  Support Systems: Spouse/Significant Other/Partner  Patient Expects to be Discharged to[de-identified] Private residence  Current DME Used/Available at Home: Shower chair  Tub or Shower Type: Tub/Shower combination  [x]  Right hand dominant   []  Left hand dominant    Cognitive/Behavioral Status:  Neurologic State: Alert  Orientation Level: Oriented to situation;Oriented to place;Oriented to person  Cognition: Follows commands  Safety/Judgement: Fall prevention    Skin: appears intact  Edema: none noted    Vision/Perceptual:  glasses, appears intact         Coordination: BUE  Coordination: Grossly decreased, non-functional  Fine Motor Skills-Upper: Right Impaired;Left Impaired    Gross Motor Skills-Upper: Left Impaired;Right Impaired    Balance:  Sitting: With support; Impaired  Sitting - Static: Fair (occasional)(-)  Sitting - Dynamic: Fair (occasional)(-)  Standing: (NT)    Strength: BUE      Strength: Grossly decreased, non-functional      Tone & Sensation: BUE      Tone: Normal      Range of Motion: BUE      AROM: Grossly decreased, non-functional  PROM: Grossly decreased, non-functional       Functional Mobility and Transfers for ADLs:  Bed Mobility: Supine to Sit: Contact guard assistance  Sit to Supine: Contact guard assistance  Scooting: Contact guard assistance  Transfers: Toilet Transfer : Other (comment)(NT )       ADL Assessment:   Feeding: Maximum assistance    Oral Facial Hygiene/Grooming: Maximum assistance    Bathing: Maximum assistance    Upper Body Dressing: Maximum assistance    Lower Body Dressing: Total assistance    Toileting: Total assistance      ADL Intervention:  Feeding  Feeding Assistance: Moderate assistance  Container Management: Total assistance (dependent)  Cutting Food: Total assistance (dependent)  Utensil Management: Total assistance (dependent)  Food to Mouth: Moderate assistance  Drink to Mouth: Stand-by assistance      Toileting  Toileting Assistance: Total assistance(dependent)    Cognitive Retraining  Safety/Judgement: Fall prevention    Pain:  Pain level pre-treatment: 0/10   Pain level post-treatment: 0/10     Activity Tolerance:   Poor  Please refer to the flowsheet for vital signs taken during this treatment. After treatment:   [] Patient left in no apparent distress sitting up in chair  [x] Patient left in no apparent distress in bed  [x] Call bell left within reach  [x] Nursing notified  [] Caregiver present  [] Bed alarm activated    COMMUNICATION/EDUCATION:   [x] Role of Occupational Therapy in the acute care setting  [x] Home safety education was provided and the patient/caregiver indicated understanding. [x] Patient/family have participated as able in goal setting and plan of care. [x] Patient/family agree to work toward stated goals and plan of care. [] Patient understands intent and goals of therapy, but is neutral about his/her participation. [] Patient is unable to participate in goal setting and plan of care.     Thank you for this referral.  Giselle Mason  Time Calculation: 30 mins    Eval Complexity: History: MEDIUM Complexity : Expanded review of history including physical, cognitive and psychosocial  history ; Examination: MEDIUM Complexity : 3-5 performance deficits relating to physical, cognitive , or psychosocial skils that result in activity limitations and / or participation restrictions; Decision Making:MEDIUM Complexity : Patient may present with comorbidities that affect occupational performnce.  Miniml to moderate modification of tasks or assistance (eg, physical or verbal ) with assesment(s) is necessary to enable patient to complete evaluation

## 2019-12-24 NOTE — PROGRESS NOTES
Problem: Falls - Risk of  Goal: *Absence of Falls  Description  Document Babitavincent Hannon Fall Risk and appropriate interventions in the flowsheet. Outcome: Progressing Towards Goal  Note: Fall Risk Interventions:  Mobility Interventions: Communicate number of staff needed for ambulation/transfer, Patient to call before getting OOB, Assess mobility with egress test, Bed/chair exit alarm    Mentation Interventions: Bed/chair exit alarm, Adequate sleep, hydration, pain control, Increase mobility, More frequent rounding, Reorient patient         Elimination Interventions: Elevated toilet seat, Bed/chair exit alarm, Call light in reach, Patient to call for help with toileting needs, Stay With Me (per policy), Toilet paper/wipes in reach, Toileting schedule/hourly rounds    History of Falls Interventions: Consult care management for discharge planning, Bed/chair exit alarm, Door open when patient unattended, Investigate reason for fall, Room close to nurse's station         Problem: Patient Education: Go to Patient Education Activity  Goal: Patient/Family Education  Outcome: Progressing Towards Goal     Problem: Discharge Planning  Goal: *Discharge to safe environment  Outcome: Progressing Towards Goal     Problem: Patient Education: Go to Patient Education Activity  Goal: Patient/Family Education  Outcome: Progressing Towards Goal     Problem: Pressure Injury - Risk of  Goal: *Prevention of pressure injury  Description  Document Juan Scale and appropriate interventions in the flowsheet.   Outcome: Progressing Towards Goal  Note: Pressure Injury Interventions:  Sensory Interventions: Keep linens dry and wrinkle-free, Maintain/enhance activity level, Minimize linen layers    Moisture Interventions: Absorbent underpads, Limit adult briefs    Activity Interventions: Pressure redistribution bed/mattress(bed type), Increase time out of bed    Mobility Interventions: HOB 30 degrees or less, Pressure redistribution bed/mattress (bed type)    Nutrition Interventions: Document food/fluid/supplement intake, Offer support with meals,snacks and hydration    Friction and Shear Interventions: HOB 30 degrees or less, Lift sheet, Apply protective barrier, creams and emollients                Problem: Patient Education: Go to Patient Education Activity  Goal: Patient/Family Education  Outcome: Progressing Towards Goal     Problem: Pain  Goal: *Control of Pain  Outcome: Progressing Towards Goal     Problem: Breathing Pattern - Ineffective  Goal: *Absence of hypoxia  Outcome: Progressing Towards Goal  Goal: *Use of effective breathing techniques  Outcome: Progressing Towards Goal  Goal: *PALLIATIVE CARE:  Alleviation of Dyspnea  Outcome: Progressing Towards Goal

## 2019-12-24 NOTE — PROGRESS NOTES
INTERIM UPDATE - 0601 EST on 12/24/2019    Called to see Patient by an overhead Rapid. Patient reports a \"Vice \" 10/10 intensity sternal chest pain that is new. Patient cannot say whether this is similar to previous GERD symptoms. Sternal pain not reproducible upon palpation. EKG does not show ST segment elevations/depressions, T-wave inversions/flattening, or new Bundle Branch Block. Normal Sinus Rhythm. Plan:  Troponin q6hrs x2. Sublingual Nitroglycerin x3. IV Pantoprazole with GI Cocktail. If pain has not resolved with GI treatments, will administer IV Ketoralac. Blood Pressure may not tolerate Nitro paste, but will re-evaluate if pain is refractory to IV Ketoralac.

## 2019-12-24 NOTE — PROGRESS NOTES
INTERIM UPDATE - 0720 EST on 12/23/2019    Nursing Staff calls to report 2 consecutive low Blood Pressures with MAP in the 50s. Per H&P, there is no suspicion of bacterial infection presently, nor does Patient have a recorded history of CHF in EHR. Plan:  Bolus 1 L NS. Monitor.

## 2019-12-24 NOTE — PROGRESS NOTES
RRT called for chest pain. This RN assist in RRT along side medical staff;     House supervisor: Shereen Jimenez, ROSLYN Bhakta, ROSLYN Savage, ROSLYN Denny RN     MD: [facility]    Laboratory and Respiratory available.

## 2019-12-24 NOTE — PROGRESS NOTES
Internal Medicine Progress Note    Patient's Name: Priscila Geiger  Admit Date: 12/22/2019  Length of Stay: 0      Assessment/Plan     Principal Problem:    Leg pain, right (12/23/2019)    Active Problems:    DM (diabetes mellitus) ([address] (12/23/2019)      Hx of Guillain-[place] syndrome (12/23/2019)      GERD (gastroesophageal reflux disease) (12/23/2019)      Weakness (12/24/2019)      Guillain Barré syndrome ([address] (12/24/2019)      Upper extremity tremors/weakness/  -neurology consulted - less likely guillain - barre, they recommend MRI brain/cervical spine to r/o lesions  - MRIs today  -PT/OT   -Wonder if functional tremor is psychological in nature ? Seemed to have improved when she was told that she would not be discharged    DM  -A1c 5.2.   -sliding scale coverage with Lantus long acting QHS    - Cont acceptable home medications for chronic conditions   - DVT protocol    I have personally reviewed all pertinent labs and films that have officially resulted over the last 24 hours. I have personally checked for all pending labs that are awaiting final results. Interval History   Romana Ochoa is a 78y.o. year old female who presents with R leg pain and decreased sensation. No alarm symptoms. Was at a concert a couple days ago and reportedly fell out of her chair onto her right side. She since has had lower back pain and R sided leg pain and numbness/weakness. She has hx of GB, DM, chronic pain. \"    Neurology consulted and recommended MRI brain and cervical spine. Subjective     Pt s/e @ bedside. No major events overnight. This morning she remains with upper extremity tremors bilat, and complaints of weakness. Tremor seemed to improved with distraction of patient and stating that we are not discharging her today.       Objective     Visit Vitals  BP 92/53 (BP 1 Location: Right arm, BP Patient Position: At rest)   Pulse 64   Temp 97.4 °F (36.3 °C)   Resp 18   SpO2 97%   Breastfeeding No Physical Exam:  General Appearance: NAD, conversant  HENT: normocephalic/atraumatic, moist mucus membranes  Neck: No JVD, supple  Lungs: CTA with normal respiratory effort  CV: RRR, no m/r/g  Abdomen: soft, non-tender, active bowel sounds  Extremities: Bilateral upper extremity tremor noted worse with increased activity. Neuro: No focal deficits, motor/sensory intact    Intake and Output:  Current Shift:  12/24 0701 - 12/24 1900  In: 120 [P.O.:120]  Out: -   Last three shifts:  12/22 1901 - 12/24 0700  In: 1000 [I.V.:1000]  Out: 200 [Urine:200]    Lab/Data Reviewed: All lab results for the last 24 hours reviewed. Imaging Reviewed:  Mri Brain Wo Cont    Result Date: 12/24/2019  Brain MRI without contrast: INDICATION: Arm and leg weakness and numbness. PROCEDURE: The patient could not tolerate a complete exam. The following sequences were obtained. Sagittal T1, axial flair, axial diffusion and axial susceptibility weighted scans were performed. The patient refused additional imaging and contrast administration. Comparison to a head CT from 3/17/2019. No more recent prior neuro imaging. FINDINGS: There are no areas of restricted diffusion, no evidence of an acute infarction. The diffusion scan is diagnostic. Very mild ischemic changes immediate periventricular white matter on the FLAIR sequence. No mass or mass effect. The susceptibility weighted scan does not demonstrate any evidence of acute or chronic hemorrhage or abnormal venous anatomy. The craniocervical junction is normal. Right maxillary sinus appears opacified and not expanded. IMPRESSION: 1. Limited incomplete examination, see above. 2. No acute hemorrhage or acute infarction. 3. Mild ischemic white matter changes. Mri Cerv Spine Wo Cont    Result Date: 12/24/2019  Cervical spine MR: Indication: Arm and leg weakness and numbness. There is a clinical concern for cervical myelopathy. History of Guillain [place].  Procedure: Sagittal spin echo T1, T2 FSE and FSE inversion recovery (STIR) images of the cervical spine. Axial 3D KALA T2 volume and T2 star gradient echo thin section scans were also performed. Axial T1 imaging was also performed. The patient could not tolerate completion of the ordered and planned pre and postcontrast evaluation. Comparison exam: None. Findings: Normal alignment of the cervical spine except for mild straightening of the midcervical lordosis and slight anterolisthesis of C7 on T1. No vertebral lesions. No cord lesion. The craniocervical junction is normal. Axial scans show: C2/C3: Normal except for mild degenerative change. C3/C4: Diffuse spondylosis. Left greater than right facet arthropathy. No significant central stenosis. Severe left and relatively severe right-sided foraminal stenosis. C4/C5: Diffuse spondylosis. Right greater than left facet arthropathy. Moderate left-sided foraminal stenosis. No significant central stenosis. C5/C6: Diffuse spondylosis. No high-grade central stenosis. Relatively severe bilateral right greater than left foraminal stenosis. C6/C7: Diffuse spondylosis. No significant central stenosis. Relatively severe bilateral foraminal stenosis. C7/T1: Normal except for degenerative change. Impression: 1. Note that the patient could not tolerate completion of the postcontrast evaluation. This is a noncontrast exam. 2. No intrinsic spinal cord lesion or high-grade stenosis and no intradural mass, no abnormality that would correlate with a cervical myelopathy. 3. Multilevel neural foramen stenosis as described above. Exam moderately limited by patient motion.        Medications Reviewed:  Current Facility-Administered Medications   Medication Dose Route Frequency    nitroglycerin (NITROSTAT) tablet 0.4 mg  0.4 mg SubLINGual PRN    ketorolac (TORADOL) injection 15 mg  15 mg IntraVENous NOW    0.9% sodium chloride infusion  75 mL/hr IntraVENous CONTINUOUS    gadoterate meglumine (DOTAREM) 0.5 mmol/mL contrast solution syringe 20 mL  20 mL IntraVENous RAD ONCE    atorvastatin (LIPITOR) tablet 40 mg  40 mg Oral DAILY    [Held by provider] chlorthalidone (HYGROTEN) tablet 25 mg  25 mg Oral EVERY OTHER DAY    citalopram (CELEXA) tablet 40 mg  40 mg Oral QHS    cyclobenzaprine (FLEXERIL) tablet 10 mg  10 mg Oral TID PRN    dicyclomine (BENTYL) tablet 20 mg  20 mg Oral TID    divalproex DR (DEPAKOTE) tablet 500 mg  500 mg Oral BID    gabapentin (NEURONTIN) capsule 200 mg  200 mg Oral BID    gabapentin (NEURONTIN) capsule 300 mg  300 mg Oral QHS    HYDROmorphone (DILAUDID) tablet 1 mg  1 mg Oral Q4H PRN    levothyroxine (SYNTHROID) tablet 50 mcg  50 mcg Oral ACB    losartan (COZAAR) tablet 100 mg  100 mg Oral DAILY    pantoprazole (PROTONIX) tablet 40 mg  40 mg Oral ACB    propranolol (INDERAL) tablet 40 mg  40 mg Oral BID    topiramate (TOPAMAX) tablet 50 mg  50 mg Oral BID    acetaminophen (TYLENOL) tablet 650 mg  650 mg Oral Q4H PRN    ondansetron (ZOFRAN) injection 4 mg  4 mg IntraVENous Q4H PRN    [Held by provider] enoxaparin (LOVENOX) injection 40 mg  40 mg SubCUTAneous Q24H    morphine injection 4 mg  4 mg IntraVENous Q4H PRN    insulin lispro (HUMALOG) injection   SubCUTAneous AC&HS    glucose chewable tablet 16 g  4 Tab Oral PRN    glucagon (GLUCAGEN) injection 1 mg  1 mg IntraMUSCular PRN    dextrose 10 % infusion 125-250 mL  125-250 mL IntraVENous PRN    naloxone (NARCAN) injection 0.4 mg  0.4 mg IntraVENous PRN    arformoterol (BROVANA) neb solution 15 mcg  15 mcg Nebulization BID RT    And    budesonide (PULMICORT) 500 mcg/2 ml nebulizer suspension  500 mcg Nebulization BID RT    lidocaine 4 % patch 1 Patch  1 Patch TransDERmal Q24H    nalbuphine (NUBAIN) injection 10 mg  10 mg IntraVENous Q3H PRN         [facility]  Hospitalist Division  Pager: [phone]  Office: [phone]

## 2019-12-24 NOTE — ROUTINE PROCESS
1125 MRI screening done. MRI Tech aware of previous surgeries and screws and rods on spine. Seymour KAHN for off tele orders  And possible ativan. Informed MRI to call if Patient can't stay still for the test. 
Bedside and Verbal shift change report given to John Holcomb (oncoming nurse) by me (offgoing nurse). Report included the following information SBAR, Kardex, Intake/Output, MAR, Recent Results and Cardiac Rhythm NSR.

## 2019-12-24 NOTE — PHYSICIAN ADVISORY
[facility] Physician Advisor Recommendation The information in this document is a recommendation to be used for utilization review and utilization management purposes only. This recommendation is not an order. The recommendation is made based on the information reviewed at the time of the referral, is pursuant to the [place] [facility] Conditions of Participation (42 CFR Part 482), and is neither a judgment nor an assessment with regard to the appropriateness or quality of clinical care. Nothing in this document may be used to limit, alter, or affect clinical services provided to the patient named below. The provider of services is ultimately responsible for the submission of a claim that has met all requirements for correct coding, billing, and reimbursement. Letter of Status Determination:  
Recommend hospitalization status upgraded from OBSERVATION  to INPATIENT Status Pt Name:  Sadie Vasquez MR#  
 [id number] [facility]#  [id number] Room and Hospital  3024/01  @ [address] Hospitalization date  12/22/2019  7:59 PM  
Current Attending Physician  Jaguar Heard MD  
Principal diagnosis  Leg pain, right [M79.604] Clinicals  78 y.o.  female hospitalized with above diagnosis 66yo M with history as per below presenting with lower back pain and problems moving her legs along with weakness and numbness. Reported previous Guillan [place] episodes as per notes Patient states she fell off her chair. Unclear when weakness started. States she cannot walk. States most recent episode of [place] was a year and a half ago. Blood pressures have fallen to the 50's  And chest pain 10/10. Pt is being followed by neurology and has crossed two midnights of active care . STATUS DETERMINATION   This patient is at above high risk of deterioration based on documented presenting clinical data, comorbid conditions, high risk of adverse events and current acute care course. Ms. Saskia Henning now meets Inpatient Admission status criteria in accordance with CMS regulation Section 43 .3. Specifically, due to medical necessity the patient's stay now exceeds Two Midnights. It is our recommendation that this patient's hospitalization status should be upgraded from  OBSERVATION to INPATIENT status. The final decision of the patient's hospitalization status depends on the attending physician's judgment Additional comments Payor: Robert Fitzpatrick / [facility]: [facility] / Product Type: Medicare /   
  
Susie Vines [facility] [employer] Physician [address] [phone]: [phone]      [email or web address]

## 2019-12-24 NOTE — PROGRESS NOTES
Problem: Breathing Pattern - Ineffective  Goal: *Absence of hypoxia  Outcome: Progressing Towards Goal   Respiratory Therapy Assessment Care Plan    Patient:  Perlita Kern 78 y.o. female 12/24/2019 8:32 AM    Leg pain, right [M79.604]      Chest X-RAY:   Results from Hospital Encounter encounter on 12/01/19   XR CHEST PORT    Impression IMPRESSION:    No active cardiopulmonary disease. Results from [place] encounter on 06/18/18   XR ABD (KUB)    Impression IMPRESSION:    1. Nonspecific nonobstructive gas pattern. 2. Canceled small bowel series as above. XR CHEST PORT    Impression IMPRESSION:    No active disease or interval change.             Vital Signs:   Visit Vitals  BP 91/49 (BP 1 Location: Right arm, BP Patient Position: At rest)   Pulse 70   Temp 98.8 °F (37.1 °C)   Resp 18   SpO2 96%         Indications for treatment: SOB      Plan of care: Brovana and pulmicort        Goal: No SOB

## 2019-12-24 NOTE — PROGRESS NOTES
Limited MRI study. Pt wished to terminate exam due to rods in back heating up too much. Contrast was not administered. Completed the MRI CERVICAL WO. Limited MRI BRAIN WO. S/w rads at , ok to send over. Informed ROSLYN Christensen and pt sent back to floor.

## 2019-12-24 NOTE — PROGRESS NOTES
2050 - Patient arrived to the unit via stretcher. Transferred patient safely to bed with 2 x assistance. Patient is alert and oriented x 2, self and place only. Not in any form of distress. Denies any pain or discomfort. Patient has periods of disorientation is unable to participate on admission document completion. Will try again later. 2100 - Patient is watching TV.      0000 - Patient is asleep. Not in any form of distress. 0400 - Patient appears to be disoriented. Patient stated: \"Where am I? I'm not supposed to be here. Did I missed a whole year? \" Reoriented patient. Patient went back to sleep. Based on the progress notes, this behavior is not new and was displayed in the ED.     7056 - Patient complained of chest pain. Rapid response team called. 3429 - RRT arrived - MD Nelia Sun, [address] [facility] Supervisor              Ez Goodson and St. poncho RN.    2659 - Administered Nitroglycerin 0.4 mg SubLing x 2.    7800 - Patient stated she still has chest pain. EKG completed. Lab drawn for Troponin. 9248 - GI cocktail and Protonix 40 mg IV administered. 0630 - Patient stated that her chest pain is relieved. BP is 80/52 - 94/50. Will continue to monitor BP. Notified Dr. Jonny Jefferson that patient's BP has not improved. MD stated he will review pt's chart and will order something. Order seen to administer 500 mL of NS IV.     0800 - Bedside and Verbal shift change report given to David Black RN (oncoming nurse) by Rhonda Burton RN (offgoing nurse). Report included the following information SBAR, Kardex, Intake/Output, MAR and Recent Results.

## 2019-12-25 DIAGNOSIS — J45.50 SEVERE PERSISTENT ASTHMA, UNSPECIFIED WHETHER COMPLICATED: ICD-10-CM

## 2019-12-25 LAB
GLUCOSE BLD STRIP.AUTO-MCNC: 126 MG/DL (ref 70–110)
GLUCOSE BLD STRIP.AUTO-MCNC: 128 MG/DL (ref 70–110)
GLUCOSE BLD STRIP.AUTO-MCNC: 77 MG/DL (ref 70–110)
GLUCOSE BLD STRIP.AUTO-MCNC: 79 MG/DL (ref 70–110)

## 2019-12-25 PROCEDURE — 74011250636 HC RX REV CODE- 250/636: Performed by: HOSPITALIST

## 2019-12-25 PROCEDURE — 94761 N-INVAS EAR/PLS OXIMETRY MLT: CPT

## 2019-12-25 PROCEDURE — 74011000250 HC RX REV CODE- 250: Performed by: EMERGENCY MEDICINE

## 2019-12-25 PROCEDURE — 82962 GLUCOSE BLOOD TEST: CPT

## 2019-12-25 PROCEDURE — 74011250636 HC RX REV CODE- 250/636: Performed by: PHYSICIAN ASSISTANT

## 2019-12-25 PROCEDURE — 94640 AIRWAY INHALATION TREATMENT: CPT

## 2019-12-25 PROCEDURE — 74011250637 HC RX REV CODE- 250/637: Performed by: HOSPITALIST

## 2019-12-25 PROCEDURE — 65270000029 HC RM PRIVATE

## 2019-12-25 PROCEDURE — 77010033678 HC OXYGEN DAILY

## 2019-12-25 RX ADMIN — DIVALPROEX SODIUM 500 MG: 500 TABLET, DELAYED RELEASE ORAL at 08:35

## 2019-12-25 RX ADMIN — SODIUM CHLORIDE 75 ML/HR: 900 INJECTION, SOLUTION INTRAVENOUS at 08:02

## 2019-12-25 RX ADMIN — CITALOPRAM HYDROBROMIDE 40 MG: 20 TABLET ORAL at 21:31

## 2019-12-25 RX ADMIN — ARFORMOTEROL TARTRATE 15 MCG: 15 SOLUTION RESPIRATORY (INHALATION) at 20:58

## 2019-12-25 RX ADMIN — PANTOPRAZOLE SODIUM 40 MG: 40 TABLET, DELAYED RELEASE ORAL at 08:35

## 2019-12-25 RX ADMIN — DICYCLOMINE HYDROCHLORIDE 20 MG: 20 TABLET ORAL at 21:31

## 2019-12-25 RX ADMIN — ATORVASTATIN CALCIUM 40 MG: 40 TABLET, FILM COATED ORAL at 08:35

## 2019-12-25 RX ADMIN — LEVOTHYROXINE SODIUM 50 MCG: 50 TABLET ORAL at 08:02

## 2019-12-25 RX ADMIN — GABAPENTIN 200 MG: 100 CAPSULE ORAL at 17:17

## 2019-12-25 RX ADMIN — ARFORMOTEROL TARTRATE 15 MCG: 15 SOLUTION RESPIRATORY (INHALATION) at 08:54

## 2019-12-25 RX ADMIN — GABAPENTIN 200 MG: 100 CAPSULE ORAL at 08:34

## 2019-12-25 RX ADMIN — MORPHINE SULFATE 4 MG: 4 INJECTION, SOLUTION INTRAMUSCULAR; INTRAVENOUS at 17:24

## 2019-12-25 RX ADMIN — DIVALPROEX SODIUM 1250 MG: 500 TABLET, DELAYED RELEASE ORAL at 21:30

## 2019-12-25 RX ADMIN — BUDESONIDE 500 MCG: 0.5 INHALANT RESPIRATORY (INHALATION) at 08:54

## 2019-12-25 RX ADMIN — DICYCLOMINE HYDROCHLORIDE 20 MG: 20 TABLET ORAL at 16:00

## 2019-12-25 RX ADMIN — BUDESONIDE 500 MCG: 0.5 INHALANT RESPIRATORY (INHALATION) at 20:58

## 2019-12-25 RX ADMIN — MORPHINE SULFATE 4 MG: 4 INJECTION, SOLUTION INTRAMUSCULAR; INTRAVENOUS at 21:30

## 2019-12-25 RX ADMIN — DICYCLOMINE HYDROCHLORIDE 20 MG: 20 TABLET ORAL at 08:35

## 2019-12-25 RX ADMIN — GABAPENTIN 300 MG: 100 CAPSULE ORAL at 21:31

## 2019-12-25 NOTE — PROGRESS NOTES
Bedside and Verbal shift change report given to ROSLYN Weaver (oncoming nurse) by Lori Blackwell (offgoing nurse). Report included the following information SBAR, Kardex, MAR and Recent Results.        Patient resting

## 2019-12-25 NOTE — CONSULTS
Neurology Consultation    Name: Estrellita Forbes  MRN: [id number]  Age: 78 y.o., : 1950    Referring Physician:  Kobe Man  Referring Service:  [unfilled]  Consulting Physician: Jb Teresa MD  Date of Consult: 2019    History of Present Illness:  68yo M with history as per below presenting with lower back pain and problems moving her legs along with weakness and numbness. Reported previous Guillan [place] episodes as per notes  Patient states she fell off her chair. Unclear when weakness started. States she cannot walk. States most recent episode of Jaquan Liao was a year and a half ago. Either has never had an LP or had one decades ago. Hard to obtain history from patient  Denies other symptoms. Denies recent illness, visual change. 2019 - Patient seen for follow up. Had MRIs with results below which were acutely unremarkable. Patient states that there is no change in her weakness. PAST MEDICAL HISTORY:  Past Medical History:   Diagnosis Date    Asthma     Back pain     Bipolar 1 disorder ([address]     Breast cancer ([address]     left side    Cancer ([address]     Diabetes ([address]     GERD (gastroesophageal reflux disease)     Guillain Barré syndrome (HCC)     History of blood transfusion     \"in my 20's\" per patient    Hypertension     Ill-defined condition 2017    \"liver problem,\" per patient, \"not liver disease. I go to a gastroenterologist for it. \"    Lymphedema     left arm and hand and wears a sleeve.      Morbid obesity ([address] 2017    BMI = 40.4    Seizures (HCC)     Sleep apnea     left apap machine at home    Thromboembolus [facility])     history of in left leg    Thyroid disease     UTI (lower urinary tract infection)       Past Surgical History:   Procedure Laterality Date    [facility] SURGERY      Dr Niki Lal, 2013    HX CHOLECYSTECTOMY      HX MASTECTOMY Left 2012    complete    HX ORTHOPAEDIC      LEFT FOOT SX.    HX PARTIAL HYSTERECTOMY IMAGING:   MRI Cervical 12/24  1. Note that the patient could not tolerate completion of the postcontrast  evaluation. This is a noncontrast exam.  2. No intrinsic spinal cord lesion or high-grade stenosis and no intradural  mass, no abnormality that would correlate with a cervical myelopathy. 3. Multilevel neural foramen stenosis as described above. Exam moderately  limited by patient motion. MRI Brain  IMPRESSION:  1. Limited incomplete examination, see above. 2. No acute hemorrhage or acute infarction. 3. Mild ischemic white matter changes. 12/22/2019   1. No compression fracture.     2. Bilateral pedicle screw and saray fixation L3-4 with laminectomy and interbody  fusion L3-4 and L4-5. The interbody fusion device at L3-4 disc space is slightly  posteriorly displaced as described above, however without significant central  stenosis.     3. Fractured residual screw at the right S1 level, tip to the lateral cortex,  abutting the right common iliac vein, however without significant perivascular  fluid collections.     Preliminary report provided by on-call radiology resident.            Physical Examination: (no change from previous exam 2 days ago)  GENERAL: No acute distress, appears stated age. Mentating well. Language intact. Obese. Lying down. CN - PERRLA EOMI fields full face symmetric. No disarthria. Motor - at least 4-/5 throughout with more possible weakness in RLE. Reflexes - intact in R side. Difficulty getting L knee reflex. Impression:  New onset weakness in arms and legs, more on R leg. Unclear time of onset. Patient unchanged from 2 days ago. LP would be difficult to perform with her and her reflexes appear intact at least on the R side, low suspicion for Klaudia Carrier would consider holding off on LP for now.         Recommendations:  1) MRI Negative  2) PT/Rehab  3) If patient worsens can get an LP    CURRENT MEDICATIONS:    Current Facility-Administered Medications:    nitroglycerin (NITROSTAT) tablet 0.4 mg, 0.4 mg, SubLINGual, PRN, Zhang Lechuga DO    0.9% sodium chloride infusion, 75 mL/hr, IntraVENous, CONTINUOUS, Ruperto Diaz PA-C, Last Rate: 75 mL/hr at 12/25/19 0802, 75 mL/hr at 12/25/19 0802    divalproex DR (DEPAKOTE) tablet 500 mg, 500 mg, Oral, DAILY, Eloisa Pedraza MD, 500 mg at 12/25/19 0835    divalproex DR (DEPAKOTE) tablet 1,250 mg, 1,250 mg, Oral, QHS, Eloisa Pedraza MD, 1,250 mg at 12/24/19 2157    atorvastatin (LIPITOR) tablet 40 mg, 40 mg, Oral, DAILY, Maikol Márquez MD, 40 mg at 12/25/19 0835    citalopram (CELEXA) tablet 40 mg, 40 mg, Oral, QHS, Maikol Márquez MD, 40 mg at 12/24/19 2158    cyclobenzaprine (FLEXERIL) tablet 10 mg, 10 mg, Oral, TID PRN, Maikol Márquez MD    dicyclomine (BENTYL) tablet 20 mg, 20 mg, Oral, TID, Maikol Márquez MD, 20 mg at 12/25/19 2299    gabapentin (NEURONTIN) capsule 200 mg, 200 mg, Oral, BID, Maikol Márquez MD, 200 mg at 12/25/19 0909    gabapentin (NEURONTIN) capsule 300 mg, 300 mg, Oral, QHS, Maikol Márquez MD, 300 mg at 12/24/19 2158    HYDROmorphone (DILAUDID) tablet 1 mg, 1 mg, Oral, Q4H PRN, Maikol Márquez MD    levothyroxine (SYNTHROID) tablet 50 mcg, 50 mcg, Oral, ACB, Maikol Márquez MD, 50 mcg at 12/25/19 0802    pantoprazole (PROTONIX) tablet 40 mg, 40 mg, Oral, ACB, Maikol Márquez MD, 40 mg at 12/25/19 0835    acetaminophen (TYLENOL) tablet 650 mg, 650 mg, Oral, Q4H PRN, Maikol Márquez MD, 650 mg at 12/23/19 0349    ondansetron (ZOFRAN) injection 4 mg, 4 mg, IntraVENous, Q4H PRN, Maikol Márquez MD    [Held by provider] enoxaparin (LOVENOX) injection 40 mg, 40 mg, SubCUTAneous, Q24H, Maikol Márquez MD, 40 mg at 12/23/19 0715    morphine injection 4 mg, 4 mg, IntraVENous, Q4H PRN, Maikol Márquez MD, 4 mg at 12/24/19 3946    insulin lispro (HUMALOG) injection, , SubCUTAneous, AC&HS, Maikol Márquez MD, Stopped at 12/23/19 0730    glucose chewable tablet 16 g, 4 Tab, Oral, PRN, Sharif Rand MD    glucagon [facility]) injection 1 mg, 1 mg, IntraMUSCular, PRN, Jerman Lin MD    dextrose 10 % infusion 125-250 mL, 125-250 mL, IntraVENous, PRN, Vashti Ocampo MD    naloxone [facility]) injection 0.4 mg, 0.4 mg, IntraVENous, PRN, Vashti Ocampo MD    arformoterol (BROVANA) neb solution 15 mcg, 15 mcg, Nebulization, BID RT, 15 mcg at 12/25/19 0854 **AND** budesonide (PULMICORT) 500 mcg/2 ml nebulizer suspension, 500 mcg, Nebulization, BID RT, Vashti Ocampo MD, 500 mcg at 12/25/19 0854    lidocaine 4 % patch 1 Patch, 1 Patch, TransDERmal, Q24H, Jerman Lin MD, 1 Patch at 12/23/19 2322    nalbuphine (NUBAIN) injection 10 mg, 10 mg, IntraVENous, Q3H PRN, Jerman Lin MD, 10 mg at 12/22/19 2134     ALLERGIES:  Allergies   Allergen Reactions    Valium [Diazepam] Anaphylaxis    Carafate [Sucralfate] Nausea and Vomiting    Carvedilol Other (comments)    Cephalexin Hives    Ciprofloxacin Hives    Eliquis [Apixaban] Unknown (comments)    Epipen [Epinephrine] Unknown (comments)     Pt states she is not allergic to [address] [facility] [address] Other (comments)     GI distress    Other Medication Other (comments)     Per pt  Valium and morphine causes pt to become nervous    Percocet [Oxycodone-Acetaminophen] Other (comments)     hallucinate    Stings [Sting, Bee] Other (comments)    Talwin [Pentazocine Lactate] Shortness of Breath    Tetanus Toxoid, Adsorbed Shortness of Breath    Trileptal [Oxcarbazepine] Rash        PAST FAMILY AND SOCIAL HISTORY:  Family History   Problem Relation Age of Onset    Cancer Mother     Cancer Maternal Grandmother     Heart Attack Father     Migraines Father     Sudden Death Father       Social History     Socioeconomic History    Marital status:      Spouse name: Not on file    Number of children: Not on file    Years of education: Not on file    Highest education level: Not on file Occupational History    Not on file   Social Needs    Financial resource strain: Not on file    Food insecurity:     Worry: Not on file     Inability: Not on file    Transportation needs:     Medical: Not on file     Non-medical: Not on file   Tobacco Use    Smoking status: Never Smoker    Smokeless tobacco: Never Used   Substance and Sexual Activity    Alcohol use: No    Drug use: No    Sexual activity: Not on file   Lifestyle    Physical activity:     Days per week: Not on file     Minutes per session: Not on file    Stress: Not on file   Relationships    Social connections:     Talks on phone: Not on file     Gets together: Not on file     Attends Sabianism service: Not on file     Active member of club or organization: Not on file     Attends meetings of clubs or organizations: Not on file     Relationship status: Not on file    Intimate partner violence:     Fear of current or ex partner: Not on file     Emotionally abused: Not on file     Physically abused: Not on file     Forced sexual activity: Not on file   Other Topics Concern    Not on file   Social History Narrative    Not on file        REVIEW OF SYSTEMS:   A 10 point review of systems was completed and is that which was stated in the HPI and that which follows. LABS:   Results:   @LABRESULTRCNT(24H)@        Thank you for the opportunity to participate in the care of your patient.      Electronically signed by Meade Peabody, MD  12/25/2019  10:05 AM

## 2019-12-25 NOTE — PROGRESS NOTES
1900  -- Bedside, Verbal and Written shift change report given to 2309 Ernesto Louis (oncoming nurse) by Wing Weberp nurse). Allergy band placed on pt's wrist. Report included the following information SBAR, Kardex, Intake/Output, MAR and Recent Results.       2155 -- PM medications administered, pt tolerated with ease, will continue to monitor.     0015 -- Shift reassessment, pt condition unchanged, will continue to monitor.      0400 --  Shift reassessment, pt condition unchanged, will continue to monitor.        0700  -- Bedside, Verbal and Written shift change report given to JUNI  (oncoming nurse) by MIHAI (offgoing nurse). Report included the following information SBAR, Kardex, Intake/Output, MAR and Recent Results. Skin assessment completed.

## 2019-12-25 NOTE — PROGRESS NOTES
Problem: Falls - Risk of  Goal: *Absence of Falls  Description  Document Aakashlorena Dowell Fall Risk and appropriate interventions in the flowsheet. Outcome: Progressing Towards Goal  Note: Fall Risk Interventions:  Mobility Interventions: Bed/chair exit alarm, Patient to call before getting OOB, Strengthening exercises (ROM-active/passive)    Mentation Interventions: Bed/chair exit alarm, Door open when patient unattended    Medication Interventions: Bed/chair exit alarm, Patient to call before getting OOB    Elimination Interventions: Call light in reach, Bed/chair exit alarm    History of Falls Interventions: Bed/chair exit alarm, Door open when patient unattended         Problem: Patient Education: Go to Patient Education Activity  Goal: Patient/Family Education  Outcome: Progressing Towards Goal     Problem: Discharge Planning  Goal: *Discharge to safe environment  Outcome: Progressing Towards Goal     Problem: Patient Education: Go to Patient Education Activity  Goal: Patient/Family Education  Outcome: Progressing Towards Goal     Problem: Pressure Injury - Risk of  Goal: *Prevention of pressure injury  Description  Document Juan Scale and appropriate interventions in the flowsheet.   Outcome: Progressing Towards Goal  Note: Pressure Injury Interventions:  Sensory Interventions: Assess need for specialty bed    Moisture Interventions: Apply protective barrier, creams and emollients    Activity Interventions: Increase time out of bed    Mobility Interventions: HOB 30 degrees or less    Nutrition Interventions: Document food/fluid/supplement intake    Friction and Shear Interventions: Feet elevated on foot rest, HOB 30 degrees or less                Problem: Patient Education: Go to Patient Education Activity  Goal: Patient/Family Education  Outcome: Progressing Towards Goal     Problem: Pain  Goal: *Control of Pain  Outcome: Progressing Towards Goal     Problem: Breathing Pattern - Ineffective  Goal: *Absence of hypoxia  Outcome: Progressing Towards Goal  Goal: *Use of effective breathing techniques  Outcome: Progressing Towards Goal  Goal: *PALLIATIVE CARE:  Alleviation of Dyspnea  Outcome: Progressing Towards Goal     Problem: Patient Education: Go to Patient Education Activity  Goal: Patient/Family Education  Outcome: Progressing Towards Goal

## 2019-12-25 NOTE — PROGRESS NOTES
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 Seemed to have improved when she was told that she would not be discharged    DM  -A1c 5.2.   -sliding scale coverage with Lantus long acting QHS    - Cont acceptable home medications for chronic conditions   - DVT protocol    I have personally reviewed all pertinent labs and films that have officially resulted over the last 24 hours. I have personally checked for all pending labs that are awaiting final results. Interval History   Millicent Cano is a 78y.o. year old female who presents with R leg pain and decreased sensation. No alarm symptoms. Was at a concert a couple days ago and reportedly fell out of her chair onto her right side. She since has had lower back pain and R sided leg pain and numbness/weakness. She has hx of GB, DM, chronic pain. \"    Neurology consulted and recommended MRI brain and cervical spine which were acutely unremarkable. LP was not performed due to patient not worsening and less likely Guillain-[place] per neurology. PT/OT consulted and recommend SNF    Subjective     Pt s/e @ bedside. No major events overnight. No chagnes in weakness or tremor.  No improvement either    Objective     Visit Vitals  /74 (BP 1 Location: Right arm, BP Patient Position: At rest)   Pulse 70   Temp 98.9 °F (37.2 °C)   Resp 18   Wt 106.7 kg (235 lb 3.7 oz)   SpO2 95%   Breastfeeding No   BMI 40.38 kg/m²       Physical Exam:  General Appearance: NAD, conversant  HENT: normocephalic/atraumatic, moist mucus membranes  Neck: No JVD, supple  Lungs: CTA with normal respiratory effort  CV: RRR, no m/r/g  Abdomen: soft, non-tender, active bowel sounds  Extremities: Bilateral upper extremity tremor noted worse with increased activity. RLE weakness. LLE is able a  To be raised against gravity. Decreased sensation RLE/RUE. Neuro: No focal deficits, motor/sensory intact    Intake and Output:  Current Shift:  No intake/output data recorded. Last three shifts:  12/23 1901 - 12/25 0700  In: 2820 [P.O.:820; I.V.:2000]  Out: 150 [Urine:150]    Lab/Data Reviewed: All lab results for the last 24 hours reviewed. Imaging Reviewed:  Mri Brain Wo Cont    Result Date: 12/24/2019  Brain MRI without contrast: INDICATION: Arm and leg weakness and numbness. PROCEDURE: The patient could not tolerate a complete exam. The following sequences were obtained. Sagittal T1, axial flair, axial diffusion and axial susceptibility weighted scans were performed. The patient refused additional imaging and contrast administration. Comparison to a head CT from 3/17/2019. No more recent prior neuro imaging. FINDINGS: There are no areas of restricted diffusion, no evidence of an acute infarction. The diffusion scan is diagnostic. Very mild ischemic changes immediate periventricular white matter on the FLAIR sequence. No mass or mass effect. The susceptibility weighted scan does not demonstrate any evidence of acute or chronic hemorrhage or abnormal venous anatomy. The craniocervical junction is normal. Right maxillary sinus appears opacified and not expanded. IMPRESSION: 1. Limited incomplete examination, see above. 2. No acute hemorrhage or acute infarction. 3. Mild ischemic white matter changes.     Mri Cerv Spine Wo Cont    Result Date: 12/24/2019  Cervical spine MR: Indication: Arm and leg weakness and numbness. There is a clinical concern for cervical myelopathy. History of Guillain [place]. Procedure: Sagittal spin echo T1, T2 FSE and FSE inversion recovery (STIR) images of the cervical spine. Axial 3D KALA T2 volume and T2 star gradient echo thin section scans were also performed. Axial T1 imaging was also performed. The patient could not tolerate completion of the ordered and planned pre and postcontrast evaluation. Comparison exam: None. Findings: Normal alignment of the cervical spine except for mild straightening of the midcervical lordosis and slight anterolisthesis of C7 on T1. No vertebral lesions. No cord lesion. The craniocervical junction is normal. Axial scans show: C2/C3: Normal except for mild degenerative change. C3/C4: Diffuse spondylosis. Left greater than right facet arthropathy. No significant central stenosis. Severe left and relatively severe right-sided foraminal stenosis. C4/C5: Diffuse spondylosis. Right greater than left facet arthropathy. Moderate left-sided foraminal stenosis. No significant central stenosis. C5/C6: Diffuse spondylosis. No high-grade central stenosis. Relatively severe bilateral right greater than left foraminal stenosis. C6/C7: Diffuse spondylosis. No significant central stenosis. Relatively severe bilateral foraminal stenosis. C7/T1: Normal except for degenerative change. Impression: 1. Note that the patient could not tolerate completion of the postcontrast evaluation. This is a noncontrast exam. 2. No intrinsic spinal cord lesion or high-grade stenosis and no intradural mass, no abnormality that would correlate with a cervical myelopathy. 3. Multilevel neural foramen stenosis as described above. Exam moderately limited by patient motion.        Medications Reviewed:  Current Facility-Administered Medications   Medication Dose Route Frequency    nitroglycerin (NITROSTAT) tablet 0.4 mg  0.4 mg SubLINGual PRN    0.9% sodium chloride infusion  75 mL/hr IntraVENous CONTINUOUS    divalproex DR (DEPAKOTE) tablet 500 mg  500 mg Oral DAILY    divalproex DR (DEPAKOTE) tablet 1,250 mg  1,250 mg Oral QHS    atorvastatin (LIPITOR) tablet 40 mg  40 mg Oral DAILY    citalopram (CELEXA) tablet 40 mg  40 mg Oral QHS    cyclobenzaprine (FLEXERIL) tablet 10 mg  10 mg Oral TID PRN    dicyclomine (BENTYL) tablet 20 mg  20 mg Oral TID    gabapentin (NEURONTIN) capsule 200 mg  200 mg Oral BID    gabapentin (NEURONTIN) capsule 300 mg  300 mg Oral QHS    HYDROmorphone (DILAUDID) tablet 1 mg  1 mg Oral Q4H PRN    levothyroxine (SYNTHROID) tablet 50 mcg  50 mcg Oral ACB    pantoprazole (PROTONIX) tablet 40 mg  40 mg Oral ACB    acetaminophen (TYLENOL) tablet 650 mg  650 mg Oral Q4H PRN    ondansetron (ZOFRAN) injection 4 mg  4 mg IntraVENous Q4H PRN    [Held by provider] enoxaparin (LOVENOX) injection 40 mg  40 mg SubCUTAneous Q24H    morphine injection 4 mg  4 mg IntraVENous Q4H PRN    insulin lispro (HUMALOG) injection   SubCUTAneous AC&HS    glucose chewable tablet 16 g  4 Tab Oral PRN    glucagon (GLUCAGEN) injection 1 mg  1 mg IntraMUSCular PRN    dextrose 10 % infusion 125-250 mL  125-250 mL IntraVENous PRN    naloxone (NARCAN) injection 0.4 mg  0.4 mg IntraVENous PRN    arformoterol (BROVANA) neb solution 15 mcg  15 mcg Nebulization BID RT    And    budesonide (PULMICORT) 500 mcg/2 ml nebulizer suspension  500 mcg Nebulization BID RT    lidocaine 4 % patch 1 Patch  1 Patch TransDERmal Q24H    nalbuphine (NUBAIN) injection 10 mg  10 mg IntraVENous Q3H PRN         [facility]  Hospitalist Division  Pager: [phone]  Office: [phone]

## 2019-12-25 NOTE — PROGRESS NOTES
Problem: Falls - Risk of  Goal: *Absence of Falls  Description  Document Adam Webb Fall Risk and appropriate interventions in the flowsheet. Outcome: Progressing Towards Goal  Note: Fall Risk Interventions:  Mobility Interventions: Communicate number of staff needed for ambulation/transfer, Patient to call before getting OOB, Assess mobility with egress test, Bed/chair exit alarm    Mentation Interventions: Bed/chair exit alarm, Adequate sleep, hydration, pain control, Increase mobility, More frequent rounding, Reorient patient         Elimination Interventions: Elevated toilet seat, Bed/chair exit alarm, Call light in reach, Patient to call for help with toileting needs, Stay With Me (per policy), Toilet paper/wipes in reach, Toileting schedule/hourly rounds    History of Falls Interventions: Consult care management for discharge planning, Bed/chair exit alarm, Door open when patient unattended, Investigate reason for fall, Room close to nurse's station         Problem: Patient Education: Go to Patient Education Activity  Goal: Patient/Family Education  Outcome: Progressing Towards Goal     Problem: Discharge Planning  Goal: *Discharge to safe environment  Outcome: Progressing Towards Goal     Problem: Patient Education: Go to Patient Education Activity  Goal: Patient/Family Education  Outcome: Progressing Towards Goal     Problem: Pressure Injury - Risk of  Goal: *Prevention of pressure injury  Description  Document Juan Scale and appropriate interventions in the flowsheet.   Outcome: Progressing Towards Goal  Note: Pressure Injury Interventions:  Sensory Interventions: Keep linens dry and wrinkle-free, Maintain/enhance activity level, Minimize linen layers    Moisture Interventions: Absorbent underpads, Limit adult briefs    Activity Interventions: Pressure redistribution bed/mattress(bed type), Increase time out of bed    Mobility Interventions: HOB 30 degrees or less, Pressure redistribution bed/mattress (bed type)    Nutrition Interventions: Document food/fluid/supplement intake, Offer support with meals,snacks and hydration    Friction and Shear Interventions: HOB 30 degrees or less, Lift sheet, Apply protective barrier, creams and emollients                Problem: Breathing Pattern - Ineffective  Goal: *Absence of hypoxia  Outcome: Progressing Towards Goal  Goal: *Use of effective breathing techniques  Outcome: Progressing Towards Goal  Goal: *PALLIATIVE CARE:  Alleviation of Dyspnea  Outcome: Progressing Towards Goal     Problem: Pain  Goal: *Control of Pain  Outcome: Progressing Towards Goal     Problem: Patient Education: Go to Patient Education Activity  Goal: Patient/Family Education  Outcome: Progressing Towards Goal

## 2019-12-26 ENCOUNTER — APPOINTMENT (OUTPATIENT)
Dept: GENERAL RADIOLOGY | Age: 69
DRG: 948 | End: 2019-12-26
Attending: HOSPITALIST
Payer: MEDICARE

## 2019-12-26 ENCOUNTER — HOSPITAL ENCOUNTER (OUTPATIENT)
Dept: INFUSION THERAPY | Age: 69
End: 2019-12-26
Payer: MEDICARE

## 2019-12-26 LAB
GLUCOSE BLD STRIP.AUTO-MCNC: 116 MG/DL (ref 70–110)
GLUCOSE BLD STRIP.AUTO-MCNC: 118 MG/DL (ref 70–110)
GLUCOSE BLD STRIP.AUTO-MCNC: 131 MG/DL (ref 70–110)
GLUCOSE BLD STRIP.AUTO-MCNC: 91 MG/DL (ref 70–110)

## 2019-12-26 PROCEDURE — 94761 N-INVAS EAR/PLS OXIMETRY MLT: CPT

## 2019-12-26 PROCEDURE — 74011250637 HC RX REV CODE- 250/637: Performed by: HOSPITALIST

## 2019-12-26 PROCEDURE — 97530 THERAPEUTIC ACTIVITIES: CPT

## 2019-12-26 PROCEDURE — 74011000250 HC RX REV CODE- 250: Performed by: EMERGENCY MEDICINE

## 2019-12-26 PROCEDURE — 74011250636 HC RX REV CODE- 250/636: Performed by: PHYSICIAN ASSISTANT

## 2019-12-26 PROCEDURE — 71045 X-RAY EXAM CHEST 1 VIEW: CPT

## 2019-12-26 PROCEDURE — 74011250636 HC RX REV CODE- 250/636: Performed by: HOSPITALIST

## 2019-12-26 PROCEDURE — 65270000029 HC RM PRIVATE

## 2019-12-26 PROCEDURE — 94640 AIRWAY INHALATION TREATMENT: CPT

## 2019-12-26 PROCEDURE — 97535 SELF CARE MNGMENT TRAINING: CPT

## 2019-12-26 PROCEDURE — 74011000250 HC RX REV CODE- 250: Performed by: HOSPITALIST

## 2019-12-26 PROCEDURE — 93005 ELECTROCARDIOGRAM TRACING: CPT

## 2019-12-26 PROCEDURE — 82962 GLUCOSE BLOOD TEST: CPT

## 2019-12-26 PROCEDURE — 77010033678 HC OXYGEN DAILY

## 2019-12-26 RX ORDER — MORPHINE SULFATE 2 MG/ML
2 INJECTION, SOLUTION INTRAMUSCULAR; INTRAVENOUS ONCE
Status: COMPLETED | OUTPATIENT
Start: 2019-12-26 | End: 2019-12-26

## 2019-12-26 RX ADMIN — GABAPENTIN 200 MG: 100 CAPSULE ORAL at 10:15

## 2019-12-26 RX ADMIN — ARFORMOTEROL TARTRATE 15 MCG: 15 SOLUTION RESPIRATORY (INHALATION) at 08:45

## 2019-12-26 RX ADMIN — DICYCLOMINE HYDROCHLORIDE 20 MG: 20 TABLET ORAL at 17:10

## 2019-12-26 RX ADMIN — MORPHINE SULFATE 2 MG: 2 INJECTION, SOLUTION INTRAMUSCULAR; INTRAVENOUS at 17:14

## 2019-12-26 RX ADMIN — BUDESONIDE 500 MCG: 0.5 INHALANT RESPIRATORY (INHALATION) at 21:08

## 2019-12-26 RX ADMIN — LEVOTHYROXINE SODIUM 50 MCG: 50 TABLET ORAL at 06:28

## 2019-12-26 RX ADMIN — DICYCLOMINE HYDROCHLORIDE 20 MG: 20 TABLET ORAL at 10:15

## 2019-12-26 RX ADMIN — DIVALPROEX SODIUM 500 MG: 500 TABLET, DELAYED RELEASE ORAL at 10:15

## 2019-12-26 RX ADMIN — GABAPENTIN 200 MG: 100 CAPSULE ORAL at 17:10

## 2019-12-26 RX ADMIN — GABAPENTIN 300 MG: 100 CAPSULE ORAL at 22:13

## 2019-12-26 RX ADMIN — ATORVASTATIN CALCIUM 40 MG: 40 TABLET, FILM COATED ORAL at 10:15

## 2019-12-26 RX ADMIN — MORPHINE SULFATE 4 MG: 4 INJECTION, SOLUTION INTRAMUSCULAR; INTRAVENOUS at 22:21

## 2019-12-26 RX ADMIN — DIVALPROEX SODIUM 1250 MG: 500 TABLET, DELAYED RELEASE ORAL at 22:12

## 2019-12-26 RX ADMIN — PANTOPRAZOLE SODIUM 40 MG: 40 TABLET, DELAYED RELEASE ORAL at 10:15

## 2019-12-26 RX ADMIN — BUDESONIDE 500 MCG: 0.5 INHALANT RESPIRATORY (INHALATION) at 08:45

## 2019-12-26 RX ADMIN — CYCLOBENZAPRINE 10 MG: 10 TABLET, FILM COATED ORAL at 10:15

## 2019-12-26 RX ADMIN — CITALOPRAM HYDROBROMIDE 40 MG: 20 TABLET ORAL at 22:12

## 2019-12-26 RX ADMIN — ARFORMOTEROL TARTRATE 15 MCG: 15 SOLUTION RESPIRATORY (INHALATION) at 21:08

## 2019-12-26 NOTE — PROGRESS NOTES
1900  -- Bedside, Verbal and Written shift change report given to 2309 Ernesto Louis (oncoming nurse) by TAMARA Barger nurse). Allergy band placed on pt's wrist. Report included the following information SBAR, Kardex, Intake/Output, MAR and Recent Results.       2130 -- PM/PRN PAIN medications administered, pt tolerated with ease, will continue to monitor.     0000 -- Shift reassessment, pt condition unchanged, will continue to monitor.      0430 --  Shift reassessment, pt condition unchanged, will continue to monitor.         0700 -- Bedside, Verbal and Written shift change report given to NATALYA  (oncoming nurse) by MIHAI (offgoing nurse). Report included the following information SBAR, Kardex, Intake/Output, MAR and Recent Results. Skin assessment completed.

## 2019-12-26 NOTE — PROGRESS NOTES
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 DM  -A1c 5.2.   -sliding scale coverage with Lantus long acting QHS    - Cont acceptable home medications for chronic conditions   - DVT protocol    I have personally reviewed all pertinent labs and films that have officially resulted over the last 24 hours. I have personally checked for all pending labs that are awaiting final results. Interval History   Nicola Thomas is a 78y.o. year old female who presents with R leg pain and decreased sensation. No alarm symptoms. Was at a concert a couple days ago and reportedly fell out of her chair onto her right side. She since has had lower back pain and R sided leg pain and numbness/weakness. She has hx of GB, DM, chronic pain. \"    Neurology consulted and recommended MRI brain and cervical spine which were acutely unremarkable. LP was not performed due to patient not worsening and less likely Guillain-[place] per neurology. PT/OT consulted and recommend SNF     Subjective     Pt s/e @ bedside. No major events overnight. Remains interested in SNF.  Weakness has slightly improved in LEs    Objective     Visit Vitals  /72   Pulse 70   Temp 98 °F (36.7 °C)   Resp 18   Wt 106.7 kg (235 lb 3.7 oz)   SpO2 95%   Breastfeeding No   BMI 40.38 kg/m²       Physical Exam:  General Appearance: NAD, conversant  HENT: normocephalic/atraumatic, moist mucus membranes  Neck: No JVD, supple  Lungs: CTA with normal respiratory effort  CV: RRR, no m/r/g  Abdomen: soft, non-tender, active bowel sounds  Extremities: Bilateral upper extremity tremor noted worse with increased activity. RLE weakness has improved. Upper extremity tremor has improved  Neuro: No focal deficits, motor/sensory intact    Intake and Output:  Current Shift:  No intake/output data recorded. Last three shifts:  12/24 1901 - 12/26 0700  In: 3348.8 [P.O.:1100; I.V.:2248.8]  Out: 650 [Urine:650]    Lab/Data Reviewed: All lab results for the last 24 hours reviewed. Imaging Reviewed:  No results found.     Medications Reviewed:  Current Facility-Administered Medications   Medication Dose Route Frequency    nitroglycerin (NITROSTAT) tablet 0.4 mg  0.4 mg SubLINGual PRN    0.9% sodium chloride infusion  75 mL/hr IntraVENous CONTINUOUS    divalproex DR (DEPAKOTE) tablet 500 mg  500 mg Oral DAILY    divalproex DR (DEPAKOTE) tablet 1,250 mg  1,250 mg Oral QHS    atorvastatin (LIPITOR) tablet 40 mg  40 mg Oral DAILY    citalopram (CELEXA) tablet 40 mg  40 mg Oral QHS    cyclobenzaprine (FLEXERIL) tablet 10 mg  10 mg Oral TID PRN    dicyclomine (BENTYL) tablet 20 mg  20 mg Oral TID    gabapentin (NEURONTIN) capsule 200 mg  200 mg Oral BID    gabapentin (NEURONTIN) capsule 300 mg  300 mg Oral QHS    HYDROmorphone (DILAUDID) tablet 1 mg  1 mg Oral Q4H PRN    levothyroxine (SYNTHROID) tablet 50 mcg  50 mcg Oral ACB    pantoprazole (PROTONIX) tablet 40 mg  40 mg Oral ACB    acetaminophen (TYLENOL) tablet 650 mg  650 mg Oral Q4H PRN    ondansetron (ZOFRAN) injection 4 mg  4 mg IntraVENous Q4H PRN    [Held by provider] enoxaparin (LOVENOX) injection 40 mg  40 mg SubCUTAneous Q24H    morphine injection 4 mg  4 mg IntraVENous Q4H PRN    insulin lispro (HUMALOG) injection   SubCUTAneous AC&HS    glucose chewable tablet 16 g  4 Tab Oral PRN    glucagon (GLUCAGEN) injection 1 mg  1 mg IntraMUSCular PRN    dextrose 10 % infusion 125-250 mL  125-250 mL IntraVENous PRN    naloxone (NARCAN) injection 0.4 mg  0.4 mg IntraVENous PRN    arformoterol (BROVANA) neb solution 15 mcg  15 mcg Nebulization BID RT    And    budesonide (PULMICORT) 500 mcg/2 ml nebulizer suspension  500 mcg Nebulization BID RT    lidocaine 4 % patch 1 Patch  1 Patch TransDERmal Q24H    nalbuphine (NUBAIN) injection 10 mg  10 mg IntraVENous Q3H PRN         Pretty Rodarte PA-C  [address] [place] [address]  Hospitalist Division  Pager: [phone]  Office: [phone]

## 2019-12-26 NOTE — PROGRESS NOTES
Problem: Falls - Risk of  Goal: *Absence of Falls  Description  Document Brandt Partidaener Fall Risk and appropriate interventions in the flowsheet. Outcome: Progressing Towards Goal  Note: Fall Risk Interventions:  Mobility Interventions: Bed/chair exit alarm, Patient to call before getting OOB    Mentation Interventions: Bed/chair exit alarm, Door open when patient unattended    Medication Interventions: Bed/chair exit alarm, Patient to call before getting OOB    Elimination Interventions: Call light in reach, Patient to call for help with toileting needs    History of Falls Interventions: Bed/chair exit alarm, Door open when patient unattended         Problem: Patient Education: Go to Patient Education Activity  Goal: Patient/Family Education  Outcome: Progressing Towards Goal     Problem: Discharge Planning  Goal: *Discharge to safe environment  Outcome: Progressing Towards Goal     Problem: Pressure Injury - Risk of  Goal: *Prevention of pressure injury  Description  Document Juan Scale and appropriate interventions in the flowsheet.   Outcome: Progressing Towards Goal  Note: Pressure Injury Interventions:  Sensory Interventions: Assess need for specialty bed    Moisture Interventions: Apply protective barrier, creams and emollients    Activity Interventions: Increase time out of bed, Pressure redistribution bed/mattress(bed type), PT/OT evaluation    Mobility Interventions: HOB 30 degrees or less    Nutrition Interventions: Document food/fluid/supplement intake    Friction and Shear Interventions: Feet elevated on foot rest, HOB 30 degrees or less                Problem: Patient Education: Go to Patient Education Activity  Goal: Patient/Family Education  Outcome: Progressing Towards Goal     Problem: Patient Education: Go to Patient Education Activity  Goal: Patient/Family Education  Outcome: Progressing Towards Goal     Problem: Pain  Goal: *Control of Pain  Outcome: Progressing Towards Goal     Problem: Breathing Pattern - Ineffective  Goal: *Absence of hypoxia  Outcome: Progressing Towards Goal  Goal: *Use of effective breathing techniques  Outcome: Progressing Towards Goal  Goal: *PALLIATIVE CARE:  Alleviation of Dyspnea  Outcome: Progressing Towards Goal

## 2019-12-26 NOTE — PROGRESS NOTES
12/26/19 Patient plan has changed to snf. Snf list with ratings disscused with patient and list given to her, list to chart. She is to review them and I will get her decisions. Patient has agreed to be posted to all of [place]. Posted in [facility].  MARIBEL Schneider  [facility]  [phone], Pager [phone]  [email or web address]

## 2019-12-26 NOTE — PROGRESS NOTES
Problem: Self Care Deficits Care Plan (Adult)  Goal: *Acute Goals and Plan of Care (Insert Text)  Description  Occupational Therapy Goals  Initiated 12/24/2019 within 7 day(s). 1.  Patient will perform upper body dressing with moderate assistance . 2.  Patient will perform lower body dressing with moderate assistance using adaptive equipment . 3. Patient will perform bathing with moderate assistance using LH bathsponge. 4.  Patient will perform bedside commode transfers with minimal assistance/contact guard assist using RW with good safety awareness. 5.  Patient will perform all aspects of toileting with moderate assistance . 6. Patient will participate in upper extremity therapeutic exercise/activities with minimal assistance/contact guard assist for 10 minutes. 7.  Patient will utilize energy conservation techniques during functional activities with minimal verbal cues. Outcome: Progressing Towards Goal   OCCUPATIONAL THERAPY TREATMENT    Patient: Fran Rosario (78 y.o. female)  Date: 12/26/2019  Diagnosis: Leg pain, right [M79.604]  Weakness [R53.1]  Guillain Barré syndrome (HCC) [G61.0]   Leg pain, right       Precautions:    PLOF: Independent    Chart, occupational therapy assessment, plan of care, and goals were reviewed. ASSESSMENT:  Pt seen for ADL retraining w/energy conservation techniques. Pt tolerates sitting EOB ~ 15 minutes performing ADL w/SBA. Pt requires assist w/LB ADLs and will benefit from SNF stay for adaptive equipment education for increase independence. LLE weakness requires Min Assist w/functional transfer to standing w/RW and increase time w/side steps to [facility]. Pt requires frequent rest breaks 2/2 decrease activity tolerance. Plan OOB to chair for meal 12/27/19.     Progression toward goals:  [x]          Improving appropriately and progressing toward goals  []          Improving slowly and progressing toward goals  []          Not making progress toward goals and plan of care will be adjusted     PLAN:  Patient continues to benefit from skilled intervention to address the above impairments. Continue treatment per established plan of care. Discharge Recommendations:  Morteza Dixon  Further Equipment Recommendations for Discharge:  TBD by next level of care     SUBJECTIVE:   Patient stated I want to be able to do my crossword puzzles.     OBJECTIVE DATA SUMMARY:   Cognitive/Behavioral Status:  Neurologic State: Appropriate for age  Orientation Level: Oriented X4  Cognition: Follows commands  Safety/Judgement: Fall prevention    Functional Mobility and Transfers for ADLs:   Bed Mobility:  Supine to Sit: Additional time;Stand-by assistance(w/HOB raised and SR)  Sit to Supine: Additional time;Stand-by assistance  Scooting: Minimum assistance   Transfers:  Sit to Stand: Minimum assistance(w/RW)  Balance:  Sitting: Intact  Sitting - Static: Good (unsupported)  Sitting - Dynamic: Fair (occasional)  Standing: Impaired; With support  Standing - Static: Fair  Standing - Dynamic : Fair(fair minus)    ADL Intervention:  Grooming  Position Performed: Seated edge of bed  Washing Face: Stand-by assistance  Washing Hands: Stand-by assistance  Brushing Teeth: Stand-by assistance  Brushing/Combing Hair: Stand-by assistance    Upper Body Bathing  Bathing Assistance: Stand-by assistance  Position Performed: Seated edge of bed    Lower Body Bathing  Perineal  : Moderate assistance  Position Performed: Standing    Upper Body [address]: Stand-by assistance    UE Therapeutic Exercises:   AROM BUE shoulder flexion, at bed level    Pain:  Pain level pre-treatment: 3/10   Pain level post-treatment: 0/10  Pt c/o L axillary pain, relieved w/UE TherEx    Activity Tolerance:    Fair, pt fatigues easily    Please refer to the flowsheet for vital signs taken during this treatment.   After treatment:   []  Patient left in no apparent distress sitting up in chair  [x]  Patient left in no apparent distress in bed  [x]  Call bell left within reach  []  Nursing notified  []  Caregiver present  []  Bed alarm activated    COMMUNICATION/EDUCATION:   [] Role of Occupational Therapy in the acute care setting  [] Home safety education was provided and the patient/caregiver indicated understanding. [] Patient/family have participated as able in working towards goals and plan of care. [x] Patient/family agree to work toward stated goals and plan of care. [] Patient understands intent and goals of therapy, but is neutral about his/her participation. [] Patient is unable to participate in goal setting and plan of care.       Thank you for this referral.  FREDERIC Taylor  Time Calculation: 23 mins

## 2019-12-26 NOTE — PROGRESS NOTES
Bedside and Verbal shift change report given to ROSLYN Weaver (oncoming nurse) by Angel Liu (offgoing nurse). Report included the following information SBAR, Kardex, MAR and Recent Results     1700 per CNA \"patient woke up was startled and began to shake stated her arm was burning then began to shake from head to toe. \" Rapid response was called and pA was at bedside and physician. Patient was not responsive yet. 1950 Verbal shift change report given to ROSLYN Bedoya (oncoming nurse) by Severo Jay (offgoing nurse). Report included the following information SBAR, Kardex, MAR and Recent Results.

## 2019-12-26 NOTE — PROGRESS NOTES
Respiratory Therapy Assessment Care Plan    Patient:  Jeannette Rhoades 78 y.o. 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 Results from [place] encounter on 06/18/18   XR ABD (KUB)    Impression IMPRESSION:    1. Nonspecific nonobstructive gas pattern. 2. Canceled small bowel series as above. XR CHEST PORT    Impression IMPRESSION:    No active disease or interval change.             Vital Signs:     Visit Vitals  /70   Pulse 70   Temp 98.5 °F (36.9 °C)   Resp 18   Wt 106.7 kg (235 lb 3.7 oz)   SpO2 94%   Breastfeeding No   BMI 40.38 kg/m²         Indications for treatment:  Asthma History      Plan of care: Lisa Flower / [place] [address]         Goal: Patient to receive scheduled tx's and understanding to reruns to home medications ( patient uses advair at home )

## 2019-12-27 ENCOUNTER — HOSPITAL ENCOUNTER (INPATIENT)
Age: 69
LOS: 27 days | Discharge: HOME HEALTH | End: 2020-01-23
Attending: INTERNAL MEDICINE | Admitting: INTERNAL MEDICINE

## 2019-12-27 VITALS
WEIGHT: 235.23 LBS | BODY MASS INDEX: 40.38 KG/M2 | OXYGEN SATURATION: 95 % | SYSTOLIC BLOOD PRESSURE: 158 MMHG | DIASTOLIC BLOOD PRESSURE: 99 MMHG | RESPIRATION RATE: 16 BRPM | HEART RATE: 79 BPM | TEMPERATURE: 98.1 F

## 2019-12-27 LAB
ANION GAP SERPL CALC-SCNC: 4 MMOL/L (ref 3–18)
ATRIAL RATE: 71 BPM
BUN SERPL-MCNC: 22 MG/DL (ref 7–18)
BUN/CREAT SERPL: 30 (ref 12–20)
CALCIUM SERPL-MCNC: 8.4 MG/DL (ref 8.5–10.1)
CALCULATED P AXIS, ECG09: 40 DEGREES
CALCULATED R AXIS, ECG10: -38 DEGREES
CALCULATED T AXIS, ECG11: 4 DEGREES
CHLORIDE SERPL-SCNC: 105 MMOL/L (ref 100–111)
CO2 SERPL-SCNC: 33 MMOL/L (ref 21–32)
CREAT SERPL-MCNC: 0.73 MG/DL (ref 0.6–1.3)
DIAGNOSIS, 93000: NORMAL
GLUCOSE BLD STRIP.AUTO-MCNC: 114 MG/DL (ref 70–110)
GLUCOSE BLD STRIP.AUTO-MCNC: 91 MG/DL (ref 70–110)
GLUCOSE SERPL-MCNC: 84 MG/DL (ref 74–99)
P-R INTERVAL, ECG05: 168 MS
POTASSIUM SERPL-SCNC: 3.9 MMOL/L (ref 3.5–5.5)
Q-T INTERVAL, ECG07: 392 MS
QRS DURATION, ECG06: 100 MS
QTC CALCULATION (BEZET), ECG08: 425 MS
SODIUM SERPL-SCNC: 142 MMOL/L (ref 136–145)
VENTRICULAR RATE, ECG03: 71 BPM

## 2019-12-27 PROCEDURE — 74011250637 HC RX REV CODE- 250/637: Performed by: HOSPITALIST

## 2019-12-27 PROCEDURE — 77030029684 HC NEB SM VOL KT MONA -A

## 2019-12-27 PROCEDURE — 94640 AIRWAY INHALATION TREATMENT: CPT

## 2019-12-27 PROCEDURE — 97530 THERAPEUTIC ACTIVITIES: CPT

## 2019-12-27 PROCEDURE — 77030038269 HC DRN EXT URIN PURWCK BARD -A

## 2019-12-27 PROCEDURE — 97535 SELF CARE MNGMENT TRAINING: CPT

## 2019-12-27 PROCEDURE — 74011250636 HC RX REV CODE- 250/636: Performed by: PHYSICIAN ASSISTANT

## 2019-12-27 PROCEDURE — 74011000250 HC RX REV CODE- 250: Performed by: EMERGENCY MEDICINE

## 2019-12-27 PROCEDURE — 36415 COLL VENOUS BLD VENIPUNCTURE: CPT

## 2019-12-27 PROCEDURE — 94761 N-INVAS EAR/PLS OXIMETRY MLT: CPT

## 2019-12-27 PROCEDURE — 74011250637 HC RX REV CODE- 250/637: Performed by: INTERNAL MEDICINE

## 2019-12-27 PROCEDURE — 74011250636 HC RX REV CODE- 250/636: Performed by: HOSPITALIST

## 2019-12-27 PROCEDURE — 82962 GLUCOSE BLOOD TEST: CPT

## 2019-12-27 PROCEDURE — 80048 BASIC METABOLIC PNL TOTAL CA: CPT

## 2019-12-27 PROCEDURE — 74011250637 HC RX REV CODE- 250/637: Performed by: NURSE PRACTITIONER

## 2019-12-27 PROCEDURE — 77010033678 HC OXYGEN DAILY

## 2019-12-27 PROCEDURE — 74011000250 HC RX REV CODE- 250: Performed by: INTERNAL MEDICINE

## 2019-12-27 PROCEDURE — 77030027138 HC INCENT SPIROMETER -A

## 2019-12-27 RX ORDER — LOSARTAN POTASSIUM 50 MG/1
100 TABLET ORAL DAILY
Status: DISCONTINUED | OUTPATIENT
Start: 2019-12-28 | End: 2020-01-23 | Stop reason: HOSPADM

## 2019-12-27 RX ORDER — GABAPENTIN 100 MG/1
200 CAPSULE ORAL 2 TIMES DAILY
Qty: 120 CAP | Refills: 0 | Status: SHIPPED
Start: 2019-12-27 | End: 2020-01-23

## 2019-12-27 RX ORDER — IPRATROPIUM BROMIDE AND ALBUTEROL SULFATE 2.5; .5 MG/3ML; MG/3ML
3 SOLUTION RESPIRATORY (INHALATION)
Status: DISCONTINUED | OUTPATIENT
Start: 2019-12-27 | End: 2020-01-23 | Stop reason: HOSPADM

## 2019-12-27 RX ORDER — POLYETHYLENE GLYCOL 3350 17 G/17G
17 POWDER, FOR SOLUTION ORAL DAILY
Status: DISCONTINUED | OUTPATIENT
Start: 2019-12-28 | End: 2019-12-27

## 2019-12-27 RX ORDER — DIVALPROEX SODIUM 500 MG/1
500 TABLET, DELAYED RELEASE ORAL DAILY
Status: DISCONTINUED | OUTPATIENT
Start: 2019-12-28 | End: 2020-01-23 | Stop reason: HOSPADM

## 2019-12-27 RX ORDER — ERGOCALCIFEROL 1.25 MG/1
50000 CAPSULE ORAL
Status: DISCONTINUED | OUTPATIENT
Start: 2019-12-28 | End: 2020-01-23 | Stop reason: HOSPADM

## 2019-12-27 RX ORDER — DIVALPROEX SODIUM 250 MG/1
1250 TABLET, DELAYED RELEASE ORAL
Qty: 150 TAB | Refills: 0 | Status: SHIPPED
Start: 2019-12-27 | End: 2020-01-23

## 2019-12-27 RX ORDER — ARFORMOTEROL TARTRATE 15 UG/2ML
15 SOLUTION RESPIRATORY (INHALATION)
Status: DISCONTINUED | OUTPATIENT
Start: 2019-12-27 | End: 2020-01-23 | Stop reason: HOSPADM

## 2019-12-27 RX ORDER — EPINEPHRINE 0.3 MG/.3ML
0.3 INJECTION SUBCUTANEOUS AS NEEDED
Status: DISCONTINUED | OUTPATIENT
Start: 2019-12-27 | End: 2020-01-23 | Stop reason: HOSPADM

## 2019-12-27 RX ORDER — FERROUS SULFATE, DRIED 160(50) MG
1 TABLET, EXTENDED RELEASE ORAL
Status: DISCONTINUED | OUTPATIENT
Start: 2019-12-28 | End: 2020-01-23 | Stop reason: HOSPADM

## 2019-12-27 RX ORDER — INSULIN LISPRO 100 [IU]/ML
INJECTION, SOLUTION INTRAVENOUS; SUBCUTANEOUS
Status: DISCONTINUED | OUTPATIENT
Start: 2019-12-27 | End: 2020-01-23 | Stop reason: HOSPADM

## 2019-12-27 RX ORDER — DIVALPROEX SODIUM 500 MG/1
500 TABLET, DELAYED RELEASE ORAL DAILY
Qty: 30 TAB | Refills: 0 | Status: SHIPPED
Start: 2019-12-27 | End: 2020-01-23

## 2019-12-27 RX ORDER — POLYETHYLENE GLYCOL 3350 17 G/17G
17 POWDER, FOR SOLUTION ORAL DAILY
Status: DISCONTINUED | OUTPATIENT
Start: 2019-12-27 | End: 2020-01-23 | Stop reason: HOSPADM

## 2019-12-27 RX ORDER — CITALOPRAM 20 MG/1
40 TABLET, FILM COATED ORAL
Status: DISCONTINUED | OUTPATIENT
Start: 2019-12-27 | End: 2020-01-23 | Stop reason: HOSPADM

## 2019-12-27 RX ORDER — GABAPENTIN 100 MG/1
200 CAPSULE ORAL 2 TIMES DAILY
Status: DISCONTINUED | OUTPATIENT
Start: 2019-12-27 | End: 2020-01-23 | Stop reason: HOSPADM

## 2019-12-27 RX ORDER — BUDESONIDE 0.5 MG/2ML
500 INHALANT ORAL
Status: DISCONTINUED | OUTPATIENT
Start: 2019-12-27 | End: 2020-01-23 | Stop reason: HOSPADM

## 2019-12-27 RX ORDER — GLIPIZIDE 5 MG/1
5 TABLET ORAL
Status: DISCONTINUED | OUTPATIENT
Start: 2019-12-28 | End: 2020-01-03

## 2019-12-27 RX ORDER — ATORVASTATIN CALCIUM 40 MG/1
40 TABLET, FILM COATED ORAL
Status: DISCONTINUED | OUTPATIENT
Start: 2019-12-27 | End: 2020-01-23 | Stop reason: HOSPADM

## 2019-12-27 RX ORDER — LEVOTHYROXINE SODIUM 100 UG/1
50 TABLET ORAL
Status: DISCONTINUED | OUTPATIENT
Start: 2019-12-28 | End: 2020-01-23 | Stop reason: HOSPADM

## 2019-12-27 RX ORDER — DICYCLOMINE HYDROCHLORIDE 10 MG/1
20 CAPSULE ORAL 3 TIMES DAILY
Status: DISCONTINUED | OUTPATIENT
Start: 2019-12-27 | End: 2020-01-23 | Stop reason: HOSPADM

## 2019-12-27 RX ORDER — FLUTICASONE PROPIONATE AND SALMETEROL 250; 50 UG/1; UG/1
1 POWDER RESPIRATORY (INHALATION) 2 TIMES DAILY
Status: DISCONTINUED | OUTPATIENT
Start: 2019-12-27 | End: 2019-12-27 | Stop reason: CLARIF

## 2019-12-27 RX ORDER — CHLORTHALIDONE 25 MG/1
25 TABLET ORAL EVERY OTHER DAY
Status: DISCONTINUED | OUTPATIENT
Start: 2019-12-28 | End: 2020-01-23 | Stop reason: HOSPADM

## 2019-12-27 RX ORDER — TOPIRAMATE 100 MG/1
100 TABLET, FILM COATED ORAL 2 TIMES DAILY WITH MEALS
Status: DISCONTINUED | OUTPATIENT
Start: 2019-12-27 | End: 2020-01-23 | Stop reason: HOSPADM

## 2019-12-27 RX ORDER — ENOXAPARIN SODIUM 100 MG/ML
40 INJECTION SUBCUTANEOUS EVERY 24 HOURS
Status: DISCONTINUED | OUTPATIENT
Start: 2019-12-27 | End: 2019-12-27 | Stop reason: HOSPADM

## 2019-12-27 RX ORDER — PANTOPRAZOLE SODIUM 40 MG/1
40 TABLET, DELAYED RELEASE ORAL
Status: DISCONTINUED | OUTPATIENT
Start: 2019-12-28 | End: 2020-01-23 | Stop reason: HOSPADM

## 2019-12-27 RX ORDER — DOCUSATE SODIUM 100 MG/1
100 CAPSULE, LIQUID FILLED ORAL DAILY
Status: DISCONTINUED | OUTPATIENT
Start: 2019-12-28 | End: 2020-01-23 | Stop reason: HOSPADM

## 2019-12-27 RX ORDER — PROPRANOLOL HYDROCHLORIDE 20 MG/1
40 TABLET ORAL 2 TIMES DAILY
Status: DISCONTINUED | OUTPATIENT
Start: 2019-12-27 | End: 2020-01-23 | Stop reason: HOSPADM

## 2019-12-27 RX ADMIN — GABAPENTIN 200 MG: 100 CAPSULE ORAL at 11:19

## 2019-12-27 RX ADMIN — MORPHINE SULFATE 4 MG: 4 INJECTION, SOLUTION INTRAMUSCULAR; INTRAVENOUS at 05:37

## 2019-12-27 RX ADMIN — BUDESONIDE 500 MCG: 0.5 SUSPENSION RESPIRATORY (INHALATION) at 20:38

## 2019-12-27 RX ADMIN — SODIUM CHLORIDE 75 ML/HR: 900 INJECTION, SOLUTION INTRAVENOUS at 05:37

## 2019-12-27 RX ADMIN — ARFORMOTEROL TARTRATE 15 MCG: 15 SOLUTION RESPIRATORY (INHALATION) at 07:22

## 2019-12-27 RX ADMIN — DIVALPROEX SODIUM 1250 MG: 250 TABLET, DELAYED RELEASE ORAL at 21:51

## 2019-12-27 RX ADMIN — DICYCLOMINE HYDROCHLORIDE 20 MG: 20 TABLET ORAL at 11:19

## 2019-12-27 RX ADMIN — GABAPENTIN 200 MG: 100 CAPSULE ORAL at 18:36

## 2019-12-27 RX ADMIN — Medication 500 MG: at 18:36

## 2019-12-27 RX ADMIN — PROPRANOLOL HYDROCHLORIDE 40 MG: 20 TABLET ORAL at 21:50

## 2019-12-27 RX ADMIN — LEVOTHYROXINE SODIUM 50 MCG: 50 TABLET ORAL at 05:37

## 2019-12-27 RX ADMIN — PANTOPRAZOLE SODIUM 40 MG: 40 TABLET, DELAYED RELEASE ORAL at 07:55

## 2019-12-27 RX ADMIN — ATORVASTATIN CALCIUM 40 MG: 40 TABLET, FILM COATED ORAL at 21:50

## 2019-12-27 RX ADMIN — DIVALPROEX SODIUM 500 MG: 500 TABLET, DELAYED RELEASE ORAL at 11:20

## 2019-12-27 RX ADMIN — CITALOPRAM HYDROBROMIDE 40 MG: 20 TABLET ORAL at 21:57

## 2019-12-27 RX ADMIN — BUDESONIDE 500 MCG: 0.5 INHALANT RESPIRATORY (INHALATION) at 07:22

## 2019-12-27 RX ADMIN — POLYETHYLENE GLYCOL 3350 17 G: 17 POWDER, FOR SOLUTION ORAL at 18:37

## 2019-12-27 RX ADMIN — DICYCLOMINE HYDROCHLORIDE 20 MG: 10 CAPSULE ORAL at 18:36

## 2019-12-27 RX ADMIN — DICYCLOMINE HYDROCHLORIDE 20 MG: 10 CAPSULE ORAL at 21:50

## 2019-12-27 RX ADMIN — ARFORMOTEROL TARTRATE 15 MCG: 15 SOLUTION RESPIRATORY (INHALATION) at 20:38

## 2019-12-27 NOTE — PROGRESS NOTES
Patient received in bed awake. Seizure Precautions in place with bed railing padded. Patient A&Ox4, denies pain and discomfort. No distress noted. Frequently use items within reach. Bed locked in low position. Call bell within reach and Patient verbalized understanding of use for assistance and needs. 56- This nurse called [facility] and spoke with Marquita Ribera RN who was given report. Opportunity for questions, answers and concerns provided.

## 2019-12-27 NOTE — DISCHARGE SUMMARY
2 [facility]  Hospitalist Division    Discharge Summary    Patient: Nicho Calzada MRN: [id number]  CSN: [id number]    YOB: 1950  Age: 78 y.o.   Sex: female    DOA: 12/22/2019 LOS:  LOS: 3 days   Discharge Date:      Admission Diagnoses: Leg pain, right [M79.604]  Weakness [R53.1]  Guillain Barré syndrome ([facility] [G61.0]    Discharge Diagnoses:    Problem List as of 12/27/2019 Date Reviewed: 9/13/2017          Codes Class Noted - Resolved    Weakness ICD-10-CM: R53.1  ICD-9-CM: 780.79  12/24/2019 - Present        Guillain Barré syndrome ([facility] ICD-10-CM: G61.0  ICD-9-CM: 357.0  12/24/2019 - Present        * (Principal) Leg pain, right ICD-10-CM: M79.604  ICD-9-CM: 729.5  12/23/2019 - Present        DM (diabetes mellitus) ([facility] ICD-10-CM: E11.9  ICD-9-CM: 250.00  12/23/2019 - Present        Hx of Guillain-[place] syndrome ICD-10-CM: Z86.69  ICD-9-CM: V12.49  12/23/2019 - Present        GERD (gastroesophageal reflux disease) ICD-10-CM: K21.9  ICD-9-CM: 530.81  12/23/2019 - Present        Severe persistent asthma ICD-10-CM: J45.50  ICD-9-CM: 493.90  11/7/2019 - Present        Lumbar stenosis ICD-10-CM: M48.061  ICD-9-CM: 724.02  9/13/2017 - Present        Choledocholithiasis ICD-10-CM: K80.50  ICD-9-CM: 574.50  6/7/2016 - Present        Biliary obstruction ICD-10-CM: K83.1  ICD-9-CM: 576.2  4/26/2016 - Present        Breast cancer ([facility] ICD-10-CM: C50.919  ICD-9-CM: 174.9  Unknown - Present              Discharge Condition: Stable    Discharge To: [facility]    Consults: Neurology    Procedures: None    Hospital Course:     \"Joseline Barillas is a 78y.o. year old female who presents with R leg pain and decreased sensation.  No alarm symptoms.  Was at a concert a couple days ago and reportedly fell out of her chair onto her right side.  She since has had lower back pain and R sided leg pain and numbness/weakness.  She has hx of GB, DM, chronic pain.  \"     Neurology consulted and recommended MRI brain and cervical spine which were acutely unremarkable - images reviewed by neuro. LP was not performed due to patient not worsening and less likely Guillain-[place] per neurology. PT/OT consulted and recommend SNF, she is agreeable. Her tremor and weakness gradually improved with continued physical/Occupational therapy. [facility] accepted. She is ready for discharge    Discharge Recommendations:   -Recommend follow up with psychiatry   -Recommend follow up with neurology for tremor     Physical Exam:  General appearance: alert, cooperative, no distress, appears stated age  Head: Normocephalic, without obvious abnormality, atraumatic  Lungs: clear to auscultation bilaterally  Heart: regular rate and rhythm, S1, S2 normal, no murmur, click, rub or gallop  Abdomen: soft, non-tender. Bowel sounds normal. No masses,  no organomegaly  Extremities: extremities normal, atraumatic, no cyanosis or edema  Skin: Skin color, texture, turgor normal. No rashes or lesions  Neurologic: no focal neuro deficits, sensation intact, CN intact, resting tremor bilat upper extremities wax and wane with increased activity. RLE weakness, 3/5, LLE 4/5. Sensation intact upper and lower    PSY: Mood and affect normal, appropriately behaved    Significant Diagnostic Studies: All lab results for the last 24 hours reviewed. Xr Chest Port    Result Date: 12/27/2019  EXAM: XR CHEST PORT CLINICAL INDICATION/HISTORY: chest -Additional: None COMPARISON: 12/1/19 TECHNIQUE: Portable frontal view of the chest _______________ FINDINGS: SUPPORT DEVICES: None. HEART AND MEDIASTINUM: Stable cardiomediastinal silhouette. . LUNGS AND PLEURAL SPACES: No dense consolidation, pleural effusion or pneumothorax. Elige Nicely BONY THORAX AND SOFT TISSUES: The axillary and left chest surgical clips. . _______________     IMPRESSION: No acute cardiopulmonary disease.          Discharge Medications:     Current Discharge Medication List      CONTINUE these medications which have CHANGED    Details   !! divalproex DR (DEPAKOTE) 500 mg tablet Take 1 Tab by mouth daily. Indications: 500 mg in am 1250 mg at night  Qty: 30 Tab, Refills: 0      !! divalproex DR (DEPAKOTE) 250 mg tablet Take 5 Tabs by mouth nightly. Qty: 150 Tab, Refills: 0      gabapentin (NEURONTIN) 100 mg capsule Take 2 Caps by mouth two (2) times a day. Max Daily Amount: 400 mg. Qty: 120 Cap, Refills: 0    Associated Diagnoses: Leg pain, right       !! - Potential duplicate medications found. Please discuss with provider. CONTINUE these medications which have NOT CHANGED    Details   iron bg,ps/vitC/B12/FA/calcium (ALIRIO FORTE PO) Take  by mouth. atorvastatin (LIPITOR) 40 mg tablet Take 40 mg by mouth daily. dicyclomine (BENTYL) 20 mg tablet Take 20 mg by mouth three (3) times daily. ergocalciferol (VITAMIN D2) 50,000 unit capsule Take 50,000 Units by mouth every seven (7) days. glipiZIDE SR (GLUCOTROL) 5 mg CR tablet Take 5 mg by mouth daily. citalopram (CELEXA) 10 mg tablet Take 40 mg by mouth nightly. fluticasone-salmeterol (ADVAIR) 250-50 mcg/dose diskus inhaler Take 1 Puff by inhalation two (2) times a day. METHYLCELLULOSE (FIBER THERAPY) 500 mg by Does Not Apply route two (2) times a day. Indications: 3 tabs twice a day      Omeprazole delayed release (PRILOSEC D/R) 20 mg tablet Take 40 mg by mouth daily. levothyroxine (SYNTHROID) 50 mcg tablet Take 50 mcg by mouth Daily (before breakfast). propranolol (INDERAL) 40 mg tablet Take 40 mg by mouth two (2) times a day. Calcium-Cholecalciferol, D3, 500 mg(1,250mg) -400 unit tab Take 1 Tab by mouth two (2) times a day. losartan (COZAAR) 100 mg tablet Take 100 mg by mouth daily. topiramate (TOPAMAX) 50 mg tablet Take 2 Tabs by mouth two (2) times a day. Qty: 20 Tab, Refills: 0      IPRATROPIUM/ALBUTEROL SULFATE (COMBIVENT IN) Take 1 Puff by inhalation four (4) times daily.       chlorthalidone (HYGROTEN) 25 mg tablet Take 25 mg by mouth every other day. letrozole (FEMARA) 2.5 mg tablet Take 2.5 mg by mouth daily. Indications: per patient, \"I stopped 7 days before surgery scheduled for 9-13-17. \"      EPINEPHrine (EPIPEN) 0.3 mg/0.3 mL (1:1,000) injection 0.3 mg by IntraMUSCular route once as needed.          STOP taking these medications       cyclobenzaprine (FLEXERIL) 10 mg tablet Comments:   Reason for Stopping:         HYDROmorphone (DILAUDID) 2 mg tablet Comments:   Reason for Stopping:               Activity: Activity as tolerated and PT/OT Eval and Treat    Diet: Diabetic Diet    Wound Care: None needed    Follow-up: PCP, Neurology, Psychiatry     Discharge time: >35 Minutes     Drake Moreno  [facility]  Pager: [phone]  Office: [phone]    12/27/2019, 1:05 PM

## 2019-12-27 NOTE — PROGRESS NOTES
Problem: Mobility Impaired (Adult and Pediatric)  Goal: *Acute Goals and Plan of Care (Insert Text)  Description  Physical Therapy Goals  Initiated 12/23/2019 and to be accomplished within 7 day(s)  1. Patient will move from supine to sit and sit to supine  in bed with supervision/set-up. 2.  Patient will transfer from bed to chair and chair to bed with minimal assistance/contact guard assist using the least restrictive device. 3.  Patient will perform sit to stand with contact guard assist.  4.  Patient will ambulate with minimal assistance/contact guard assist for 30 feet with the least restrictive device. PLOF: Pt was independent with mobility without an assistive device prior to admission, 2 story home with bedroom on second floor,  and brother available to assist at home   Outcome: Progressing Towards Goal   PHYSICAL THERAPY TREATMENT    Patient: Jeannette Rhoades (78 y.o. female)  Date: 12/27/2019  Diagnosis: Leg pain, right [M79.604]  Weakness [R53.1]  Guillain Barré syndrome (HCC) [G61.0]   Leg pain, right       Precautions:    PLOF: Independent    ASSESSMENT:  Pt progressing well today, very shaky but with good stability during gait training. Instructed with verbal cues for safe hand placement during transfers, walker management. Instructed in LE exercises for strengthening to improve functional  mobility. Progression toward goals:   [x]      Improving appropriately and progressing toward goals  []      Improving slowly and progressing toward goals  []      Not making progress toward goals and plan of care will be adjusted     PLAN:  Patient continues to benefit from skilled intervention to address the above impairments. Continue treatment per established plan of care. Discharge Recommendations:  Morteza Dixon  Further Equipment Recommendations for Discharge:  TBD at next level of care     SUBJECTIVE:   Patient stated this episode (of GBS) really took me down.     OBJECTIVE DATA SUMMARY:   Critical Behavior:  Neurologic State: Alert  Orientation Level: Oriented X4  Cognition: Follows commands  Safety/Judgement: Fall prevention  Functional Mobility Training:  Bed Mobility:  Supine to Sit: Additional time;Modified independent(w/HOB raised )  Scooting: Modified independent; Additional time  Transfers:  Sit to Stand: Minimum assistance  Bed to Chair: Minimum assistance(w/RW)  Balance:  Sitting: Intact  Sitting - Static: Good (unsupported)  Sitting - Dynamic: Fair (occasional)  Standing: Impaired; With support  Standing - Static: Fair  Standing - Dynamic : Fair(fair minus)     Ambulation/Gait Training:  Distance (ft): 10 Feet (ft)(X2)  Assistive Device: Walker, rolling  Ambulation - Level of Assistance: Stand-by assistance  Gait Abnormalities: Decreased step clearance  Speed/Maylin: Pace decreased (<100 feet/min)  Therapeutic Exercises:         EXERCISE   Sets   Reps   Active Active Assist   Passive Self ROM   Comments   Ankle Pumps 1 20  [x] [] [] []    Quad Sets/Glut Sets 1 10  [x] [] [] []    Hamstring Sets   [] [] [] []    Short Arc Quads   [] [] [] []    Heel Slides   [] [] [] []    Straight Leg Raises   [] [] [] []    Hip Add   [] [] [] []    Long Arc Quads 1 10 [x] [] [] []    Seated Marching 1 10 [x] [] [] []    Standing Marching   [] [] [] []       [] [] [] []        Pain:  Pain level pre-treatment: 0/10  Pain level post-treatment: 0/10   Pain Intervention(s): n/a      Activity Tolerance:   Fair   Please refer to the flowsheet for vital signs taken during this treatment. After treatment:   [x] Patient left in no apparent distress sitting up in chair  [] Patient left in no apparent distress in bed  [x] Call bell left within reach  [x] Nursing notified  [] Caregiver present  [] Bed alarm activated  [] SCDs applied      COMMUNICATION/EDUCATION:   []           []         Fall prevention education was provided and the patient/caregiver indicated understanding.   []         Patient/family have participated as able in working toward goals and plan of care. []         Patient/family agree to work toward stated goals and plan of care. []         Patient understands intent and goals of therapy, but is neutral about his/her participation. []         Patient is unable to participate in stated goals/plan of care: ongoing with therapy staff. [x]         Role of Physical Therapy in the acute care setting.         Aquiles Egan PTA   Time Calculation: 16 mins

## 2019-12-27 NOTE — PROGRESS NOTES
Problem: Falls - Risk of  Goal: *Absence of Falls  Description  Document Nathen Lebron Fall Risk and appropriate interventions in the flowsheet.   Outcome: Progressing Towards Goal  Note: Fall Risk Interventions:  Mobility Interventions: Patient to call before getting OOB    Mentation Interventions: Door open when patient unattended    Medication Interventions: Evaluate medications/consider consulting pharmacy, Patient to call before getting OOB    Elimination Interventions: Call light in reach, Patient to call for help with toileting needs    History of Falls Interventions: Door open when patient unattended, Evaluate medications/consider consulting pharmacy         Problem: Patient Education: Go to Patient Education Activity  Goal: Patient/Family Education  Outcome: Progressing Towards Goal     Problem: Pain  Goal: *Control of Pain  Outcome: Progressing Towards Goal

## 2019-12-27 NOTE — PROGRESS NOTES
conducted a Follow up consultation and Spiritual Assessment for Lu Motta, who is a 78 y.o.,female. The  provided the following Interventions:  Continued the relationship of care and support. Listened empathically. Offered prayer and assurance of continued prayer on patients behalf. Chart reviewed. The following outcomes were achieved:  Patient expressed gratitude for pastoral care visit. Assessment:  There are no further spiritual or Protestant issues which require Spiritual Care Services interventions at this time. Plan:  Chaplains will continue to follow and will provide pastoral care on an as needed/requested basis.  recommends bedside caregivers page  on duty if patient shows signs of acute spiritual or emotional distress.      [address] [facility]   Staff [address] [place] [address]   [phone]

## 2019-12-27 NOTE — DISCHARGE INSTRUCTIONS
Patient Education        Preventing Outdoor Falls: Care Instructions  Your Care Instructions    Worries about falls don't need to keep you indoors. Outdoor activities like walking have big benefits for your health. You will need to watch your step and learn a few safety measures. If you are worried about having a fall outdoors, ask your doctor about exercises, classes, or physical therapy that may help. You can learn ways to gain strength, flexibility, and balance. Ask if it might help to use a cane or walker. You can make your time outdoors safer with a few simple measures. Follow-up care is a key part of your treatment and safety. Be sure to make and go to all appointments, and call your doctor if you are having problems. It's also a good idea to know your test results and keep a list of the medicines you take. How can you prevent falls outdoors? · Wear shoes with firm soles and low heels. If you have to walk on an icy surface, use grippers that can be worn over your shoes in bad weather. · Be extra careful if weather is bad. Walk on the grass when the sidewalks are slick. If you live in a place that gets snow and ice in the winter, sprinkle salt on slippery stairs and sidewalks. · Be careful getting on or off buses and trains or getting in and out of cars. If handrails are available, use them. · Be careful when you cross the street. Look for crosswalks or places where curb cuts or ramps are present. · Try not to hurry, especially if you are carrying something. · Be cautious in parking lots or garages. There may be curbs or changes in pavement, or the height of the pavement may vary. · Make sure to wear the correct eyeglasses, if you need them. Reading glasses or bifocals can make it harder to see hazards that might be in your way. · If you are walking outdoors for exercise, try to:  ? Walk in well-lighted, well-maintained areas.  These include high school or college tracks, shopping malls, and public spaces. ? Walk with a partner. ? Watch out for cracked sidewalks, curbs, changes in the height of the pavement, exposed tree roots, and debris such as fallen leaves or branches. Where can you learn more? Go to http://ramón-ayden.info/. Enter N988 in the search box to learn more about \"Preventing Outdoor Falls: Care Instructions. \"  Current as of: 2018  Content Version: 12.2  © 0649-3778 [employer]. Care instructions adapted under license by [employer] (which disclaims liability or warranty for this information). If you have questions about a medical condition or this instruction, always ask your healthcare professional. [place] [address] any warranty or liability for your use of this information. Patient {ARMBANDS:98272}    MyChart Activation    Thank you for requesting access to [employer]. Please follow the instructions below to securely access and download your online medical record. [employer] allows you to send messages to your doctor, view your test results, renew your prescriptions, schedule appointments, and more. How Do I Sign Up? 1. In your internet browser, go to [email or web address]  2. Click on the First Time User? Click Here link in the Sign In box. You will be redirect to the New Member Sign Up page. 3. Enter your [employer] Access Code exactly as it appears below. You will not need to use this code after youve completed the sign-up process. If you do not sign up before the expiration date, you must request a new code. [employer] Access Code: [email or web address]  Expires: 2/10/2020  9:15 AM (This is the date your [employer] access code will )    4. Enter the last four digits of your Social Security Number (xxxx) and Date of Birth (mm/dd/yyyy) as indicated and click Submit. You will be taken to the next sign-up page. 5. Create a Ph[employer]ital ID.  This will be your [employer] login ID and cannot be changed, so think of one that is secure and easy to remember. 6. Create a [employer] password. You can change your password at any time. 7. Enter your Password Reset Question and Answer. This can be used at a later time if you forget your password. 8. Enter your e-mail address. You will receive e-mail notification when new information is available in [address] 9. Click Sign Up. You can now view and download portions of your medical record. 10. Click the Download Summary menu link to download a portable copy of your medical information. Additional Information    If you have questions, please visit the Frequently Asked Questions section of the [employer] website at [email or web address] Remember, [employer] is NOT to be used for urgent needs. For medical emergencies, dial 911. DISCHARGE SUMMARY from Nurse    PATIENT INSTRUCTIONS:    After general anesthesia or intravenous sedation, for 24 hours or while taking prescription Narcotics:  · Limit your activities  · Do not drive and operate hazardous machinery  · Do not make important personal or business decisions  · Do  not drink alcoholic beverages  · If you have not urinated within 8 hours after discharge, please contact your surgeon on call. Report the following to your surgeon:  · Excessive pain, swelling, redness or odor of or around the surgical area  · Temperature over 100.5  · Nausea and vomiting lasting longer than 4 hours or if unable to take medications  · Any signs of decreased circulation or nerve impairment to extremity: change in color, persistent  numbness, tingling, coldness or increase pain  · Any questions    What to do at Home:  Recommended activity: {discharge activity:57403}, ***    If you experience any of the following symptoms ***, please follow up with ***. *  Please give a list of your current medications to your Primary Care Provider.     *  Please update this list whenever your medications are discontinued, doses are      changed, or new medications (including over-the-counter products) are added. *  Please carry medication information at all times in case of emergency situations. These are general instructions for a healthy lifestyle:    No smoking/ No tobacco products/ Avoid exposure to second hand smoke  Surgeon General's Warning:  Quitting smoking now greatly reduces serious risk to your health. Obesity, smoking, and sedentary lifestyle greatly increases your risk for illness    A healthy diet, regular physical exercise & weight monitoring are important for maintaining a healthy lifestyle    You may be retaining fluid if you have a history of heart failure or if you experience any of the following symptoms:  Weight gain of 3 pounds or more overnight or 5 pounds in a week, increased swelling in our hands or feet or shortness of breath while lying flat in bed. Please call your doctor as soon as you notice any of these symptoms; do not wait until your next office visit. The discharge information has been reviewed with the {PATIENT PARENT GUARDIAN:64393}. The {PATIENT PARENT GUARDIAN:57440} verbalized understanding. Discharge medications reviewed with the {[facility] meds reviewed YTIZ:65839} and appropriate educational materials and side effects teaching were provided.   ___________________________________________________________________________________________________________________________________

## 2019-12-27 NOTE — PROGRESS NOTES
Problem: Breathing Pattern - Ineffective  Goal: *Absence of hypoxia  Outcome: Progressing Towards Goal   Respiratory Therapy Assessment Care Plan    Patient:  Chet Daly 78 y.o. female 12/27/2019 7:25 AM    Leg pain, right [M79.604]  Weakness [R53.1]  [facility] [G61.0]      Chest X-RAY:   Results from Hospital Encounter encounter on 12/01/19   XR CHEST PORT    Impression IMPRESSION:    No active cardiopulmonary disease. Results from [place] encounter on 06/18/18   XR ABD (KUB)    Impression IMPRESSION:    1. Nonspecific nonobstructive gas pattern. 2. Canceled small bowel series as above. XR CHEST PORT    Impression IMPRESSION:    No active disease or interval change.             Vital Signs:   Visit Vitals  BP (!) 158/99 (BP 1 Location: Right leg, BP Patient Position: At rest)   Pulse 79   Temp 98.1 °F (36.7 °C)   Resp 16   Wt 106.7 kg (235 lb 3.7 oz)   SpO2 95%   Breastfeeding No   BMI 40.38 kg/m²         Indications for treatment:  History Asthma         Plan of care: Cerna Brisker / Pulmicort BID           Goal: Patient to receive scheduled tx's and understanding to reruns to home medications ( patient uses advair at home )

## 2019-12-27 NOTE — PROGRESS NOTES
Problem: Falls - Risk of  Goal: *Absence of Falls  Description  Document Yaw Stade Fall Risk and appropriate interventions in the flowsheet. Outcome: Progressing Towards Goal  Note: Fall Risk Interventions:  Mobility Interventions: Patient to call before getting OOB    Mentation Interventions: Door open when patient unattended, More frequent rounding, Update white board    Medication Interventions: Bed/chair exit alarm, Patient to call before getting OOB, Teach patient to arise slowly    Elimination Interventions: Call light in reach, Patient to call for help with toileting needs, Toileting schedule/hourly rounds    History of Falls Interventions: Door open when patient unattended, Room close to nurse's station         Problem: Patient Education: Go to Patient Education Activity  Goal: Patient/Family Education  Outcome: Progressing Towards Goal     Problem: Discharge Planning  Goal: *Discharge to safe environment  Outcome: Progressing Towards Goal     Problem: Patient Education: Go to Patient Education Activity  Goal: Patient/Family Education  Outcome: Progressing Towards Goal     Problem: Pressure Injury - Risk of  Goal: *Prevention of pressure injury  Description  Document Juan Scale and appropriate interventions in the flowsheet.   Outcome: Progressing Towards Goal  Note: Pressure Injury Interventions:  Sensory Interventions: Float heels, Keep linens dry and wrinkle-free, Minimize linen layers    Moisture Interventions: Absorbent underpads, Check for incontinence Q2 hours and as needed, Internal/External urinary devices    Activity Interventions: Increase time out of bed, Pressure redistribution bed/mattress(bed type)    Mobility Interventions: HOB 30 degrees or less, Pressure redistribution bed/mattress (bed type)    Nutrition Interventions: Document food/fluid/supplement intake    Friction and Shear Interventions: HOB 30 degrees or less                Problem: Patient Education: Go to Patient Education Activity  Goal: Patient/Family Education  Outcome: Progressing Towards Goal     Problem: Pain  Goal: *Control of Pain  Outcome: Progressing Towards Goal     Problem: Breathing Pattern - Ineffective  Goal: *Absence of hypoxia  Outcome: Progressing Towards Goal  Goal: *Use of effective breathing techniques  Outcome: Progressing Towards Goal  Goal: *PALLIATIVE CARE:  Alleviation of Dyspnea  Outcome: Progressing Towards Goal     Problem: Patient Education: Go to Patient Education Activity  Goal: Patient/Family Education  Outcome: Progressing Towards Goal

## 2019-12-27 NOTE — PROGRESS NOTES
Problem: Self Care Deficits Care Plan (Adult)  Goal: *Acute Goals and Plan of Care (Insert Text)  Description  Occupational Therapy Goals  Initiated 12/24/2019 within 7 day(s). 1.  Patient will perform upper body dressing with moderate assistance . 2.  Patient will perform lower body dressing with moderate assistance using adaptive equipment . 3. Patient will perform bathing with moderate assistance using LH bathsponge. 4.  Patient will perform bedside commode transfers with minimal assistance/contact guard assist using RW with good safety awareness. 5.  Patient will perform all aspects of toileting with moderate assistance . 6. Patient will participate in upper extremity therapeutic exercise/activities with minimal assistance/contact guard assist for 10 minutes. 7.  Patient will utilize energy conservation techniques during functional activities with minimal verbal cues. Outcome: Progressing Towards Goal   OCCUPATIONAL THERAPY TREATMENT    Patient: Ana Jordan (78 y.o. 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 ASSESSMENT:  Pt is pleasant and cooperative. Increase BUE tremors require distal deep pressure to facilitate increase functional use w/oral care task. Pt educated on stand pivot technique w/functional transfer to [facility] requiring hand over hand assist reaching for armrest. Generalized weakness and decrease dynamic standing balance requires Max Assist w/clothing mgt and toileting ADL. Pt requires max vc's for safety w/RW and functional transfer to chair.    Progression toward goals:  [x]          Improving appropriately and progressing toward goals  []          Improving slowly and progressing toward goals  []          Not making progress toward goals and plan of care will be adjusted     PLAN:  Patient continues to benefit from skilled intervention to address the above impairments. Continue treatment per established plan of care. Discharge Recommendations:  Skilled Nursing Facility  Further Equipment Recommendations for Discharge:  TBD by next level of care     SUBJECTIVE:   Patient stated That just my . He says that.  reference medication mgt    OBJECTIVE DATA SUMMARY:   Cognitive/Behavioral Status:  Neurologic State: Alert  Orientation Level: Oriented X4  Cognition: Follows commands  Safety/Judgement: Fall prevention    Functional Mobility and Transfers for ADLs:   Bed Mobility:  Supine to Sit: Additional time;Modified independent(w/HOB raised )  Scooting: Modified independent; Additional time   Transfers:  Sit to Stand: Minimum assistance  Bed to Chair: Minimum assistance(w/RW)   Toilet Transfer : Moderate assistance(EOB --> BSC w/SPT, BSC --> chair w/RW)  Balance:  Sitting: Intact  Sitting - Static: Good (unsupported)  Sitting - Dynamic: Fair (occasional)  Standing: Impaired; With support  Standing - Static: Fair  Standing - Dynamic : Fair(fair minus)    ADL Intervention:   Grooming  Washing Face: Set-up  Washing Hands: Stand-by assistance  Brushing Teeth: Minimum assistance    Toileting Assistance  Bladder hygiene:CGA  Bowel hygiene:CGA  Clothing mgt: Max Assist    Pain:  Pain level pre-treatment: 0/10   Pain level post-treatment: 0/10    Activity Tolerance:    Fair    Please refer to the flowsheet for vital signs taken during this treatment. After treatment:   [x]  Patient left in no apparent distress sitting up in chair  []  Patient left in no apparent distress in bed  [x]  Call bell left within reach  [x]  Nursing notified  []  Caregiver present  []  Bed alarm activated    COMMUNICATION/EDUCATION:   [] Role of Occupational Therapy in the acute care setting  [] Home safety education was provided and the patient/caregiver indicated understanding.   [] Patient/family have participated as able in working towards goals and plan of care. [x] Patient/family agree to work toward stated goals and plan of care. [] Patient understands intent and goals of therapy, but is neutral about his/her participation. [] Patient is unable to participate in goal setting and plan of care.       Thank you for this referral.  FREDERIC Colon  Time Calculation: 35 mins

## 2019-12-27 NOTE — ROUTINE PROCESS
Bedside and Verbal shift change report given to Jean Carlos Flores RN (Staff nurse) by Kranthi Schneider RN, BSN (offgoing nurse). Report given with SBAR, Kardex, Intake/Output, MAR and Recent Results.

## 2019-12-27 NOTE — PROGRESS NOTES
[facility] can accept pt, she states she would like to go there. Spoke with lazara . Notified christopher     Care Management Interventions  PCP Verified by CM: Yes  Palliative Care Criteria Met (RRAT>21 & CHF Dx)?: No  Mode of Transport at Discharge: Other (see comment)  Transition of Care Consult (CM Consult): Discharge Planning  Discharge Durable Medical Equipment: No  Physical Therapy Consult: Yes  Occupational Therapy Consult: Yes  Speech Therapy Consult: No  Current Support Network: Lives with Spouse  Confirm Follow Up Transport: Other (see comment)  The Plan for Transition of Care is Related to the Following Treatment Goals : PT/Ot  The Patient and/or Patient Representative was Provided with a Choice of Provider and Agrees with the Discharge Plan?: Yes  Freedom of Choice List was Provided with Basic Dialogue that Supports the Patient's Individualized Plan of Care/Goals, Treatment Preferences and Shares the Quality Data Associated with the Providers?: Yes  Discharge Location  Discharge Placement: Skilled nursing facility    Communication to Patient/Family:  Met with patient and family and they are agreeable to the transition plan. The Plan for Transition of Care is related to the following treatment goals:PT/OT  The Patient and/or patient representative ms [email or web address] was provided with a choice of provider and agrees   with the discharge plan. Yes [x] No []    Freedom of choice list was provided with basic dialogue that supports the patient's individualized plan of care/goals and shares the quality data associated with the providers. Yes [x] No []    SNF/Rehab Transition:  [facility] [address] [place] [address] and meets criteria for admission.    Patient will transported by  and expected to leave at this pm.    Communication to SNF/Rehab:  Bedside RN, , has been notified to update the transition plan to the facility and call report [phone]  Discharge information has been updated on the AVS. And communicated to facility via Adify/All Scripts, or CC link. Discharge instructions to be fax'd to facility at [facility] #). [phone]    Nursing Please include all hard scripts for controlled substances, med rec and dc summary, and AVS in packet. Reviewed and confirmed with facility, [facility], can manage the patient care needs for the following:     Titi Willett with (X) only those applicable:  Medication:  [x]Medications are available at the facility  []IV Antibiotics    []Controlled Substance  hard copies available sent. []Weekly Labs    Equipment:  []CPAP/BiPAP  []Wound Vacuum  []Harper or Urinary Device  []PICC/Central Line  []Nebulizer  []Ventilator    Treatment:  []Isolation (for MRSA, VRE, etc.)  []Surgical Drain Management  []Tracheostomy Care  []Dressing Changes  []Dialysis with transportation  []PEG Care  []Oxygen  []Daily Weights for Heart Failure    Dietary:  []Any diet limitations  []Tube Feedings   []Total Parenteral Management (TPN)    Financial Resources:  []Medicaid Application Completed    []UAI Completed and copy given to pt/family    []A screening has previously been completed. []Level II Completed    [] Private pay individual who will not become financially eligible for Medicaid within 6 months from admission to a [address] [place] [address] facility. [] Individual refused to have screening conducted. []Medicaid Application Completed    []The screening denied because it was determined individual did not need/did not qualify for nursing facility level of care. [] Out of state residents seeking direct admission to a [address] facility.   [] Individuals who are inpatients of an out of state hospital, or in state or out of state veterans/ hospital and seek direct admission to a [address] facility  [] Individuals who are pateints or residents of a state owned/operated facility that is licensed by Department of [employer] and seek direct admission to [address] facility  [] A screening not required for enrollment in [facility] Hospice services as set out in [facility]-  [] [facility] Re[facility] staff shall perform screenings of the [facility] clients. Advanced Care Plan:  []Surrogate Decision Maker of Care  []POA  []Communicated Code Status and copy sent.     Other:

## 2019-12-27 NOTE — CDMP QUERY
68yo female patient presented w/ R leg pain and weakness/numbness. All work-up including MRI of brain and cervical spine, CT and labs have been unremarkable. Neuro consult indicates low suspicion for Guillain [place]. Discharge summary indicates Upper extremity tremors/weakness  -neurology consulted - less likely guillain - barre  MRIs acutely unremarkable, arthritis noted.     If possible, please document the most likely cause of the patient's extremity weakness:    =Weakness due to Arthritis  =Weakness due to age related physical debility  =Weakness due to other specified condition  =Other explanation  =Unable to determine    Thank you,  Kamaljit Trinidad [facility], [address]

## 2019-12-27 NOTE — PROGRESS NOTES
Problem: Falls - Risk of  Goal: *Absence of Falls  Description  Document Vernell Brennan Fall Risk and appropriate interventions in the flowsheet.   12/27/2019 1452 by Monica Tovar RN  Outcome: Progressing Towards Goal  Note: Fall Risk Interventions:  Mobility Interventions: Patient to call before getting OOB    Mentation Interventions: Door open when patient unattended    Medication Interventions: Evaluate medications/consider consulting pharmacy, Patient to call before getting OOB    Elimination Interventions: Call light in reach, Patient to call for help with toileting needs    History of Falls Interventions: Door open when patient unattended, Evaluate medications/consider consulting pharmacy      12/27/2019 1110 by Monica Tovar RN  Outcome: Progressing Towards Goal  Note: Fall Risk Interventions:  Mobility Interventions: Patient to call before getting OOB    Mentation Interventions: Door open when patient unattended    Medication Interventions: Evaluate medications/consider consulting pharmacy, Patient to call before getting OOB    Elimination Interventions: Call light in reach, Patient to call for help with toileting needs    History of Falls Interventions: Door open when patient unattended, Evaluate medications/consider consulting pharmacy         Problem: Patient Education: Go to Patient Education Activity  Goal: Patient/Family Education  12/27/2019 1452 by Monica Tovar RN  Outcome: Progressing Towards Goal  12/27/2019 1110 by Monica Tovar RN  Outcome: Progressing Towards Goal     Problem: Pain  Goal: *Control of Pain  12/27/2019 1452 by Monica Tovar RN  Outcome: Progressing Towards Goal  12/27/2019 1110 by Monica Tovar RN  Outcome: Progressing Towards Goal

## 2019-12-27 NOTE — PROGRESS NOTES
TRANSFER - IN REPORT:    Verbal report received from Meg RN(name) on Desiree Allen  being received from 3S(unit) for routine progression of care      Report consisted of patients Situation, Background, Assessment and   Recommendations(SBAR). Information from the following report(s) SBAR, Kardex, Procedure Summary, Intake/Output, MAR and Recent Results was reviewed with the receiving nurse. 2110- Received pt on floor alert and verbal able to make needs known known. Oriented to room and call light system. NAD. Call light placed in reach. 1992- Bed bath given. Pure wick changed. Complaint of pain 10 of 10, prn medication given. Call light placed in reach.

## 2019-12-28 LAB
GLUCOSE BLD STRIP.AUTO-MCNC: 105 MG/DL (ref 70–110)
GLUCOSE BLD STRIP.AUTO-MCNC: 74 MG/DL (ref 70–110)
GLUCOSE BLD STRIP.AUTO-MCNC: 81 MG/DL (ref 70–110)
GLUCOSE BLD STRIP.AUTO-MCNC: 81 MG/DL (ref 70–110)
GLUCOSE BLD STRIP.AUTO-MCNC: 89 MG/DL (ref 70–110)

## 2019-12-28 PROCEDURE — 82962 GLUCOSE BLOOD TEST: CPT

## 2019-12-28 PROCEDURE — 74011250637 HC RX REV CODE- 250/637: Performed by: NURSE PRACTITIONER

## 2019-12-28 PROCEDURE — 77030040831 HC BAG URINE DRNG MDII -A

## 2019-12-28 PROCEDURE — 74011000250 HC RX REV CODE- 250: Performed by: INTERNAL MEDICINE

## 2019-12-28 PROCEDURE — 74011250637 HC RX REV CODE- 250/637: Performed by: INTERNAL MEDICINE

## 2019-12-28 RX ORDER — DIVALPROEX SODIUM 250 MG/1
1250 TABLET, DELAYED RELEASE ORAL
Status: DISCONTINUED | OUTPATIENT
Start: 2019-12-28 | End: 2020-01-01

## 2019-12-28 RX ADMIN — Medication 1 CAPSULE: at 09:10

## 2019-12-28 RX ADMIN — TOPIRAMATE 100 MG: 100 TABLET, FILM COATED ORAL at 17:25

## 2019-12-28 RX ADMIN — CITALOPRAM HYDROBROMIDE 40 MG: 20 TABLET ORAL at 22:07

## 2019-12-28 RX ADMIN — DICYCLOMINE HYDROCHLORIDE 20 MG: 10 CAPSULE ORAL at 17:25

## 2019-12-28 RX ADMIN — GLIPIZIDE 5 MG: 5 TABLET ORAL at 09:10

## 2019-12-28 RX ADMIN — ARFORMOTEROL TARTRATE 15 MCG: 15 SOLUTION RESPIRATORY (INHALATION) at 09:09

## 2019-12-28 RX ADMIN — GABAPENTIN 200 MG: 100 CAPSULE ORAL at 17:25

## 2019-12-28 RX ADMIN — DIVALPROEX SODIUM 1250 MG: 250 TABLET, DELAYED RELEASE ORAL at 22:36

## 2019-12-28 RX ADMIN — ERGOCALCIFEROL 50000 UNITS: 1.25 CAPSULE ORAL at 09:09

## 2019-12-28 RX ADMIN — PANTOPRAZOLE SODIUM 40 MG: 40 TABLET, DELAYED RELEASE ORAL at 09:10

## 2019-12-28 RX ADMIN — LEVOTHYROXINE SODIUM 50 MCG: 100 TABLET ORAL at 05:16

## 2019-12-28 RX ADMIN — LOSARTAN POTASSIUM 100 MG: 50 TABLET, FILM COATED ORAL at 09:10

## 2019-12-28 RX ADMIN — Medication 1 TABLET: at 09:10

## 2019-12-28 RX ADMIN — DOCUSATE SODIUM 100 MG: 100 CAPSULE, LIQUID FILLED ORAL at 09:09

## 2019-12-28 RX ADMIN — ATORVASTATIN CALCIUM 40 MG: 40 TABLET, FILM COATED ORAL at 22:07

## 2019-12-28 RX ADMIN — POLYETHYLENE GLYCOL 3350 17 G: 17 POWDER, FOR SOLUTION ORAL at 09:09

## 2019-12-28 RX ADMIN — DICYCLOMINE HYDROCHLORIDE 20 MG: 10 CAPSULE ORAL at 09:11

## 2019-12-28 RX ADMIN — Medication 500 MG: at 09:11

## 2019-12-28 RX ADMIN — BUDESONIDE 500 MCG: 0.5 SUSPENSION RESPIRATORY (INHALATION) at 09:08

## 2019-12-28 RX ADMIN — PROPRANOLOL HYDROCHLORIDE 40 MG: 20 TABLET ORAL at 09:10

## 2019-12-28 RX ADMIN — CHLORTHALIDONE 25 MG: 25 TABLET ORAL at 09:09

## 2019-12-28 RX ADMIN — DICYCLOMINE HYDROCHLORIDE 20 MG: 10 CAPSULE ORAL at 22:06

## 2019-12-28 RX ADMIN — Medication 500 MG: at 17:25

## 2019-12-28 RX ADMIN — DIVALPROEX SODIUM 500 MG: 500 TABLET, DELAYED RELEASE ORAL at 09:10

## 2019-12-28 RX ADMIN — PROPRANOLOL HYDROCHLORIDE 40 MG: 20 TABLET ORAL at 17:25

## 2019-12-28 RX ADMIN — GABAPENTIN 200 MG: 100 CAPSULE ORAL at 09:10

## 2019-12-28 NOTE — PROGRESS NOTES
conducted an initial consultation and Spiritual Assessment for Sunita Mcadams, who is a 78 y.o.,female. According to the patients chart Sabianism Affiliation is: Non Sabianism.     The reason the Patient came to the hospital is:   Patient Active Problem List    Diagnosis Date Noted    Weakness 12/24/2019    Guillain Barré syndrome ([facility] 12/24/2019    Leg pain, right 12/23/2019    DM (diabetes mellitus) ([facility] 12/23/2019    Hx of Guillain-[place] syndrome 12/23/2019    GERD (gastroesophageal reflux disease) 12/23/2019    Severe persistent asthma 11/07/2019    Lumbar stenosis 09/13/2017    Choledocholithiasis 06/07/2016    Biliary obstruction 04/26/2016    Breast cancer ([facility]         The  provided the following Interventions:  Initiated a relationship of care and support. Explored issues of sissy, belief, spirituality and Holiness/ritual needs while hospitalized. Listened empathically. Provided information about Spiritual Care Services. Offered prayer and assurance of continued prayers on patients behalf. Chart reviewed. The following outcomes were achieved:  Patient shared limited information about their medical narrative, spiritual journey and beliefs. Patient processed feelings about current hospitalization. Patient expressed gratitude for Spiritual Care visit. Assessment:  There are no significant spiritual or Holiness issues which require further intervention at this time. Patient does not have any Holiness or cultural needs that will affect patients preferences in health care. Plan:  Chaplains will continue to follow and will provide pastoral care as needed or requested.  recommends bedside caregivers page  on duty if patient shows signs of acute spiritual or emotional distress. [address] [place]   [address] [facility] [address] [facility] Office

## 2019-12-28 NOTE — PROGRESS NOTES
1540  Patient was found sitting on the floor at the foot of the bed by a therapist passing by. Therapist called for help. Patient stated that she had been calling 4x using the call bell to get assistance to the bedside commode but nobody came. All the nurses were at the nurse station because of shift report and the aides were at the patient's room doing vitals but no one heard any light go off. Outgoing nurse, CNA, and myself came to patient's room to help her on a chair. Patient was alert and oriented to name and place. No bruising or wound noted. Neuro check performed and WNL. Patient stated she had mild pain on her lower back since she fell on her buttock and bilateral knee. Patient's oxygen was put back on as it was on the floor. Patient stated she still wanted to go on the commode even if she had BM on the bed and urinated on the floor/bed. Patient was assisted to the commode and a male visitor came in.     0  Male visitor came out of the room, stated he will go get some gowns for her. Called Dr. Tonja Lock to report fall. 26  Dr. Vikas Keenan nurse called back. 36  Per Dr. Tonja Lock, do neuro check per shift and follow up with NP on Monday. 3960 Babak Luu came to me to report that patient told them while they were in the room with patient that she actually fell twice. She had BM on the bed, tried to go continue having BM in the bathroom, finished it then fell on the floor trying to get back to the bed (there were BM on the floor too). She got up, peed on the floor, fell again and that's when she was found by nurses. Both times she denied hitting her head and stated that she landed on her buttock. 1700 Patient while taking her medication suddenly stopped for about 10 seconds and starred blankly on the wall. I asked her what was wrong several times she answered after the third time and stated \"you wouldn't believe me if I tell you, I see a GOAT\".  I repeated what she said and she confirmed a goat is starring at her at the moment and went through the wall then back and left through the door. Patient was AO to place/name but not time and date. Patient has noticeable tremors, she could not hold a cup of water especially the left arm kept dropping. Patient took medication well with assistance. No complaints. 2217 Patient would not dispense in the pyxis. It would just give me the 250 mg then an error message of \"hardware failure\" comes up. I went down to the pharmacy to get from pharmacist Aureliano the 1,000 mg.     2245  I administered medication whole in vanilla pudding as medication cannot be crushed. Patient had a very difficult time swallowing the pills and had twice almost projectile vomited. Patient is very unsteady with her hands too and could not handle the cup of water. Her hands would tremor and drop bilaterally.

## 2019-12-28 NOTE — ROUTINE PROCESS
TRANSFER - IN REPORT:    Verbal report received from [facility] RN(name) on Keven Carpio  being received from 2200(unit) for routine progression of care      Report consisted of patients Situation, Background, Assessment and   Recommendations(SBAR). Information from the following report(s) SBAR, Kardex and MAR was reviewed with the receiving nurse. Opportunity for questions and clarification was provided. Assessment completed upon patients arrival to unit and care assumed.

## 2019-12-28 NOTE — PROGRESS NOTES
Pt states that she is not taking the femara at this time, she also states that she has been taking inderal and she does not have an allergy to it

## 2019-12-28 NOTE — ROUTINE PROCESS
Patient in bed at present time. Oxygen saturation noted to be 90% . Patient has order for prn oxygen at 2l/min . Patient oxygen saturation increased to 96%. Patient  Denies any pain. Patient remains assist of 1 for all ADLS and continues to use bedside commode.

## 2019-12-28 NOTE — PROGRESS NOTES
Pt transferred via bed accompanied by previous unit nurse, oxygen intact @ 2LPM via N/C, HOB elevated, pt alert and oriented x 4, verbally responsive, tremors noted to BUE, pt states that she has a history of the tremors, pt has special utensils that she uses to feed her self and they are at the bedside, pt oriented to room, educated on use of call bell, telephone, tv, and lights, telephone and call bell within reach, skin intact, pt has a history of a left breast mast, sign hung stating no B/P or needle sticks in the left arm, no c/o pain and no visual signs of pain noted

## 2019-12-29 LAB
GLUCOSE BLD STRIP.AUTO-MCNC: 170 MG/DL (ref 70–110)
GLUCOSE BLD STRIP.AUTO-MCNC: 219 MG/DL (ref 70–110)
GLUCOSE BLD STRIP.AUTO-MCNC: 84 MG/DL (ref 70–110)
GLUCOSE BLD STRIP.AUTO-MCNC: 97 MG/DL (ref 70–110)

## 2019-12-29 PROCEDURE — 74011250637 HC RX REV CODE- 250/637: Performed by: NURSE PRACTITIONER

## 2019-12-29 PROCEDURE — 74011636637 HC RX REV CODE- 636/637: Performed by: INTERNAL MEDICINE

## 2019-12-29 PROCEDURE — 77030040831 HC BAG URINE DRNG MDII -A

## 2019-12-29 PROCEDURE — 74011000250 HC RX REV CODE- 250: Performed by: INTERNAL MEDICINE

## 2019-12-29 PROCEDURE — 82962 GLUCOSE BLOOD TEST: CPT

## 2019-12-29 PROCEDURE — 74011250637 HC RX REV CODE- 250/637: Performed by: INTERNAL MEDICINE

## 2019-12-29 RX ADMIN — TOPIRAMATE 100 MG: 100 TABLET, FILM COATED ORAL at 17:26

## 2019-12-29 RX ADMIN — Medication 500 MG: at 09:11

## 2019-12-29 RX ADMIN — Medication 1 TABLET: at 09:11

## 2019-12-29 RX ADMIN — DOCUSATE SODIUM 100 MG: 100 CAPSULE, LIQUID FILLED ORAL at 09:10

## 2019-12-29 RX ADMIN — TOPIRAMATE 100 MG: 100 TABLET, FILM COATED ORAL at 09:10

## 2019-12-29 RX ADMIN — GABAPENTIN 200 MG: 100 CAPSULE ORAL at 09:09

## 2019-12-29 RX ADMIN — ARFORMOTEROL TARTRATE 15 MCG: 15 SOLUTION RESPIRATORY (INHALATION) at 09:10

## 2019-12-29 RX ADMIN — Medication 1 CAPSULE: at 09:10

## 2019-12-29 RX ADMIN — DICYCLOMINE HYDROCHLORIDE 20 MG: 10 CAPSULE ORAL at 09:09

## 2019-12-29 RX ADMIN — GLIPIZIDE 5 MG: 5 TABLET ORAL at 09:10

## 2019-12-29 RX ADMIN — Medication 500 MG: at 17:26

## 2019-12-29 RX ADMIN — POLYETHYLENE GLYCOL 3350 17 G: 17 POWDER, FOR SOLUTION ORAL at 09:10

## 2019-12-29 RX ADMIN — LEVOTHYROXINE SODIUM 50 MCG: 100 TABLET ORAL at 05:54

## 2019-12-29 RX ADMIN — DICYCLOMINE HYDROCHLORIDE 20 MG: 10 CAPSULE ORAL at 21:43

## 2019-12-29 RX ADMIN — LOSARTAN POTASSIUM 100 MG: 50 TABLET, FILM COATED ORAL at 09:11

## 2019-12-29 RX ADMIN — PANTOPRAZOLE SODIUM 40 MG: 40 TABLET, DELAYED RELEASE ORAL at 06:33

## 2019-12-29 RX ADMIN — GABAPENTIN 200 MG: 100 CAPSULE ORAL at 17:26

## 2019-12-29 RX ADMIN — BUDESONIDE 500 MCG: 0.5 SUSPENSION RESPIRATORY (INHALATION) at 20:12

## 2019-12-29 RX ADMIN — DIVALPROEX SODIUM 1250 MG: 250 TABLET, DELAYED RELEASE ORAL at 21:45

## 2019-12-29 RX ADMIN — PROPRANOLOL HYDROCHLORIDE 40 MG: 20 TABLET ORAL at 09:10

## 2019-12-29 RX ADMIN — DIVALPROEX SODIUM 500 MG: 500 TABLET, DELAYED RELEASE ORAL at 09:11

## 2019-12-29 RX ADMIN — PROPRANOLOL HYDROCHLORIDE 40 MG: 20 TABLET ORAL at 17:26

## 2019-12-29 RX ADMIN — ARFORMOTEROL TARTRATE 15 MCG: 15 SOLUTION RESPIRATORY (INHALATION) at 20:12

## 2019-12-29 RX ADMIN — ATORVASTATIN CALCIUM 40 MG: 40 TABLET, FILM COATED ORAL at 21:43

## 2019-12-29 RX ADMIN — CITALOPRAM HYDROBROMIDE 40 MG: 20 TABLET ORAL at 21:43

## 2019-12-29 RX ADMIN — DICYCLOMINE HYDROCHLORIDE 20 MG: 10 CAPSULE ORAL at 17:26

## 2019-12-29 RX ADMIN — INSULIN LISPRO 2 UNITS: 100 INJECTION, SOLUTION INTRAVENOUS; SUBCUTANEOUS at 22:00

## 2019-12-29 RX ADMIN — BUDESONIDE 500 MCG: 0.5 SUSPENSION RESPIRATORY (INHALATION) at 09:09

## 2019-12-29 RX ADMIN — INSULIN LISPRO 4 UNITS: 100 INJECTION, SOLUTION INTRAVENOUS; SUBCUTANEOUS at 12:31

## 2019-12-29 NOTE — ROUTINE PROCESS
Patient awake and verbal on this shift . Patient participated in physical therapy earlier in shift Neuro checks within patient normal limits. No seizure activity noted. Patient range of motion same as prior to fall yesterday . Patient denied any pain.

## 2019-12-29 NOTE — PROGRESS NOTES
NUTRITION INITIAL ASSESSMENT/PLAN OF CARE      RECOMMENDATIONS:   1. Speech consult  2. Feed/assist pt at all meals  3. RD to follow     GOALS:   1. PO intake meets >75% of protein/calorie needs by 12/31  2. Diet consistency tolerance by 12/31    ASSESSMENT:   Wt status is classified as obese per Body mass index is 40.63 kg/m². New admit to TCC, admit to hospital for R leg pain and decreased sensation. Diagnosis: weakness, guillain [place] syndrome, leg pain, DM, GERD, asthma, lumbar stenosis, choledocholithiasis, biliary obstruct, breast cancer. BG range last 24 hours:   Nutrition recommendations listed. RD to follow. Nutrition Diagnoses:   Difficulty chewing/swallowing related to/as evidence by reported issues swallowing many foods/pills    SUBJECTIVE/OBJECTIVE:   Pt seen for an initial assessment to TCC/Consult. Pt resting in bed during time of assessment, with mild confusion during time of visit. Noted multiple falls per reports. During visit pt attempting to eat pudding but having a hard time with utensils independently. Pt does report to have a good appetite and normally consumes 3 meals/day. No reports of n/v/d/c at this time. Per I/Os last BM 12/28/19. Pt states no food allergies, noted in chart allergy to milk containing products - when asked pt to clarify, no response (will continue with allergy in diet due to confusion, nursing/kitchen informed). Pt reports issues swallowing, per RN documents also noting trouble with pills. Will place speech consult. When asking about UBW pt staring off. Rec: assist/feed pt at all meals. Will continue to monitor intake/tolerance, weight, labs.     Information Obtained from:    [x] Chart Review   [x] Patient   [] Family/Caregiver   [] Nurse/Physician   [] Interdisciplinary Meeting/Rounds    Diet: DM with options CC 1800 kcal  Medications: [x] Reviewed  Noted: oscal, hygroten, celexa, depakote, colace, ergocalciferol, glipizide, humalog, ferrex, synthroid, cozaar, citrucel, propranolol, protonix, miralax  Allergies: [x] Reviewed Milk containing products per chart  No diagnosis found. Past Medical History:   Diagnosis Date    Asthma     Back pain     [facility] ([facility]     Breast cancer ([facility] 2012    left side    Cancer ([facility]     Diabetes ([facility]     GERD (gastroesophageal reflux disease)     Guillain Barré syndrome ([facility]     History of blood transfusion     \"in my 20's\" per patient    Hypertension     Ill-defined condition 09/12/2017    \"liver problem,\" per patient, \"not liver disease. I go to a gastroenterologist for it. \"    Lymphedema     left arm and hand and wears a sleeve.      Morbid obesity ([facility] 09/12/2017    BMI = 40.4    Seizures ([facility]     Sleep apnea     left apap machine at home    [facility]     history of in left leg    Thyroid disease     UTI (lower urinary tract infection)       Labs:    Lab Results   Component Value Date/Time    Sodium 142 12/27/2019 05:15 AM    Potassium 3.9 12/27/2019 05:15 AM    Chloride 105 12/27/2019 05:15 AM    CO2 33 (H) 12/27/2019 05:15 AM    Anion gap 4 12/27/2019 05:15 AM    Glucose 84 12/27/2019 05:15 AM    BUN 22 (H) 12/27/2019 05:15 AM    Creatinine 0.73 12/27/2019 05:15 AM    Calcium 8.4 (L) 12/27/2019 05:15 AM    Magnesium 2.2 05/21/2017 10:12 AM    Albumin 3.0 (L) 12/01/2019 11:50 AM     Lab Results   Component Value Date/Time    GLUCPOC 84 12/29/2019 06:02 AM    GLUCPOC 89 12/28/2019 08:52 PM    GLUCPOC 105 12/28/2019 04:56 PM     No results found for: CHOL, CHOLPOCT, CHOLX, CHLST, CHOLV, HDL, HDLPOC, HDLP, LDL, LDLCPOC, LDLC, DLDLP, VLDLC, VLDL, TGLX, TRIGL, TRIGP, TGLPOCT, CHHD, CHHDX  Anthropometrics: BMI (calculated): 40.6  Last 3 Recorded Weights in this Encounter    12/27/19 1644   Weight: 107.4 kg (236 lb 11.2 oz)      Ht Readings from Last 1 Encounters:   12/27/19 5' 4\" (1.626 m)       Documented Weight History:  Weight Metrics 12/27/2019 12/25/2019 12/22/2019 12/1/2019 11/5/2019 3/17/2019 1/6/2019   Weight 236 lb 11.2 oz 235 lb 3.7 oz - 220 lb 218 lb 218 lb 222 lb   BMI 40.63 kg/m2 - 40.38 kg/m2 38.97 kg/m2 38.62 kg/m2 38.62 kg/m2 39.33 kg/m2       No data found. IBW: 120 lb %IBW: 198%  [] Weight Loss [x] Weight Gain per history above [] Weight Stable    Estimated Nutrition Needs: [x] MSJ  [] Other:  Calories: 2728-2087 kcal (x 1.1-1.2) Based on:   [x] Actual BW    Protein:    g (0.8-1 g/kg) Based on:   [x] Actual BW    Fluid:      1 mL/kcal     [x] No Cultural, Synagogue or ethnic dietary need identified.     [] Cultural, Synagogue and ethnic food preferences identified and addressed     Wt Status:  [] Normal (18.6 - 24.9) [] Underweight (<18.5) [] Overweight (25 - 29.9) [] Mild Obesity (30 - 34.9)  [] Moderate Obesity (35 - 39.9) [x] Morbid Obesity (40+)       Nutrition Problems Identified:   [] Suboptimal PO intake   [] Food Allergies  [x] Difficulty chewing/swallowing/poor dentition  [] Constipation/Diarrhea   [] Nausea/Vomiting   [] None  [] Other:     Plan:   [x] Therapeutic Diet  []  Obtained/adjusted food preferences/tolerances and/or snacks options   []  Supplements added   [] Occupational therapy following for feeding techniques  []  HS snack added   []  Modify diet texture   []  Modify diet for food allergies   []  Educate patient   []  Assist with menu selection   [x]  Monitor PO intake on meal rounds   [x]  Continue inpatient monitoring and intervention   [x]  Participated in discharge planning/Interdisciplinary rounds/Team meetings   []  Other:     Education Needs:   [] Not appropriate for teaching at this time due to:   [x] Identified and addressed encouraged intake    Nutrition Monitoring and Evaluation:  [x] Continue ongoing monitoring and intervention  [] Other    Eriberto Wynn

## 2019-12-29 NOTE — PROGRESS NOTES
Patient is in bed with eyes closed HOB elevated, Oxygen @ 2L via cannula in place, Patient respond to sounds with jerking of arms and legs, verbal response incoherent, identify scent of alcohol as ammonia, Side rails up x 3, frequent rounds made, bell alarm on.

## 2019-12-30 LAB
ANION GAP SERPL CALC-SCNC: 4 MMOL/L (ref 3–18)
BASOPHILS # BLD: 0 K/UL (ref 0–0.1)
BASOPHILS NFR BLD: 0 % (ref 0–2)
BUN SERPL-MCNC: 21 MG/DL (ref 7–18)
BUN/CREAT SERPL: 27 (ref 12–20)
CALCIUM SERPL-MCNC: 9 MG/DL (ref 8.5–10.1)
CHLORIDE SERPL-SCNC: 105 MMOL/L (ref 100–111)
CO2 SERPL-SCNC: 34 MMOL/L (ref 21–32)
CREAT SERPL-MCNC: 0.78 MG/DL (ref 0.6–1.3)
DIFFERENTIAL METHOD BLD: ABNORMAL
EOSINOPHIL # BLD: 0 K/UL (ref 0–0.4)
EOSINOPHIL NFR BLD: 0 % (ref 0–5)
ERYTHROCYTE [DISTWIDTH] IN BLOOD BY AUTOMATED COUNT: 16.8 % (ref 11.6–14.5)
GLUCOSE BLD STRIP.AUTO-MCNC: 124 MG/DL (ref 70–110)
GLUCOSE BLD STRIP.AUTO-MCNC: 135 MG/DL (ref 70–110)
GLUCOSE BLD STRIP.AUTO-MCNC: 72 MG/DL (ref 70–110)
GLUCOSE BLD STRIP.AUTO-MCNC: 89 MG/DL (ref 70–110)
GLUCOSE SERPL-MCNC: 70 MG/DL (ref 74–99)
HCT VFR BLD AUTO: 38.9 % (ref 35–45)
HGB BLD-MCNC: 12.3 G/DL (ref 12–16)
LYMPHOCYTES # BLD: 1.7 K/UL (ref 0.9–3.6)
LYMPHOCYTES NFR BLD: 39 % (ref 21–52)
MCH RBC QN AUTO: 31.5 PG (ref 24–34)
MCHC RBC AUTO-ENTMCNC: 31.6 G/DL (ref 31–37)
MCV RBC AUTO: 99.7 FL (ref 74–97)
MONOCYTES # BLD: 0.6 K/UL (ref 0.05–1.2)
MONOCYTES NFR BLD: 14 % (ref 3–10)
NEUTS SEG # BLD: 2 K/UL (ref 1.8–8)
NEUTS SEG NFR BLD: 47 % (ref 40–73)
PLATELET # BLD AUTO: 125 K/UL (ref 135–420)
PMV BLD AUTO: 10 FL (ref 9.2–11.8)
POTASSIUM SERPL-SCNC: 3.3 MMOL/L (ref 3.5–5.5)
RBC # BLD AUTO: 3.9 M/UL (ref 4.2–5.3)
SODIUM SERPL-SCNC: 143 MMOL/L (ref 136–145)
WBC # BLD AUTO: 4.3 K/UL (ref 4.6–13.2)

## 2019-12-30 PROCEDURE — 80048 BASIC METABOLIC PNL TOTAL CA: CPT

## 2019-12-30 PROCEDURE — 36415 COLL VENOUS BLD VENIPUNCTURE: CPT

## 2019-12-30 PROCEDURE — 74011250637 HC RX REV CODE- 250/637: Performed by: NURSE PRACTITIONER

## 2019-12-30 PROCEDURE — 74011250637 HC RX REV CODE- 250/637: Performed by: INTERNAL MEDICINE

## 2019-12-30 PROCEDURE — 82962 GLUCOSE BLOOD TEST: CPT

## 2019-12-30 PROCEDURE — 85025 COMPLETE CBC W/AUTO DIFF WBC: CPT

## 2019-12-30 PROCEDURE — 74011000250 HC RX REV CODE- 250: Performed by: INTERNAL MEDICINE

## 2019-12-30 RX ORDER — POTASSIUM CHLORIDE 20 MEQ/1
20 TABLET, EXTENDED RELEASE ORAL
Status: COMPLETED | OUTPATIENT
Start: 2019-12-30 | End: 2019-12-30

## 2019-12-30 RX ADMIN — DOCUSATE SODIUM 100 MG: 100 CAPSULE, LIQUID FILLED ORAL at 09:35

## 2019-12-30 RX ADMIN — TOPIRAMATE 100 MG: 100 TABLET, FILM COATED ORAL at 09:34

## 2019-12-30 RX ADMIN — CHLORTHALIDONE 25 MG: 25 TABLET ORAL at 09:35

## 2019-12-30 RX ADMIN — PANTOPRAZOLE SODIUM 40 MG: 40 TABLET, DELAYED RELEASE ORAL at 09:35

## 2019-12-30 RX ADMIN — Medication 1 CAPSULE: at 09:35

## 2019-12-30 RX ADMIN — ATORVASTATIN CALCIUM 40 MG: 40 TABLET, FILM COATED ORAL at 21:36

## 2019-12-30 RX ADMIN — GABAPENTIN 200 MG: 100 CAPSULE ORAL at 17:31

## 2019-12-30 RX ADMIN — ARFORMOTEROL TARTRATE 15 MCG: 15 SOLUTION RESPIRATORY (INHALATION) at 21:44

## 2019-12-30 RX ADMIN — Medication 500 MG: at 09:34

## 2019-12-30 RX ADMIN — DICYCLOMINE HYDROCHLORIDE 20 MG: 10 CAPSULE ORAL at 17:32

## 2019-12-30 RX ADMIN — Medication 1 TABLET: at 09:35

## 2019-12-30 RX ADMIN — POLYETHYLENE GLYCOL 3350 17 G: 17 POWDER, FOR SOLUTION ORAL at 09:35

## 2019-12-30 RX ADMIN — TOPIRAMATE 100 MG: 100 TABLET, FILM COATED ORAL at 17:31

## 2019-12-30 RX ADMIN — BUDESONIDE 500 MCG: 0.5 SUSPENSION RESPIRATORY (INHALATION) at 09:35

## 2019-12-30 RX ADMIN — PROPRANOLOL HYDROCHLORIDE 40 MG: 20 TABLET ORAL at 09:34

## 2019-12-30 RX ADMIN — PROPRANOLOL HYDROCHLORIDE 40 MG: 20 TABLET ORAL at 17:31

## 2019-12-30 RX ADMIN — DIVALPROEX SODIUM 1250 MG: 250 TABLET, DELAYED RELEASE ORAL at 21:45

## 2019-12-30 RX ADMIN — ARFORMOTEROL TARTRATE 15 MCG: 15 SOLUTION RESPIRATORY (INHALATION) at 09:35

## 2019-12-30 RX ADMIN — POTASSIUM CHLORIDE 20 MEQ: 1500 TABLET, EXTENDED RELEASE ORAL at 14:44

## 2019-12-30 RX ADMIN — LEVOTHYROXINE SODIUM 50 MCG: 100 TABLET ORAL at 05:11

## 2019-12-30 RX ADMIN — DIVALPROEX SODIUM 500 MG: 500 TABLET, DELAYED RELEASE ORAL at 09:35

## 2019-12-30 RX ADMIN — GLIPIZIDE 5 MG: 5 TABLET ORAL at 09:34

## 2019-12-30 RX ADMIN — BUDESONIDE 500 MCG: 0.5 SUSPENSION RESPIRATORY (INHALATION) at 21:44

## 2019-12-30 RX ADMIN — DICYCLOMINE HYDROCHLORIDE 20 MG: 10 CAPSULE ORAL at 09:35

## 2019-12-30 RX ADMIN — GABAPENTIN 200 MG: 100 CAPSULE ORAL at 09:34

## 2019-12-30 RX ADMIN — DICYCLOMINE HYDROCHLORIDE 20 MG: 10 CAPSULE ORAL at 21:36

## 2019-12-30 RX ADMIN — CITALOPRAM HYDROBROMIDE 40 MG: 20 TABLET ORAL at 21:36

## 2019-12-30 RX ADMIN — Medication 500 MG: at 17:31

## 2019-12-30 RX ADMIN — LOSARTAN POTASSIUM 100 MG: 50 TABLET, FILM COATED ORAL at 09:35

## 2019-12-30 NOTE — PROGRESS NOTES
Patient in bed drowsy with HOB elevated Oxygen @ 2L per min via nasal cannula, Patient denies any pain or discomfort,no apparent distress, neuro check completed as ordered, call bell and personal items within reach.

## 2019-12-30 NOTE — PROGRESS NOTES
Patient awake and oriented to self and place, actively conversing, laughing and watching TV, had BM this morning.

## 2019-12-30 NOTE — PROGRESS NOTES
Pt in bed, drowsy,  in to assist patient with eating dinner, pt ate 90%, meds given whole with pudding per pt request, HOB elevated, oxygen intact @ 2LPM via N/C, no SOB noted, toileting assistance/incont care given, no seizure activity noted, pt cont to have UE tremors as per her history, neuro checks completed as ordered, no c/o pain and no visual signs of pain noted, call bell and personal items within reach

## 2019-12-31 LAB
GLUCOSE BLD STRIP.AUTO-MCNC: 103 MG/DL (ref 70–110)
GLUCOSE BLD STRIP.AUTO-MCNC: 103 MG/DL (ref 70–110)
GLUCOSE BLD STRIP.AUTO-MCNC: 113 MG/DL (ref 70–110)
GLUCOSE BLD STRIP.AUTO-MCNC: 75 MG/DL (ref 70–110)

## 2019-12-31 PROCEDURE — 74011250637 HC RX REV CODE- 250/637: Performed by: NURSE PRACTITIONER

## 2019-12-31 PROCEDURE — 74011250637 HC RX REV CODE- 250/637: Performed by: INTERNAL MEDICINE

## 2019-12-31 PROCEDURE — 74011000250 HC RX REV CODE- 250: Performed by: INTERNAL MEDICINE

## 2019-12-31 PROCEDURE — 82962 GLUCOSE BLOOD TEST: CPT

## 2019-12-31 RX ADMIN — POLYETHYLENE GLYCOL 3350 17 G: 17 POWDER, FOR SOLUTION ORAL at 08:21

## 2019-12-31 RX ADMIN — CITALOPRAM HYDROBROMIDE 40 MG: 20 TABLET ORAL at 21:58

## 2019-12-31 RX ADMIN — TOPIRAMATE 100 MG: 100 TABLET, FILM COATED ORAL at 08:21

## 2019-12-31 RX ADMIN — LOSARTAN POTASSIUM 100 MG: 50 TABLET, FILM COATED ORAL at 08:21

## 2019-12-31 RX ADMIN — ARFORMOTEROL TARTRATE 15 MCG: 15 SOLUTION RESPIRATORY (INHALATION) at 08:21

## 2019-12-31 RX ADMIN — TOPIRAMATE 100 MG: 100 TABLET, FILM COATED ORAL at 18:10

## 2019-12-31 RX ADMIN — Medication 500 MG: at 08:21

## 2019-12-31 RX ADMIN — Medication 500 MG: at 18:10

## 2019-12-31 RX ADMIN — PANTOPRAZOLE SODIUM 40 MG: 40 TABLET, DELAYED RELEASE ORAL at 08:21

## 2019-12-31 RX ADMIN — BUDESONIDE 500 MCG: 0.5 SUSPENSION RESPIRATORY (INHALATION) at 20:00

## 2019-12-31 RX ADMIN — GLIPIZIDE 5 MG: 5 TABLET ORAL at 08:21

## 2019-12-31 RX ADMIN — DICYCLOMINE HYDROCHLORIDE 20 MG: 10 CAPSULE ORAL at 21:58

## 2019-12-31 RX ADMIN — DICYCLOMINE HYDROCHLORIDE 20 MG: 10 CAPSULE ORAL at 08:20

## 2019-12-31 RX ADMIN — ARFORMOTEROL TARTRATE 15 MCG: 15 SOLUTION RESPIRATORY (INHALATION) at 20:00

## 2019-12-31 RX ADMIN — BUDESONIDE 500 MCG: 0.5 SUSPENSION RESPIRATORY (INHALATION) at 08:21

## 2019-12-31 RX ADMIN — Medication 1 TABLET: at 08:21

## 2019-12-31 RX ADMIN — DIVALPROEX SODIUM 500 MG: 500 TABLET, DELAYED RELEASE ORAL at 08:21

## 2019-12-31 RX ADMIN — LEVOTHYROXINE SODIUM 50 MCG: 100 TABLET ORAL at 05:13

## 2019-12-31 RX ADMIN — Medication 1 CAPSULE: at 08:21

## 2019-12-31 RX ADMIN — GABAPENTIN 200 MG: 100 CAPSULE ORAL at 08:20

## 2019-12-31 RX ADMIN — DOCUSATE SODIUM 100 MG: 100 CAPSULE, LIQUID FILLED ORAL at 08:21

## 2019-12-31 RX ADMIN — DIVALPROEX SODIUM 1250 MG: 250 TABLET, DELAYED RELEASE ORAL at 22:00

## 2019-12-31 RX ADMIN — PROPRANOLOL HYDROCHLORIDE 40 MG: 20 TABLET ORAL at 18:10

## 2019-12-31 RX ADMIN — ATORVASTATIN CALCIUM 40 MG: 40 TABLET, FILM COATED ORAL at 21:58

## 2019-12-31 RX ADMIN — DICYCLOMINE HYDROCHLORIDE 20 MG: 10 CAPSULE ORAL at 18:11

## 2019-12-31 RX ADMIN — GABAPENTIN 200 MG: 100 CAPSULE ORAL at 18:10

## 2019-12-31 RX ADMIN — PROPRANOLOL HYDROCHLORIDE 40 MG: 20 TABLET ORAL at 08:20

## 2019-12-31 NOTE — PROGRESS NOTES
NUTRITION FOLLOW-UP/PLAN OF CARE TCC      RECOMMENDATIONS:   1. Consistent CHO Diet; lactose intolerance (No artificial sweeteners)  2. Monitor labs, weight and PO intake  3. SLP Consult pending for Thur (1/2/2020)  4. Feeding assist and specialty utensils     GOALS:   1. Progressing/Ongoing: PO intake meets >75% of protein/calorie needs by 1/7/2020  2. Ongoing: Weight Maintenance (+/- 1-2 lb) by 1/7/2020     ASSESSMENT:   Wt status is classified as obese per Body mass index is 39.51 kg/m². Fair to good PO intake. Labs noted. BG range () over the past 24 hours; A1c (5.2%). Pt w/ hypokalemia and elevated BUN. Nutrition recommendations listed. RD to follow. SUBJECTIVE/OBJECTIVE:   (12/31). Pt seen in room after lunch; observed >75% of meal consumed. Spoke with  as well to assist with Hx. Pt does have lactose intolerance, does NOT tolerate artificial sweeteners and usually feeds herself at home with specialty utensils and tray set up/assist. Placed orders in system and informed dietary. Spoke with therapy about feeding assist with specialty utensils d/t tremors as well as about SLP consult. Pt reports she has been doing OK with meals most of the time. Last BM on (12/30) per I/Os. Will continue to monitor.        (12/29): Pt seen for an initial assessment to TCC/Consult. Pt resting in bed during time of assessment, with mild confusion during time of visit. Noted multiple falls per reports. During visit pt attempting to eat pudding but having a hard time with utensils independently. Pt does report to have a good appetite and normally consumes 3 meals/day. No reports of n/v/d/c at this time. Per I/Os last BM 12/28/19. Pt states no food allergies, noted in chart allergy to milk containing products - when asked pt to clarify, no response (will continue with allergy in diet due to confusion, nursing/kitchen informed). Pt reports issues swallowing, per RN documents also noting trouble with pills.  Will place speech consult. When asking about UBW pt staring off. Rec: assist/feed pt at all meals. Will continue to monitor intake/tolerance, weight, labs. Information Obtained from:    [x] Chart Review   [x] Patient   [x] Family/Caregiver   [x] Nurse/Physician   [] Interdisciplinary Meeting/Rounds    Diet: DM with options CC 1800 kcal  Medications: [x] Reviewed   oscal, hygroten, celexa, depakote, colace, ergocalciferol, glipizide, humalog, ferrex, synthroid, cozaar, citrucel, propranolol, protonix, miralax  Allergies: [x] Reviewed Milk containing products per chart  Patient Active Problem List   Diagnosis Code    Breast cancer ([facility] C50.919    Biliary obstruction K83.1    Choledocholithiasis K80.50    Lumbar stenosis M48.061    Severe persistent asthma J45.50    Leg pain, right M79.604    DM (diabetes mellitus) ([facility] E11.9    Hx of Guillain-[place] syndrome Z86.69    GERD (gastroesophageal reflux disease) K21.9    Weakness R53.1    Guillain Barré syndrome (HCC) G61.0       Past Medical History:   Diagnosis Date    Asthma     Back pain     Bipolar 1 disorder ([facility]     Breast cancer ([facility] 2012    left side    Cancer (HCC)     Diabetes ([facility]     GERD (gastroesophageal reflux disease)     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 I go to a gastroenterologist for it. \"    Lymphedema     left arm and hand and wears a sleeve.      Morbid obesity ([facility] 09/12/2017    BMI = 40.4    Seizures (HCC)     Sleep apnea     left apap machine at home    [facility])     history of in left leg    Thyroid disease     UTI (lower urinary tract infection)       Labs:    Lab Results   Component Value Date/Time    Sodium 143 12/30/2019 04:30 AM    Potassium 3.3 (L) 12/30/2019 04:30 AM    Chloride 105 12/30/2019 04:30 AM    CO2 34 (H) 12/30/2019 04:30 AM Anion gap 4 12/30/2019 04:30 AM    Glucose 70 (L) 12/30/2019 04:30 AM    BUN 21 (H) 12/30/2019 04:30 AM    Creatinine 0.78 12/30/2019 04:30 AM    Calcium 9.0 12/30/2019 04:30 AM    Magnesium 2.2 05/21/2017 10:12 AM    Albumin 3.0 (L) 12/01/2019 11:50 AM     Lab Results   Component Value Date/Time    GLUCPOC 103 12/31/2019 12:18 PM    GLUCPOC 103 12/31/2019 05:18 AM    GLUCPOC 135 (H) 12/30/2019 08:50 PM       Anthropometrics: BMI (calculated): 39.5  Last 3 Recorded Weights in this Encounter    12/27/19 1644 12/30/19 1444   Weight: 107.4 kg (236 lb 11.2 oz) 104.4 kg (230 lb 3.2 oz)      Ht Readings from Last 1 Encounters:   12/27/19 5' 4\" (1.626 m)       Documented Weight History:  Weight Metrics 12/30/2019 12/27/2019 12/25/2019 12/22/2019 12/1/2019 11/5/2019 3/17/2019   Weight 230 lb 3.2 oz - 235 lb 3.7 oz - 220 lb 218 lb 218 lb   BMI - 39.51 kg/m2 - 40.38 kg/m2 38.97 kg/m2 38.62 kg/m2 38.62 kg/m2       No data found.     UBW: 220 lb??  [] Weight Loss  [x] Weight Gain per history above  [] Weight Stable    Estimated Nutrition Needs: [x] MSJ  [] Other:  Calories: 9781-1279 kcal (x 1.1-1.2) Based on:   [x] Actual BW    Protein:    g (0.8-1 g/kg) Based on:   [x] Actual BW    Fluid:      1 mL/kcal    Nutrition Problems Identified:   [] Suboptimal PO intake   [x] Food Allergies (Lactose intolerance)  [x] Difficulty chewing/swallowing/poor dentition  [] Constipation/Diarrhea (Last BM on 12/30; bowel regimen in place)  [] Nausea/Vomiting   [] None  [] Other:     Plan:   [x] Therapeutic Diet  []  Obtained/adjusted food preferences/tolerances and/or snacks options   []  Supplements added   [x] Occupational therapy following for feeding techniques  []  HS snack added   []  Modify diet texture   [x]  Modify diet for food allergies   []  Educate patient   []  Assist with menu selection   [x]  Monitor PO intake on meal rounds   [x]  Continue inpatient monitoring and intervention   [x]  Participated in discharge planning/Interdisciplinary rounds/Team meetings   [x]  Other: Feeding assist and specialty utensils     Education Needs:   [] Not appropriate for teaching at this time due to:   [x] Identified and addressed encouraged intake    Nutrition Monitoring and Evaluation:  [x] Continue ongoing monitoring and intervention  [] Other    Yara Rodríguez

## 2019-12-31 NOTE — H&P
[address] [place] [address]  HISTORY AND PHYSICAL    Name:  Josh Giron  MR#:   [id number]  :  1950  ACCOUNT #:  [de-identified]  ADMIT DATE:  2019    REASON FOR ADMISSION:  Right leg weakness and fall. HISTORY OF PRESENT ILLNESS:  The patient is a pleasant 65-year-old female with past medical history significant for history of Guillain-[place], diabetes with neuropathy, gastroesophageal reflux disease, who was admitted to the hospital due to right leg pain and loss of sensation. The patient was admitted to the hospital for additional workup. The patient was seen in consultation by Neurology. An MRI of the brain and cervical spine was recommended that showed no acute abnormality. Per Neurology recommendation, LP was not performed due to less likely chance of Guillain-[place]. The patient was seen in consultation by Physical Therapy and skilled nursing facility placement was recommended. At the time of my evaluation, the patient is alert, awake, oriented to self, date of birth, month, but not to year or person. The patient is a poor historian and most history is obtained from the chart. The patient denies any leg pain, but does recall generalized weakness. PAST MEDICAL HISTORY:  Guillain-[place], diabetes, hyperlipidemia, hypothyroidism, hypertension. PAST SURGICAL HISTORY:  Back surgery, cholecystectomy, mastectomy, foot surgery, partial hysterectomy. ALLERGIES:  VALIUM, CARAFATE, COREG, CEPHALEXIN, CIPRO, ELIQUIS, EPIPEN, MILK, VALIUM, PERCOCET, BEE STING, TALWIN, TETANUS TOXOID, TRILEPTAL. MEDICATIONS:  See attached list.    SOCIAL HISTORY:  The patient is a nonsmoker, nondrinker. FAMILY HISTORY:  Positive for CAD, cancer. REVIEW OF SYSTEMS:  The patient is a very poor historian; however, she denies any fever or chills. No weight loss or headache. No neck pain or sore throat. No chest pain or palpitation. No shortness of breath or cough. No nausea, vomiting, or diarrhea.   No dysuria or polyuria. No back pain, occasional leg pain. No heat or cold intolerance. No anxiety or depression. Please note that the patient is a poor historian; review of systems may not be as reliable. PHYSICAL EXAMINATION:  GENERAL:   This is a well-nourished, well-developed female, in no apparent distress. VITAL SIGNS:  Temperature is 97.9, heart rate 56, blood pressure is 121/64, respiratory rate is 18, saturating 91%. HEENT:  Normocephalic, atraumatic. Sclerae anicteric. Oropharynx clear. Mucous membranes moist.  NECK:  No JVD, thyromegaly. HEART:  S1, S2.  Regular rate and rhythm. LUNGS:  Clear to auscultation bilaterally. No wheezing. ABDOMEN:  Nontender, nondistended. Normoactive bowel sounds. EXTREMITIES:  Trace edema bilaterally. Motor strength is 5/5 on the right, slightly diminished with some tremors on the left upper extremity, 4+/5. Lower extremity motor strength is 3/5. NEUROLOGIC:  Cranial nerves are grossly intact. ASSESSMENT AND PLAN:  1. Right leg weakness with workup as I noted in the hospital including MRI and Neurology evaluation. Continue with physical therapy, occupational therapy, gait and balance training for fall precaution. 2.  History of Guillain-[place]. 3.  Diabetes. 4.  Hypertension. 5.  Hyperlipidemia. 6.  Hypothyroidism, continue Synthroid and follow up routine TSH. 7.  Physical deconditioning; continue physical therapy, occupational therapy, gait and balance training for fall precaution. 8.  Flu shot, the patient cannot recall if she has received one. 9.  Advance directives completed. 10.  Depression screening, the patient has a history of depression.         MD RODO [email or web address]/[email or web address]  D:  12/30/2019 23:42  T:  12/31/2019 4:28  JOB #:  [id number]

## 2019-12-31 NOTE — ROUTINE PROCESS
Blood sugar reading 72. Patient is drowsy but responsive with some confusion. . Able to drink some cranberry juice before dinner arrives. Tremors noted to upper extremities. Incontinent care. Oxygen @ 2 liters nasal cannula.  came to assist patient with dinner. Only ate 50%. Denies pain or n/v. Call light and personal items within reach.

## 2019-12-31 NOTE — PROGRESS NOTES
Patient is alert conversed clearly, HOB elevated, Oxygen @ 2L via nasal cannula, tremors continue to BUE, Denies pain/ discomfort, had difficulty holding spoon while taking meds, Swelling to  RLE noted, incontinent care provided, Call bell and personal items within reach

## 2020-01-01 LAB
GLUCOSE BLD STRIP.AUTO-MCNC: 119 MG/DL (ref 70–110)
GLUCOSE BLD STRIP.AUTO-MCNC: 143 MG/DL (ref 70–110)
GLUCOSE BLD STRIP.AUTO-MCNC: 73 MG/DL (ref 70–110)
GLUCOSE BLD STRIP.AUTO-MCNC: 73 MG/DL (ref 70–110)

## 2020-01-01 PROCEDURE — 74011000250 HC RX REV CODE- 250: Performed by: INTERNAL MEDICINE

## 2020-01-01 PROCEDURE — 74011250637 HC RX REV CODE- 250/637: Performed by: NURSE PRACTITIONER

## 2020-01-01 PROCEDURE — 82962 GLUCOSE BLOOD TEST: CPT

## 2020-01-01 PROCEDURE — 74011250637 HC RX REV CODE- 250/637: Performed by: INTERNAL MEDICINE

## 2020-01-01 RX ADMIN — Medication 500 MG: at 18:27

## 2020-01-01 RX ADMIN — DICYCLOMINE HYDROCHLORIDE 20 MG: 10 CAPSULE ORAL at 08:58

## 2020-01-01 RX ADMIN — PROPRANOLOL HYDROCHLORIDE 40 MG: 20 TABLET ORAL at 18:27

## 2020-01-01 RX ADMIN — Medication 500 MG: at 08:58

## 2020-01-01 RX ADMIN — LEVOTHYROXINE SODIUM 50 MCG: 100 TABLET ORAL at 06:00

## 2020-01-01 RX ADMIN — TOPIRAMATE 100 MG: 100 TABLET, FILM COATED ORAL at 18:27

## 2020-01-01 RX ADMIN — PROPRANOLOL HYDROCHLORIDE 40 MG: 20 TABLET ORAL at 08:58

## 2020-01-01 RX ADMIN — BUDESONIDE 500 MCG: 0.5 SUSPENSION RESPIRATORY (INHALATION) at 08:58

## 2020-01-01 RX ADMIN — DIVALPROEX SODIUM 500 MG: 500 TABLET, DELAYED RELEASE ORAL at 08:58

## 2020-01-01 RX ADMIN — LOSARTAN POTASSIUM 100 MG: 50 TABLET, FILM COATED ORAL at 08:58

## 2020-01-01 RX ADMIN — GABAPENTIN 200 MG: 100 CAPSULE ORAL at 08:58

## 2020-01-01 RX ADMIN — GLIPIZIDE 5 MG: 5 TABLET ORAL at 08:58

## 2020-01-01 RX ADMIN — DOCUSATE SODIUM 100 MG: 100 CAPSULE, LIQUID FILLED ORAL at 08:59

## 2020-01-01 RX ADMIN — Medication 1 CAPSULE: at 08:58

## 2020-01-01 RX ADMIN — BUDESONIDE 500 MCG: 0.5 SUSPENSION RESPIRATORY (INHALATION) at 19:45

## 2020-01-01 RX ADMIN — Medication 1 TABLET: at 08:59

## 2020-01-01 RX ADMIN — ATORVASTATIN CALCIUM 40 MG: 40 TABLET, FILM COATED ORAL at 22:03

## 2020-01-01 RX ADMIN — GABAPENTIN 200 MG: 100 CAPSULE ORAL at 18:27

## 2020-01-01 RX ADMIN — DICYCLOMINE HYDROCHLORIDE 20 MG: 10 CAPSULE ORAL at 22:02

## 2020-01-01 RX ADMIN — PANTOPRAZOLE SODIUM 40 MG: 40 TABLET, DELAYED RELEASE ORAL at 08:58

## 2020-01-01 RX ADMIN — TOPIRAMATE 100 MG: 100 TABLET, FILM COATED ORAL at 08:58

## 2020-01-01 RX ADMIN — ARFORMOTEROL TARTRATE 15 MCG: 15 SOLUTION RESPIRATORY (INHALATION) at 19:45

## 2020-01-01 RX ADMIN — CHLORTHALIDONE 25 MG: 25 TABLET ORAL at 08:58

## 2020-01-01 RX ADMIN — DICYCLOMINE HYDROCHLORIDE 20 MG: 10 CAPSULE ORAL at 18:27

## 2020-01-01 RX ADMIN — ARFORMOTEROL TARTRATE 15 MCG: 15 SOLUTION RESPIRATORY (INHALATION) at 08:58

## 2020-01-01 RX ADMIN — CITALOPRAM HYDROBROMIDE 40 MG: 20 TABLET ORAL at 22:03

## 2020-01-01 RX ADMIN — POLYETHYLENE GLYCOL 3350 17 G: 17 POWDER, FOR SOLUTION ORAL at 08:58

## 2020-01-01 NOTE — ROUTINE PROCESS
@2400 Patient  in bed  with eyes closed. Appears asleep.02 @ 2L/nc on HOB up. Allan upper SR'S up and call bell in reach for safety. Bed check exit alarm on. Has periodic confusion. 0500 Incontinent of B/B. Kept clean and dry. Turned and repositioned in bed.

## 2020-01-01 NOTE — ROUTINE PROCESS
Sat up in wheelchair in dinning room with spouse. Patient had puzzle books worked on them briefly. Patient has excessive facial hair, questioned spouse if she used any particular razor at home, he stated she has a electric razor, I requested he bring in, he replied what for, I explained so we can clean up her facial hair, he said why bother it comes right back. Explained we would like to help her feel better and making her appearance more positive may help, he stated I don't think so. Nasal oxygen at 2 liters, no distress notes. Declined pain.

## 2020-01-01 NOTE — ROUTINE PROCESS
Bedside and Verbal shift change report given to LILLIAN Crawford RN (oncoming nurse) by Radhames Marina RN (offgoing nurse). Report included the following information SBAR, Kardex and Recent Results.

## 2020-01-01 NOTE — PROGRESS NOTES
completed follow up visit with patient in room 3102 and a Spiritual assessment of patient was done . Patient was having breakfast when I cam e by and she reported she was doing what she could to continue getting better.  Chaplains will continue to follow and will provide pastoral care on an as needed/requested basis    Chaplain Jorge Luis Valentine   Board Certified [facility]   [phone]

## 2020-01-02 LAB
GLUCOSE BLD STRIP.AUTO-MCNC: 134 MG/DL (ref 70–110)
GLUCOSE BLD STRIP.AUTO-MCNC: 140 MG/DL (ref 70–110)
GLUCOSE BLD STRIP.AUTO-MCNC: 60 MG/DL (ref 70–110)
GLUCOSE BLD STRIP.AUTO-MCNC: 68 MG/DL (ref 70–110)
GLUCOSE BLD STRIP.AUTO-MCNC: 72 MG/DL (ref 70–110)

## 2020-01-02 PROCEDURE — 74011250637 HC RX REV CODE- 250/637: Performed by: NURSE PRACTITIONER

## 2020-01-02 PROCEDURE — 74011000250 HC RX REV CODE- 250: Performed by: INTERNAL MEDICINE

## 2020-01-02 PROCEDURE — 74011250637 HC RX REV CODE- 250/637: Performed by: INTERNAL MEDICINE

## 2020-01-02 PROCEDURE — 82962 GLUCOSE BLOOD TEST: CPT

## 2020-01-02 RX ADMIN — PANTOPRAZOLE SODIUM 40 MG: 40 TABLET, DELAYED RELEASE ORAL at 09:27

## 2020-01-02 RX ADMIN — ARFORMOTEROL TARTRATE 15 MCG: 15 SOLUTION RESPIRATORY (INHALATION) at 22:43

## 2020-01-02 RX ADMIN — ARFORMOTEROL TARTRATE 15 MCG: 15 SOLUTION RESPIRATORY (INHALATION) at 09:27

## 2020-01-02 RX ADMIN — Medication 1 TABLET: at 09:26

## 2020-01-02 RX ADMIN — DICYCLOMINE HYDROCHLORIDE 20 MG: 10 CAPSULE ORAL at 17:26

## 2020-01-02 RX ADMIN — BUDESONIDE 500 MCG: 0.5 SUSPENSION RESPIRATORY (INHALATION) at 09:27

## 2020-01-02 RX ADMIN — DICYCLOMINE HYDROCHLORIDE 20 MG: 10 CAPSULE ORAL at 09:26

## 2020-01-02 RX ADMIN — TOPIRAMATE 100 MG: 100 TABLET, FILM COATED ORAL at 09:26

## 2020-01-02 RX ADMIN — DIVALPROEX SODIUM 500 MG: 500 TABLET, DELAYED RELEASE ORAL at 09:26

## 2020-01-02 RX ADMIN — DOCUSATE SODIUM 100 MG: 100 CAPSULE, LIQUID FILLED ORAL at 09:27

## 2020-01-02 RX ADMIN — PROPRANOLOL HYDROCHLORIDE 40 MG: 20 TABLET ORAL at 09:26

## 2020-01-02 RX ADMIN — DIVALPROEX SODIUM 1250 MG: 500 TABLET, DELAYED RELEASE ORAL at 22:45

## 2020-01-02 RX ADMIN — GABAPENTIN 200 MG: 100 CAPSULE ORAL at 17:26

## 2020-01-02 RX ADMIN — LOSARTAN POTASSIUM 100 MG: 50 TABLET, FILM COATED ORAL at 09:26

## 2020-01-02 RX ADMIN — ATORVASTATIN CALCIUM 40 MG: 40 TABLET, FILM COATED ORAL at 22:43

## 2020-01-02 RX ADMIN — GLIPIZIDE 5 MG: 5 TABLET ORAL at 09:27

## 2020-01-02 RX ADMIN — CITALOPRAM HYDROBROMIDE 40 MG: 20 TABLET ORAL at 22:44

## 2020-01-02 RX ADMIN — BUDESONIDE 500 MCG: 0.5 SUSPENSION RESPIRATORY (INHALATION) at 22:43

## 2020-01-02 RX ADMIN — PROPRANOLOL HYDROCHLORIDE 40 MG: 20 TABLET ORAL at 17:27

## 2020-01-02 RX ADMIN — Medication 500 MG: at 17:26

## 2020-01-02 RX ADMIN — Medication 500 MG: at 09:26

## 2020-01-02 RX ADMIN — POLYETHYLENE GLYCOL 3350 17 G: 17 POWDER, FOR SOLUTION ORAL at 09:27

## 2020-01-02 RX ADMIN — Medication 1 CAPSULE: at 09:26

## 2020-01-02 RX ADMIN — DICYCLOMINE HYDROCHLORIDE 20 MG: 10 CAPSULE ORAL at 22:44

## 2020-01-02 RX ADMIN — LEVOTHYROXINE SODIUM 50 MCG: 100 TABLET ORAL at 06:01

## 2020-01-02 RX ADMIN — GABAPENTIN 200 MG: 100 CAPSULE ORAL at 09:26

## 2020-01-02 RX ADMIN — TOPIRAMATE 100 MG: 100 TABLET, FILM COATED ORAL at 17:27

## 2020-01-02 NOTE — PROGRESS NOTES
[facility]    Daily progress Note    Patient: Radha Rockwell MRN: [id number]  CSN: [id number]    YOB: 1950  Age: 78 y.o. Sex: female    DOA: 12/27/2019 LOS:  LOS: 6 days                    Subjective:     CC: Weakness    Ms. Antonio Ortiz is a 78 yr old female, patient with recent hospitalization from 12/22-12/27. Patient   Had complain of right leg pain and decrease sensation. Patient was seen by neurologist and she has MRI of head and Cervical Spine which was unremarkable. Per neurologist less likely [address] She was seen PT/OT and they recommended rehab. Patient with tremor and weakness with some improvement. They recommended for patient to follow up with psychiatrist and neurologist OP. On examination, noted that patient is sleepy but easily aroused. No fever or chills. Discussion with patients , plan of care discussed, all questions were answered. 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 FAMILY hx: CAD and [place]    SOCIAL hx: discussed with patient and  she doesn't smoke of drink. ROS: discussion with patients  and nursing. No fever or chills. No weight loss or headache. No neck pain or sore throat. No chest pain or palpitation. No shortness of breath or cough. No nausea, vomiting, or diarrhea. No dysuria or polyuria. No back pain, occasional leg pain. No heat or cold intolerance. No anxiety or depression.   Objective:      Visit Vitals  /88   Pulse 90   Temp 98 °F (36.7 °C)   Resp 18   Ht 5' 4\" (1.626 m)   Wt 104.4 kg (230 lb 3.2 oz)   SpO2 97%   Breastfeeding No   BMI 39.51 kg/m²       Physical Exam:  General appearance: , cooperative, no distress, appears stated age, sleepy easily aroused  HEENT: negative  Neck: supple, symmetrical, trachea midline, no adenopathy, thyroid: not enlarged, symmetric, no tenderness/mass/nodules, no carotid bruit and no JVD  Lungs: clear to auscultation bilaterally  Heart: regular rate and rhythm, S1, S2 normal, no murmur, click, rub or gallop  Abdomen: soft, non-tender. Bowel sounds normal. No masses,  no organomegaly  Pulses: 2+ and symmetric  Skin: Skin color, texture, turgor normal. No rashes or lesions      Intake and Output:  Current Shift:  No intake/output data recorded. Last three shifts:  No intake/output data recorded.     Recent Results (from the past 24 hour(s))   GLUCOSE, POC    Collection Time: 01/01/20  4:35 PM   Result Value Ref Range    Glucose (POC) 73 70 - 110 mg/dL   GLUCOSE, POC    Collection Time: 01/01/20  9:19 PM   Result Value Ref Range    Glucose (POC) 143 (H) 70 - 110 mg/dL   GLUCOSE, POC    Collection Time: 01/02/20  5:13 AM   Result Value Ref Range    Glucose (POC) 134 (H) 70 - 110 mg/dL   GLUCOSE, POC    Collection Time: 01/02/20 11:48 AM   Result Value Ref Range    Glucose (POC) 72 70 - 110 mg/dL         Current Facility-Administered Medications:     divalproex DR (DEPAKOTE) tablet 1,250 mg, 1,250 mg, Oral, QHS, Luis Dow MD    docusate sodium (COLACE) capsule 100 mg, 100 mg, Oral, DAILY, Luis Dow MD, 100 mg at 01/02/20 2871    divalproex DR (DEPAKOTE) tablet 500 mg, 500 mg, Oral, DAILY, Gus Debbie A, NP, 500 mg at 01/02/20 1107    gabapentin (NEURONTIN) capsule 200 mg, 200 mg, Oral, BID, Gus, Debbie A, NP, 200 mg at 01/02/20 0926    atorvastatin (LIPITOR) tablet 40 mg, 40 mg, Oral, QHS, Gus, Debbie A, NP, 40 mg at 01/01/20 2203    dicyclomine (BENTYL) capsule 20 mg, 20 mg, Oral, TID, Rios Brandon A, NP, 20 mg at 01/02/20 0926    ergocalciferol capsule 50,000 Units, 50,000 Units, Oral, Q7D, Debbie Weber NP, 50,000 Units at 12/28/19 0909    glipiZIDE (GLUCOTROL) tablet 5 mg, 5 mg, Oral, ACB, Debbie Weber NP, 5 mg at 01/02/20 5444    citalopram (CELEXA) tablet 40 mg, 40 mg, Oral, QHS, Debbie Weber NP, 40 mg at 01/01/20 2203    methylcellulose (CITRUCEL) tablet 500 mg, 1 Tab, Oral, BID, Blanka Yemifelicia, Debbie COLEMAN NP, 500 mg at 01/02/20 1992    levothyroxine (SYNTHROID) tablet 50 mcg, 50 mcg, Oral, 6am, Debbie Weber NP, 50 mcg at 01/02/20 0601    calcium-vitamin D (OS-MARICEL) 500 mg-200 unit tablet, 1 Tab, Oral, DAILY WITH BREAKFAST, Clemente COLEMAN NP, 1 Tab at 01/02/20 0947    topiramate (TOPAMAX) tablet 100 mg, 100 mg, Oral, BID WITH MEALS, Debbie Weber NP, 100 mg at 01/02/20 0926    chlorthalidone (HYGROTEN) tablet 25 mg, 25 mg, Oral, EVERY OTHER DAY, Debbie Weber NP, 25 mg at 01/01/20 0858    losartan (COZAAR) tablet 100 mg, 100 mg, Oral, DAILY, Debbie Weber NP, 100 mg at 01/02/20 0926    polyethylene glycol (MIRALAX) packet 17 g, 17 g, Oral, DAILY, Neel Barron MD, 17 g at 01/02/20 0927    pantoprazole (PROTONIX) tablet 40 mg, 40 mg, Oral, ACB, Neel Barron MD, 40 mg at 01/02/20 0927    arformoterol (BROVANA) neb solution 15 mcg, 15 mcg, Nebulization, BID RT, 15 mcg at 01/02/20 0927 **AND** budesonide (PULMICORT) 500 mcg/2 ml nebulizer suspension, 500 mcg, Nebulization, BID RT, Neel Barron MD, 500 mcg at 01/02/20 0927    iron polysacch complex-b12-fa (FERREX 150 FORTE) capsule 1 Cap (Pawel Forte generic), 1 Cap, Oral, DAILY, Luis Dow MD, 1 Cap at 01/02/20 0926    albuterol-ipratropium (DUO-NEB) 2.5 MG-0.5 MG/3 ML, 3 mL, Nebulization, QID PRN, Neel Barron MD    EPINEPHrine (EPIPEN) injection 0.3 mg (Patient Supplied), 0.3 mg, IntraMUSCular, PRN, Neel Barron MD    insulin lispro (HUMALOG) injection, , SubCUTAneous, AC&HS, Neel Barron MD, Stopped at 12/30/19 0938    propranolol (INDERAL) tablet 40 mg, 40 mg, Oral, BID, Clemente COLEMAN NP, 40 mg at 01/02/20 7724    Lab Results   Component Value Date/Time    Glucose 70 (L) 12/30/2019 04:30 AM    Glucose 84 12/27/2019 05:15 AM    Glucose 121 (H) 12/24/2019 06:00 AM    Glucose 126 (H) 12/22/2019 08:55 PM    Glucose 100 (H) 12/01/2019 11:50 AM        Assessment/Plan   Hx of Leg pain right/weakness: Continue PT/OT, she was seen by Neurologist and had work up. Patient to follow up with her Neurologist OP. Continue with Gabapentin. Hx of Guillain-[place] Syndrome    GERD; continue PPI    DM:  Noted low BS in 60s, she is asymptomatic, will decrease her Glipizide, may need to eb held while at facility, will monitor.      Seizure disorder: continue Depakote, follow up with her Neurologist OP     Hyperlipidemia: continue with statin    Hypothyroidism: continue synthroid     HTN: BP stable, continue current meds and monitor     Keira Castillo NP  1/2/2020, 1:32 PM

## 2020-01-02 NOTE — ROUTINE PROCESS
Called Dr. Colon Appl office for follow up for patient per RNP order. They are booked out appointment for 5/19/2020 @ 1400.

## 2020-01-02 NOTE — ROUTINE PROCESS
Patient no longer has facial hair  reported he shaved it last night. Patient  Smiled when I told her she looked beautiful.  has been in and out of room. Patient more alert but confused this AM, this afternoon very sleepy. RNP made aware will continue to monitor. Nasal oxygen at 2 liters per nasal canula.

## 2020-01-02 NOTE — ROUTINE PROCESS
Bedside and Verbal shift change report given to [address] (oncoming nurse) by Nayely Frye RN (offgoing nurse). Report included the following information SBAR, Kardex and Med Rec Status.

## 2020-01-02 NOTE — ROUTINE PROCESS
2400 Patient vandana. rt/verbal but with period of confusion. Icontinent of B/B. Kept clean and dry. Turned and repositioned to sides . Allan heels off bed surfaces. 02 @2l/nc on. Resp non labored. Mabeline Sink HOB up. Call bell in reach and allan upper SR'S up for safety. 0440 No seizures activity noted.

## 2020-01-02 NOTE — ROUTINE PROCESS
Refused lunch tray, patient accepting oral fluids and provided for patient. Awakens easily from nap.

## 2020-01-03 LAB
ANION GAP SERPL CALC-SCNC: 3 MMOL/L (ref 3–18)
BUN SERPL-MCNC: 29 MG/DL (ref 7–18)
BUN/CREAT SERPL: 32 (ref 12–20)
CALCIUM SERPL-MCNC: 8.8 MG/DL (ref 8.5–10.1)
CHLORIDE SERPL-SCNC: 104 MMOL/L (ref 100–111)
CO2 SERPL-SCNC: 33 MMOL/L (ref 21–32)
CREAT SERPL-MCNC: 0.91 MG/DL (ref 0.6–1.3)
ERYTHROCYTE [DISTWIDTH] IN BLOOD BY AUTOMATED COUNT: 16.1 % (ref 11.6–14.5)
GLUCOSE BLD STRIP.AUTO-MCNC: 121 MG/DL (ref 70–110)
GLUCOSE BLD STRIP.AUTO-MCNC: 160 MG/DL (ref 70–110)
GLUCOSE BLD STRIP.AUTO-MCNC: 48 MG/DL (ref 70–110)
GLUCOSE BLD STRIP.AUTO-MCNC: 52 MG/DL (ref 70–110)
GLUCOSE BLD STRIP.AUTO-MCNC: 75 MG/DL (ref 70–110)
GLUCOSE BLD STRIP.AUTO-MCNC: 91 MG/DL (ref 70–110)
GLUCOSE SERPL-MCNC: 78 MG/DL (ref 74–99)
HCT VFR BLD AUTO: 42 % (ref 35–45)
HGB BLD-MCNC: 13.4 G/DL (ref 12–16)
MCH RBC QN AUTO: 32.2 PG (ref 24–34)
MCHC RBC AUTO-ENTMCNC: 31.9 G/DL (ref 31–37)
MCV RBC AUTO: 101 FL (ref 74–97)
PLATELET # BLD AUTO: 122 K/UL (ref 135–420)
PMV BLD AUTO: 10.2 FL (ref 9.2–11.8)
POTASSIUM SERPL-SCNC: 3.4 MMOL/L (ref 3.5–5.5)
RBC # BLD AUTO: 4.16 M/UL (ref 4.2–5.3)
SODIUM SERPL-SCNC: 140 MMOL/L (ref 136–145)
T4 FREE SERPL-MCNC: 0.9 NG/DL (ref 0.7–1.5)
VALPROATE SERPL-MCNC: 79 UG/ML (ref 50–100)
WBC # BLD AUTO: 4.1 K/UL (ref 4.6–13.2)

## 2020-01-03 PROCEDURE — 82962 GLUCOSE BLOOD TEST: CPT

## 2020-01-03 PROCEDURE — 74011000250 HC RX REV CODE- 250: Performed by: INTERNAL MEDICINE

## 2020-01-03 PROCEDURE — 36415 COLL VENOUS BLD VENIPUNCTURE: CPT

## 2020-01-03 PROCEDURE — 74011636637 HC RX REV CODE- 636/637: Performed by: INTERNAL MEDICINE

## 2020-01-03 PROCEDURE — 80048 BASIC METABOLIC PNL TOTAL CA: CPT

## 2020-01-03 PROCEDURE — 84439 ASSAY OF FREE THYROXINE: CPT

## 2020-01-03 PROCEDURE — 85027 COMPLETE CBC AUTOMATED: CPT

## 2020-01-03 PROCEDURE — 74011250637 HC RX REV CODE- 250/637: Performed by: NURSE PRACTITIONER

## 2020-01-03 PROCEDURE — 74011250637 HC RX REV CODE- 250/637: Performed by: INTERNAL MEDICINE

## 2020-01-03 PROCEDURE — 83520 IMMUNOASSAY QUANT NOS NONAB: CPT

## 2020-01-03 PROCEDURE — 80164 ASSAY DIPROPYLACETIC ACD TOT: CPT

## 2020-01-03 RX ORDER — GLIPIZIDE 5 MG/1
2.5 TABLET ORAL
Status: DISCONTINUED | OUTPATIENT
Start: 2020-01-04 | End: 2020-01-06

## 2020-01-03 RX ADMIN — Medication 500 MG: at 19:11

## 2020-01-03 RX ADMIN — INSULIN LISPRO 160 UNITS: 100 INJECTION, SOLUTION INTRAVENOUS; SUBCUTANEOUS at 22:45

## 2020-01-03 RX ADMIN — CITALOPRAM HYDROBROMIDE 40 MG: 20 TABLET ORAL at 22:45

## 2020-01-03 RX ADMIN — BUDESONIDE 500 MCG: 0.5 SUSPENSION RESPIRATORY (INHALATION) at 09:43

## 2020-01-03 RX ADMIN — BUDESONIDE 500 MCG: 0.5 SUSPENSION RESPIRATORY (INHALATION) at 19:09

## 2020-01-03 RX ADMIN — Medication 500 MG: at 09:43

## 2020-01-03 RX ADMIN — DOCUSATE SODIUM 100 MG: 100 CAPSULE, LIQUID FILLED ORAL at 09:43

## 2020-01-03 RX ADMIN — DICYCLOMINE HYDROCHLORIDE 20 MG: 10 CAPSULE ORAL at 19:10

## 2020-01-03 RX ADMIN — DIVALPROEX SODIUM 1250 MG: 500 TABLET, DELAYED RELEASE ORAL at 22:45

## 2020-01-03 RX ADMIN — ARFORMOTEROL TARTRATE 15 MCG: 15 SOLUTION RESPIRATORY (INHALATION) at 19:09

## 2020-01-03 RX ADMIN — TOPIRAMATE 100 MG: 100 TABLET, FILM COATED ORAL at 19:12

## 2020-01-03 RX ADMIN — Medication 1 CAPSULE: at 09:43

## 2020-01-03 RX ADMIN — Medication 1 TABLET: at 09:43

## 2020-01-03 RX ADMIN — LOSARTAN POTASSIUM 100 MG: 50 TABLET, FILM COATED ORAL at 09:43

## 2020-01-03 RX ADMIN — GABAPENTIN 200 MG: 100 CAPSULE ORAL at 09:43

## 2020-01-03 RX ADMIN — PROPRANOLOL HYDROCHLORIDE 40 MG: 20 TABLET ORAL at 09:43

## 2020-01-03 RX ADMIN — PROPRANOLOL HYDROCHLORIDE 40 MG: 20 TABLET ORAL at 19:12

## 2020-01-03 RX ADMIN — DICYCLOMINE HYDROCHLORIDE 20 MG: 10 CAPSULE ORAL at 22:45

## 2020-01-03 RX ADMIN — TOPIRAMATE 100 MG: 100 TABLET, FILM COATED ORAL at 09:43

## 2020-01-03 RX ADMIN — LEVOTHYROXINE SODIUM 50 MCG: 100 TABLET ORAL at 06:00

## 2020-01-03 RX ADMIN — DIVALPROEX SODIUM 500 MG: 500 TABLET, DELAYED RELEASE ORAL at 09:42

## 2020-01-03 RX ADMIN — DICYCLOMINE HYDROCHLORIDE 20 MG: 10 CAPSULE ORAL at 09:43

## 2020-01-03 RX ADMIN — ARFORMOTEROL TARTRATE 15 MCG: 15 SOLUTION RESPIRATORY (INHALATION) at 09:43

## 2020-01-03 RX ADMIN — GABAPENTIN 200 MG: 100 CAPSULE ORAL at 19:10

## 2020-01-03 RX ADMIN — CHLORTHALIDONE 25 MG: 25 TABLET ORAL at 09:42

## 2020-01-03 RX ADMIN — GLIPIZIDE 5 MG: 5 TABLET ORAL at 09:42

## 2020-01-03 RX ADMIN — PANTOPRAZOLE SODIUM 40 MG: 40 TABLET, DELAYED RELEASE ORAL at 09:43

## 2020-01-03 RX ADMIN — ATORVASTATIN CALCIUM 40 MG: 40 TABLET, FILM COATED ORAL at 22:45

## 2020-01-03 NOTE — ROUTINE PROCESS
Patient was noted to have chewed food pocketed in mouth oral care provided and removed. HOB elevated 45 degrees.

## 2020-01-03 NOTE — ROUTINE PROCESS
Bedside and Verbal shift change report given to [address] (oncoming nurse) by Darek Grubbs RN (offgoing nurse). Report included the following information SBAR, Kardex and Recent Results.

## 2020-01-03 NOTE — ROUTINE PROCESS
Sat up in wheelchair for breakfast, ate 75%, Alert to person.  in and out to visit. Ate poorly for lunch, currently back in bed, Nasal oxygen at 2 liters, No distress noted.

## 2020-01-03 NOTE — ROUTINE PROCESS
2400 Patient in bed  With 02 @2l/nc,resp non labored. HOB up.has periodic confusion. Call bell in reach and liberty upper SR'S up for safety. 0100 Incontinent of B/B . Kept clean and dry. Turned and repositioned to sides Q2h.  0430 Rested fairly.

## 2020-01-04 LAB
GLUCOSE BLD STRIP.AUTO-MCNC: 106 MG/DL (ref 70–110)
GLUCOSE BLD STRIP.AUTO-MCNC: 136 MG/DL (ref 70–110)
GLUCOSE BLD STRIP.AUTO-MCNC: 142 MG/DL (ref 70–110)
GLUCOSE BLD STRIP.AUTO-MCNC: 147 MG/DL (ref 70–110)
TSH RECEP AB SER-ACNC: <1.1 IU/L (ref 0–1.75)

## 2020-01-04 PROCEDURE — 77030029684 HC NEB SM VOL KT MONA -A

## 2020-01-04 PROCEDURE — 74011250637 HC RX REV CODE- 250/637: Performed by: NURSE PRACTITIONER

## 2020-01-04 PROCEDURE — 74011000250 HC RX REV CODE- 250: Performed by: INTERNAL MEDICINE

## 2020-01-04 PROCEDURE — 74011250637 HC RX REV CODE- 250/637: Performed by: INTERNAL MEDICINE

## 2020-01-04 PROCEDURE — 82962 GLUCOSE BLOOD TEST: CPT

## 2020-01-04 RX ADMIN — DOCUSATE SODIUM 100 MG: 100 CAPSULE, LIQUID FILLED ORAL at 09:58

## 2020-01-04 RX ADMIN — GABAPENTIN 200 MG: 100 CAPSULE ORAL at 17:43

## 2020-01-04 RX ADMIN — BUDESONIDE 500 MCG: 0.5 SUSPENSION RESPIRATORY (INHALATION) at 09:57

## 2020-01-04 RX ADMIN — TOPIRAMATE 100 MG: 100 TABLET, FILM COATED ORAL at 09:58

## 2020-01-04 RX ADMIN — Medication 1 TABLET: at 09:58

## 2020-01-04 RX ADMIN — POLYETHYLENE GLYCOL 3350 17 G: 17 POWDER, FOR SOLUTION ORAL at 09:00

## 2020-01-04 RX ADMIN — ATORVASTATIN CALCIUM 40 MG: 40 TABLET, FILM COATED ORAL at 21:19

## 2020-01-04 RX ADMIN — LEVOTHYROXINE SODIUM 50 MCG: 100 TABLET ORAL at 05:08

## 2020-01-04 RX ADMIN — PROPRANOLOL HYDROCHLORIDE 40 MG: 20 TABLET ORAL at 17:43

## 2020-01-04 RX ADMIN — ARFORMOTEROL TARTRATE 15 MCG: 15 SOLUTION RESPIRATORY (INHALATION) at 09:57

## 2020-01-04 RX ADMIN — PROPRANOLOL HYDROCHLORIDE 40 MG: 20 TABLET ORAL at 09:58

## 2020-01-04 RX ADMIN — PANTOPRAZOLE SODIUM 40 MG: 40 TABLET, DELAYED RELEASE ORAL at 09:58

## 2020-01-04 RX ADMIN — CITALOPRAM HYDROBROMIDE 40 MG: 20 TABLET ORAL at 21:18

## 2020-01-04 RX ADMIN — GLIPIZIDE 2.5 MG: 5 TABLET ORAL at 10:02

## 2020-01-04 RX ADMIN — BUDESONIDE 500 MCG: 0.5 SUSPENSION RESPIRATORY (INHALATION) at 21:18

## 2020-01-04 RX ADMIN — ARFORMOTEROL TARTRATE 15 MCG: 15 SOLUTION RESPIRATORY (INHALATION) at 21:18

## 2020-01-04 RX ADMIN — DICYCLOMINE HYDROCHLORIDE 20 MG: 10 CAPSULE ORAL at 21:19

## 2020-01-04 RX ADMIN — Medication 500 MG: at 09:58

## 2020-01-04 RX ADMIN — Medication 500 MG: at 17:43

## 2020-01-04 RX ADMIN — ERGOCALCIFEROL 50000 UNITS: 1.25 CAPSULE ORAL at 09:57

## 2020-01-04 RX ADMIN — DICYCLOMINE HYDROCHLORIDE 20 MG: 10 CAPSULE ORAL at 09:57

## 2020-01-04 RX ADMIN — DIVALPROEX SODIUM 1250 MG: 500 TABLET, DELAYED RELEASE ORAL at 21:19

## 2020-01-04 RX ADMIN — DICYCLOMINE HYDROCHLORIDE 20 MG: 10 CAPSULE ORAL at 17:43

## 2020-01-04 RX ADMIN — DIVALPROEX SODIUM 500 MG: 500 TABLET, DELAYED RELEASE ORAL at 09:58

## 2020-01-04 RX ADMIN — TOPIRAMATE 100 MG: 100 TABLET, FILM COATED ORAL at 17:43

## 2020-01-04 RX ADMIN — LOSARTAN POTASSIUM 100 MG: 50 TABLET, FILM COATED ORAL at 09:58

## 2020-01-04 RX ADMIN — Medication 1 CAPSULE: at 09:58

## 2020-01-04 RX ADMIN — GABAPENTIN 200 MG: 100 CAPSULE ORAL at 09:57

## 2020-01-04 NOTE — ROUTINE PROCESS
Pt lying in bed, oriented to person, Pt on 2 liters oxygen via nasal cannula. Pt has no obvious signs of shortness of breath or respiratory distress at this time. Pt denies any pain at this time.  at bedside.

## 2020-01-04 NOTE — ROUTINE PROCESS
Patient complaining of \"feeling foggy\", vital signs O2Sat: 95%, pulse 66, B/P 92/65 Resp. rate: 18. Patient has no other complaints at this time. Called Dr. Vivi Barahona and awaiting call back.

## 2020-01-04 NOTE — ROUTINE PROCESS
in with patient assisting with feeding. Patient is talking and responding to . Denies pain, nv or being \"foggy\". Oxygen at 2 liters nasal cannula. Incontinent care and repositioning as needed. Call light within reach.

## 2020-01-04 NOTE — ROUTINE PROCESS
Trinity from Dr Tova Euceda office says she spoke to Tavo Moore NP regarding patient's complaint of feeling \"foggy\". Debbie says to monitor patient for now and report any changes.

## 2020-01-04 NOTE — PROGRESS NOTES
Patient in bed with eyes closed easily aroused when her name was called, Oxygen @ 2L via nasal cannula , HOB elevated, No C/O pain/discomfort none observed, Call light and personal items within reach.

## 2020-01-05 LAB
GLUCOSE BLD STRIP.AUTO-MCNC: 141 MG/DL (ref 70–110)
GLUCOSE BLD STRIP.AUTO-MCNC: 59 MG/DL (ref 70–110)
GLUCOSE BLD STRIP.AUTO-MCNC: 64 MG/DL (ref 70–110)
GLUCOSE BLD STRIP.AUTO-MCNC: 65 MG/DL (ref 70–110)
GLUCOSE BLD STRIP.AUTO-MCNC: 88 MG/DL (ref 70–110)
GLUCOSE BLD STRIP.AUTO-MCNC: 96 MG/DL (ref 70–110)

## 2020-01-05 PROCEDURE — 74011250637 HC RX REV CODE- 250/637: Performed by: INTERNAL MEDICINE

## 2020-01-05 PROCEDURE — 74011000250 HC RX REV CODE- 250: Performed by: INTERNAL MEDICINE

## 2020-01-05 PROCEDURE — 74011250637 HC RX REV CODE- 250/637: Performed by: NURSE PRACTITIONER

## 2020-01-05 PROCEDURE — 82962 GLUCOSE BLOOD TEST: CPT

## 2020-01-05 RX ORDER — ADHESIVE BANDAGE
30 BANDAGE TOPICAL DAILY PRN
Status: DISCONTINUED | OUTPATIENT
Start: 2020-01-05 | End: 2020-01-05

## 2020-01-05 RX ADMIN — Medication 500 MG: at 17:25

## 2020-01-05 RX ADMIN — TOPIRAMATE 100 MG: 100 TABLET, FILM COATED ORAL at 17:25

## 2020-01-05 RX ADMIN — CHLORTHALIDONE 25 MG: 25 TABLET ORAL at 09:36

## 2020-01-05 RX ADMIN — DIVALPROEX SODIUM 1250 MG: 500 TABLET, DELAYED RELEASE ORAL at 22:06

## 2020-01-05 RX ADMIN — CITALOPRAM HYDROBROMIDE 40 MG: 20 TABLET ORAL at 22:06

## 2020-01-05 RX ADMIN — TOPIRAMATE 100 MG: 100 TABLET, FILM COATED ORAL at 09:36

## 2020-01-05 RX ADMIN — Medication 1 CAPSULE: at 09:36

## 2020-01-05 RX ADMIN — DOCUSATE SODIUM 100 MG: 100 CAPSULE, LIQUID FILLED ORAL at 09:23

## 2020-01-05 RX ADMIN — ATORVASTATIN CALCIUM 40 MG: 40 TABLET, FILM COATED ORAL at 22:06

## 2020-01-05 RX ADMIN — PROPRANOLOL HYDROCHLORIDE 40 MG: 20 TABLET ORAL at 17:25

## 2020-01-05 RX ADMIN — DIVALPROEX SODIUM 500 MG: 500 TABLET, DELAYED RELEASE ORAL at 09:36

## 2020-01-05 RX ADMIN — DICYCLOMINE HYDROCHLORIDE 20 MG: 10 CAPSULE ORAL at 17:25

## 2020-01-05 RX ADMIN — ARFORMOTEROL TARTRATE 15 MCG: 15 SOLUTION RESPIRATORY (INHALATION) at 08:00

## 2020-01-05 RX ADMIN — PANTOPRAZOLE SODIUM 40 MG: 40 TABLET, DELAYED RELEASE ORAL at 09:35

## 2020-01-05 RX ADMIN — LOSARTAN POTASSIUM 100 MG: 50 TABLET, FILM COATED ORAL at 09:36

## 2020-01-05 RX ADMIN — POLYETHYLENE GLYCOL 3350 17 G: 17 POWDER, FOR SOLUTION ORAL at 09:00

## 2020-01-05 RX ADMIN — DICYCLOMINE HYDROCHLORIDE 20 MG: 10 CAPSULE ORAL at 22:06

## 2020-01-05 RX ADMIN — LEVOTHYROXINE SODIUM 50 MCG: 100 TABLET ORAL at 06:40

## 2020-01-05 RX ADMIN — GLIPIZIDE 2.5 MG: 5 TABLET ORAL at 09:35

## 2020-01-05 RX ADMIN — BUDESONIDE 500 MCG: 0.5 SUSPENSION RESPIRATORY (INHALATION) at 08:00

## 2020-01-05 RX ADMIN — GABAPENTIN 200 MG: 100 CAPSULE ORAL at 09:35

## 2020-01-05 RX ADMIN — Medication 500 MG: at 09:36

## 2020-01-05 RX ADMIN — GABAPENTIN 200 MG: 100 CAPSULE ORAL at 17:25

## 2020-01-05 RX ADMIN — Medication 1 TABLET: at 09:36

## 2020-01-05 RX ADMIN — DICYCLOMINE HYDROCHLORIDE 20 MG: 10 CAPSULE ORAL at 09:36

## 2020-01-05 RX ADMIN — ARFORMOTEROL TARTRATE 15 MCG: 15 SOLUTION RESPIRATORY (INHALATION) at 20:30

## 2020-01-05 RX ADMIN — BUDESONIDE 500 MCG: 0.5 SUSPENSION RESPIRATORY (INHALATION) at 20:30

## 2020-01-05 RX ADMIN — PROPRANOLOL HYDROCHLORIDE 40 MG: 20 TABLET ORAL at 09:35

## 2020-01-05 NOTE — ROUTINE PROCESS
Bedside and Verbal shift change report given to KACEY Washington (oncoming nurse) by Екатерина García LPN (offgoing nurse). Report included the following information SBAR, Kardex and MAR.

## 2020-01-05 NOTE — ROUTINE PROCESS
Pt sitting upright in bed, Responds to name called,  at bedside. Pt has no signs of obvious distress at this time.

## 2020-01-05 NOTE — ROUTINE PROCESS
Patient resting in bed with O2 @ 2L/min via nasal canula. No SOB noted. HOB elevated. Incontinent care provided as needed. Call bell within reach.

## 2020-01-05 NOTE — ROUTINE PROCESS
Bedside and Verbal shift change report given to Deep Yousif LPN (oncoming nurse) by Jamshid Alicea LPN (offgoing nurse). Report included the following information SBAR, Kardex and MAR.

## 2020-01-06 ENCOUNTER — HOSPITAL ENCOUNTER (OUTPATIENT)
Dept: GENERAL RADIOLOGY | Age: 70
Discharge: HOME | End: 2020-01-06
Attending: INTERNAL MEDICINE
Payer: MEDICARE

## 2020-01-06 ENCOUNTER — HOSPITAL ENCOUNTER (OUTPATIENT)
Dept: PHYSICAL THERAPY | Age: 70
Discharge: HOME | End: 2020-01-06
Attending: INTERNAL MEDICINE
Payer: MEDICARE

## 2020-01-06 DIAGNOSIS — R13.12 OROPHARYNGEAL DYSPHAGIA: ICD-10-CM

## 2020-01-06 LAB
ANION GAP SERPL CALC-SCNC: 7 MMOL/L (ref 3–18)
BASOPHILS # BLD: 0 K/UL (ref 0–0.1)
BASOPHILS NFR BLD: 0 % (ref 0–2)
BUN SERPL-MCNC: 35 MG/DL (ref 7–18)
BUN/CREAT SERPL: 40 (ref 12–20)
CALCIUM SERPL-MCNC: 8.4 MG/DL (ref 8.5–10.1)
CHLORIDE SERPL-SCNC: 100 MMOL/L (ref 100–111)
CO2 SERPL-SCNC: 35 MMOL/L (ref 21–32)
CREAT SERPL-MCNC: 0.88 MG/DL (ref 0.6–1.3)
DIFFERENTIAL METHOD BLD: ABNORMAL
EOSINOPHIL # BLD: 0 K/UL (ref 0–0.4)
EOSINOPHIL NFR BLD: 1 % (ref 0–5)
ERYTHROCYTE [DISTWIDTH] IN BLOOD BY AUTOMATED COUNT: 15.6 % (ref 11.6–14.5)
GLUCOSE BLD STRIP.AUTO-MCNC: 104 MG/DL (ref 70–110)
GLUCOSE BLD STRIP.AUTO-MCNC: 120 MG/DL (ref 70–110)
GLUCOSE BLD STRIP.AUTO-MCNC: 86 MG/DL (ref 70–110)
GLUCOSE BLD STRIP.AUTO-MCNC: 96 MG/DL (ref 70–110)
GLUCOSE BLD STRIP.AUTO-MCNC: 97 MG/DL (ref 70–110)
GLUCOSE SERPL-MCNC: 84 MG/DL (ref 74–99)
HCT VFR BLD AUTO: 39.1 % (ref 35–45)
HGB BLD-MCNC: 12.5 G/DL (ref 12–16)
LYMPHOCYTES # BLD: 1.6 K/UL (ref 0.9–3.6)
LYMPHOCYTES NFR BLD: 39 % (ref 21–52)
MCH RBC QN AUTO: 31.9 PG (ref 24–34)
MCHC RBC AUTO-ENTMCNC: 32 G/DL (ref 31–37)
MCV RBC AUTO: 99.7 FL (ref 74–97)
MONOCYTES # BLD: 0.5 K/UL (ref 0.05–1.2)
MONOCYTES NFR BLD: 12 % (ref 3–10)
NEUTS SEG # BLD: 2.1 K/UL (ref 1.8–8)
NEUTS SEG NFR BLD: 48 % (ref 40–73)
PLATELET # BLD AUTO: 128 K/UL (ref 135–420)
PMV BLD AUTO: 10.1 FL (ref 9.2–11.8)
POTASSIUM SERPL-SCNC: 3.2 MMOL/L (ref 3.5–5.5)
RBC # BLD AUTO: 3.92 M/UL (ref 4.2–5.3)
SODIUM SERPL-SCNC: 142 MMOL/L (ref 136–145)
WBC # BLD AUTO: 4.2 K/UL (ref 4.6–13.2)

## 2020-01-06 PROCEDURE — 74011000250 HC RX REV CODE- 250: Performed by: INTERNAL MEDICINE

## 2020-01-06 PROCEDURE — 74011250637 HC RX REV CODE- 250/637: Performed by: NURSE PRACTITIONER

## 2020-01-06 PROCEDURE — 36415 COLL VENOUS BLD VENIPUNCTURE: CPT

## 2020-01-06 PROCEDURE — 74011250637 HC RX REV CODE- 250/637: Performed by: INTERNAL MEDICINE

## 2020-01-06 PROCEDURE — 82962 GLUCOSE BLOOD TEST: CPT

## 2020-01-06 PROCEDURE — 74230 X-RAY XM SWLNG FUNCJ C+: CPT

## 2020-01-06 PROCEDURE — 85025 COMPLETE CBC W/AUTO DIFF WBC: CPT

## 2020-01-06 PROCEDURE — 92611 MOTION FLUOROSCOPY/SWALLOW: CPT

## 2020-01-06 PROCEDURE — 74011000255 HC RX REV CODE- 255: Performed by: INTERNAL MEDICINE

## 2020-01-06 PROCEDURE — 80048 BASIC METABOLIC PNL TOTAL CA: CPT

## 2020-01-06 RX ORDER — TOPIRAMATE 100 MG/1
100 TABLET, FILM COATED ORAL 2 TIMES DAILY WITH MEALS
Status: CANCELLED | OUTPATIENT
Start: 2020-01-06

## 2020-01-06 RX ORDER — ACETAMINOPHEN 325 MG/1
650 TABLET ORAL
Status: DISCONTINUED | OUTPATIENT
Start: 2020-01-06 | End: 2020-01-23 | Stop reason: HOSPADM

## 2020-01-06 RX ORDER — POTASSIUM CHLORIDE 20 MEQ/1
40 TABLET, EXTENDED RELEASE ORAL
Status: COMPLETED | OUTPATIENT
Start: 2020-01-06 | End: 2020-01-06

## 2020-01-06 RX ORDER — LOSARTAN POTASSIUM 50 MG/1
100 TABLET ORAL DAILY
Status: CANCELLED | OUTPATIENT
Start: 2020-01-07

## 2020-01-06 RX ADMIN — LOSARTAN POTASSIUM 100 MG: 50 TABLET, FILM COATED ORAL at 08:15

## 2020-01-06 RX ADMIN — CITALOPRAM HYDROBROMIDE 40 MG: 20 TABLET ORAL at 21:31

## 2020-01-06 RX ADMIN — BARIUM SULFATE 700 MG: 700 TABLET ORAL at 11:22

## 2020-01-06 RX ADMIN — ATORVASTATIN CALCIUM 40 MG: 40 TABLET, FILM COATED ORAL at 21:31

## 2020-01-06 RX ADMIN — TOPIRAMATE 100 MG: 100 TABLET, FILM COATED ORAL at 17:21

## 2020-01-06 RX ADMIN — TOPIRAMATE 100 MG: 100 TABLET, FILM COATED ORAL at 08:17

## 2020-01-06 RX ADMIN — DICYCLOMINE HYDROCHLORIDE 20 MG: 10 CAPSULE ORAL at 17:20

## 2020-01-06 RX ADMIN — ARFORMOTEROL TARTRATE 15 MCG: 15 SOLUTION RESPIRATORY (INHALATION) at 21:05

## 2020-01-06 RX ADMIN — PANTOPRAZOLE SODIUM 40 MG: 40 TABLET, DELAYED RELEASE ORAL at 08:15

## 2020-01-06 RX ADMIN — LEVOTHYROXINE SODIUM 50 MCG: 100 TABLET ORAL at 06:04

## 2020-01-06 RX ADMIN — DICYCLOMINE HYDROCHLORIDE 20 MG: 10 CAPSULE ORAL at 08:15

## 2020-01-06 RX ADMIN — DIVALPROEX SODIUM 1250 MG: 500 TABLET, DELAYED RELEASE ORAL at 21:31

## 2020-01-06 RX ADMIN — Medication 1 CAPSULE: at 08:16

## 2020-01-06 RX ADMIN — GLIPIZIDE 2.5 MG: 5 TABLET ORAL at 08:15

## 2020-01-06 RX ADMIN — POLYETHYLENE GLYCOL 3350 17 G: 17 POWDER, FOR SOLUTION ORAL at 08:17

## 2020-01-06 RX ADMIN — Medication 500 MG: at 17:21

## 2020-01-06 RX ADMIN — PROPRANOLOL HYDROCHLORIDE 40 MG: 20 TABLET ORAL at 17:20

## 2020-01-06 RX ADMIN — BUDESONIDE 500 MCG: 0.5 SUSPENSION RESPIRATORY (INHALATION) at 21:05

## 2020-01-06 RX ADMIN — Medication 500 MG: at 08:15

## 2020-01-06 RX ADMIN — GABAPENTIN 200 MG: 100 CAPSULE ORAL at 17:21

## 2020-01-06 RX ADMIN — BARIUM SULFATE 370 ML: 0.81 POWDER, FOR SUSPENSION ORAL at 11:22

## 2020-01-06 RX ADMIN — BUDESONIDE 500 MCG: 0.5 SUSPENSION RESPIRATORY (INHALATION) at 08:17

## 2020-01-06 RX ADMIN — BARIUM SULFATE 15 ML: 400 PASTE ORAL at 11:22

## 2020-01-06 RX ADMIN — PROPRANOLOL HYDROCHLORIDE 40 MG: 20 TABLET ORAL at 08:15

## 2020-01-06 RX ADMIN — DOCUSATE SODIUM 100 MG: 100 CAPSULE, LIQUID FILLED ORAL at 08:15

## 2020-01-06 RX ADMIN — Medication 1 TABLET: at 08:15

## 2020-01-06 RX ADMIN — ARFORMOTEROL TARTRATE 15 MCG: 15 SOLUTION RESPIRATORY (INHALATION) at 08:17

## 2020-01-06 RX ADMIN — GABAPENTIN 200 MG: 100 CAPSULE ORAL at 08:15

## 2020-01-06 RX ADMIN — DICYCLOMINE HYDROCHLORIDE 20 MG: 10 CAPSULE ORAL at 21:31

## 2020-01-06 RX ADMIN — POTASSIUM CHLORIDE 40 MEQ: 1500 TABLET, EXTENDED RELEASE ORAL at 11:15

## 2020-01-06 RX ADMIN — DIVALPROEX SODIUM 500 MG: 500 TABLET, DELAYED RELEASE ORAL at 08:23

## 2020-01-06 NOTE — PROGRESS NOTES
I have reviewed this patient's current medication list and recent laboratory results. At this time, I do not suggest any drug therapy adjustments or additional laboratory monitoring. Thank you,  Sonya ELIAS  Ph. M. S.  1/6/2020

## 2020-01-06 NOTE — ROUTINE PROCESS
returned to check on wife. Says he and the daughter noticed patient has been more confused the last couple of days. He brought in her pill bottles from home and was reviewed by this nurse. Patient not taking Losartan or Topamax at home. Informed  theses medications were added. Says he would like for MD to assess his wife and review her medications. Left message on Dr Amanda Gutierrez, [facility] board.

## 2020-01-06 NOTE — PROGRESS NOTES
[facility], [address] [place] [address]  Ph: [phone] Fax: [phone]       Outpatient Modified Barium Swallow Evaluation    Patient: Gita Luevano (89 y.o. female)  Date: 1/6/2020  Primary Diagnosis: No admission diagnoses are documented for this encounter. Precautions: aspiration       Radiologist: India Lee    PMHx: Guillain-[place], diabetes, hyperlipidemia, hypothyroidism, hypertension    History: Pt referred for Modified Barium Swallow study to assess swallow integrity, rule-out aspiration, and determine safest, least restrictive diet    Video Flouroscopic Procedures  [x] Lateral View   [] A-P View [] Scanned to level of Sternum    [] Seated at 90 deg.   [] Other:    Presentation:    [x] Spoon   [] Cup   [x] Straw   [] Syringe   [x] Consecutive Swallows  [] Other:    Consistencies:   [x] Ba+ liquid   [] Ba+ liquid (nectar)   [] Ba+ liquid (honey)   [x] Ba+ puree [x] Ba+ cookie [x] 13 mm Ba pill with thin Ba wash    Testing Discontinued:   [] Due to:    Treatment Techniques Attempted  [] Head Turn: [] Right [] Left     [] Head Tilt: [] Right [] Left     [] Chin Down:  [] Small Sips/bites:  [] Effortful swallow:  [] Double swallow:  [] Other:    Results  Dysphagia Present:     [x] Yes  [] No    Ratings of Dysphagia:     [] Mild  [x] Moderate  [] Severe    Stages of Breakdown:   [x] Oral  [x] Pharyngeal  [] Esophageal    Aspiration:   [x] Yes    [] No  [] At Risk     Cough: [] Yes      [x] No     Penetration:   [] Yes    [x] No     Cough: [] Yes      [] No   [] Flash/trace   [] Mod   [] To Chords          Consistency Aspirated:     [x] Thin Liquid (x 1 instance when used as liquid wash for solid trial)      [] Nectar-thick Liquid      [] Honey-thick Liquid      [] Puree      [] Solid       [] 13 mm Ba pill with       Motility Problems with:  [] Lip Closure:    [x] Mastication:   [x] Bolus Formation/control:   [x] A-P Transport:  [x] Tongue Base Retraction:  [x] Swallow Response (delayed):  [] Velopharyngeal Closure:  [] Pharyngeal Aspirations:  [] Laryngeal Elevation/adduction:  [] Epiglottic Inversion:  [] Pharyngeal motility/sensation:  [] Cricopharyngeal Relaxation:  [] Esophageal Peristalsis:  [] Other:    Timing of Aspiration/Penetration:  [] Before Swallow:  [x] During Swallow:  [] After Swallow:    Transit Time Delay:  [x] >1 Second  Oral  [x] >1 Second Pharyngeal  [] >20 Second Esophageal     Residuals:  [x] Vallecula:    [x] Minimal  [] Mild  [] Mod  [] Severe      The swallowing severity rating is based on the following functional outcome:    8-point Penetration-Aspiration Scale - Score 8        Videofluoroscopy Results/Recommendations:  MBS completed with x 1 instance of silent aspiration with thin liquid when used as liquid wash to initiate swallow of solid trial. Otherwise, no aspiration noted with thin liquid + straw with successive swallows. Pudding, cracker, and 13 mm Ba+ tablet with thin liquid wash accepted and cleared without aspiration; however, it must be noted that pt with significantly decreased lingual strength and function as evidenced by labored oral bolus prep and transit, premature spillage into valleculae, and consistent swallow delay ~3 seconds. Decreased tongue base strength with tongue pumping and decreased hyolaryngeal excursion noted. Scant vallecular residue noted x 1 throughout exam. No subsequent residue noted. Pt presents with moderate oropharyngeal dysphagia, as evidenced above, which places pt at risk for aspiration. At this time, safest for mechanical-soft solid (chopped meat), thin liquid diet; meds one at a time or whole in pudding. SLP utilized video of study for visual feedback, education, and recommendations for pt; SLP questions pt's comprehension due to level of cognitive function. Thank you for this referral,  Tram Hughes M.S., [facility]  Phone: [phone]

## 2020-01-06 NOTE — ROUTINE PROCESS
Patient resting in bed. No signs or symptoms of discomfort or distress. O2 @ 2L/min via nasal canula. No SOB noted. HOB elevated. Incontinent care provided as needed.

## 2020-01-06 NOTE — ROUTINE PROCESS
Blood sugar reading 59. Patient is confused. Was able to drink some coke.  at bedside and gave her a cookie. Blood sugar reading now 64. Patient says she has had enough and to leave her alone.  attempting to feed her dinner but get upset with patient and goes home. Nurse was able to feed patient turkey, soup and pudding. Patient more alert and trying to feed herself but hand tremors are too bad. Patient ate about 50% of dinner. Oxygen 2 liters nasal cannula. Bed alarm set. Side rails x 3. Call light within reach. Will continue to monitor.

## 2020-01-07 LAB
AMORPH CRY URNS QL MICRO: ABNORMAL
APPEARANCE UR: ABNORMAL
BACTERIA URNS QL MICRO: ABNORMAL /HPF
BILIRUB UR QL: NEGATIVE
COLOR UR: YELLOW
EPITH CASTS URNS QL MICRO: ABNORMAL /LPF (ref 0–5)
GLUCOSE BLD STRIP.AUTO-MCNC: 104 MG/DL (ref 70–110)
GLUCOSE BLD STRIP.AUTO-MCNC: 134 MG/DL (ref 70–110)
GLUCOSE BLD STRIP.AUTO-MCNC: 145 MG/DL (ref 70–110)
GLUCOSE BLD STRIP.AUTO-MCNC: 89 MG/DL (ref 70–110)
GLUCOSE UR STRIP.AUTO-MCNC: NEGATIVE MG/DL
HGB UR QL STRIP: NEGATIVE
KETONES UR QL STRIP.AUTO: NEGATIVE MG/DL
LEUKOCYTE ESTERASE UR QL STRIP.AUTO: ABNORMAL
NITRITE UR QL STRIP.AUTO: NEGATIVE
PH UR STRIP: 8.5 [PH] (ref 5–8)
PROT UR STRIP-MCNC: NEGATIVE MG/DL
RBC #/AREA URNS HPF: NEGATIVE /HPF (ref 0–5)
SP GR UR REFRACTOMETRY: 1.02 (ref 1–1.03)
UROBILINOGEN UR QL STRIP.AUTO: 1 EU/DL (ref 0.2–1)
WBC URNS QL MICRO: ABNORMAL /HPF (ref 0–4)

## 2020-01-07 PROCEDURE — 82962 GLUCOSE BLOOD TEST: CPT

## 2020-01-07 PROCEDURE — 81001 URINALYSIS AUTO W/SCOPE: CPT

## 2020-01-07 PROCEDURE — 74011000250 HC RX REV CODE- 250: Performed by: INTERNAL MEDICINE

## 2020-01-07 PROCEDURE — 87086 URINE CULTURE/COLONY COUNT: CPT

## 2020-01-07 PROCEDURE — 74011250637 HC RX REV CODE- 250/637: Performed by: NURSE PRACTITIONER

## 2020-01-07 PROCEDURE — 74011250637 HC RX REV CODE- 250/637: Performed by: INTERNAL MEDICINE

## 2020-01-07 RX ORDER — POTASSIUM CHLORIDE 750 MG/1
10 TABLET, EXTENDED RELEASE ORAL DAILY
Status: DISCONTINUED | OUTPATIENT
Start: 2020-01-07 | End: 2020-01-07

## 2020-01-07 RX ADMIN — DIVALPROEX SODIUM 1250 MG: 500 TABLET, DELAYED RELEASE ORAL at 22:00

## 2020-01-07 RX ADMIN — POTASSIUM BICARBONATE 10 MEQ: 391 TABLET, EFFERVESCENT ORAL at 14:00

## 2020-01-07 RX ADMIN — Medication 1 CAPSULE: at 08:39

## 2020-01-07 RX ADMIN — BUDESONIDE 500 MCG: 0.5 SUSPENSION RESPIRATORY (INHALATION) at 08:39

## 2020-01-07 RX ADMIN — Medication 500 MG: at 08:39

## 2020-01-07 RX ADMIN — PROPRANOLOL HYDROCHLORIDE 40 MG: 20 TABLET ORAL at 18:00

## 2020-01-07 RX ADMIN — GABAPENTIN 200 MG: 100 CAPSULE ORAL at 08:39

## 2020-01-07 RX ADMIN — DICYCLOMINE HYDROCHLORIDE 20 MG: 10 CAPSULE ORAL at 22:00

## 2020-01-07 RX ADMIN — LOSARTAN POTASSIUM 100 MG: 50 TABLET, FILM COATED ORAL at 08:39

## 2020-01-07 RX ADMIN — DIVALPROEX SODIUM 500 MG: 500 TABLET, DELAYED RELEASE ORAL at 08:39

## 2020-01-07 RX ADMIN — LEVOTHYROXINE SODIUM 50 MCG: 100 TABLET ORAL at 06:20

## 2020-01-07 RX ADMIN — PROPRANOLOL HYDROCHLORIDE 40 MG: 20 TABLET ORAL at 08:39

## 2020-01-07 RX ADMIN — DICYCLOMINE HYDROCHLORIDE 20 MG: 10 CAPSULE ORAL at 08:39

## 2020-01-07 RX ADMIN — CHLORTHALIDONE 25 MG: 25 TABLET ORAL at 08:39

## 2020-01-07 RX ADMIN — ARFORMOTEROL TARTRATE 15 MCG: 15 SOLUTION RESPIRATORY (INHALATION) at 20:00

## 2020-01-07 RX ADMIN — DOCUSATE SODIUM 100 MG: 100 CAPSULE, LIQUID FILLED ORAL at 08:39

## 2020-01-07 RX ADMIN — PANTOPRAZOLE SODIUM 40 MG: 40 TABLET, DELAYED RELEASE ORAL at 06:35

## 2020-01-07 RX ADMIN — Medication 1 TABLET: at 08:39

## 2020-01-07 RX ADMIN — ARFORMOTEROL TARTRATE 15 MCG: 15 SOLUTION RESPIRATORY (INHALATION) at 08:39

## 2020-01-07 RX ADMIN — BUDESONIDE 500 MCG: 0.5 SUSPENSION RESPIRATORY (INHALATION) at 20:00

## 2020-01-07 RX ADMIN — CITALOPRAM HYDROBROMIDE 40 MG: 20 TABLET ORAL at 22:00

## 2020-01-07 RX ADMIN — GABAPENTIN 200 MG: 100 CAPSULE ORAL at 18:00

## 2020-01-07 RX ADMIN — TOPIRAMATE 100 MG: 100 TABLET, FILM COATED ORAL at 08:39

## 2020-01-07 RX ADMIN — ATORVASTATIN CALCIUM 40 MG: 40 TABLET, FILM COATED ORAL at 22:00

## 2020-01-07 RX ADMIN — DICYCLOMINE HYDROCHLORIDE 20 MG: 10 CAPSULE ORAL at 16:00

## 2020-01-07 NOTE — ROUTINE PROCESS
Patient awakened by staff for pericare after BM , patient given sips of orange juice and fluids.  No distress noted resting quietly

## 2020-01-07 NOTE — ROUTINE PROCESS
Straight cath successful on 2nd attempt. Assisted by Melodie Mckay LPN. Patient very resistant. Obtained 100 cc estephanie urine with sediment. Specimen sent to lab.

## 2020-01-07 NOTE — PROGRESS NOTES
Nasal oxygen at 2 liters per nasal canula. Patient more alert but confused this AM, this afternoon very sleepy. Tolerated oral medications whole with pudding.  in and out of room.

## 2020-01-07 NOTE — PROGRESS NOTES
NUTRITION FOLLOW-UP/PLAN OF CARE TCC      RECOMMENDATIONS:   1. Dental Soft Solid Diet (chopped meats); low lactose (No artificial sweeteners)  2. Monitor labs, weight and PO intake  3. Feeding assist and specialty utensils     GOALS:   1. Progressing/Ongoing: PO intake meets >75% of protein/calorie needs by 1/14/2020  2. Met/Ongoing: Weight Maintenance (+/- 1-2 lb) by 1/14/2020     ASSESSMENT:   Wt status is classified as obese per Body mass index is 39.26 kg/m². Fair to good PO intake. Labs noted. BG range () over the past 24 hours; A1c (5.2%). Pt w/ hypokalemia and elevated BUN. Nutrition recommendations listed. RD to follow. SUBJECTIVE/OBJECTIVE:   (1/7/2020): SLP following: pt is s/p MBS on (1/6) Pt presents with moderate oropharyngeal dysphagia with recommendations for mechanical-soft solid (chopped meat), thin liquid diet; meds one at a time or whole in pudding. Appetite/PO intake is fair to good overall; average of 72% this past week per [facility]. Noted Pt sometimes more confused than others so needs assistance/encouragement. Last BM on (1/7) per I/Os. Weight has been stable this admission. Pt seen in room working with SLP today; observed 75% of meal consumed during rounding. Will continue to monitor.     (12/31). Pt seen in room after lunch; observed >75% of meal consumed. Spoke with  as well to assist with Hx. Pt does have lactose intolerance, does NOT tolerate artificial sweeteners and usually feeds herself at home with specialty utensils and tray set up/assist. Placed orders in system and informed dietary. Spoke with therapy about feeding assist with specialty utensils d/t tremors as well as about SLP consult. Pt reports she has been doing OK with meals most of the time. Last BM on (12/30) per I/Os. Will continue to monitor.        (12/29): Pt seen for an initial assessment to TCC/Consult. Pt resting in bed during time of assessment, with mild confusion during time of visit.  Noted multiple falls per reports. During visit pt attempting to eat pudding but having a hard time with utensils independently. Pt does report to have a good appetite and normally consumes 3 meals/day. No reports of n/v/d/c at this time. Per I/Os last BM 12/28/19. Pt states no food allergies, noted in chart allergy to milk containing products - when asked pt to clarify, no response (will continue with allergy in diet due to confusion, nursing/kitchen informed). Pt reports issues swallowing, per RN documents also noting trouble with pills. Will place speech consult. When asking about UBW pt staring off. Rec: assist/feed pt at all meals. Will continue to monitor intake/tolerance, weight, labs. Information Obtained from:    [x] Chart Review   [x] Patient   [x] Family/Caregiver   [x] Nurse/Physician   [] Interdisciplinary Meeting/Rounds    Diet: Dental soft solid  Medications: [x] Reviewed   oscal, hygroten, celexa, depakote, colace, ergocalciferol, humalog, ferrex, synthroid, cozaar, citrucel (held) propranolol, protonix, miralax  Allergies: [x] Reviewed Milk containing products per chart  Patient Active Problem List   Diagnosis Code    Breast cancer (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 I go to a gastroenterologist for it. \"    Lymphedema     left arm and hand and wears a sleeve.  Morbid obesity ([facility] 09/12/2017    BMI = 40.4    Seizures ([facility]     Sleep apnea     left apap machine at home    Thromboembolus [facility])     history of in left leg    Thyroid disease     UTI (lower urinary tract infection)       Labs:    Lab Results   Component Value Date/Time    Sodium 142 01/06/2020 04:40 AM    Potassium 3.2 (L) 01/06/2020 04:40 AM    Chloride 100 01/06/2020 04:40 AM    CO2 35 (H) 01/06/2020 04:40 AM    Anion gap 7 01/06/2020 04:40 AM    Glucose 84 01/06/2020 04:40 AM    BUN 35 (H) 01/06/2020 04:40 AM    Creatinine 0.88 01/06/2020 04:40 AM    Calcium 8.4 (L) 01/06/2020 04:40 AM    Magnesium 2.2 05/21/2017 10:12 AM    Albumin 3.0 (L) 12/01/2019 11:50 AM     Lab Results   Component Value Date/Time    GLUCPOC 145 (H) 01/07/2020 11:33 AM    GLUCPOC 104 01/07/2020 05:23 AM    GLUCPOC 97 01/06/2020 08:51 PM       Anthropometrics: BMI (calculated): 39.2  Last 3 Recorded Weights in this Encounter    12/27/19 1644 12/30/19 1444 01/07/20 1239   Weight: 107.4 kg (236 lb 11.2 oz) 104.4 kg (230 lb 3.2 oz) 103.7 kg (228 lb 11.2 oz)      Ht Readings from Last 1 Encounters:   12/27/19 5' 4\" (1.626 m)       Documented Weight History:  Weight Metrics 1/7/2020 12/27/2019 12/25/2019 12/22/2019 12/1/2019 11/5/2019 3/17/2019   Weight 228 lb 11.2 oz - 235 lb 3.7 oz - 220 lb 218 lb 218 lb   BMI - 39.26 kg/m2 - 40.38 kg/m2 38.97 kg/m2 38.62 kg/m2 38.62 kg/m2       No data found.     UBW: 220 lb??  [] Weight Loss  [x] Weight Gain from UBW per history above  [] Weight Stable    Estimated Nutrition Needs: [x] MSJ  [] Other:  Calories: 8361-4752 kcal (x 1.1-1.2) Based on:   [x] Actual BW    Protein:    g (0.8-1 g/kg) Based on:   [x] Actual BW    Fluid:      1 mL/kcal    Nutrition Problems Identified:   [] Suboptimal PO intake   [x] Food Allergies (Lactose intolerance)  [x] Difficulty chewing/swallowing/poor dentition  [] Constipation/Diarrhea (Last BM on 1/7; bowel regimen in place)  [] Nausea/Vomiting   [] None  [] Other:     Plan:   [x] Therapeutic Diet  [x]  Obtained/adjusted food preferences/tolerances and/or snacks options   []  Supplements added   [x] Occupational therapy and SLP following for feeding techniques  []  HS snack added   [x]  Modify diet texture   [x]  Modify diet for food allergies   []  Educate patient   []  Assist with menu selection   [x]  Monitor PO intake on meal rounds   [x]  Continue inpatient monitoring and intervention   [x]  Participated in discharge planning/Interdisciplinary rounds/Team meetings   [x]  Other: Feeding assist and specialty utensils     Education Needs:   [] Not appropriate for teaching at this time due to:   [x] Identified and addressed encouraged intake    Nutrition Monitoring and Evaluation:  [x] Continue ongoing monitoring and intervention  [] Other    Samy Ends

## 2020-01-08 DIAGNOSIS — J45.50 SEVERE PERSISTENT ASTHMA, UNSPECIFIED WHETHER COMPLICATED: ICD-10-CM

## 2020-01-08 LAB
GLUCOSE BLD STRIP.AUTO-MCNC: 117 MG/DL (ref 70–110)
GLUCOSE BLD STRIP.AUTO-MCNC: 164 MG/DL (ref 70–110)
GLUCOSE BLD STRIP.AUTO-MCNC: 95 MG/DL (ref 70–110)
GLUCOSE BLD STRIP.AUTO-MCNC: 97 MG/DL (ref 70–110)

## 2020-01-08 PROCEDURE — 74011250637 HC RX REV CODE- 250/637: Performed by: INTERNAL MEDICINE

## 2020-01-08 PROCEDURE — 74011636637 HC RX REV CODE- 636/637: Performed by: INTERNAL MEDICINE

## 2020-01-08 PROCEDURE — 74011250637 HC RX REV CODE- 250/637: Performed by: NURSE PRACTITIONER

## 2020-01-08 PROCEDURE — 82962 GLUCOSE BLOOD TEST: CPT

## 2020-01-08 PROCEDURE — 74011000250 HC RX REV CODE- 250: Performed by: INTERNAL MEDICINE

## 2020-01-08 RX ORDER — SODIUM CHLORIDE 9 MG/ML
50 INJECTION, SOLUTION INTRAVENOUS CONTINUOUS
Status: DISCONTINUED | OUTPATIENT
Start: 2020-01-08 | End: 2020-01-10

## 2020-01-08 RX ADMIN — POTASSIUM BICARBONATE 10 MEQ: 391 TABLET, EFFERVESCENT ORAL at 08:05

## 2020-01-08 RX ADMIN — ATORVASTATIN CALCIUM 40 MG: 40 TABLET, FILM COATED ORAL at 21:42

## 2020-01-08 RX ADMIN — GABAPENTIN 200 MG: 100 CAPSULE ORAL at 08:05

## 2020-01-08 RX ADMIN — PROPRANOLOL HYDROCHLORIDE 40 MG: 20 TABLET ORAL at 08:05

## 2020-01-08 RX ADMIN — Medication 1 TABLET: at 08:05

## 2020-01-08 RX ADMIN — DICYCLOMINE HYDROCHLORIDE 20 MG: 10 CAPSULE ORAL at 17:34

## 2020-01-08 RX ADMIN — DICYCLOMINE HYDROCHLORIDE 20 MG: 10 CAPSULE ORAL at 21:42

## 2020-01-08 RX ADMIN — BUDESONIDE 500 MCG: 0.5 SUSPENSION RESPIRATORY (INHALATION) at 08:05

## 2020-01-08 RX ADMIN — CITALOPRAM HYDROBROMIDE 40 MG: 20 TABLET ORAL at 21:42

## 2020-01-08 RX ADMIN — PROPRANOLOL HYDROCHLORIDE 40 MG: 20 TABLET ORAL at 17:34

## 2020-01-08 RX ADMIN — POLYETHYLENE GLYCOL 3350 17 G: 17 POWDER, FOR SOLUTION ORAL at 08:05

## 2020-01-08 RX ADMIN — INSULIN LISPRO 2 UNITS: 100 INJECTION, SOLUTION INTRAVENOUS; SUBCUTANEOUS at 21:41

## 2020-01-08 RX ADMIN — DICYCLOMINE HYDROCHLORIDE 20 MG: 10 CAPSULE ORAL at 08:05

## 2020-01-08 RX ADMIN — ARFORMOTEROL TARTRATE 15 MCG: 15 SOLUTION RESPIRATORY (INHALATION) at 20:00

## 2020-01-08 RX ADMIN — DOCUSATE SODIUM 100 MG: 100 CAPSULE, LIQUID FILLED ORAL at 08:04

## 2020-01-08 RX ADMIN — DIVALPROEX SODIUM 1250 MG: 500 TABLET, DELAYED RELEASE ORAL at 21:42

## 2020-01-08 RX ADMIN — Medication 1 CAPSULE: at 08:05

## 2020-01-08 RX ADMIN — BUDESONIDE 500 MCG: 0.5 SUSPENSION RESPIRATORY (INHALATION) at 20:00

## 2020-01-08 RX ADMIN — DIVALPROEX SODIUM 500 MG: 500 TABLET, DELAYED RELEASE ORAL at 08:05

## 2020-01-08 RX ADMIN — ARFORMOTEROL TARTRATE 15 MCG: 15 SOLUTION RESPIRATORY (INHALATION) at 08:05

## 2020-01-08 RX ADMIN — PANTOPRAZOLE SODIUM 40 MG: 40 TABLET, DELAYED RELEASE ORAL at 08:05

## 2020-01-08 NOTE — PROGRESS NOTES
Patient is resting in bed with eyes closed, HOB elevated, No sign of distress/discomfort noted, UA results: pH 8.5, trace leukocyte, bacteria 1+, crystals 3+, Culture pending.

## 2020-01-08 NOTE — ROUTINE PROCESS
Patient remains alert and verbal and eating lunch at present time. Patient able to answer all questions. Patient denies any pain at present time.

## 2020-01-08 NOTE — PROGRESS NOTES
1630  Patient was in bet sleeping. After I woke her to take her medicines patient suddenly was trying to get off her bed. Patient seemed to not respond to my instructions and just kept fighting myself and the CNA to get herself off the bed. Patient eventually took the medications in pudding and just went back to sleep. 1700  Result from UA is in chart.

## 2020-01-08 NOTE — PROGRESS NOTES
concern that patient is too sleepy. Meds reviewed, he report she was not on Gabapentin at home. Patients  requesting to hold med for now. I explained that purpose of med. Patient does have period of being sleepy after taking her meds. Currently at bedside, she is up ate talking, having lunch , back to baseline. Noted ongoing tremors to upper extremities.

## 2020-01-09 ENCOUNTER — HOSPITAL ENCOUNTER (OUTPATIENT)
Dept: INFUSION THERAPY | Age: 70
End: 2020-01-09

## 2020-01-09 ENCOUNTER — PATIENT OUTREACH (OUTPATIENT)
Dept: CASE MANAGEMENT | Age: 70
End: 2020-01-09

## 2020-01-09 LAB
ANION GAP SERPL CALC-SCNC: 4 MMOL/L (ref 3–18)
BACTERIA SPEC CULT: NORMAL
BUN SERPL-MCNC: 30 MG/DL (ref 7–18)
BUN/CREAT SERPL: 38 (ref 12–20)
CALCIUM SERPL-MCNC: 8.7 MG/DL (ref 8.5–10.1)
CHLORIDE SERPL-SCNC: 104 MMOL/L (ref 100–111)
CO2 SERPL-SCNC: 34 MMOL/L (ref 21–32)
CREAT SERPL-MCNC: 0.78 MG/DL (ref 0.6–1.3)
ERYTHROCYTE [DISTWIDTH] IN BLOOD BY AUTOMATED COUNT: 15.1 % (ref 11.6–14.5)
GLUCOSE BLD STRIP.AUTO-MCNC: 105 MG/DL (ref 70–110)
GLUCOSE BLD STRIP.AUTO-MCNC: 130 MG/DL (ref 70–110)
GLUCOSE BLD STRIP.AUTO-MCNC: 132 MG/DL (ref 70–110)
GLUCOSE BLD STRIP.AUTO-MCNC: 95 MG/DL (ref 70–110)
GLUCOSE SERPL-MCNC: 88 MG/DL (ref 74–99)
HCT VFR BLD AUTO: 39 % (ref 35–45)
HGB BLD-MCNC: 12.5 G/DL (ref 12–16)
MCH RBC QN AUTO: 32 PG (ref 24–34)
MCHC RBC AUTO-ENTMCNC: 32.1 G/DL (ref 31–37)
MCV RBC AUTO: 99.7 FL (ref 74–97)
PLATELET # BLD AUTO: 111 K/UL (ref 135–420)
PMV BLD AUTO: 9.8 FL (ref 9.2–11.8)
POTASSIUM SERPL-SCNC: 3.1 MMOL/L (ref 3.5–5.5)
RBC # BLD AUTO: 3.91 M/UL (ref 4.2–5.3)
SERVICE CMNT-IMP: NORMAL
SODIUM SERPL-SCNC: 142 MMOL/L (ref 136–145)
T4 FREE SERPL-MCNC: 1 NG/DL (ref 0.7–1.5)
WBC # BLD AUTO: 4.1 K/UL (ref 4.6–13.2)

## 2020-01-09 PROCEDURE — 74011000250 HC RX REV CODE- 250: Performed by: INTERNAL MEDICINE

## 2020-01-09 PROCEDURE — 85027 COMPLETE CBC AUTOMATED: CPT

## 2020-01-09 PROCEDURE — 74011250637 HC RX REV CODE- 250/637: Performed by: NURSE PRACTITIONER

## 2020-01-09 PROCEDURE — 74011250637 HC RX REV CODE- 250/637: Performed by: INTERNAL MEDICINE

## 2020-01-09 PROCEDURE — 80048 BASIC METABOLIC PNL TOTAL CA: CPT

## 2020-01-09 PROCEDURE — 83520 IMMUNOASSAY QUANT NOS NONAB: CPT

## 2020-01-09 PROCEDURE — 82962 GLUCOSE BLOOD TEST: CPT

## 2020-01-09 PROCEDURE — 36415 COLL VENOUS BLD VENIPUNCTURE: CPT

## 2020-01-09 PROCEDURE — 84439 ASSAY OF FREE THYROXINE: CPT

## 2020-01-09 RX ADMIN — CHLORTHALIDONE 25 MG: 25 TABLET ORAL at 08:56

## 2020-01-09 RX ADMIN — BUDESONIDE 500 MCG: 0.5 SUSPENSION RESPIRATORY (INHALATION) at 20:00

## 2020-01-09 RX ADMIN — LEVOTHYROXINE SODIUM 50 MCG: 100 TABLET ORAL at 06:25

## 2020-01-09 RX ADMIN — POLYETHYLENE GLYCOL 3350 17 G: 17 POWDER, FOR SOLUTION ORAL at 08:57

## 2020-01-09 RX ADMIN — DICYCLOMINE HYDROCHLORIDE 20 MG: 10 CAPSULE ORAL at 08:59

## 2020-01-09 RX ADMIN — BUDESONIDE 500 MCG: 0.5 SUSPENSION RESPIRATORY (INHALATION) at 09:11

## 2020-01-09 RX ADMIN — PANTOPRAZOLE SODIUM 40 MG: 40 TABLET, DELAYED RELEASE ORAL at 08:59

## 2020-01-09 RX ADMIN — DIVALPROEX SODIUM 1250 MG: 500 TABLET, DELAYED RELEASE ORAL at 21:56

## 2020-01-09 RX ADMIN — ARFORMOTEROL TARTRATE 15 MCG: 15 SOLUTION RESPIRATORY (INHALATION) at 20:00

## 2020-01-09 RX ADMIN — PROPRANOLOL HYDROCHLORIDE 40 MG: 20 TABLET ORAL at 18:18

## 2020-01-09 RX ADMIN — DICYCLOMINE HYDROCHLORIDE 20 MG: 10 CAPSULE ORAL at 21:57

## 2020-01-09 RX ADMIN — DIVALPROEX SODIUM 500 MG: 500 TABLET, DELAYED RELEASE ORAL at 08:59

## 2020-01-09 RX ADMIN — Medication 1 TABLET: at 08:56

## 2020-01-09 RX ADMIN — CITALOPRAM HYDROBROMIDE 40 MG: 20 TABLET ORAL at 21:57

## 2020-01-09 RX ADMIN — POTASSIUM BICARBONATE 20 MEQ: 782 TABLET, EFFERVESCENT ORAL at 18:17

## 2020-01-09 RX ADMIN — ATORVASTATIN CALCIUM 40 MG: 40 TABLET, FILM COATED ORAL at 21:57

## 2020-01-09 RX ADMIN — DOCUSATE SODIUM 100 MG: 100 CAPSULE, LIQUID FILLED ORAL at 08:59

## 2020-01-09 RX ADMIN — ARFORMOTEROL TARTRATE 15 MCG: 15 SOLUTION RESPIRATORY (INHALATION) at 09:10

## 2020-01-09 RX ADMIN — PROPRANOLOL HYDROCHLORIDE 40 MG: 20 TABLET ORAL at 08:55

## 2020-01-09 RX ADMIN — POTASSIUM BICARBONATE 10 MEQ: 391 TABLET, EFFERVESCENT ORAL at 09:00

## 2020-01-09 RX ADMIN — Medication 1 CAPSULE: at 08:59

## 2020-01-09 RX ADMIN — DICYCLOMINE HYDROCHLORIDE 20 MG: 10 CAPSULE ORAL at 18:18

## 2020-01-09 NOTE — PROGRESS NOTES
Patient Ax2 attempted to get oob during shift, redirected back to bed, provided incontinent care, bed alarm is set, side rails up x2, call bell is within reach.

## 2020-01-09 NOTE — ROUTINE PROCESS
Patient has tried twice this shift to get out of the bed without calling for assistance. Says she was going out for tonight. Explained to her she was in a rehab unit for physical therapy and she has not been discharged yet. Her  says patient is ready to go home. Explained to him patient is still weak and confused. Informed him he needs to speak with PT regarding patient's activity level. Says he will speak to them tomorrow.

## 2020-01-09 NOTE — PROGRESS NOTES
Community Care Team Documentation for Patient in [facility]     Patient discharged from [facility] [address] to [address] [facility], on 12/27/19. Hospital Discharge diagnosis:       Weakness   Guillain Ingraham syndrome   Leg pain   GERD   DM   Asthma    SNF Attending Provider:   Dr. Katerine Salinas    Anticipated discharge date from SNF:  Tentative 1/13/20    PCP : Lorraine Vela MD    CTN:     [facility] Team rounds completed, updates provided by facility. RRAT:  Low Risk            10       Total Score        2 . Living with Significant Other. Assisted Living. LTAC. SNF. or   Rehab    8 Charlson Comorbidity Score (Age + Comorbid Conditions)        Criteria that do not apply:    Has Seen PCP in Last 6 Months (Yes=3, No=0)    Patient Length of Stay (>5 days = 3)    IP Visits Last 12 Months (1-3=4, 4=9, >4=11)    Pt.  Coverage (Medicare=5 , Medicaid, or Self-Pay=4)        Active Ambulatory Problems     Diagnosis Date Noted    Breast cancer ([address]     Biliary obstruction 04/26/2016    Choledocholithiasis 06/07/2016    Lumbar stenosis 09/13/2017    Severe persistent asthma 11/07/2019    Leg pain, right 12/23/2019    DM (diabetes mellitus) ([address] 12/23/2019    Hx of Guillain-Ingraham syndrome 12/23/2019    GERD (gastroesophageal reflux disease) 12/23/2019    Weakness 12/24/2019    Guillain Barré syndrome ([address] 12/24/2019     Resolved Ambulatory Problems     Diagnosis Date Noted    No Resolved Ambulatory Problems     Past Medical History:   Diagnosis Date    Asthma     Back pain     Bipolar 1 disorder ([address]     Cancer ([address]     Diabetes ([address]     History of blood transfusion     Hypertension     Ill-defined condition 09/12/2017    Lymphedema     Morbid obesity ([address] 09/12/2017    Seizures ([address]     Sleep apnea     Thromboembolus ([address]     Thyroid disease     UTI (lower urinary tract infection)

## 2020-01-10 LAB
GLUCOSE BLD STRIP.AUTO-MCNC: 104 MG/DL (ref 70–110)
GLUCOSE BLD STRIP.AUTO-MCNC: 151 MG/DL (ref 70–110)
GLUCOSE BLD STRIP.AUTO-MCNC: 97 MG/DL (ref 70–110)
GLUCOSE BLD STRIP.AUTO-MCNC: 97 MG/DL (ref 70–110)
TSH RECEP AB SER-ACNC: <1.1 IU/L (ref 0–1.75)

## 2020-01-10 PROCEDURE — 74011000250 HC RX REV CODE- 250: Performed by: INTERNAL MEDICINE

## 2020-01-10 PROCEDURE — 82962 GLUCOSE BLOOD TEST: CPT

## 2020-01-10 PROCEDURE — 74011250637 HC RX REV CODE- 250/637: Performed by: NURSE PRACTITIONER

## 2020-01-10 PROCEDURE — 74011636637 HC RX REV CODE- 636/637: Performed by: INTERNAL MEDICINE

## 2020-01-10 PROCEDURE — 74011250637 HC RX REV CODE- 250/637: Performed by: INTERNAL MEDICINE

## 2020-01-10 RX ADMIN — POTASSIUM BICARBONATE 20 MEQ: 782 TABLET, EFFERVESCENT ORAL at 09:18

## 2020-01-10 RX ADMIN — POLYETHYLENE GLYCOL 3350 17 G: 17 POWDER, FOR SOLUTION ORAL at 09:17

## 2020-01-10 RX ADMIN — ARFORMOTEROL TARTRATE 15 MCG: 15 SOLUTION RESPIRATORY (INHALATION) at 21:22

## 2020-01-10 RX ADMIN — DOCUSATE SODIUM 100 MG: 100 CAPSULE, LIQUID FILLED ORAL at 09:17

## 2020-01-10 RX ADMIN — DICYCLOMINE HYDROCHLORIDE 20 MG: 10 CAPSULE ORAL at 09:18

## 2020-01-10 RX ADMIN — CITALOPRAM HYDROBROMIDE 40 MG: 20 TABLET ORAL at 21:23

## 2020-01-10 RX ADMIN — INSULIN LISPRO 2 UNITS: 100 INJECTION, SOLUTION INTRAVENOUS; SUBCUTANEOUS at 21:28

## 2020-01-10 RX ADMIN — LEVOTHYROXINE SODIUM 50 MCG: 100 TABLET ORAL at 06:00

## 2020-01-10 RX ADMIN — ARFORMOTEROL TARTRATE 15 MCG: 15 SOLUTION RESPIRATORY (INHALATION) at 09:17

## 2020-01-10 RX ADMIN — Medication 1 TABLET: at 09:18

## 2020-01-10 RX ADMIN — DICYCLOMINE HYDROCHLORIDE 20 MG: 10 CAPSULE ORAL at 21:23

## 2020-01-10 RX ADMIN — PANTOPRAZOLE SODIUM 40 MG: 40 TABLET, DELAYED RELEASE ORAL at 09:18

## 2020-01-10 RX ADMIN — POTASSIUM BICARBONATE 20 MEQ: 782 TABLET, EFFERVESCENT ORAL at 17:43

## 2020-01-10 RX ADMIN — DIVALPROEX SODIUM 500 MG: 500 TABLET, DELAYED RELEASE ORAL at 09:17

## 2020-01-10 RX ADMIN — PROPRANOLOL HYDROCHLORIDE 40 MG: 20 TABLET ORAL at 09:17

## 2020-01-10 RX ADMIN — Medication 1 CAPSULE: at 09:17

## 2020-01-10 RX ADMIN — DICYCLOMINE HYDROCHLORIDE 20 MG: 10 CAPSULE ORAL at 17:43

## 2020-01-10 RX ADMIN — BUDESONIDE 500 MCG: 0.5 SUSPENSION RESPIRATORY (INHALATION) at 21:22

## 2020-01-10 RX ADMIN — DIVALPROEX SODIUM 1250 MG: 500 TABLET, DELAYED RELEASE ORAL at 21:23

## 2020-01-10 RX ADMIN — BUDESONIDE 500 MCG: 0.5 SUSPENSION RESPIRATORY (INHALATION) at 09:17

## 2020-01-10 RX ADMIN — ATORVASTATIN CALCIUM 40 MG: 40 TABLET, FILM COATED ORAL at 21:23

## 2020-01-10 RX ADMIN — PROPRANOLOL HYDROCHLORIDE 40 MG: 20 TABLET ORAL at 21:23

## 2020-01-10 NOTE — PROGRESS NOTES
conducted a Follow up consultation and Spiritual Assessment for Denisa Jarrell, who is a 78 y.o.,female. The  provided the following Interventions:  Continued the relationship of care and support. Listened empathically. Offered prayer and assurance of continued prayer on patients behalf. Chart reviewed. The following outcomes were achieved:  Patient expressed gratitude for pastoral care visit. Assessment:  There are no further spiritual or Mormonism issues which require Spiritual Care Services interventions at this time. Plan:  Chaplains will continue to follow and will provide pastoral care on an as needed/requested basis.  recommends bedside caregivers page  on duty if patient shows signs of acute spiritual or emotional distress.      [address] [facility]   Staff [address] [facility]   [phone]

## 2020-01-10 NOTE — ROUTINE PROCESS
Patient resting quietly. Oxygen at 2 liters nasal cannula. Incontinent care and repositioning as needed. Bed alarm set. Call light within reach.

## 2020-01-10 NOTE — ROUTINE PROCESS
Self fed both meals needs cues and encouragement .  in and out to see patient. Small soft formed stool on bedside commode. Sat up in dinning room for lunch.

## 2020-01-11 LAB
GLUCOSE BLD STRIP.AUTO-MCNC: 102 MG/DL (ref 70–110)
GLUCOSE BLD STRIP.AUTO-MCNC: 105 MG/DL (ref 70–110)
GLUCOSE BLD STRIP.AUTO-MCNC: 108 MG/DL (ref 70–110)
GLUCOSE BLD STRIP.AUTO-MCNC: 156 MG/DL (ref 70–110)
POTASSIUM SERPL-SCNC: 3 MMOL/L (ref 3.5–5.5)

## 2020-01-11 PROCEDURE — 82962 GLUCOSE BLOOD TEST: CPT

## 2020-01-11 PROCEDURE — 74011250637 HC RX REV CODE- 250/637: Performed by: INTERNAL MEDICINE

## 2020-01-11 PROCEDURE — 74011000250 HC RX REV CODE- 250: Performed by: INTERNAL MEDICINE

## 2020-01-11 PROCEDURE — 74011636637 HC RX REV CODE- 636/637: Performed by: INTERNAL MEDICINE

## 2020-01-11 PROCEDURE — 74011250637 HC RX REV CODE- 250/637: Performed by: NURSE PRACTITIONER

## 2020-01-11 PROCEDURE — 84132 ASSAY OF SERUM POTASSIUM: CPT

## 2020-01-11 PROCEDURE — 36415 COLL VENOUS BLD VENIPUNCTURE: CPT

## 2020-01-11 RX ADMIN — ATORVASTATIN CALCIUM 40 MG: 40 TABLET, FILM COATED ORAL at 22:17

## 2020-01-11 RX ADMIN — Medication 1 CAPSULE: at 10:02

## 2020-01-11 RX ADMIN — ARFORMOTEROL TARTRATE 15 MCG: 15 SOLUTION RESPIRATORY (INHALATION) at 10:01

## 2020-01-11 RX ADMIN — BUDESONIDE 500 MCG: 0.5 SUSPENSION RESPIRATORY (INHALATION) at 21:37

## 2020-01-11 RX ADMIN — DICYCLOMINE HYDROCHLORIDE 20 MG: 10 CAPSULE ORAL at 17:36

## 2020-01-11 RX ADMIN — PROPRANOLOL HYDROCHLORIDE 40 MG: 20 TABLET ORAL at 10:02

## 2020-01-11 RX ADMIN — POLYETHYLENE GLYCOL 3350 17 G: 17 POWDER, FOR SOLUTION ORAL at 10:01

## 2020-01-11 RX ADMIN — DOCUSATE SODIUM 100 MG: 100 CAPSULE, LIQUID FILLED ORAL at 10:02

## 2020-01-11 RX ADMIN — Medication 1 TABLET: at 10:01

## 2020-01-11 RX ADMIN — POTASSIUM BICARBONATE 20 MEQ: 782 TABLET, EFFERVESCENT ORAL at 17:25

## 2020-01-11 RX ADMIN — PANTOPRAZOLE SODIUM 40 MG: 40 TABLET, DELAYED RELEASE ORAL at 10:01

## 2020-01-11 RX ADMIN — LEVOTHYROXINE SODIUM 50 MCG: 100 TABLET ORAL at 06:03

## 2020-01-11 RX ADMIN — ARFORMOTEROL TARTRATE 15 MCG: 15 SOLUTION RESPIRATORY (INHALATION) at 21:37

## 2020-01-11 RX ADMIN — PROPRANOLOL HYDROCHLORIDE 40 MG: 20 TABLET ORAL at 17:25

## 2020-01-11 RX ADMIN — DIVALPROEX SODIUM 500 MG: 500 TABLET, DELAYED RELEASE ORAL at 10:02

## 2020-01-11 RX ADMIN — DICYCLOMINE HYDROCHLORIDE 20 MG: 10 CAPSULE ORAL at 22:16

## 2020-01-11 RX ADMIN — INSULIN LISPRO 2 UNITS: 100 INJECTION, SOLUTION INTRAVENOUS; SUBCUTANEOUS at 12:31

## 2020-01-11 RX ADMIN — DIVALPROEX SODIUM 1250 MG: 500 TABLET, DELAYED RELEASE ORAL at 22:16

## 2020-01-11 RX ADMIN — CHLORTHALIDONE 25 MG: 25 TABLET ORAL at 10:02

## 2020-01-11 RX ADMIN — BUDESONIDE 500 MCG: 0.5 SUSPENSION RESPIRATORY (INHALATION) at 10:01

## 2020-01-11 RX ADMIN — ERGOCALCIFEROL 50000 UNITS: 1.25 CAPSULE ORAL at 10:02

## 2020-01-11 RX ADMIN — CITALOPRAM HYDROBROMIDE 40 MG: 20 TABLET ORAL at 22:16

## 2020-01-11 RX ADMIN — POTASSIUM BICARBONATE 20 MEQ: 782 TABLET, EFFERVESCENT ORAL at 10:02

## 2020-01-11 RX ADMIN — POTASSIUM BICARBONATE 20 MEQ: 782 TABLET, EFFERVESCENT ORAL at 22:16

## 2020-01-11 NOTE — PROGRESS NOTES
New order from [address] [place] [address] : Potassium 20 meq tid a day, labs BMP and Magnesium on Monday.  Order read back to provider

## 2020-01-11 NOTE — ROUTINE PROCESS
Patient current potassium level 3.0 . Patient on 20meq  Two times daily. Call placed to Dr Radha Gallegos office and message left on answer phone to call unit for patient result update.

## 2020-01-12 LAB
GLUCOSE BLD STRIP.AUTO-MCNC: 105 MG/DL (ref 70–110)
GLUCOSE BLD STRIP.AUTO-MCNC: 146 MG/DL (ref 70–110)
GLUCOSE BLD STRIP.AUTO-MCNC: 148 MG/DL (ref 70–110)
GLUCOSE BLD STRIP.AUTO-MCNC: 96 MG/DL (ref 70–110)

## 2020-01-12 PROCEDURE — 74011250637 HC RX REV CODE- 250/637: Performed by: INTERNAL MEDICINE

## 2020-01-12 PROCEDURE — 74011000250 HC RX REV CODE- 250: Performed by: INTERNAL MEDICINE

## 2020-01-12 PROCEDURE — 74011250637 HC RX REV CODE- 250/637: Performed by: NURSE PRACTITIONER

## 2020-01-12 PROCEDURE — 82962 GLUCOSE BLOOD TEST: CPT

## 2020-01-12 RX ORDER — ADHESIVE BANDAGE
30 BANDAGE TOPICAL DAILY PRN
Status: DISCONTINUED | OUTPATIENT
Start: 2020-01-12 | End: 2020-01-23 | Stop reason: HOSPADM

## 2020-01-12 RX ADMIN — DICYCLOMINE HYDROCHLORIDE 20 MG: 10 CAPSULE ORAL at 22:22

## 2020-01-12 RX ADMIN — PROPRANOLOL HYDROCHLORIDE 40 MG: 20 TABLET ORAL at 18:19

## 2020-01-12 RX ADMIN — DIVALPROEX SODIUM 1250 MG: 500 TABLET, DELAYED RELEASE ORAL at 22:21

## 2020-01-12 RX ADMIN — POLYETHYLENE GLYCOL 3350 17 G: 17 POWDER, FOR SOLUTION ORAL at 08:04

## 2020-01-12 RX ADMIN — POTASSIUM BICARBONATE 20 MEQ: 782 TABLET, EFFERVESCENT ORAL at 18:19

## 2020-01-12 RX ADMIN — Medication 1 TABLET: at 07:57

## 2020-01-12 RX ADMIN — POTASSIUM BICARBONATE 20 MEQ: 782 TABLET, EFFERVESCENT ORAL at 08:05

## 2020-01-12 RX ADMIN — ARFORMOTEROL TARTRATE 15 MCG: 15 SOLUTION RESPIRATORY (INHALATION) at 07:52

## 2020-01-12 RX ADMIN — POTASSIUM BICARBONATE 20 MEQ: 782 TABLET, EFFERVESCENT ORAL at 22:22

## 2020-01-12 RX ADMIN — Medication 1 CAPSULE: at 08:04

## 2020-01-12 RX ADMIN — BUDESONIDE 500 MCG: 0.5 SUSPENSION RESPIRATORY (INHALATION) at 07:52

## 2020-01-12 RX ADMIN — PANTOPRAZOLE SODIUM 40 MG: 40 TABLET, DELAYED RELEASE ORAL at 07:57

## 2020-01-12 RX ADMIN — MAGNESIUM HYDROXIDE 30 ML: 400 SUSPENSION ORAL at 06:32

## 2020-01-12 RX ADMIN — BUDESONIDE 500 MCG: 0.5 SUSPENSION RESPIRATORY (INHALATION) at 21:43

## 2020-01-12 RX ADMIN — ARFORMOTEROL TARTRATE 15 MCG: 15 SOLUTION RESPIRATORY (INHALATION) at 21:43

## 2020-01-12 RX ADMIN — CITALOPRAM HYDROBROMIDE 40 MG: 20 TABLET ORAL at 22:22

## 2020-01-12 RX ADMIN — DOCUSATE SODIUM 100 MG: 100 CAPSULE, LIQUID FILLED ORAL at 08:04

## 2020-01-12 RX ADMIN — DIVALPROEX SODIUM 500 MG: 500 TABLET, DELAYED RELEASE ORAL at 08:04

## 2020-01-12 RX ADMIN — PROPRANOLOL HYDROCHLORIDE 40 MG: 20 TABLET ORAL at 08:04

## 2020-01-12 RX ADMIN — DICYCLOMINE HYDROCHLORIDE 20 MG: 10 CAPSULE ORAL at 18:19

## 2020-01-12 RX ADMIN — ATORVASTATIN CALCIUM 40 MG: 40 TABLET, FILM COATED ORAL at 22:22

## 2020-01-12 RX ADMIN — DICYCLOMINE HYDROCHLORIDE 20 MG: 10 CAPSULE ORAL at 08:04

## 2020-01-12 RX ADMIN — LEVOTHYROXINE SODIUM 50 MCG: 100 TABLET ORAL at 06:32

## 2020-01-12 NOTE — ROUTINE PROCESS
S/p fall day 3/3, no late injuries noted. Denies pain. No s/sof distress note safety precautions in place.  Pt resting quietly in bed with no s/s of distress noted

## 2020-01-12 NOTE — PROGRESS NOTES
Pt in bed,  at the bedside, oxygen intact @ 2LPM via N/C, no c/o pain and no visual signs of pain noted, toileting assistance given as needed, call bell and personal items within reach

## 2020-01-13 LAB
ANION GAP SERPL CALC-SCNC: 3 MMOL/L (ref 3–18)
BASOPHILS # BLD: 0 K/UL (ref 0–0.1)
BASOPHILS NFR BLD: 0 % (ref 0–2)
BUN SERPL-MCNC: 24 MG/DL (ref 7–18)
BUN/CREAT SERPL: 32 (ref 12–20)
CALCIUM SERPL-MCNC: 8.6 MG/DL (ref 8.5–10.1)
CHLORIDE SERPL-SCNC: 104 MMOL/L (ref 100–111)
CO2 SERPL-SCNC: 37 MMOL/L (ref 21–32)
CREAT SERPL-MCNC: 0.76 MG/DL (ref 0.6–1.3)
DIFFERENTIAL METHOD BLD: ABNORMAL
EOSINOPHIL # BLD: 0 K/UL (ref 0–0.4)
EOSINOPHIL NFR BLD: 0 % (ref 0–5)
ERYTHROCYTE [DISTWIDTH] IN BLOOD BY AUTOMATED COUNT: 15.2 % (ref 11.6–14.5)
GLUCOSE BLD STRIP.AUTO-MCNC: 130 MG/DL (ref 70–110)
GLUCOSE BLD STRIP.AUTO-MCNC: 140 MG/DL (ref 70–110)
GLUCOSE BLD STRIP.AUTO-MCNC: 142 MG/DL (ref 70–110)
GLUCOSE BLD STRIP.AUTO-MCNC: 175 MG/DL (ref 70–110)
GLUCOSE SERPL-MCNC: 92 MG/DL (ref 74–99)
HCT VFR BLD AUTO: 39.4 % (ref 35–45)
HGB BLD-MCNC: 12.6 G/DL (ref 12–16)
LYMPHOCYTES # BLD: 1.4 K/UL (ref 0.9–3.6)
LYMPHOCYTES NFR BLD: 38 % (ref 21–52)
MAGNESIUM SERPL-MCNC: 2.2 MG/DL (ref 1.6–2.6)
MCH RBC QN AUTO: 31.9 PG (ref 24–34)
MCHC RBC AUTO-ENTMCNC: 32 G/DL (ref 31–37)
MCV RBC AUTO: 99.7 FL (ref 74–97)
MONOCYTES # BLD: 0.5 K/UL (ref 0.05–1.2)
MONOCYTES NFR BLD: 12 % (ref 3–10)
NEUTS SEG # BLD: 1.9 K/UL (ref 1.8–8)
NEUTS SEG NFR BLD: 50 % (ref 40–73)
PLATELET # BLD AUTO: 120 K/UL (ref 135–420)
PMV BLD AUTO: 10 FL (ref 9.2–11.8)
POTASSIUM SERPL-SCNC: 3.6 MMOL/L (ref 3.5–5.5)
RBC # BLD AUTO: 3.95 M/UL (ref 4.2–5.3)
SODIUM SERPL-SCNC: 144 MMOL/L (ref 136–145)
WBC # BLD AUTO: 3.8 K/UL (ref 4.6–13.2)

## 2020-01-13 PROCEDURE — 83735 ASSAY OF MAGNESIUM: CPT

## 2020-01-13 PROCEDURE — 74011000250 HC RX REV CODE- 250: Performed by: INTERNAL MEDICINE

## 2020-01-13 PROCEDURE — 80048 BASIC METABOLIC PNL TOTAL CA: CPT

## 2020-01-13 PROCEDURE — 74011636637 HC RX REV CODE- 636/637: Performed by: INTERNAL MEDICINE

## 2020-01-13 PROCEDURE — 82962 GLUCOSE BLOOD TEST: CPT

## 2020-01-13 PROCEDURE — 74011250637 HC RX REV CODE- 250/637: Performed by: NURSE PRACTITIONER

## 2020-01-13 PROCEDURE — 85025 COMPLETE CBC W/AUTO DIFF WBC: CPT

## 2020-01-13 PROCEDURE — 74011250637 HC RX REV CODE- 250/637: Performed by: INTERNAL MEDICINE

## 2020-01-13 PROCEDURE — 36415 COLL VENOUS BLD VENIPUNCTURE: CPT

## 2020-01-13 RX ADMIN — BUDESONIDE 500 MCG: 0.5 SUSPENSION RESPIRATORY (INHALATION) at 07:57

## 2020-01-13 RX ADMIN — DOCUSATE SODIUM 100 MG: 100 CAPSULE, LIQUID FILLED ORAL at 08:01

## 2020-01-13 RX ADMIN — LEVOTHYROXINE SODIUM 50 MCG: 100 TABLET ORAL at 06:00

## 2020-01-13 RX ADMIN — BUDESONIDE 500 MCG: 0.5 SUSPENSION RESPIRATORY (INHALATION) at 21:08

## 2020-01-13 RX ADMIN — DICYCLOMINE HYDROCHLORIDE 20 MG: 10 CAPSULE ORAL at 17:58

## 2020-01-13 RX ADMIN — POTASSIUM BICARBONATE 20 MEQ: 782 TABLET, EFFERVESCENT ORAL at 22:00

## 2020-01-13 RX ADMIN — Medication 1 TABLET: at 07:58

## 2020-01-13 RX ADMIN — POLYETHYLENE GLYCOL 3350 17 G: 17 POWDER, FOR SOLUTION ORAL at 08:01

## 2020-01-13 RX ADMIN — POTASSIUM BICARBONATE 20 MEQ: 782 TABLET, EFFERVESCENT ORAL at 08:01

## 2020-01-13 RX ADMIN — DICYCLOMINE HYDROCHLORIDE 20 MG: 10 CAPSULE ORAL at 08:01

## 2020-01-13 RX ADMIN — ARFORMOTEROL TARTRATE 15 MCG: 15 SOLUTION RESPIRATORY (INHALATION) at 07:57

## 2020-01-13 RX ADMIN — PROPRANOLOL HYDROCHLORIDE 40 MG: 20 TABLET ORAL at 08:01

## 2020-01-13 RX ADMIN — ARFORMOTEROL TARTRATE 15 MCG: 15 SOLUTION RESPIRATORY (INHALATION) at 21:08

## 2020-01-13 RX ADMIN — CHLORTHALIDONE 25 MG: 25 TABLET ORAL at 08:01

## 2020-01-13 RX ADMIN — CITALOPRAM HYDROBROMIDE 40 MG: 20 TABLET ORAL at 21:29

## 2020-01-13 RX ADMIN — DIVALPROEX SODIUM 1250 MG: 500 TABLET, DELAYED RELEASE ORAL at 21:28

## 2020-01-13 RX ADMIN — ATORVASTATIN CALCIUM 40 MG: 40 TABLET, FILM COATED ORAL at 21:29

## 2020-01-13 RX ADMIN — PROPRANOLOL HYDROCHLORIDE 40 MG: 20 TABLET ORAL at 17:58

## 2020-01-13 RX ADMIN — INSULIN LISPRO 2 UNITS: 100 INJECTION, SOLUTION INTRAVENOUS; SUBCUTANEOUS at 07:59

## 2020-01-13 RX ADMIN — DICYCLOMINE HYDROCHLORIDE 20 MG: 10 CAPSULE ORAL at 21:28

## 2020-01-13 RX ADMIN — Medication 1 CAPSULE: at 08:01

## 2020-01-13 RX ADMIN — POTASSIUM BICARBONATE 20 MEQ: 782 TABLET, EFFERVESCENT ORAL at 17:58

## 2020-01-13 RX ADMIN — PANTOPRAZOLE SODIUM 40 MG: 40 TABLET, DELAYED RELEASE ORAL at 08:01

## 2020-01-13 RX ADMIN — DIVALPROEX SODIUM 500 MG: 500 TABLET, DELAYED RELEASE ORAL at 08:01

## 2020-01-13 NOTE — ROUTINE PROCESS
Sully Parra witnessed/unwitnessed fall occurred on 1/09/2020 (Date) at 56 (Time).      The answers to the following questions summarize the fall:      In the patient's own words:  · What were you attempting to do when you fell? Getting into Bed  · Do you know why you fell?  brought patient back into room, and patient attempting to get up to into bed without waiting for assistance or calling for help. · Do you have any pain/discomfort or any other complaints? None at this time  · Which part of your body made contact with the floor or other object? Buttocks/legs  Nurse:  · Was this an assisted fall? No  · Was fall witnessed? Yes  · If witnessed, what part of the body made contact with the floor or other object? buttocks  · Patients mental status after the fall/when found: Alert and oriented to person, disoriented to place, situation, time  · Any apparent injury:  None  · Immediate interventions for injury/suspected injury? Assessment/ Evaluation of neurological status, skin assessment, pain assessment  · Patient assisted back to bed? Assist X2 person assistance  · Name of provider notified and time, any comments? MEENAKSHI Lewis verbally notified @0934, continue to monitor   · If family member notified and time: Patient's  at bedside during incident, notified@ [phone], informed patient and  to call for assistance.    Document Immediate VS and physical assessment in flowsheets.             Ginger Estrada LPN

## 2020-01-13 NOTE — PROGRESS NOTES
[facility]    Daily progress Note    Patient: Gita Luevano MRN: [id number]  CSN: [id number]    YOB: 1950  Age: 78 y.o. Sex: female    DOA: 12/27/2019 LOS:  LOS: 17 days                    Subjective:     CC: weakness  Ms. Angelo Quijano is a 78 yr old female, patient with recent hospitalization from 12/22-12/27. Patient   Had complain of right leg pain and decrease sensation. Patient was seen by neurologist and she has MRI of head and Cervical Spine which was unremarkable. Per neurologist less likely [address] She was seen PT/OT and they recommended rehab. Patient with tremor and weakness with some improvement. They recommended for patient to follow up with psychiatrist and neurologist OP. On examination, noted that patient is sleepy but easily aroused. No fever or chills. Discussion with patients , plan of care discussed, all questions were answered. 1/13/2020  Since admitted, c./o for by  and nursing staff that patient has been every sleepy. presumable cause of lethargic  was from her Gabapentin which is a new med fr her. this was helped, since then report that patient is more alert. Today Pt reported that she did well with PT, she had approx 75 % of her meal, very much alert. VSs stable, no nvd, fever or chills. Report of fall last week with transfer no injury.     PAST MEDICAL hx: Guillain- [place], DM type 2, Hyperlipidemia, Hypothyroidism, HTN     PAST SURGICAL Hx: Back Surgery, cholecystectomy, mastectomy, foot surgery, partial hysterectomy       ALLERGIES:   VALIUM, CARAFATE, COREG, CEPHALEXIN, CIPRO, ELIQUIS, EPIPEN, MILK, VALIUM, PERCOCET, BEE STING, TALWIN, TETANUS TOXOID, TRILEPTAL.     FAMILY hx: CAD and [place]     SOCIAL hx: discussed with patient and  she doesn't smoke of drink.     ROS: discussion with patients  and nursing.    No fever or chills.  No weight loss or headache.  No neck pain or sore throat.  No chest pain or palpitation.  No shortness of breath or cough.  No nausea, vomiting, or diarrhea.  No dysuria or polyuria.  No back pain, occasional leg pain.  No heat or cold intolerance.  No anxiety or depression. Objective:      Visit Vitals  /68   Pulse 60   Temp 98.9 °F (37.2 °C)   Resp 20   Ht 5' 4\" (1.626 m)   Wt 103.7 kg (228 lb 11.2 oz)   SpO2 95%   Breastfeeding No   BMI 39.26 kg/m²       Physical Exam:  General appearance: alert, cooperative, no distress, appears stated age. Period of confusion  HEENT: negative  Neck: supple, symmetrical, trachea midline, no adenopathy, thyroid: not enlarged, symmetric, no tenderness/mass/nodules, no carotid bruit and no JVD  Lungs: clear to auscultation bilaterally  Heart: regular rate and rhythm, S1, S2 normal, no murmur, click, rub or gallop  Abdomen: soft, non-tender. Bowel sounds normal. No masses,  no organomegaly  Pulses: 2+ and symmetric  Skin: Skin color, texture, turgor normal. No rashes or lesions      Intake and Output:  Current Shift:  No intake/output data recorded. Last three shifts:  No intake/output data recorded.     Recent Results (from the past 24 hour(s))   GLUCOSE, POC    Collection Time: 01/12/20 11:25 AM   Result Value Ref Range    Glucose (POC) 146 (H) 70 - 110 mg/dL   GLUCOSE, POC    Collection Time: 01/12/20  4:14 PM   Result Value Ref Range    Glucose (POC) 105 70 - 110 mg/dL   GLUCOSE, POC    Collection Time: 01/12/20  9:01 PM   Result Value Ref Range    Glucose (POC) 148 (H) 70 - 110 mg/dL   CBC WITH AUTOMATED DIFF    Collection Time: 01/13/20  4:30 AM   Result Value Ref Range    WBC 3.8 (L) 4.6 - 13.2 K/uL    RBC 3.95 (L) 4.20 - 5.30 M/uL    HGB 12.6 12.0 - 16.0 g/dL    HCT 39.4 35.0 - 45.0 %    MCV 99.7 (H) 74.0 - 97.0 FL    MCH 31.9 24.0 - 34.0 PG    MCHC 32.0 31.0 - 37.0 g/dL    RDW 15.2 (H) 11.6 - 14.5 %    PLATELET 894 (L) 691 - 420 K/uL    MPV 10.0 9.2 - 11.8 FL    NEUTROPHILS 50 40 - 73 %    LYMPHOCYTES 38 21 - 52 %    MONOCYTES 12 (H) 3 - 10 % EOSINOPHILS 0 0 - 5 %    BASOPHILS 0 0 - 2 %    ABS. NEUTROPHILS 1.9 1.8 - 8.0 K/UL    ABS. LYMPHOCYTES 1.4 0.9 - 3.6 K/UL    ABS. MONOCYTES 0.5 0.05 - 1.2 K/UL    ABS. EOSINOPHILS 0.0 0.0 - 0.4 K/UL    ABS.  BASOPHILS 0.0 0.0 - 0.1 K/UL    DF AUTOMATED     METABOLIC PANEL, BASIC    Collection Time: 01/13/20  4:30 AM   Result Value Ref Range    Sodium 144 136 - 145 mmol/L    Potassium 3.6 3.5 - 5.5 mmol/L    Chloride 104 100 - 111 mmol/L    CO2 37 (H) 21 - 32 mmol/L    Anion gap 3 3.0 - 18 mmol/L    Glucose 92 74 - 99 mg/dL    BUN 24 (H) 7.0 - 18 MG/DL    Creatinine 0.76 0.6 - 1.3 MG/DL    BUN/Creatinine ratio 32 (H) 12 - 20      GFR est AA >60 >60 ml/min/1.73m2    GFR est non-AA >60 >60 ml/min/1.73m2    Calcium 8.6 8.5 - 10.1 MG/DL   MAGNESIUM    Collection Time: 01/13/20  4:30 AM   Result Value Ref Range    Magnesium 2.2 1.6 - 2.6 mg/dL   GLUCOSE, POC    Collection Time: 01/13/20  5:41 AM   Result Value Ref Range    Glucose (POC) 175 (H) 70 - 110 mg/dL         Current Facility-Administered Medications:     magnesium hydroxide (MILK OF MAGNESIA) 400 mg/5 mL oral suspension 30 mL, 30 mL, Oral, DAILY PRN, Luis Dow MD, 30 mL at 01/12/20 4573    potassium bicarb-citric acid (EFFER-K) tablet 20 mEq, 20 mEq, Oral, TID, Luis Dow MD, 20 mEq at 01/13/20 0801    acetaminophen (TYLENOL) tablet 650 mg, 650 mg, Oral, Q6H PRN, Benigno Roberts MD    divalproex DR (DEPAKOTE) tablet 1,250 mg, 1,250 mg, Oral, QHS, Luis Dow MD, 1,250 mg at 01/12/20 2221    docusate sodium (COLACE) capsule 100 mg, 100 mg, Oral, DAILY, Luis Dow MD, 100 mg at 01/13/20 0801    divalproex DR (DEPAKOTE) tablet 500 mg, 500 mg, Oral, DAILY, Debbie Weber NP, 500 mg at 01/13/20 0801    [Held by provider] gabapentin (NEURONTIN) capsule 200 mg, 200 mg, Oral, BID, Debbie Weber NP, 200 mg at 01/08/20 0805    atorvastatin (LIPITOR) tablet 40 mg, 40 mg, Oral, QHS, Debbie Weber NP, 40 mg at 01/12/20 2222   dicyclomine (BENTYL) capsule 20 mg, 20 mg, Oral, TID, Debbie Rangel NP, 20 mg at 01/13/20 0801    ergocalciferol capsule 50,000 Units, 50,000 Units, Oral, Q7D, Debbie Weber NP, 50,000 Units at 01/11/20 1002    citalopram (CELEXA) tablet 40 mg, 40 mg, Oral, QHS, Debbie Weber NP, 40 mg at 01/12/20 2222    [Held by provider] methylcellulose (CITRUCEL) tablet 500 mg, 1 Tab, Oral, BID, Debbie Rangel NP, 500 mg at 01/07/20 2715    levothyroxine (SYNTHROID) tablet 50 mcg, 50 mcg, Oral, 6am, Debbie Weber NP, 50 mcg at 01/13/20 0600    calcium-vitamin D (OS-MARICEL) 500 mg-200 unit tablet, 1 Tab, Oral, DAILY WITH BREAKFAST, Gricelda COLEMAN NP, 1 Tab at 01/13/20 0758    [Held by provider] topiramate (TOPAMAX) tablet 100 mg, 100 mg, Oral, BID WITH MEALS, Debbie Weber NP, 100 mg at 01/07/20 0839    chlorthalidone (HYGROTEN) tablet 25 mg, 25 mg, Oral, EVERY OTHER DAY, Debbie Weber NP, 25 mg at 01/13/20 0801    [Held by provider] losartan (COZAAR) tablet 100 mg, 100 mg, Oral, DAILY, Debbie Weber NP, 100 mg at 01/07/20 0839    polyethylene glycol (MIRALAX) packet 17 g, 17 g, Oral, DAILY, Luis Dow MD, 17 g at 01/13/20 0801    pantoprazole (PROTONIX) tablet 40 mg, 40 mg, Oral, ACB, Katerine Granados MD, 40 mg at 01/13/20 0801    arformoterol (BROVANA) neb solution 15 mcg, 15 mcg, Nebulization, BID RT, 15 mcg at 01/13/20 0757 **AND** budesonide (PULMICORT) 500 mcg/2 ml nebulizer suspension, 500 mcg, Nebulization, BID RT, Katerine Granados MD, 500 mcg at 01/13/20 0757    iron polysacch complex-b12-fa (FERREX 150 FORTE) capsule 1 Cap (Pawel Forte generic), 1 Cap, Oral, DAILY, Luis Dow MD, 1 Cap at 01/13/20 0801    albuterol-ipratropium (DUO-NEB) 2.5 MG-0.5 MG/3 ML, 3 mL, Nebulization, QID PRN, Katerine Granados MD    EPINEPHrine (EPIPEN) injection 0.3 mg (Patient Supplied), 0.3 mg, IntraMUSCular, PRN, Luis Dow MD    insulin lispro (HUMALOG) injection, , SubCUTAneous, AC&HS, Johnson Nunez MD, 2 Units at 01/13/20 0759    propranolol (INDERAL) tablet 40 mg, 40 mg, Oral, BID, Jose COLEMAN NP, 40 mg at 01/13/20 0801    Lab Results   Component Value Date/Time    Glucose 92 01/13/2020 04:30 AM    Glucose 88 01/09/2020 05:30 AM    Glucose 84 01/06/2020 04:40 AM    Glucose 78 01/03/2020 05:10 AM    Glucose 70 (L) 12/30/2019 04:30 AM        Assessment/Plan   Hx of Leg pain right/weakness: Continue PT/OT, she was seen by Neurologist and had work up. Patient to follow up with her Neurologist OP. Her Gabapentin was held presumable cause of increase lethargy.  She is more alert now and able to      Hx of Guillain-[place] Syndrome: follow up with neurologist OP     GERD; continue PPI     DM:  BS did improve, stable    Seizure disorder: continue Depakote, follow up with her Neurologist OP      Hyperlipidemia: continue with statin     Hypothyroidism: continue synthroid      HTN: BP stable, continue current meds and monitor    Continue pT/Ot  Ana Dose, NP  1/13/2020, 10:33 AM

## 2020-01-13 NOTE — PROGRESS NOTES
I have reviewed this patient's current medication list and recent laboratory results. Serum CO2 levels are high. Please consider switching Effer-K to KCl supplement    Thank you,  Roxanne ELIAS  Ph. M. S.  1/13/2020

## 2020-01-13 NOTE — ROUTINE PROCESS
2400 Asleep  With 02 @ 2L/nc on. HOB up,call bell in reach,[place] upper SR'S up             For safety. .Seizures precautions maintained. Alert/verbal but confused. 0100 Turned and repositioned in bed. Incontinent. Kept clean /dry.

## 2020-01-13 NOTE — ROUTINE PROCESS
Bedside and Verbal shift change report given to DEBBIE Bedoya LPN (oncoming nurse) by Onofre Quintana RN (offgoing nurse). Report included the following information SBAR, Kardex and Med Rec Status.

## 2020-01-14 LAB
GLUCOSE BLD STRIP.AUTO-MCNC: 145 MG/DL (ref 70–110)
GLUCOSE BLD STRIP.AUTO-MCNC: 164 MG/DL (ref 70–110)
GLUCOSE BLD STRIP.AUTO-MCNC: 180 MG/DL (ref 70–110)
GLUCOSE BLD STRIP.AUTO-MCNC: 99 MG/DL (ref 70–110)

## 2020-01-14 PROCEDURE — 74011636637 HC RX REV CODE- 636/637: Performed by: INTERNAL MEDICINE

## 2020-01-14 PROCEDURE — 82962 GLUCOSE BLOOD TEST: CPT

## 2020-01-14 PROCEDURE — 74011250637 HC RX REV CODE- 250/637: Performed by: NURSE PRACTITIONER

## 2020-01-14 PROCEDURE — 74011250637 HC RX REV CODE- 250/637: Performed by: INTERNAL MEDICINE

## 2020-01-14 PROCEDURE — 74011000250 HC RX REV CODE- 250: Performed by: INTERNAL MEDICINE

## 2020-01-14 RX ORDER — POTASSIUM CHLORIDE 1500 MG/1
20 TABLET, FILM COATED, EXTENDED RELEASE ORAL 2 TIMES DAILY
Qty: 60 TAB | Refills: 0 | Status: SHIPPED | OUTPATIENT
Start: 2020-01-14 | End: 2020-01-20 | Stop reason: SDUPTHER

## 2020-01-14 RX ORDER — DIVALPROEX SODIUM 500 MG/1
500 TABLET, DELAYED RELEASE ORAL DAILY
Qty: 30 TAB | Refills: 0 | Status: SHIPPED | OUTPATIENT
Start: 2020-01-15 | End: 2020-01-20 | Stop reason: SDUPTHER

## 2020-01-14 RX ORDER — DIVALPROEX SODIUM 250 MG/1
1250 TABLET, DELAYED RELEASE ORAL
Qty: 150 TAB | Refills: 0 | Status: SHIPPED | OUTPATIENT
Start: 2020-01-14 | End: 2020-01-20 | Stop reason: SDUPTHER

## 2020-01-14 RX ORDER — DOCUSATE SODIUM 100 MG/1
100 CAPSULE, LIQUID FILLED ORAL DAILY
Qty: 30 CAP | Refills: 0 | Status: SHIPPED | OUTPATIENT
Start: 2020-01-15 | End: 2020-04-15

## 2020-01-14 RX ORDER — GLIPIZIDE 5 MG/1
5 TABLET, FILM COATED, EXTENDED RELEASE ORAL DAILY
Qty: 30 TAB | Refills: 0 | Status: ON HOLD | OUTPATIENT
Start: 2020-01-14 | End: 2021-06-23

## 2020-01-14 RX ADMIN — Medication 1 TABLET: at 09:06

## 2020-01-14 RX ADMIN — ARFORMOTEROL TARTRATE 15 MCG: 15 SOLUTION RESPIRATORY (INHALATION) at 21:37

## 2020-01-14 RX ADMIN — PROPRANOLOL HYDROCHLORIDE 40 MG: 20 TABLET ORAL at 09:06

## 2020-01-14 RX ADMIN — POTASSIUM BICARBONATE 20 MEQ: 782 TABLET, EFFERVESCENT ORAL at 21:38

## 2020-01-14 RX ADMIN — ARFORMOTEROL TARTRATE 15 MCG: 15 SOLUTION RESPIRATORY (INHALATION) at 09:06

## 2020-01-14 RX ADMIN — POTASSIUM BICARBONATE 20 MEQ: 782 TABLET, EFFERVESCENT ORAL at 09:06

## 2020-01-14 RX ADMIN — DIVALPROEX SODIUM 1250 MG: 500 TABLET, DELAYED RELEASE ORAL at 21:39

## 2020-01-14 RX ADMIN — Medication 1 CAPSULE: at 09:06

## 2020-01-14 RX ADMIN — ATORVASTATIN CALCIUM 40 MG: 40 TABLET, FILM COATED ORAL at 21:39

## 2020-01-14 RX ADMIN — PROPRANOLOL HYDROCHLORIDE 40 MG: 20 TABLET ORAL at 18:21

## 2020-01-14 RX ADMIN — LEVOTHYROXINE SODIUM 50 MCG: 100 TABLET ORAL at 06:08

## 2020-01-14 RX ADMIN — CITALOPRAM HYDROBROMIDE 40 MG: 20 TABLET ORAL at 21:38

## 2020-01-14 RX ADMIN — INSULIN LISPRO 2 UNITS: 100 INJECTION, SOLUTION INTRAVENOUS; SUBCUTANEOUS at 17:25

## 2020-01-14 RX ADMIN — POLYETHYLENE GLYCOL 3350 17 G: 17 POWDER, FOR SOLUTION ORAL at 09:00

## 2020-01-14 RX ADMIN — DICYCLOMINE HYDROCHLORIDE 20 MG: 10 CAPSULE ORAL at 17:29

## 2020-01-14 RX ADMIN — BUDESONIDE 500 MCG: 0.5 SUSPENSION RESPIRATORY (INHALATION) at 21:38

## 2020-01-14 RX ADMIN — DICYCLOMINE HYDROCHLORIDE 20 MG: 10 CAPSULE ORAL at 09:06

## 2020-01-14 RX ADMIN — DOCUSATE SODIUM 100 MG: 100 CAPSULE, LIQUID FILLED ORAL at 09:06

## 2020-01-14 RX ADMIN — DICYCLOMINE HYDROCHLORIDE 20 MG: 10 CAPSULE ORAL at 21:38

## 2020-01-14 RX ADMIN — BUDESONIDE 500 MCG: 0.5 SUSPENSION RESPIRATORY (INHALATION) at 09:06

## 2020-01-14 RX ADMIN — POTASSIUM BICARBONATE 20 MEQ: 782 TABLET, EFFERVESCENT ORAL at 17:29

## 2020-01-14 RX ADMIN — PANTOPRAZOLE SODIUM 40 MG: 40 TABLET, DELAYED RELEASE ORAL at 09:06

## 2020-01-14 RX ADMIN — DIVALPROEX SODIUM 500 MG: 500 TABLET, DELAYED RELEASE ORAL at 09:07

## 2020-01-14 RX ADMIN — INSULIN LISPRO 2 UNITS: 100 INJECTION, SOLUTION INTRAVENOUS; SUBCUTANEOUS at 21:47

## 2020-01-14 NOTE — PROGRESS NOTES
conducted a Follow up consultation and Spiritual Assessment for Burnice Men, who is a 78 y.o.,female. The  provided the following Interventions:  Continued the relationship of care and support. Listened empathically. Offered prayer and assurance of continued prayer on patients behalf. Chart reviewed. The following outcomes were achieved:  Patient expressed gratitude for pastoral care visit. Assessment:  There are no further spiritual or Gnosticism issues which require Spiritual Care Services interventions at this time. Plan:  Chaplains will continue to follow and will provide pastoral care on an as needed/requested basis.  recommends bedside caregivers page  on duty if patient shows signs of acute spiritual or emotional distress.      [address] [facility]   Staff [address] [place] [address]   [phone]

## 2020-01-14 NOTE — PROGRESS NOTES
Care Plan Meeting held with the following attendees: Activities, ,Rehab,DON and Mrs. And . Ellie Yoon. Initial care plan reviewed along with ,therapy goals and frequency of service delivery,pt ability as of this date,activities offered and patient participation in activities,discharge plans, and specific questions regarding medications and other areas of concern.

## 2020-01-14 NOTE — PROGRESS NOTES
Patient resting with eyes closed, denies pain, no seizures activity noted.  Pt restng in bed with eyes closed, no s/s of distress noted

## 2020-01-14 NOTE — PROGRESS NOTES
NUTRITION FOLLOW-UP/PLAN OF CARE TCC      RECOMMENDATIONS:   1. Dental Soft Solid Diet (chopped meats); low lactose (No artificial sweeteners)  2. Monitor labs, weight and PO intake  3. Feeding assist and specialty utensils     GOALS:   1. Progressing/Ongoing: PO intake meets >75% of protein/calorie needs by 1/21/2020  2. Met/Ongoing: Weight Maintenance (+/- 1-2 lb) by 1/21/2020     ASSESSMENT:   Wt status is classified as obese per Body mass index is 39.26 kg/m². Overall good PO intake. Labs noted. BG range () over the past 24 hours; A1c (5.2%). Pt w/ hypokalemia and elevated BUN. Nutrition recommendations listed. RD to follow. SUBJECTIVE/OBJECTIVE:   (1/14/2020): Appetite/PO intake is adequate; most meals >75% this past week per [facility]. Last BM on (1/12) per I/Os; noted MOM given that day as well. No new weight on records this week as of yet. Pt seen in room OOB in chair. Noted has been much more alert and doing better with lethargy since Gabapentin has been held. Pt was agitated about something during visit so attempted to assist her and was able to get her a word find to do until dinner. She mentioned only did so so for intake at lunch d/t having a little bit of upset stomach, but is looking forward to dinner. Will continue to monitor.     (1/7/2020): SLP following: pt is s/p MBS on (1/6) Pt presents with moderate oropharyngeal dysphagia with recommendations for mechanical-soft solid (chopped meat), thin liquid diet; meds one at a time or whole in pudding. Appetite/PO intake is fair to good overall; average of 72% this past week per [facility]. Noted Pt sometimes more confused than others so needs assistance/encouragement. Last BM on (1/7) per I/Os. Weight has been stable this admission. Pt seen in room working with SLP today; observed 75% of meal consumed during rounding. Will continue to monitor.     (12/31). Pt seen in room after lunch; observed >75% of meal consumed.  Spoke with  as well to assist with Hx. Pt does have lactose intolerance, does NOT tolerate artificial sweeteners and usually feeds herself at home with specialty utensils and tray set up/assist. Placed orders in system and informed dietary. Spoke with therapy about feeding assist with specialty utensils d/t tremors as well as about SLP consult. Pt reports she has been doing OK with meals most of the time. Last BM on (12/30) per I/Os. Will continue to monitor.        (12/29): Pt seen for an initial assessment to TCC/Consult. Pt resting in bed during time of assessment, with mild confusion during time of visit. Noted multiple falls per reports. During visit pt attempting to eat pudding but having a hard time with utensils independently. Pt does report to have a good appetite and normally consumes 3 meals/day. No reports of n/v/d/c at this time. Per I/Os last BM 12/28/19. Pt states no food allergies, noted in chart allergy to milk containing products - when asked pt to clarify, no response (will continue with allergy in diet due to confusion, nursing/kitchen informed). Pt reports issues swallowing, per RN documents also noting trouble with pills. Will place speech consult. When asking about UBW pt staring off. Rec: assist/feed pt at all meals. Will continue to monitor intake/tolerance, weight, labs.     Information Obtained from:    [x] Chart Review   [x] Patient   [x] Family/Caregiver   [x] Nurse/Physician   [] Interdisciplinary Meeting/Rounds    Diet: Dental soft solid  Medications: [x] Reviewed   oscal, hygroten, celexa, depakote, colace, ergocalciferol, humalog, ferrex, synthroid, cozaar, citrucel (held) propranolol, protonix, miralax  Allergies: [x] Reviewed   Patient Active Problem List   Diagnosis Code    [facility] C50.919    Biliary obstruction K83.1    Choledocholithiasis K80.50    Lumbar stenosis M48.061    Severe persistent asthma J45.50    Leg pain, right M79.604    DM (diabetes mellitus) ([facility] E11.9    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    GERD (gastroesophageal reflux disease) K21.9    Weakness R53.1    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 I go to a gastroenterologist for it. \"    Lymphedema     left arm and hand and wears a sleeve.      Morbid obesity ([facility] 09/12/2017    BMI = 40.4    Seizures (HCC)     Sleep apnea     left apap machine at home    [facility]     history of in left leg    Thyroid disease     UTI (lower urinary tract infection)       Labs:    Lab Results   Component Value Date/Time    Sodium 144 01/13/2020 04:30 AM    Potassium 3.6 01/13/2020 04:30 AM    Chloride 104 01/13/2020 04:30 AM    CO2 37 (H) 01/13/2020 04:30 AM    Anion gap 3 01/13/2020 04:30 AM    Glucose 92 01/13/2020 04:30 AM    BUN 24 (H) 01/13/2020 04:30 AM    Creatinine 0.76 01/13/2020 04:30 AM    Calcium 8.6 01/13/2020 04:30 AM    Magnesium 2.2 01/13/2020 04:30 AM    Albumin 3.0 (L) 12/01/2019 11:50 AM     Lab Results   Component Value Date/Time    GLUCPOC 145 (H) 01/14/2020 12:23 PM    GLUCPOC 99 01/14/2020 06:13 AM    GLUCPOC 130 (H) 01/13/2020 09:11 PM       Anthropometrics: BMI (calculated): 39.2  Last 3 Recorded Weights in this Encounter    12/27/19 1644 12/30/19 1444 01/07/20 1239   Weight: 107.4 kg (236 lb 11.2 oz) 104.4 kg (230 lb 3.2 oz) 103.7 kg (228 lb 11.2 oz)      Ht Readings from Last 1 Encounters:   12/27/19 5' 4\" (1.626 m)       Documented Weight History:  Weight Metrics 1/7/2020 12/27/2019 12/25/2019 12/22/2019 12/1/2019 11/5/2019 3/17/2019   Weight 228 lb 11.2 oz - 235 lb 3.7 oz - 220 lb 218 lb 218 lb   BMI - 39.26 kg/m2 - 40.38 kg/m2 38.97 kg/m2 38.62 kg/m2 38.62 kg/m2       No data found.     UBW: 220 lb??  [] Weight Loss  [x] Weight Gain from UBW per history above  [] Weight Stable    Estimated Nutrition Needs: [x] MSJ  [] Other:  Calories: 9779-0864 kcal (x 1.1-1.2) Based on:   [x] Actual BW    Protein:    g (0.8-1 g/kg) Based on:   [x] Actual BW    Fluid:      1 mL/kcal    Nutrition Problems Identified:   [] Suboptimal PO intake   [x] Food Allergies (Lactose intolerance)  [x] Difficulty chewing/swallowing/poor dentition  [x] Constipation/Diarrhea (Last BM on 1/12; bowel regimen in place)  [] Nausea/Vomiting   [] None  [] Other:     Plan:   [x] Therapeutic Diet  [x]  Obtained/adjusted food preferences/tolerances and/or snacks options   []  Supplements added   [x] Occupational therapy and SLP following for feeding techniques  []  HS snack added   [x]  Modify diet texture   [x]  Modify diet for food allergies   []  Educate patient   []  Assist with menu selection   [x]  Monitor PO intake on meal rounds   [x]  Continue inpatient monitoring and intervention   [x]  Participated in discharge planning/Interdisciplinary rounds/Team meetings   [x]  Other: Feeding assist and specialty utensils     Education Needs:   [] Not appropriate for teaching at this time due to:   [x] Identified and addressed encouraged intake    Nutrition Monitoring and Evaluation:  [x] Continue ongoing monitoring and intervention  [] Other    Shweta Saba

## 2020-01-15 LAB
GLUCOSE BLD STRIP.AUTO-MCNC: 106 MG/DL (ref 70–110)
GLUCOSE BLD STRIP.AUTO-MCNC: 151 MG/DL (ref 70–110)
GLUCOSE BLD STRIP.AUTO-MCNC: 175 MG/DL (ref 70–110)
GLUCOSE BLD STRIP.AUTO-MCNC: 93 MG/DL (ref 70–110)

## 2020-01-15 PROCEDURE — 74011000250 HC RX REV CODE- 250: Performed by: INTERNAL MEDICINE

## 2020-01-15 PROCEDURE — 82962 GLUCOSE BLOOD TEST: CPT

## 2020-01-15 PROCEDURE — 74011636637 HC RX REV CODE- 636/637: Performed by: INTERNAL MEDICINE

## 2020-01-15 PROCEDURE — 74011250637 HC RX REV CODE- 250/637: Performed by: NURSE PRACTITIONER

## 2020-01-15 PROCEDURE — 74011250637 HC RX REV CODE- 250/637: Performed by: INTERNAL MEDICINE

## 2020-01-15 RX ADMIN — CITALOPRAM HYDROBROMIDE 40 MG: 20 TABLET ORAL at 22:09

## 2020-01-15 RX ADMIN — CHLORTHALIDONE 25 MG: 25 TABLET ORAL at 08:46

## 2020-01-15 RX ADMIN — DIVALPROEX SODIUM 1250 MG: 500 TABLET, DELAYED RELEASE ORAL at 22:09

## 2020-01-15 RX ADMIN — POTASSIUM BICARBONATE 20 MEQ: 782 TABLET, EFFERVESCENT ORAL at 16:00

## 2020-01-15 RX ADMIN — BUDESONIDE 500 MCG: 0.5 SUSPENSION RESPIRATORY (INHALATION) at 08:47

## 2020-01-15 RX ADMIN — INSULIN LISPRO 2 UNITS: 100 INJECTION, SOLUTION INTRAVENOUS; SUBCUTANEOUS at 11:16

## 2020-01-15 RX ADMIN — ARFORMOTEROL TARTRATE 15 MCG: 15 SOLUTION RESPIRATORY (INHALATION) at 08:47

## 2020-01-15 RX ADMIN — INSULIN LISPRO 2 UNITS: 100 INJECTION, SOLUTION INTRAVENOUS; SUBCUTANEOUS at 22:10

## 2020-01-15 RX ADMIN — PROPRANOLOL HYDROCHLORIDE 40 MG: 20 TABLET ORAL at 08:46

## 2020-01-15 RX ADMIN — DICYCLOMINE HYDROCHLORIDE 20 MG: 10 CAPSULE ORAL at 16:00

## 2020-01-15 RX ADMIN — POTASSIUM BICARBONATE 20 MEQ: 782 TABLET, EFFERVESCENT ORAL at 22:09

## 2020-01-15 RX ADMIN — Medication 1 CAPSULE: at 08:46

## 2020-01-15 RX ADMIN — LEVOTHYROXINE SODIUM 50 MCG: 100 TABLET ORAL at 06:00

## 2020-01-15 RX ADMIN — PANTOPRAZOLE SODIUM 40 MG: 40 TABLET, DELAYED RELEASE ORAL at 08:47

## 2020-01-15 RX ADMIN — PROPRANOLOL HYDROCHLORIDE 40 MG: 20 TABLET ORAL at 18:00

## 2020-01-15 RX ADMIN — DIVALPROEX SODIUM 500 MG: 500 TABLET, DELAYED RELEASE ORAL at 08:46

## 2020-01-15 RX ADMIN — POTASSIUM BICARBONATE 20 MEQ: 782 TABLET, EFFERVESCENT ORAL at 08:46

## 2020-01-15 RX ADMIN — POLYETHYLENE GLYCOL 3350 17 G: 17 POWDER, FOR SOLUTION ORAL at 08:47

## 2020-01-15 RX ADMIN — DICYCLOMINE HYDROCHLORIDE 20 MG: 10 CAPSULE ORAL at 22:09

## 2020-01-15 RX ADMIN — DOCUSATE SODIUM 100 MG: 100 CAPSULE, LIQUID FILLED ORAL at 08:47

## 2020-01-15 RX ADMIN — ATORVASTATIN CALCIUM 40 MG: 40 TABLET, FILM COATED ORAL at 22:09

## 2020-01-15 RX ADMIN — Medication 1 TABLET: at 08:47

## 2020-01-15 RX ADMIN — DICYCLOMINE HYDROCHLORIDE 20 MG: 10 CAPSULE ORAL at 08:46

## 2020-01-15 NOTE — ROUTINE PROCESS
Patient in bed with eyes closed,with 02 @ 2L/nc on,Resp non labored. HOB up,Call bell in reach ,Allan. upper SR's up and bed check alarm on for safety. .  Incontinent. Kept clean and dry. Turned and repositioned to bed. Heels off bed surfaces. Alert/verbal  With  periodic confusion. Oleg pain. BS noted.

## 2020-01-15 NOTE — INTERDISCIPLINARY ROUNDS
IDT team, , [address] [place] [address] Kend[place], Rehab Manager, Dietician, /, met and reviewed patients medication, diet, therapy, nursing needs, discharge plans, and overall progress. Concerns identified and addressed to meet patients needs.

## 2020-01-15 NOTE — ROUTINE PROCESS
Bedside and Verbal shift change report given to BUCKY Bedoya LPN (oncoming nurse) by Nancy Gutierrez RN (offgoing nurse). Report included the following information SBAR, Kardex and Med Rec Status.

## 2020-01-16 ENCOUNTER — PATIENT OUTREACH (OUTPATIENT)
Dept: CASE MANAGEMENT | Age: 70
End: 2020-01-16

## 2020-01-16 LAB
GLUCOSE BLD STRIP.AUTO-MCNC: 105 MG/DL (ref 70–110)
GLUCOSE BLD STRIP.AUTO-MCNC: 118 MG/DL (ref 70–110)
GLUCOSE BLD STRIP.AUTO-MCNC: 130 MG/DL (ref 70–110)
GLUCOSE BLD STRIP.AUTO-MCNC: 92 MG/DL (ref 70–110)

## 2020-01-16 PROCEDURE — 74011000250 HC RX REV CODE- 250: Performed by: INTERNAL MEDICINE

## 2020-01-16 PROCEDURE — 82962 GLUCOSE BLOOD TEST: CPT

## 2020-01-16 PROCEDURE — 74011250637 HC RX REV CODE- 250/637: Performed by: NURSE PRACTITIONER

## 2020-01-16 PROCEDURE — 74011250637 HC RX REV CODE- 250/637: Performed by: INTERNAL MEDICINE

## 2020-01-16 RX ADMIN — PROPRANOLOL HYDROCHLORIDE 40 MG: 20 TABLET ORAL at 18:24

## 2020-01-16 RX ADMIN — CITALOPRAM HYDROBROMIDE 40 MG: 20 TABLET ORAL at 21:38

## 2020-01-16 RX ADMIN — POTASSIUM BICARBONATE 20 MEQ: 782 TABLET, EFFERVESCENT ORAL at 08:02

## 2020-01-16 RX ADMIN — DIVALPROEX SODIUM 1250 MG: 500 TABLET, DELAYED RELEASE ORAL at 21:37

## 2020-01-16 RX ADMIN — PROPRANOLOL HYDROCHLORIDE 40 MG: 20 TABLET ORAL at 08:01

## 2020-01-16 RX ADMIN — LEVOTHYROXINE SODIUM 50 MCG: 100 TABLET ORAL at 05:22

## 2020-01-16 RX ADMIN — BUDESONIDE 500 MCG: 0.5 SUSPENSION RESPIRATORY (INHALATION) at 07:48

## 2020-01-16 RX ADMIN — POTASSIUM BICARBONATE 20 MEQ: 782 TABLET, EFFERVESCENT ORAL at 18:24

## 2020-01-16 RX ADMIN — DIVALPROEX SODIUM 500 MG: 500 TABLET, DELAYED RELEASE ORAL at 08:01

## 2020-01-16 RX ADMIN — Medication 1 CAPSULE: at 08:00

## 2020-01-16 RX ADMIN — POTASSIUM BICARBONATE 20 MEQ: 782 TABLET, EFFERVESCENT ORAL at 22:00

## 2020-01-16 RX ADMIN — PANTOPRAZOLE SODIUM 40 MG: 40 TABLET, DELAYED RELEASE ORAL at 07:49

## 2020-01-16 RX ADMIN — ARFORMOTEROL TARTRATE 15 MCG: 15 SOLUTION RESPIRATORY (INHALATION) at 21:37

## 2020-01-16 RX ADMIN — ARFORMOTEROL TARTRATE 15 MCG: 15 SOLUTION RESPIRATORY (INHALATION) at 07:48

## 2020-01-16 RX ADMIN — DICYCLOMINE HYDROCHLORIDE 20 MG: 10 CAPSULE ORAL at 18:23

## 2020-01-16 RX ADMIN — BUDESONIDE 500 MCG: 0.5 SUSPENSION RESPIRATORY (INHALATION) at 21:37

## 2020-01-16 RX ADMIN — DOCUSATE SODIUM 100 MG: 100 CAPSULE, LIQUID FILLED ORAL at 08:01

## 2020-01-16 RX ADMIN — ATORVASTATIN CALCIUM 40 MG: 40 TABLET, FILM COATED ORAL at 21:37

## 2020-01-16 RX ADMIN — DICYCLOMINE HYDROCHLORIDE 20 MG: 10 CAPSULE ORAL at 08:02

## 2020-01-16 RX ADMIN — Medication 1 TABLET: at 07:49

## 2020-01-16 RX ADMIN — DICYCLOMINE HYDROCHLORIDE 20 MG: 10 CAPSULE ORAL at 21:38

## 2020-01-16 RX ADMIN — POLYETHYLENE GLYCOL 3350 17 G: 17 POWDER, FOR SOLUTION ORAL at 08:02

## 2020-01-16 NOTE — PROGRESS NOTES
Patient in bed resting. She sat in the wheelchair most of the shift. Was assisted to the bedside commode. O2 at 2L. Was oriented at the beginning of shift but was confused at bedtime. HOB elevated. No complaints.

## 2020-01-16 NOTE — PROGRESS NOTES
Community Care Team Documentation for Patient in [facility]     Patient discharged from [facility] [facility] to [address] [place] [address], on 12/27/19. Hospital Discharge diagnosis:       Weakness   Guillain Lumpkin syndrome   Leg pain   GERD   DM   Asthma    SNF Attending Provider:   Dr. Harinder Crespo    Anticipated discharge date from SNF:  Tentative 1/22/20    PCP : Siena Long MD    CTN:     [facility] Team rounds completed, updates provided by facility. RRAT:  Low Risk            10       Total Score        2 . Living with Significant Other. Assisted Living. LTAC. SNF. or   Rehab    8 Charlson Comorbidity Score (Age + Comorbid Conditions)        Criteria that do not apply:    Has Seen PCP in Last 6 Months (Yes=3, No=0)    Patient Length of Stay (>5 days = 3)    IP Visits Last 12 Months (1-3=4, 4=9, >4=11)    Pt.  Coverage (Medicare=5 , Medicaid, or Self-Pay=4)        Active Ambulatory Problems     Diagnosis Date Noted    Breast cancer ([address]     Biliary obstruction 04/26/2016    Choledocholithiasis 06/07/2016    Lumbar stenosis 09/13/2017    Severe persistent asthma 11/07/2019    Leg pain, right 12/23/2019    DM (diabetes mellitus) ([address] 12/23/2019    Hx of Guillain-Lumpkin syndrome 12/23/2019    GERD (gastroesophageal reflux disease) 12/23/2019    Weakness 12/24/2019    Guillain Barré syndrome ([address] 12/24/2019     Resolved Ambulatory Problems     Diagnosis Date Noted    No Resolved Ambulatory Problems     Past Medical History:   Diagnosis Date    Asthma     Back pain     Bipolar 1 disorder ([address]     Cancer ([address] [facility]     Diabetes ([address] [facility]     History of blood transfusion     Hypertension     Ill-defined condition 09/12/2017    Lymphedema     Morbid obesity ([address] 09/12/2017    Seizures ([address]     Sleep apnea     Thromboembolus ([address]     Thyroid disease     UTI (lower urinary tract infection)

## 2020-01-17 LAB
GLUCOSE BLD STRIP.AUTO-MCNC: 132 MG/DL (ref 70–110)
GLUCOSE BLD STRIP.AUTO-MCNC: 146 MG/DL (ref 70–110)
GLUCOSE BLD STRIP.AUTO-MCNC: 157 MG/DL (ref 70–110)
GLUCOSE BLD STRIP.AUTO-MCNC: 99 MG/DL (ref 70–110)

## 2020-01-17 PROCEDURE — 82962 GLUCOSE BLOOD TEST: CPT

## 2020-01-17 PROCEDURE — 74011250637 HC RX REV CODE- 250/637: Performed by: NURSE PRACTITIONER

## 2020-01-17 PROCEDURE — 74011636637 HC RX REV CODE- 636/637: Performed by: INTERNAL MEDICINE

## 2020-01-17 PROCEDURE — 74011000250 HC RX REV CODE- 250: Performed by: INTERNAL MEDICINE

## 2020-01-17 PROCEDURE — 74011250637 HC RX REV CODE- 250/637: Performed by: INTERNAL MEDICINE

## 2020-01-17 RX ADMIN — ARFORMOTEROL TARTRATE 15 MCG: 15 SOLUTION RESPIRATORY (INHALATION) at 09:36

## 2020-01-17 RX ADMIN — ARFORMOTEROL TARTRATE 15 MCG: 15 SOLUTION RESPIRATORY (INHALATION) at 22:25

## 2020-01-17 RX ADMIN — CITALOPRAM HYDROBROMIDE 40 MG: 20 TABLET ORAL at 22:24

## 2020-01-17 RX ADMIN — ATORVASTATIN CALCIUM 40 MG: 40 TABLET, FILM COATED ORAL at 22:24

## 2020-01-17 RX ADMIN — DICYCLOMINE HYDROCHLORIDE 20 MG: 10 CAPSULE ORAL at 22:23

## 2020-01-17 RX ADMIN — DIVALPROEX SODIUM 1250 MG: 500 TABLET, DELAYED RELEASE ORAL at 22:24

## 2020-01-17 RX ADMIN — DICYCLOMINE HYDROCHLORIDE 20 MG: 10 CAPSULE ORAL at 09:37

## 2020-01-17 RX ADMIN — PROPRANOLOL HYDROCHLORIDE 40 MG: 20 TABLET ORAL at 17:15

## 2020-01-17 RX ADMIN — DIVALPROEX SODIUM 500 MG: 500 TABLET, DELAYED RELEASE ORAL at 09:37

## 2020-01-17 RX ADMIN — POTASSIUM BICARBONATE 20 MEQ: 782 TABLET, EFFERVESCENT ORAL at 22:23

## 2020-01-17 RX ADMIN — CHLORTHALIDONE 25 MG: 25 TABLET ORAL at 09:37

## 2020-01-17 RX ADMIN — BUDESONIDE 500 MCG: 0.5 SUSPENSION RESPIRATORY (INHALATION) at 09:36

## 2020-01-17 RX ADMIN — Medication 1 TABLET: at 09:37

## 2020-01-17 RX ADMIN — INSULIN LISPRO 2 UNITS: 100 INJECTION, SOLUTION INTRAVENOUS; SUBCUTANEOUS at 22:23

## 2020-01-17 RX ADMIN — BUDESONIDE 500 MCG: 0.5 SUSPENSION RESPIRATORY (INHALATION) at 22:25

## 2020-01-17 RX ADMIN — PROPRANOLOL HYDROCHLORIDE 40 MG: 20 TABLET ORAL at 09:37

## 2020-01-17 RX ADMIN — DOCUSATE SODIUM 100 MG: 100 CAPSULE, LIQUID FILLED ORAL at 09:37

## 2020-01-17 RX ADMIN — POTASSIUM BICARBONATE 20 MEQ: 782 TABLET, EFFERVESCENT ORAL at 09:33

## 2020-01-17 RX ADMIN — POTASSIUM BICARBONATE 20 MEQ: 782 TABLET, EFFERVESCENT ORAL at 16:18

## 2020-01-17 RX ADMIN — DICYCLOMINE HYDROCHLORIDE 20 MG: 10 CAPSULE ORAL at 16:19

## 2020-01-17 RX ADMIN — Medication 1 CAPSULE: at 09:37

## 2020-01-17 RX ADMIN — LEVOTHYROXINE SODIUM 50 MCG: 100 TABLET ORAL at 06:34

## 2020-01-17 RX ADMIN — PANTOPRAZOLE SODIUM 40 MG: 40 TABLET, DELAYED RELEASE ORAL at 09:36

## 2020-01-17 NOTE — PROGRESS NOTES
Patient in bed resting, HOB, elevated, oxygen @ 2L via nasal cannula, one assist to  [facility] at bedside, call bell and personal items within.

## 2020-01-18 LAB
GLUCOSE BLD STRIP.AUTO-MCNC: 147 MG/DL (ref 70–110)
GLUCOSE BLD STRIP.AUTO-MCNC: 150 MG/DL (ref 70–110)
GLUCOSE BLD STRIP.AUTO-MCNC: 88 MG/DL (ref 70–110)
GLUCOSE BLD STRIP.AUTO-MCNC: 98 MG/DL (ref 70–110)

## 2020-01-18 PROCEDURE — 74011250637 HC RX REV CODE- 250/637: Performed by: INTERNAL MEDICINE

## 2020-01-18 PROCEDURE — 74011636637 HC RX REV CODE- 636/637: Performed by: INTERNAL MEDICINE

## 2020-01-18 PROCEDURE — 74011250637 HC RX REV CODE- 250/637: Performed by: NURSE PRACTITIONER

## 2020-01-18 PROCEDURE — 82962 GLUCOSE BLOOD TEST: CPT

## 2020-01-18 PROCEDURE — 74011000250 HC RX REV CODE- 250: Performed by: INTERNAL MEDICINE

## 2020-01-18 RX ADMIN — BUDESONIDE 500 MCG: 0.5 SUSPENSION RESPIRATORY (INHALATION) at 09:28

## 2020-01-18 RX ADMIN — PANTOPRAZOLE SODIUM 40 MG: 40 TABLET, DELAYED RELEASE ORAL at 09:28

## 2020-01-18 RX ADMIN — Medication 1 TABLET: at 09:28

## 2020-01-18 RX ADMIN — DIVALPROEX SODIUM 1250 MG: 500 TABLET, DELAYED RELEASE ORAL at 21:09

## 2020-01-18 RX ADMIN — POTASSIUM BICARBONATE 20 MEQ: 782 TABLET, EFFERVESCENT ORAL at 09:36

## 2020-01-18 RX ADMIN — DIVALPROEX SODIUM 500 MG: 500 TABLET, DELAYED RELEASE ORAL at 09:28

## 2020-01-18 RX ADMIN — CITALOPRAM HYDROBROMIDE 40 MG: 20 TABLET ORAL at 21:09

## 2020-01-18 RX ADMIN — ERGOCALCIFEROL 50000 UNITS: 1.25 CAPSULE ORAL at 09:28

## 2020-01-18 RX ADMIN — BUDESONIDE 500 MCG: 0.5 SUSPENSION RESPIRATORY (INHALATION) at 20:00

## 2020-01-18 RX ADMIN — Medication 1 CAPSULE: at 09:36

## 2020-01-18 RX ADMIN — PROPRANOLOL HYDROCHLORIDE 40 MG: 20 TABLET ORAL at 09:28

## 2020-01-18 RX ADMIN — DICYCLOMINE HYDROCHLORIDE 20 MG: 10 CAPSULE ORAL at 21:09

## 2020-01-18 RX ADMIN — POLYETHYLENE GLYCOL 3350 17 G: 17 POWDER, FOR SOLUTION ORAL at 09:36

## 2020-01-18 RX ADMIN — ARFORMOTEROL TARTRATE 15 MCG: 15 SOLUTION RESPIRATORY (INHALATION) at 20:00

## 2020-01-18 RX ADMIN — ATORVASTATIN CALCIUM 40 MG: 40 TABLET, FILM COATED ORAL at 21:09

## 2020-01-18 RX ADMIN — DICYCLOMINE HYDROCHLORIDE 20 MG: 10 CAPSULE ORAL at 09:28

## 2020-01-18 RX ADMIN — INSULIN LISPRO 2 UNITS: 100 INJECTION, SOLUTION INTRAVENOUS; SUBCUTANEOUS at 11:43

## 2020-01-18 RX ADMIN — POTASSIUM BICARBONATE 20 MEQ: 782 TABLET, EFFERVESCENT ORAL at 17:51

## 2020-01-18 RX ADMIN — PROPRANOLOL HYDROCHLORIDE 40 MG: 20 TABLET ORAL at 17:52

## 2020-01-18 RX ADMIN — POTASSIUM BICARBONATE 20 MEQ: 782 TABLET, EFFERVESCENT ORAL at 21:08

## 2020-01-18 RX ADMIN — DICYCLOMINE HYDROCHLORIDE 20 MG: 10 CAPSULE ORAL at 17:52

## 2020-01-18 RX ADMIN — ARFORMOTEROL TARTRATE 15 MCG: 15 SOLUTION RESPIRATORY (INHALATION) at 09:28

## 2020-01-18 RX ADMIN — LEVOTHYROXINE SODIUM 50 MCG: 100 TABLET ORAL at 06:07

## 2020-01-18 NOTE — PROGRESS NOTES
Pt resting in bed, HOB elevated, oxygen intact @ 2LPM via n/c, toileting assistance given as needed, no c/o pain and no visual signs of pain noted, no seizure activity noted, call bell and personal items within reach

## 2020-01-18 NOTE — ROUTINE PROCESS
Bedside and Verbal shift change report given to KACYE iVrk (oncoming nurse) by Elan Turner LPN (offgoing nurse). Report included the following information SBAR, Kardex and MAR.

## 2020-01-18 NOTE — ROUTINE PROCESS
Patient sitting in bed, patient on 2 liters of oxygen via nasal cannula, Pt has no signs of distress at this time. Patient alert with periods of confusion, no seizure activity noted at this time, call bell and personal items within reach, bed in lowest position.

## 2020-01-19 LAB
GLUCOSE BLD STRIP.AUTO-MCNC: 149 MG/DL (ref 70–110)
GLUCOSE BLD STRIP.AUTO-MCNC: 149 MG/DL (ref 70–110)
GLUCOSE BLD STRIP.AUTO-MCNC: 161 MG/DL (ref 70–110)
GLUCOSE BLD STRIP.AUTO-MCNC: 86 MG/DL (ref 70–110)

## 2020-01-19 PROCEDURE — 74011636637 HC RX REV CODE- 636/637: Performed by: INTERNAL MEDICINE

## 2020-01-19 PROCEDURE — 74011000250 HC RX REV CODE- 250: Performed by: INTERNAL MEDICINE

## 2020-01-19 PROCEDURE — 74011250637 HC RX REV CODE- 250/637: Performed by: NURSE PRACTITIONER

## 2020-01-19 PROCEDURE — 74011250637 HC RX REV CODE- 250/637: Performed by: INTERNAL MEDICINE

## 2020-01-19 PROCEDURE — 82962 GLUCOSE BLOOD TEST: CPT

## 2020-01-19 RX ADMIN — POLYETHYLENE GLYCOL 3350 17 G: 17 POWDER, FOR SOLUTION ORAL at 10:30

## 2020-01-19 RX ADMIN — CHLORTHALIDONE 25 MG: 25 TABLET ORAL at 09:00

## 2020-01-19 RX ADMIN — ATORVASTATIN CALCIUM 40 MG: 40 TABLET, FILM COATED ORAL at 21:55

## 2020-01-19 RX ADMIN — POTASSIUM BICARBONATE 20 MEQ: 782 TABLET, EFFERVESCENT ORAL at 17:59

## 2020-01-19 RX ADMIN — DICYCLOMINE HYDROCHLORIDE 20 MG: 10 CAPSULE ORAL at 17:56

## 2020-01-19 RX ADMIN — ARFORMOTEROL TARTRATE 15 MCG: 15 SOLUTION RESPIRATORY (INHALATION) at 22:02

## 2020-01-19 RX ADMIN — DOCUSATE SODIUM 100 MG: 100 CAPSULE, LIQUID FILLED ORAL at 10:27

## 2020-01-19 RX ADMIN — BUDESONIDE 500 MCG: 0.5 SUSPENSION RESPIRATORY (INHALATION) at 10:28

## 2020-01-19 RX ADMIN — PROPRANOLOL HYDROCHLORIDE 40 MG: 20 TABLET ORAL at 17:56

## 2020-01-19 RX ADMIN — POTASSIUM BICARBONATE 20 MEQ: 782 TABLET, EFFERVESCENT ORAL at 10:30

## 2020-01-19 RX ADMIN — PROPRANOLOL HYDROCHLORIDE 40 MG: 20 TABLET ORAL at 10:28

## 2020-01-19 RX ADMIN — POTASSIUM BICARBONATE 20 MEQ: 782 TABLET, EFFERVESCENT ORAL at 22:03

## 2020-01-19 RX ADMIN — PANTOPRAZOLE SODIUM 40 MG: 40 TABLET, DELAYED RELEASE ORAL at 10:27

## 2020-01-19 RX ADMIN — CITALOPRAM HYDROBROMIDE 40 MG: 20 TABLET ORAL at 21:55

## 2020-01-19 RX ADMIN — ARFORMOTEROL TARTRATE 15 MCG: 15 SOLUTION RESPIRATORY (INHALATION) at 10:28

## 2020-01-19 RX ADMIN — DIVALPROEX SODIUM 500 MG: 500 TABLET, DELAYED RELEASE ORAL at 10:28

## 2020-01-19 RX ADMIN — Medication 1 CAPSULE: at 10:27

## 2020-01-19 RX ADMIN — Medication 1 TABLET: at 10:28

## 2020-01-19 RX ADMIN — LEVOTHYROXINE SODIUM 50 MCG: 100 TABLET ORAL at 06:38

## 2020-01-19 RX ADMIN — BUDESONIDE 500 MCG: 0.5 SUSPENSION RESPIRATORY (INHALATION) at 22:03

## 2020-01-19 RX ADMIN — DICYCLOMINE HYDROCHLORIDE 20 MG: 10 CAPSULE ORAL at 10:28

## 2020-01-19 RX ADMIN — DIVALPROEX SODIUM 1250 MG: 500 TABLET, DELAYED RELEASE ORAL at 21:54

## 2020-01-19 RX ADMIN — INSULIN LISPRO 2 UNITS: 100 INJECTION, SOLUTION INTRAVENOUS; SUBCUTANEOUS at 22:04

## 2020-01-19 RX ADMIN — DICYCLOMINE HYDROCHLORIDE 20 MG: 10 CAPSULE ORAL at 21:55

## 2020-01-19 NOTE — PROGRESS NOTES
met with Patient completed the initial Spiritual Assessment of the patient, and offered Pastoral Care, see flow sheets for interventions. Patient does not have any Anglican/cultural needs that will affect patients preferences in health care. Charted reviewed. Chaplains will continue to follow and will provide pastoral care on an as needed/requested basis.       Adis Hicks  [phone]

## 2020-01-20 LAB
ANION GAP SERPL CALC-SCNC: 2 MMOL/L (ref 3–18)
BASOPHILS # BLD: 0 K/UL (ref 0–0.1)
BASOPHILS NFR BLD: 0 % (ref 0–2)
BUN SERPL-MCNC: 17 MG/DL (ref 7–18)
BUN/CREAT SERPL: 23 (ref 12–20)
CALCIUM SERPL-MCNC: 8.6 MG/DL (ref 8.5–10.1)
CHLORIDE SERPL-SCNC: 103 MMOL/L (ref 100–111)
CO2 SERPL-SCNC: 37 MMOL/L (ref 21–32)
CREAT SERPL-MCNC: 0.75 MG/DL (ref 0.6–1.3)
DIFFERENTIAL METHOD BLD: ABNORMAL
EOSINOPHIL # BLD: 0 K/UL (ref 0–0.4)
EOSINOPHIL NFR BLD: 0 % (ref 0–5)
ERYTHROCYTE [DISTWIDTH] IN BLOOD BY AUTOMATED COUNT: 15.1 % (ref 11.6–14.5)
GLUCOSE BLD STRIP.AUTO-MCNC: 137 MG/DL (ref 70–110)
GLUCOSE BLD STRIP.AUTO-MCNC: 140 MG/DL (ref 70–110)
GLUCOSE BLD STRIP.AUTO-MCNC: 160 MG/DL (ref 70–110)
GLUCOSE BLD STRIP.AUTO-MCNC: 98 MG/DL (ref 70–110)
GLUCOSE SERPL-MCNC: 94 MG/DL (ref 74–99)
HCT VFR BLD AUTO: 36.6 % (ref 35–45)
HGB BLD-MCNC: 11.6 G/DL (ref 12–16)
LYMPHOCYTES # BLD: 2.2 K/UL (ref 0.9–3.6)
LYMPHOCYTES NFR BLD: 45 % (ref 21–52)
MCH RBC QN AUTO: 32 PG (ref 24–34)
MCHC RBC AUTO-ENTMCNC: 31.7 G/DL (ref 31–37)
MCV RBC AUTO: 100.8 FL (ref 74–97)
MONOCYTES # BLD: 0.6 K/UL (ref 0.05–1.2)
MONOCYTES NFR BLD: 12 % (ref 3–10)
NEUTS SEG # BLD: 2.1 K/UL (ref 1.8–8)
NEUTS SEG NFR BLD: 43 % (ref 40–73)
PLATELET # BLD AUTO: 103 K/UL (ref 135–420)
PMV BLD AUTO: 9.5 FL (ref 9.2–11.8)
POTASSIUM SERPL-SCNC: 3.2 MMOL/L (ref 3.5–5.5)
RBC # BLD AUTO: 3.63 M/UL (ref 4.2–5.3)
SODIUM SERPL-SCNC: 142 MMOL/L (ref 136–145)
WBC # BLD AUTO: 4.9 K/UL (ref 4.6–13.2)

## 2020-01-20 PROCEDURE — 74011000250 HC RX REV CODE- 250: Performed by: INTERNAL MEDICINE

## 2020-01-20 PROCEDURE — 82962 GLUCOSE BLOOD TEST: CPT

## 2020-01-20 PROCEDURE — 36415 COLL VENOUS BLD VENIPUNCTURE: CPT

## 2020-01-20 PROCEDURE — 80048 BASIC METABOLIC PNL TOTAL CA: CPT

## 2020-01-20 PROCEDURE — 74011250637 HC RX REV CODE- 250/637: Performed by: NURSE PRACTITIONER

## 2020-01-20 PROCEDURE — 74011250637 HC RX REV CODE- 250/637: Performed by: INTERNAL MEDICINE

## 2020-01-20 PROCEDURE — 74011636637 HC RX REV CODE- 636/637: Performed by: INTERNAL MEDICINE

## 2020-01-20 PROCEDURE — 85025 COMPLETE CBC W/AUTO DIFF WBC: CPT

## 2020-01-20 RX ORDER — LEVOTHYROXINE SODIUM 50 UG/1
50 TABLET ORAL
Qty: 30 TAB | Refills: 0 | Status: SHIPPED | OUTPATIENT
Start: 2020-01-20

## 2020-01-20 RX ORDER — POTASSIUM CHLORIDE 1500 MG/1
20 TABLET, FILM COATED, EXTENDED RELEASE ORAL 2 TIMES DAILY
Qty: 60 TAB | Refills: 0 | Status: SHIPPED | OUTPATIENT
Start: 2020-01-20 | End: 2021-06-22

## 2020-01-20 RX ORDER — DIVALPROEX SODIUM 500 MG/1
500 TABLET, DELAYED RELEASE ORAL DAILY
Qty: 30 TAB | Refills: 0 | Status: ON HOLD | OUTPATIENT
Start: 2020-01-20 | End: 2021-06-23 | Stop reason: CLARIF

## 2020-01-20 RX ORDER — DIVALPROEX SODIUM 250 MG/1
1250 TABLET, DELAYED RELEASE ORAL
Qty: 150 TAB | Refills: 0 | Status: ON HOLD | OUTPATIENT
Start: 2020-01-20 | End: 2021-06-23 | Stop reason: CLARIF

## 2020-01-20 RX ORDER — POTASSIUM CHLORIDE 20 MEQ/1
20 TABLET, EXTENDED RELEASE ORAL
Status: COMPLETED | OUTPATIENT
Start: 2020-01-20 | End: 2020-01-20

## 2020-01-20 RX ORDER — PROPRANOLOL HYDROCHLORIDE 40 MG/1
40 TABLET ORAL 2 TIMES DAILY
Qty: 60 TAB | Refills: 0 | Status: SHIPPED | OUTPATIENT
Start: 2020-01-20

## 2020-01-20 RX ADMIN — DIVALPROEX SODIUM 500 MG: 500 TABLET, DELAYED RELEASE ORAL at 08:58

## 2020-01-20 RX ADMIN — Medication 1 TABLET: at 08:58

## 2020-01-20 RX ADMIN — LEVOTHYROXINE SODIUM 50 MCG: 100 TABLET ORAL at 06:00

## 2020-01-20 RX ADMIN — DICYCLOMINE HYDROCHLORIDE 20 MG: 10 CAPSULE ORAL at 08:56

## 2020-01-20 RX ADMIN — POTASSIUM BICARBONATE 20 MEQ: 782 TABLET, EFFERVESCENT ORAL at 18:19

## 2020-01-20 RX ADMIN — PROPRANOLOL HYDROCHLORIDE 40 MG: 20 TABLET ORAL at 18:19

## 2020-01-20 RX ADMIN — DIVALPROEX SODIUM 1250 MG: 500 TABLET, DELAYED RELEASE ORAL at 22:19

## 2020-01-20 RX ADMIN — PANTOPRAZOLE SODIUM 40 MG: 40 TABLET, DELAYED RELEASE ORAL at 08:58

## 2020-01-20 RX ADMIN — ARFORMOTEROL TARTRATE 15 MCG: 15 SOLUTION RESPIRATORY (INHALATION) at 08:55

## 2020-01-20 RX ADMIN — INSULIN LISPRO 2 UNITS: 100 INJECTION, SOLUTION INTRAVENOUS; SUBCUTANEOUS at 12:45

## 2020-01-20 RX ADMIN — PROPRANOLOL HYDROCHLORIDE 40 MG: 20 TABLET ORAL at 08:58

## 2020-01-20 RX ADMIN — POTASSIUM BICARBONATE 20 MEQ: 782 TABLET, EFFERVESCENT ORAL at 08:56

## 2020-01-20 RX ADMIN — ARFORMOTEROL TARTRATE 15 MCG: 15 SOLUTION RESPIRATORY (INHALATION) at 22:18

## 2020-01-20 RX ADMIN — BUDESONIDE 500 MCG: 0.5 SUSPENSION RESPIRATORY (INHALATION) at 08:55

## 2020-01-20 RX ADMIN — ATORVASTATIN CALCIUM 40 MG: 40 TABLET, FILM COATED ORAL at 22:19

## 2020-01-20 RX ADMIN — Medication 1 CAPSULE: at 08:59

## 2020-01-20 RX ADMIN — CITALOPRAM HYDROBROMIDE 40 MG: 20 TABLET ORAL at 22:19

## 2020-01-20 RX ADMIN — DOCUSATE SODIUM 100 MG: 100 CAPSULE, LIQUID FILLED ORAL at 08:58

## 2020-01-20 RX ADMIN — POTASSIUM CHLORIDE 20 MEQ: 1500 TABLET, EXTENDED RELEASE ORAL at 12:45

## 2020-01-20 RX ADMIN — DICYCLOMINE HYDROCHLORIDE 20 MG: 10 CAPSULE ORAL at 18:19

## 2020-01-20 RX ADMIN — POTASSIUM BICARBONATE 20 MEQ: 782 TABLET, EFFERVESCENT ORAL at 22:19

## 2020-01-20 RX ADMIN — POLYETHYLENE GLYCOL 3350 17 G: 17 POWDER, FOR SOLUTION ORAL at 08:55

## 2020-01-20 RX ADMIN — DICYCLOMINE HYDROCHLORIDE 20 MG: 10 CAPSULE ORAL at 22:19

## 2020-01-20 RX ADMIN — BUDESONIDE 500 MCG: 0.5 SUSPENSION RESPIRATORY (INHALATION) at 22:19

## 2020-01-20 NOTE — ROUTINE PROCESS
Bedside and Verbal shift change report given to DEBBIE HERNANDEZ LPN (oncoming nurse) by Any Fontanez RN (offgoing nurse). Report included the following information SBAR, Kardex and Med Rec Status.

## 2020-01-20 NOTE — PROGRESS NOTES
I have reviewed this patient's current medication list and recent laboratory results. At this time, I do not suggest any drug therapy adjustments or additional laboratory monitoring. Thank you,  Sonya ELIAS  Ph. M. S.  1/20/2020

## 2020-01-20 NOTE — PROGRESS NOTES
[facility]    Daily progress Note    Patient: Sofia Hodges MRN: [id number]  CSN: [id number]    YOB: 1950  Age: 78 y.o. Sex: female    DOA: 12/27/2019 LOS:  LOS: 24 days                    Subjective:     CC: hypokalemia     Ms. Isa Guevara is a 78 yr old female, patient with recent hospitalization from 12/22-12/27. Patient   Had complain of right leg pain and decrease sensation.  Patient was seen by neurologist and she has MRI of head and Cervical Spine which was unremarkable. Per neurologist less likely [address] She was seen PT/OT and they recommended rehab. Patient with tremor and weakness with some improvement. They recommended for patient to follow up with psychiatrist and neurologist OP. On examination, noted that patient is sleepy but easily aroused. No fever or chills. Discussion with patients , plan of care discussed, all questions were answered.      1/13/2020  Since admitted, c./o for by  and nursing staff that patient has been every sleepy. presumable cause of lethargic  was from her Gabapentin which is a new med fr her. this was helped, since then report that patient is more alert. Today Pt reported that she did well with PT, she had approx 75 % of her meal, very much alert. VSs stable, no nvd, fever or chills. Report of fall last week with transfer no injury. 1/20/2020  Patient has been improving since her Gabapentin has been held, she is tolerating and improving with PT/OT. She  Is so much alert, holding conversation making her needs be known. Patient with mild hypokalemia, she remains asymptomatic. Plans for patient to be d/c home w/ hh on 1/23.  Patient remains stable.      PAST MEDICAL hx: Guillain- [place], DM type 2, Hyperlipidemia, Hypothyroidism, HTN     PAST SURGICAL Hx: Back Surgery, cholecystectomy, mastectomy, foot surgery, partial hysterectomy       ALLERGIES:   VALIUM, CARAFATE, COREG, CEPHALEXIN, CIPRO, ELIQUIS, EPIPEN, MILK, VALIUM, PERCOCET, BEE STING, TALWIN, TETANUS TOXOID, TRILEPTAL.     FAMILY hx: CAD and [place]     SOCIAL hx: discussed with patient and  she doesn't smoke of drink.     ROS: discussion with patients  and nursing. No fever or chills.  No weight loss or headache.  No neck pain or sore throat.  No chest pain or palpitation.  No shortness of breath or cough.  No nausea, vomiting, or diarrhea.  No dysuria or polyuria.  No back pain, occasional leg pain.  No heat or cold intolerance.  No anxiety or depression.   weakness to ble    Objective:      Visit Vitals  /58   Pulse 62   Temp 97.4 °F (36.3 °C)   Resp 18   Ht 5' 4\" (1.626 m)   Wt 103.7 kg (228 lb 11.2 oz)   SpO2 (!) 80%   Breastfeeding No   BMI 39.26 kg/m²   Sat 98 % on 2 L    Physical Exam:  General appearance: alert, cooperative, no distress, appears stated age  HEENT: negative  Neck: supple, symmetrical, trachea midline, no adenopathy, thyroid: not enlarged, symmetric, no tenderness/mass/nodules, no carotid bruit and no JVD  Lungs: clear to auscultation bilaterally  Heart: regular rate and rhythm, S1, S2 normal, no murmur, click, rub or gallop  Abdomen: soft, non-tender. Bowel sounds normal. No masses,  no organomegaly  Pulses: 2+ and symmetric  Skin: Skin color, texture, turgor normal. No rashes or lesions      Intake and Output:  Current Shift:  No intake/output data recorded. Last three shifts:  No intake/output data recorded.     Recent Results (from the past 24 hour(s))   GLUCOSE, POC    Collection Time: 01/19/20 12:35 PM   Result Value Ref Range    Glucose (POC) 149 (H) 70 - 110 mg/dL   GLUCOSE, POC    Collection Time: 01/19/20  4:47 PM   Result Value Ref Range    Glucose (POC) 149 (H) 70 - 110 mg/dL   GLUCOSE, POC    Collection Time: 01/19/20  8:37 PM   Result Value Ref Range    Glucose (POC) 161 (H) 70 - 110 mg/dL   CBC WITH AUTOMATED DIFF    Collection Time: 01/20/20  4:45 AM   Result Value Ref Range    WBC 4.9 4.6 - 13.2 K/uL    RBC 3.63 (L) 4.20 - 5.30 M/uL    HGB 11.6 (L) 12.0 - 16.0 g/dL    HCT 36.6 35.0 - 45.0 %    .8 (H) 74.0 - 97.0 FL    MCH 32.0 24.0 - 34.0 PG    MCHC 31.7 31.0 - 37.0 g/dL    RDW 15.1 (H) 11.6 - 14.5 %    PLATELET 099 (L) 943 - 420 K/uL    MPV 9.5 9.2 - 11.8 FL    NEUTROPHILS 43 40 - 73 %    LYMPHOCYTES 45 21 - 52 %    MONOCYTES 12 (H) 3 - 10 %    EOSINOPHILS 0 0 - 5 %    BASOPHILS 0 0 - 2 %    ABS. NEUTROPHILS 2.1 1.8 - 8.0 K/UL    ABS. LYMPHOCYTES 2.2 0.9 - 3.6 K/UL    ABS. MONOCYTES 0.6 0.05 - 1.2 K/UL    ABS. EOSINOPHILS 0.0 0.0 - 0.4 K/UL    ABS.  BASOPHILS 0.0 0.0 - 0.1 K/UL    DF AUTOMATED     METABOLIC PANEL, BASIC    Collection Time: 01/20/20  4:45 AM   Result Value Ref Range    Sodium 142 136 - 145 mmol/L    Potassium 3.2 (L) 3.5 - 5.5 mmol/L    Chloride 103 100 - 111 mmol/L    CO2 37 (H) 21 - 32 mmol/L    Anion gap 2 (L) 3.0 - 18 mmol/L    Glucose 94 74 - 99 mg/dL    BUN 17 7.0 - 18 MG/DL    Creatinine 0.75 0.6 - 1.3 MG/DL    BUN/Creatinine ratio 23 (H) 12 - 20      GFR est AA >60 >60 ml/min/1.73m2    GFR est non-AA >60 >60 ml/min/1.73m2    Calcium 8.6 8.5 - 10.1 MG/DL   GLUCOSE, POC    Collection Time: 01/20/20  4:57 AM   Result Value Ref Range    Glucose (POC) 98 70 - 110 mg/dL         Current Facility-Administered Medications:     potassium chloride (K-DUR, KLOR-CON) SR tablet 20 mEq, 20 mEq, Oral, NOW, Debbie Weber, NP    magnesium hydroxide (MILK OF MAGNESIA) 400 mg/5 mL oral suspension 30 mL, 30 mL, Oral, DAILY PRN, Luis Dow MD, 30 mL at 01/12/20 3658    potassium bicarb-citric acid (EFFER-K) tablet 20 mEq, 20 mEq, Oral, TID, Luis Dow MD, 20 mEq at 01/20/20 0856    acetaminophen (TYLENOL) tablet 650 mg, 650 mg, Oral, Q6H PRN, Tyrell Palomo MD    divalproex  (DEPAKOTE) tablet 1,250 mg, 1,250 mg, Oral, QHS, Luis Dow MD, 1,250 mg at 01/19/20 2154    docusate sodium (COLACE) capsule 100 mg, 100 mg, Oral, DAILY, Luis Dow MD, 100 mg at 01/20/20 0858    divcasi GRAY (DEPAKOTE) tablet 500 mg, 500 mg, Oral, DAILY, Gus, Debbie A, NP, 500 mg at 01/20/20 0858    [Held by provider] gabapentin (NEURONTIN) capsule 200 mg, 200 mg, Oral, BID, Yaima Devaughn, Debbie A, NP, 200 mg at 01/08/20 0805    atorvastatin (LIPITOR) tablet 40 mg, 40 mg, Oral, QHS, Gus, Debbie A, NP, 40 mg at 01/19/20 2155    dicyclomine (BENTYL) capsule 20 mg, 20 mg, Oral, TID, Porfirio Areola A, NP, 20 mg at 01/20/20 0856    ergocalciferol capsule 50,000 Units, 50,000 Units, Oral, Q7D, Porfirio Areola A, NP, 50,000 Units at 01/18/20 1188    citalopram (CELEXA) tablet 40 mg, 40 mg, Oral, QHS, Gus, Debbie A, NP, 40 mg at 01/19/20 2155    [Held by provider] methylcellulose (CITRUCEL) tablet 500 mg, 1 Tab, Oral, BID, Porfirio Areola A, NP, 500 mg at 01/07/20 6846    levothyroxine (SYNTHROID) tablet 50 mcg, 50 mcg, Oral, 6am, Gus Debbie A, NP, 50 mcg at 01/20/20 0600    calcium-vitamin D (OS-MARICEL) 500 mg-200 unit tablet, 1 Tab, Oral, DAILY WITH BREAKFAST, Porfirio Areola A, NP, 1 Tab at 01/20/20 0858    [Held by provider] topiramate (TOPAMAX) tablet 100 mg, 100 mg, Oral, BID WITH MEALS, Porfirio Areola A, NP, 100 mg at 01/07/20 0839    chlorthalidone (HYGROTEN) tablet 25 mg, 25 mg, Oral, EVERY OTHER DAY, Yaima Cantor, Debbie A, NP, 25 mg at 01/19/20 0900    [Held by provider] losartan (COZAAR) tablet 100 mg, 100 mg, Oral, DAILY, Gus Debbie A, NP, 100 mg at 01/07/20 0839    polyethylene glycol (MIRALAX) packet 17 g, 17 g, Oral, DAILY, Luis Dow MD, 17 g at 01/20/20 0855    pantoprazole (PROTONIX) tablet 40 mg, 40 mg, Oral, ACB, David Mallory MD, 40 mg at 01/20/20 0858    arformoterol (BROVANA) neb solution 15 mcg, 15 mcg, Nebulization, BID RT, 15 mcg at 01/20/20 0855 **AND** budesonide (PULMICORT) 500 mcg/2 ml nebulizer suspension, 500 mcg, Nebulization, BID RT, David Mallory MD, 500 mcg at 01/20/20 0855    iron polysacch complex-b12-fa (FERREX 150 FORTE) capsule 1 Cap (Pawel Forte generic), 1 Cap, Oral, DAILY, David Mallory MD, 1 Cap at 01/20/20 0859    albuterol-ipratropium (DUO-NEB) 2.5 MG-0.5 MG/3 ML, 3 mL, Nebulization, QID PRN, Yun Hicks MD    EPINEPHrine (EPIPEN) injection 0.3 mg (Patient Supplied), 0.3 mg, IntraMUSCular, PRN, Yun Hicks MD    insulin lispro (HUMALOG) injection, , SubCUTAneous, AC&HS, Luis Dow MD, Stopped at 01/20/20 0730    propranolol (INDERAL) tablet 40 mg, 40 mg, Oral, BID, Linus COLEMAN NP, 40 mg at 01/20/20 3069    Lab Results   Component Value Date/Time    Glucose 94 01/20/2020 04:45 AM    Glucose 92 01/13/2020 04:30 AM    Glucose 88 01/09/2020 05:30 AM    Glucose 84 01/06/2020 04:40 AM    Glucose 78 01/03/2020 05:10 AM        Assessment/Plan   Hx of Leg pain right/weakness: Continue PT/OT, she was seen by Neurologist and had work up. Patient to follow up with her Neurologist OP. Her Gabapentin was held presumable cause of increase lethargy.  She is more alert now and able to perform PT/OT     Hx of Guillain-[place] Syndrome: follow up with neurologist OP     GERD; continue PPI     DM:  BS did improve, stable    Seizure disorder: continue Depakote, follow up with her Neurologist OP      Hyperlipidemia: continue with statin     Hypothyroidism: continue synthroid      HTN: BP stable, continue current meds and monitor    Hypokalemia :  Will continue current potassium meds and give additional dose today, follow up labs         continue PT/OT    Patricia Quigley NP  1/20/2020, 11:45 AM

## 2020-01-20 NOTE — ROUTINE PROCESS
Awake alert/vebal.02 @2L/nc on. Hob up,call bell in reach ,Allan upper siderails  Up for safety. Kept clean and dry. Turned and repositioned in bed .

## 2020-01-20 NOTE — PROGRESS NOTES
Met with patient at bedside. Discussed discharge plan. She has all equipment except for a wheelchair, she was borrowing one. She has a walker, Bedside commode and a shower bench. [address] was reviewed, Mrs. Parviz Man verbalized understanding, signed and copy provided to her. Discharge is 1/23/2020.

## 2020-01-21 LAB
GLUCOSE BLD STRIP.AUTO-MCNC: 101 MG/DL (ref 70–110)
GLUCOSE BLD STRIP.AUTO-MCNC: 109 MG/DL (ref 70–110)
GLUCOSE BLD STRIP.AUTO-MCNC: 113 MG/DL (ref 70–110)
GLUCOSE BLD STRIP.AUTO-MCNC: 166 MG/DL (ref 70–110)

## 2020-01-21 PROCEDURE — 82962 GLUCOSE BLOOD TEST: CPT

## 2020-01-21 PROCEDURE — 74011250637 HC RX REV CODE- 250/637: Performed by: NURSE PRACTITIONER

## 2020-01-21 PROCEDURE — 74011250637 HC RX REV CODE- 250/637: Performed by: INTERNAL MEDICINE

## 2020-01-21 PROCEDURE — 74011636637 HC RX REV CODE- 636/637: Performed by: INTERNAL MEDICINE

## 2020-01-21 PROCEDURE — 74011000250 HC RX REV CODE- 250: Performed by: INTERNAL MEDICINE

## 2020-01-21 RX ADMIN — LEVOTHYROXINE SODIUM 50 MCG: 100 TABLET ORAL at 06:12

## 2020-01-21 RX ADMIN — DICYCLOMINE HYDROCHLORIDE 20 MG: 10 CAPSULE ORAL at 09:49

## 2020-01-21 RX ADMIN — POTASSIUM BICARBONATE 20 MEQ: 782 TABLET, EFFERVESCENT ORAL at 17:20

## 2020-01-21 RX ADMIN — Medication 1 TABLET: at 09:48

## 2020-01-21 RX ADMIN — POTASSIUM BICARBONATE 20 MEQ: 782 TABLET, EFFERVESCENT ORAL at 21:46

## 2020-01-21 RX ADMIN — DOCUSATE SODIUM 100 MG: 100 CAPSULE, LIQUID FILLED ORAL at 09:49

## 2020-01-21 RX ADMIN — DIVALPROEX SODIUM 1250 MG: 500 TABLET, DELAYED RELEASE ORAL at 21:46

## 2020-01-21 RX ADMIN — CITALOPRAM HYDROBROMIDE 40 MG: 20 TABLET ORAL at 21:46

## 2020-01-21 RX ADMIN — PANTOPRAZOLE SODIUM 40 MG: 40 TABLET, DELAYED RELEASE ORAL at 09:51

## 2020-01-21 RX ADMIN — ARFORMOTEROL TARTRATE 15 MCG: 15 SOLUTION RESPIRATORY (INHALATION) at 20:00

## 2020-01-21 RX ADMIN — DIVALPROEX SODIUM 500 MG: 500 TABLET, DELAYED RELEASE ORAL at 09:49

## 2020-01-21 RX ADMIN — Medication 1 CAPSULE: at 09:50

## 2020-01-21 RX ADMIN — DICYCLOMINE HYDROCHLORIDE 20 MG: 10 CAPSULE ORAL at 21:46

## 2020-01-21 RX ADMIN — DICYCLOMINE HYDROCHLORIDE 20 MG: 10 CAPSULE ORAL at 17:20

## 2020-01-21 RX ADMIN — BUDESONIDE 500 MCG: 0.5 SUSPENSION RESPIRATORY (INHALATION) at 20:00

## 2020-01-21 RX ADMIN — ATORVASTATIN CALCIUM 40 MG: 40 TABLET, FILM COATED ORAL at 21:46

## 2020-01-21 RX ADMIN — PROPRANOLOL HYDROCHLORIDE 40 MG: 20 TABLET ORAL at 09:51

## 2020-01-21 RX ADMIN — ARFORMOTEROL TARTRATE 15 MCG: 15 SOLUTION RESPIRATORY (INHALATION) at 09:48

## 2020-01-21 RX ADMIN — PROPRANOLOL HYDROCHLORIDE 40 MG: 20 TABLET ORAL at 17:19

## 2020-01-21 RX ADMIN — BUDESONIDE 500 MCG: 0.5 SUSPENSION RESPIRATORY (INHALATION) at 09:48

## 2020-01-21 RX ADMIN — POTASSIUM BICARBONATE 20 MEQ: 782 TABLET, EFFERVESCENT ORAL at 09:50

## 2020-01-21 RX ADMIN — CHLORTHALIDONE 25 MG: 25 TABLET ORAL at 09:49

## 2020-01-21 RX ADMIN — INSULIN LISPRO 2 UNITS: 100 INJECTION, SOLUTION INTRAVENOUS; SUBCUTANEOUS at 21:47

## 2020-01-21 NOTE — PROGRESS NOTES
NUTRITION FOLLOW-UP/PLAN OF CARE T[facility]      RECOMMENDATIONS:   1. Dental Soft Solid Diet (chopped meats); low lactose (No artificial sweeteners)  2. Monitor labs, weight and PO intake  3. Feeding assist and specialty utensils     GOALS:   1. Met/Ongoing: PO intake meets >75% of protein/calorie needs by 1/28/2020  2. Met/Ongoing: Weight Maintenance (+/- 1-2 lb) by 1/28/2020     ASSESSMENT:   Wt status is classified as obese per Body mass index is 38.83 kg/m². Adequate PO intake. Labs noted. BG range () over the past 24 hours; A1c (5.2%). Pt w/ hypokalemia. Nutrition recommendations listed. RD to follow. SUBJECTIVE/OBJECTIVE:   (1/21): Appetite/PO intake is adequate; most meals >75% this past week per [facility]. Last BM on (1/19) per I/Os. Weight loss noted, but closer to within range of UBW yet still with increase. Pt seen in dining room at lunch with . Appetite has been good and she is doing well. Ready to go home. Observed 90% of meal consumed. Will continue to monitor. (1/14/2020): Appetite/PO intake is adequate; most meals >75% this past week per [facility]. Last BM on (1/12) per I/Os; noted MOM given that day as well. No new weight on records this week as of yet. Pt seen in room OOB in chair. Noted has been much more alert and doing better with lethargy since Gabapentin has been held. Pt was agitated about something during visit so attempted to assist her and was able to get her a word find to do until dinner. She mentioned only did so so for intake at lunch d/t having a little bit of upset stomach, but is looking forward to dinner. Will continue to monitor.     (1/7/2020): SLP following: pt is s/p MBS on (1/6) Pt presents with moderate oropharyngeal dysphagia with recommendations for mechanical-soft solid (chopped meat), thin liquid diet; meds one at a time or whole in pudding. Appetite/PO intake is fair to good overall; average of 72% this past week per [facility].  Noted Pt sometimes more confused than others so needs assistance/encouragement. Last BM on (1/7) per I/Os. Weight has been stable this admission. Pt seen in room working with SLP today; observed 75% of meal consumed during rounding. Will continue to monitor.     (12/31). Pt seen in room after lunch; observed >75% of meal consumed. Spoke with  as well to assist with Hx. Pt does have lactose intolerance, does NOT tolerate artificial sweeteners and usually feeds herself at home with specialty utensils and tray set up/assist. Placed orders in system and informed dietary. Spoke with therapy about feeding assist with specialty utensils d/t tremors as well as about SLP consult. Pt reports she has been doing OK with meals most of the time. Last BM on (12/30) per I/Os. Will continue to monitor.      (12/29): Pt seen for an initial assessment to TCC/Consult. Pt resting in bed during time of assessment, with mild confusion during time of visit. Noted multiple falls per reports. During visit pt attempting to eat pudding but having a hard time with utensils independently. Pt does report to have a good appetite and normally consumes 3 meals/day. No reports of n/v/d/c at this time. Per I/Os last BM 12/28/19. Pt states no food allergies, noted in chart allergy to milk containing products - when asked pt to clarify, no response (will continue with allergy in diet due to confusion, nursing/kitchen informed). Pt reports issues swallowing, per RN documents also noting trouble with pills. Will place speech consult. When asking about UBW pt staring off. Rec: assist/feed pt at all meals. Will continue to monitor intake/tolerance, weight, labs.     Information Obtained from:    [x] Chart Review   [x] Patient   [x] Family/Caregiver   [x] Nurse/Physician   [] Interdisciplinary Meeting/Rounds    Diet: Dental soft solid  Medications: [x] Reviewed   oscal, hygroten, celexa, depakote, colace, ergocalciferol, humalog, ferrex, synthroid, cozaar, citrucel (held) propranolol, protonix, miralax  Allergies: [x] Reviewed   Patient Active Problem List   Diagnosis Code    Breast cancer ([facility] C50.919    Biliary obstruction K83.1    Choledocholithiasis K80.50    Lumbar stenosis M48.061    Severe persistent asthma J45.50    Leg pain, right M79.604    DM (diabetes mellitus) ([facility] E11.9    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    GERD (gastroesophageal reflux disease) K21.9    Weakness R53.1    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       Past Medical History:   Diagnosis Date    Asthma     Back pain     Bipolar 1 disorder ([facility]     Breast cancer ([facility] 2012    left side    Cancer ([facility]     Diabetes ([facility]     GERD (gastroesophageal reflux disease)     Guillain Barré syndrome ([facility]     History of blood transfusion     \"in my 20's\" per patient    Hypertension     Ill-defined condition 09/12/2017    \"liver problem,\" per patient, \"not liver disease. I go to a gastroenterologist for it. \"    Lymphedema     left arm and hand and wears a sleeve.      Morbid obesity ([facility] 09/12/2017    BMI = 40.4    Seizures (HCC)     Sleep apnea     left apap machine at home    [facility])     history of in left leg    Thyroid disease     UTI (lower urinary tract infection)       Labs:    Lab Results   Component Value Date/Time    Sodium 142 01/20/2020 04:45 AM    Potassium 3.2 (L) 01/20/2020 04:45 AM    Chloride 103 01/20/2020 04:45 AM    CO2 37 (H) 01/20/2020 04:45 AM    Anion gap 2 (L) 01/20/2020 04:45 AM    Glucose 94 01/20/2020 04:45 AM    BUN 17 01/20/2020 04:45 AM    Creatinine 0.75 01/20/2020 04:45 AM    Calcium 8.6 01/20/2020 04:45 AM    Magnesium 2.2 01/13/2020 04:30 AM    Albumin 3.0 (L) 12/01/2019 11:50 AM     Lab Results   Component Value Date/Time    GLUCPOC 101 01/21/2020 06:11 AM    GLUCPOC 137 (H) 01/20/2020 10:18 PM    GLUCPOC 140 (H) 01/20/2020 04:34 PM       Anthropometrics: BMI (calculated): 38.8  Last 3 Recorded Weights in this Encounter    12/30/19 1444 01/07/20 1239 01/21/20 1150   Weight: 104.4 kg (230 lb 3.2 oz) 103.7 kg (228 lb 11.2 oz) 102.6 kg (226 lb 3.2 oz)      Ht Readings from Last 1 Encounters:   12/27/19 5' 4\" (1.626 m)       Documented Weight History:  Weight Metrics 1/21/2020 12/27/2019 12/25/2019 12/22/2019 12/1/2019 11/5/2019 3/17/2019   Weight 226 lb 3.2 oz - 235 lb 3.7 oz - 220 lb 218 lb 218 lb   BMI - 38.83 kg/m2 - 40.38 kg/m2 38.97 kg/m2 38.62 kg/m2 38.62 kg/m2       No data found.     UBW: 220 lb??  [] Weight Loss  [x] Weight Gain per history above but closer to within range of UBW  [] Weight Stable    Estimated Nutrition Needs: [x] MSJ  [] Other:  Calories: 4455-7825 kcal (x 1.1-1.2) Based on:   [x] Actual BW    Protein:    g (0.8-1 g/kg) Based on:   [x] Actual BW    Fluid:      1 mL/kcal    Nutrition Problems Identified:   [] Suboptimal PO intake   [x] Food Allergies (Lactose intolerance)  [x] Difficulty chewing/swallowing/poor dentition  [x] Constipation/Diarrhea (Last BM on 1/19; bowel regimen in place)  [] Nausea/Vomiting   [] None  [] Other:     Plan:   [x] Therapeutic Diet  [x]  Obtained/adjusted food preferences/tolerances and/or snacks options   []  Supplements added   [x] Occupational therapy and SLP following for feeding techniques  []  HS snack added   [x]  Modify diet texture   [x]  Modify diet for food allergies   []  Educate patient   []  Assist with menu selection   [x]  Monitor PO intake on meal rounds   [x]  Continue inpatient monitoring and intervention   [x]  Participated in discharge planning/Interdisciplinary rounds/Team meetings   [x]  Other: Feeding assist and specialty utensils     Education Needs:   [] Not appropriate for teaching at this time due to:   [x] Identified and addressed encouraged intake    Nutrition Monitoring and Evaluation:  [x] Continue ongoing monitoring and intervention  [] Other    Nicholas Granger

## 2020-01-21 NOTE — ROUTINE PROCESS
Patient sitting up in chair eating dinner. Alert and oriented with periods of confusion. Ambulates to bathroom with walker with assist. Had bm today. Oxygen at 2 liters nasal cannula. Denies pain or SOB. Call light within reach.  at bedside. Says patient will discharge home this Thursday.

## 2020-01-21 NOTE — DISCHARGE SUMMARY
[facility]  Discharge Summary    Patient: Roxanne Luke MRN: [id number]  CSN: [id number]    YOB: 1950  Age: 78 y.o. Sex: female    DOA: 12/27/2019 LOS:  LOS: 25 days   Discharge Date:      Admission Diagnoses: LE weakness    Discharge Diagnoses:    Problem List as of 1/21/2020 Date Reviewed: 9/13/2017          Codes Class Noted - Resolved    Weakness ICD-10-CM: R53.1  ICD-9-CM: 780.79  12/24/2019 - Present        Guillain Barré syndrome ([facility] ICD-10-CM: G61.0  ICD-9-CM: 357.0  12/24/2019 - Present        Leg pain, right ICD-10-CM: M79.604  ICD-9-CM: 729.5  12/23/2019 - Present        DM (diabetes mellitus) ([facility] ICD-10-CM: E11.9  ICD-9-CM: 250.00  12/23/2019 - Present        Hx of Guillain-Marcell syndrome ICD-10-CM: Z86.69  ICD-9-CM: V12.49  12/23/2019 - Present        GERD (gastroesophageal reflux disease) ICD-10-CM: K21.9  ICD-9-CM: 530.81  12/23/2019 - Present        Severe persistent asthma ICD-10-CM: J45.50  ICD-9-CM: 493.90  11/7/2019 - Present        Lumbar stenosis ICD-10-CM: M48.061  ICD-9-CM: 724.02  9/13/2017 - Present        Choledocholithiasis ICD-10-CM: K80.50  ICD-9-CM: 574.50  6/7/2016 - Present        Biliary obstruction ICD-10-CM: K83.1  ICD-9-CM: 576.2  4/26/2016 - Present        Breast cancer ([facility] ICD-10-CM: C50.919  ICD-9-CM: 174.9  Unknown - Present              Discharge Condition: Good    Discharge To: Home with [address] Course: Ms. Yael Nicole is a 78 yr old female, patient with recent hospitalization from 12/22-12/27. Patient   Had complain of right leg pain and decrease sensation.  Patient was seen by neurologist and she has MRI of head and Cervical Spine which was unremarkable. Per neurologist less likely [address] She was seen PT/OT and they recommended rehab. Patient with tremor and weakness with some improvement. They recommended for patient to follow up with psychiatrist and neurologist OP.  On examination, noted that patient is sleepy but easily aroused. No fever or chills. Discussion with patients , plan of care discussed, all questions were answered.      1/13/2020  Since admitted, c./o for by  and nursing staff that patient has been every sleepy. presumable cause of lethargic  was from her Gabapentin which is a new med fr her. this was helped, since then report that patient is more alert. Today Pt reported that she did well with PT, she had approx 75 % of her meal, very much alert. VSs stable, no nvd, fever or chills. Report of fall last week with transfer no injury.     1/21/2020  Patient has been improving since her Gabapentin has been held, she is tolerating and improving with PT/OT. She  Is so much alert, holding conversation making her needs be known. Patient with mild hypokalemia, she remains asymptomatic. Katherene Means Patient remains stable. Patient had work up for possible infection cause of lethargy, work up was all negative. CXR (-), urine cx NG. Patient is ready for d/c home with [facility]. Her  did confirm that she does wear oxygen at home.      PAST MEDICAL hx: Guillain- [place], DM type 2, Hyperlipidemia, Hypothyroidism, HTN     PAST SURGICAL Hx: Back Surgery, cholecystectomy, mastectomy, foot surgery, partial hysterectomy       ALLERGIES:   VALIUM, CARAFATE, COREG, CEPHALEXIN, CIPRO, ELIQUIS, EPIPEN, MILK, VALIUM, PERCOCET, BEE STING, TALWIN, TETANUS TOXOID, TRILEPTAL.     FAMILY hx: CAD and [place]     SOCIAL hx: discussed with patient and  she doesn't smoke of drink.     ROS: discussion with patients  and nursing.    No fever or chills.  No weight loss or headache.  No neck pain or sore throat.  No chest pain or palpitation.  No shortness of breath or cough.  No nausea, vomiting, or diarrhea.  No dysuria or polyuria.  No back pain, occasional leg pain.  No heat or cold intolerance.  No anxiety or depression.   weakness to ble     Objective:      Visit Vitals  /58   Pulse 62   Temp 97.4 °F (36.3 °C)   Resp 18   Ht 5' 4\" (1.626 m)   Wt 103.7 kg (228 lb 11.2 oz)   SpO2 94 %   Breastfeeding No   BMI 39.26 kg/m²   Sat 98 % on 2 L     Physical Exam:  General appearance: alert, cooperative, no distress, appears stated age  [de-identified]: negative  Neck: supple, symmetrical, trachea midline, no adenopathy, thyroid: not enlarged, symmetric, no tenderness/mass/nodules, no carotid bruit and no JVD  Lungs: clear to auscultation bilaterally  Heart: regular rate and rhythm, S1, S2 normal, no murmur, click, rub or gallop  Abdomen: soft, non-tender. Bowel sounds normal. No masses,  no organomegaly  Pulses: 2+ and symmetric  Skin: Skin color, texture, turgor normal. No rashes or lesions         Consults: None    Significant Diagnostic Studies: labs: see below     Discharge Medications:     Current Discharge Medication List      START taking these medications    Details   potassium chloride SR (K-TAB) 20 mEq tablet Take 1 Tab by mouth two (2) times a day. Qty: 60 Tab, Refills: 0      docusate sodium (COLACE) 100 mg capsule Take 1 Cap by mouth daily for 90 days. Qty: 30 Cap, Refills: 0         CONTINUE these medications which have CHANGED    Details   propranolol (INDERAL) 40 mg tablet Take 1 Tab by mouth two (2) times a day. Qty: 60 Tab, Refills: 0      levothyroxine (SYNTHROID) 50 mcg tablet Take 1 Tab by mouth Daily (before breakfast). Qty: 30 Tab, Refills: 0      !! divalproex DR (DEPAKOTE) 500 mg tablet Take 1 Tab by mouth daily. Qty: 30 Tab, Refills: 0      !! divalproex DR (DEPAKOTE) 250 mg tablet Take 5 Tabs by mouth nightly. Qty: 150 Tab, Refills: 0      glipiZIDE SR (GLUCOTROL XL) 5 mg CR tablet Take 1 Tab by mouth daily. Qty: 30 Tab, Refills: 0       !! - Potential duplicate medications found. Please discuss with provider. CONTINUE these medications which have NOT CHANGED    Details   atorvastatin (LIPITOR) 40 mg tablet Take 40 mg by mouth daily. dicyclomine (BENTYL) 20 mg tablet Take 20 mg by mouth three (3) times daily. Calcium-Cholecalciferol, D3, 500 mg(1,250mg) -400 unit tab Take 1 Tab by mouth two (2) times a day. citalopram (CELEXA) 10 mg tablet Take 40 mg by mouth nightly. chlorthalidone (HYGROTEN) 25 mg tablet Take 25 mg by mouth every other day. iron bg,ps/vitC/B12/FA/calcium (ALIRIO FORTE PO) Take  by mouth.      ergocalciferol (VITAMIN D2) 50,000 unit capsule Take 50,000 Units by mouth every seven (7) days. fluticasone-salmeterol (ADVAIR) 250-50 mcg/dose diskus inhaler Take 1 Puff by inhalation two (2) times a day. Omeprazole delayed release (PRILOSEC D/R) 20 mg tablet Take 40 mg by mouth daily. IPRATROPIUM/ALBUTEROL SULFATE (COMBIVENT IN) Take 1 Puff by inhalation four (4) times daily. letrozole (FEMARA) 2.5 mg tablet Take 2.5 mg by mouth daily. Indications: per patient, \"I stopped 7 days before surgery scheduled for 9-13-17. \"      EPINEPHrine (EPIPEN) 0.3 mg/0.3 mL (1:1,000) injection 0.3 mg by IntraMUSCular route once as needed. STOP taking these medications       gabapentin (NEURONTIN) 100 mg capsule Comments:   Reason for Stopping:         losartan (COZAAR) 100 mg tablet Comments:   Reason for Stopping:         topiramate (TOPAMAX) 50 mg tablet Comments:   Reason for Stopping:         METHYLCELLULOSE (FIBER THERAPY) Comments:   Reason for Stopping:         cyclobenzaprine (FLEXERIL) 10 mg tablet Comments:   Reason for Stopping:         HYDROmorphone (DILAUDID) 2 mg tablet Comments:   Reason for Stopping:         gabapentin (NEURONTIN) 100 mg capsule Comments:   Reason for Stopping:         gabapentin (NEURONTIN) 100 mg capsule Comments:   Reason for Stopping:             Results for Binta Zimmer (MRN [id number]) as of 1/21/2020 10:41   Ref.  Range 1/20/2020 04:45   WBC Latest Ref Range: 4.6 - 13.2 K/uL 4.9   RBC Latest Ref Range: 4.20 - 5.30 M/uL 3.63 (L)   HGB Latest Ref Range: 12.0 - 16.0 g/dL 11.6 (L)   HCT Latest Ref Range: 35.0 - 45.0 % 36.6   MCV Latest Ref Range: 74.0 - 97.0 .8 (H)   MCH Latest Ref Range: 24.0 - 34.0 PG 32.0   MCHC Latest Ref Range: 31.0 - 37.0 g/dL 31.7   RDW Latest Ref Range: 11.6 - 14.5 % 15.1 (H)   PLATELET Latest Ref Range: 135 - 420 K/uL 103 (L)   MPV Latest Ref Range: 9.2 - 11.8 FL 9.5   NEUTROPHILS Latest Ref Range: 40 - 73 % 43   LYMPHOCYTES Latest Ref Range: 21 - 52 % 45   MONOCYTES Latest Ref Range: 3 - 10 % 12 (H)   EOSINOPHILS Latest Ref Range: 0 - 5 % 0   BASOPHILS Latest Ref Range: 0 - 2 % 0   DF Latest Units:   AUTOMATED   ABS. NEUTROPHILS Latest Ref Range: 1.8 - 8.0 K/UL 2.1   ABS. LYMPHOCYTES Latest Ref Range: 0.9 - 3.6 K/UL 2.2   ABS. MONOCYTES Latest Ref Range: 0.05 - 1.2 K/UL 0.6   ABS. EOSINOPHILS Latest Ref Range: 0.0 - 0.4 K/UL 0.0   ABS. BASOPHILS Latest Ref Range: 0.0 - 0.1 K/UL 0.0         Results for Eran Correia (MRN [id number]) as of 1/21/2020 10:41   Ref. Range 1/20/2020 04:45   Sodium Latest Ref Range: 136 - 145 mmol/L 142   Potassium Latest Ref Range: 3.5 - 5.5 mmol/L 3.2 (L)   Chloride Latest Ref Range: 100 - 111 mmol/L 103   CO2 Latest Ref Range: 21 - 32 mmol/L 37 (H)   Anion gap Latest Ref Range: 3.0 - 18 mmol/L 2 (L)   Glucose Latest Ref Range: 74 - 99 mg/dL 94   BUN Latest Ref Range: 7.0 - 18 MG/DL 17   Creatinine Latest Ref Range: 0.6 - 1.3 MG/DL 0.75   BUN/Creatinine ratio Latest Ref Range: 12 - 20   23 (H)   Calcium Latest Ref Range: 8.5 - 10.1 MG/DL 8.6   GFR est non-AA Latest Ref Range: >60 ml/min/1.73m2 >60   GFR est AA Latest Ref Range: >60 ml/min/1.73m2 >60       ASSESSMENT/PLANS  Hx of Leg pain right/weakness: Continue PT/OT, she was seen by Neurologist and had work up. Patient to follow up with her Neurologist OP. Her Gabapentin was held presumable cause of increase lethargy.  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 Labs in Am prior to d/c home.        During her stay Neurontin that was added during her hospital stay was held- patient had increase confusion, lethargy. When meds was placed on hold, patient was back to baseline.  Patient to follow up with her Neurologist for further eval.        DME: standard wheelchair    Home health: Pt/OT/skilled nursing for medication management   Activity: Activity as tolerated    Diet: Cardiac Diet    Wound Care: None needed    Follow-up: Follow up with neurologist /psychritis , PCP in 3-5 days    Time spent > 30 mins  Herve Bradley NP  1/21/2020, 10:42 AM   .

## 2020-01-22 DIAGNOSIS — J45.50 SEVERE PERSISTENT ASTHMA, UNSPECIFIED WHETHER COMPLICATED: ICD-10-CM

## 2020-01-22 LAB
GLUCOSE BLD STRIP.AUTO-MCNC: 125 MG/DL (ref 70–110)
GLUCOSE BLD STRIP.AUTO-MCNC: 164 MG/DL (ref 70–110)
GLUCOSE BLD STRIP.AUTO-MCNC: 93 MG/DL (ref 70–110)
GLUCOSE BLD STRIP.AUTO-MCNC: 96 MG/DL (ref 70–110)
POTASSIUM SERPL-SCNC: 3.3 MMOL/L (ref 3.5–5.5)

## 2020-01-22 PROCEDURE — 74011636637 HC RX REV CODE- 636/637: Performed by: INTERNAL MEDICINE

## 2020-01-22 PROCEDURE — 74011250637 HC RX REV CODE- 250/637: Performed by: NURSE PRACTITIONER

## 2020-01-22 PROCEDURE — 74011250637 HC RX REV CODE- 250/637: Performed by: INTERNAL MEDICINE

## 2020-01-22 PROCEDURE — 82962 GLUCOSE BLOOD TEST: CPT

## 2020-01-22 PROCEDURE — 84132 ASSAY OF SERUM POTASSIUM: CPT

## 2020-01-22 PROCEDURE — 74011000250 HC RX REV CODE- 250: Performed by: INTERNAL MEDICINE

## 2020-01-22 PROCEDURE — 36415 COLL VENOUS BLD VENIPUNCTURE: CPT

## 2020-01-22 RX ORDER — POTASSIUM CHLORIDE 20 MEQ/1
20 TABLET, EXTENDED RELEASE ORAL 3 TIMES DAILY
Status: DISCONTINUED | OUTPATIENT
Start: 2020-01-22 | End: 2020-01-23 | Stop reason: HOSPADM

## 2020-01-22 RX ORDER — POTASSIUM CHLORIDE 20 MEQ/1
20 TABLET, EXTENDED RELEASE ORAL
Status: COMPLETED | OUTPATIENT
Start: 2020-01-22 | End: 2020-01-22

## 2020-01-22 RX ADMIN — BUDESONIDE 500 MCG: 0.5 SUSPENSION RESPIRATORY (INHALATION) at 09:05

## 2020-01-22 RX ADMIN — ATORVASTATIN CALCIUM 40 MG: 40 TABLET, FILM COATED ORAL at 21:40

## 2020-01-22 RX ADMIN — DICYCLOMINE HYDROCHLORIDE 20 MG: 10 CAPSULE ORAL at 09:07

## 2020-01-22 RX ADMIN — POLYETHYLENE GLYCOL 3350 17 G: 17 POWDER, FOR SOLUTION ORAL at 09:05

## 2020-01-22 RX ADMIN — POTASSIUM CHLORIDE 20 MEQ: 1500 TABLET, EXTENDED RELEASE ORAL at 18:03

## 2020-01-22 RX ADMIN — BUDESONIDE 500 MCG: 0.5 SUSPENSION RESPIRATORY (INHALATION) at 20:00

## 2020-01-22 RX ADMIN — PROPRANOLOL HYDROCHLORIDE 40 MG: 20 TABLET ORAL at 18:02

## 2020-01-22 RX ADMIN — DOCUSATE SODIUM 100 MG: 100 CAPSULE, LIQUID FILLED ORAL at 09:06

## 2020-01-22 RX ADMIN — PROPRANOLOL HYDROCHLORIDE 40 MG: 20 TABLET ORAL at 09:06

## 2020-01-22 RX ADMIN — DICYCLOMINE HYDROCHLORIDE 20 MG: 10 CAPSULE ORAL at 18:02

## 2020-01-22 RX ADMIN — POTASSIUM CHLORIDE 20 MEQ: 1500 TABLET, EXTENDED RELEASE ORAL at 11:26

## 2020-01-22 RX ADMIN — DIVALPROEX SODIUM 500 MG: 500 TABLET, DELAYED RELEASE ORAL at 09:06

## 2020-01-22 RX ADMIN — INSULIN LISPRO 2 UNITS: 100 INJECTION, SOLUTION INTRAVENOUS; SUBCUTANEOUS at 18:04

## 2020-01-22 RX ADMIN — ARFORMOTEROL TARTRATE 15 MCG: 15 SOLUTION RESPIRATORY (INHALATION) at 09:05

## 2020-01-22 RX ADMIN — DICYCLOMINE HYDROCHLORIDE 20 MG: 10 CAPSULE ORAL at 21:40

## 2020-01-22 RX ADMIN — CITALOPRAM HYDROBROMIDE 40 MG: 20 TABLET ORAL at 21:40

## 2020-01-22 RX ADMIN — Medication 1 TABLET: at 09:06

## 2020-01-22 RX ADMIN — LEVOTHYROXINE SODIUM 50 MCG: 100 TABLET ORAL at 05:56

## 2020-01-22 RX ADMIN — POTASSIUM CHLORIDE 20 MEQ: 1500 TABLET, EXTENDED RELEASE ORAL at 21:40

## 2020-01-22 RX ADMIN — PANTOPRAZOLE SODIUM 40 MG: 40 TABLET, DELAYED RELEASE ORAL at 09:07

## 2020-01-22 RX ADMIN — POTASSIUM BICARBONATE 20 MEQ: 782 TABLET, EFFERVESCENT ORAL at 09:06

## 2020-01-22 RX ADMIN — ARFORMOTEROL TARTRATE 15 MCG: 15 SOLUTION RESPIRATORY (INHALATION) at 20:00

## 2020-01-22 RX ADMIN — Medication 1 CAPSULE: at 09:06

## 2020-01-22 RX ADMIN — DIVALPROEX SODIUM 1250 MG: 500 TABLET, DELAYED RELEASE ORAL at 21:40

## 2020-01-22 NOTE — INTERDISCIPLINARY ROUNDS
IDT team, , DON, MDS, Rehab Manager, Dietician, /, met and reviewed patients medication, diet, therapy, nursing needs, discharge plans, and overall progress. Concerns identified and addressed to meet patients needs. Discharge date 1/23/2020 with [facility].

## 2020-01-22 NOTE — PROGRESS NOTES
Pt in bed, sleeping, HOB elevated, oxygen @ 2LPM via N/C intact, toileting assistance given as needed, no c/o pain and no visual signs of pain noted, no seizure activity noted, call bell and personal items within reach

## 2020-01-23 ENCOUNTER — PATIENT OUTREACH (OUTPATIENT)
Dept: CASE MANAGEMENT | Age: 70
End: 2020-01-23

## 2020-01-23 ENCOUNTER — HOME HEALTH ADMISSION (OUTPATIENT)
Dept: HOME HEALTH SERVICES | Facility: HOME HEALTH | Age: 70
End: 2020-01-23
Payer: MEDICARE

## 2020-01-23 VITALS
WEIGHT: 226.2 LBS | TEMPERATURE: 97 F | HEART RATE: 66 BPM | OXYGEN SATURATION: 95 % | SYSTOLIC BLOOD PRESSURE: 130 MMHG | RESPIRATION RATE: 18 BRPM | HEIGHT: 64 IN | DIASTOLIC BLOOD PRESSURE: 58 MMHG | BODY MASS INDEX: 38.62 KG/M2

## 2020-01-23 LAB
GLUCOSE BLD STRIP.AUTO-MCNC: 100 MG/DL (ref 70–110)
POTASSIUM SERPL-SCNC: 3.5 MMOL/L (ref 3.5–5.5)

## 2020-01-23 PROCEDURE — 74011250637 HC RX REV CODE- 250/637: Performed by: INTERNAL MEDICINE

## 2020-01-23 PROCEDURE — 74011250637 HC RX REV CODE- 250/637: Performed by: NURSE PRACTITIONER

## 2020-01-23 PROCEDURE — 82962 GLUCOSE BLOOD TEST: CPT

## 2020-01-23 PROCEDURE — 36415 COLL VENOUS BLD VENIPUNCTURE: CPT

## 2020-01-23 PROCEDURE — 84132 ASSAY OF SERUM POTASSIUM: CPT

## 2020-01-23 PROCEDURE — 74011000250 HC RX REV CODE- 250: Performed by: INTERNAL MEDICINE

## 2020-01-23 RX ADMIN — DOCUSATE SODIUM 100 MG: 100 CAPSULE, LIQUID FILLED ORAL at 08:17

## 2020-01-23 RX ADMIN — Medication 1 TABLET: at 08:16

## 2020-01-23 RX ADMIN — BUDESONIDE 500 MCG: 0.5 SUSPENSION RESPIRATORY (INHALATION) at 08:16

## 2020-01-23 RX ADMIN — CHLORTHALIDONE 25 MG: 25 TABLET ORAL at 08:17

## 2020-01-23 RX ADMIN — Medication 1 CAPSULE: at 08:17

## 2020-01-23 RX ADMIN — PANTOPRAZOLE SODIUM 40 MG: 40 TABLET, DELAYED RELEASE ORAL at 08:17

## 2020-01-23 RX ADMIN — POLYETHYLENE GLYCOL 3350 17 G: 17 POWDER, FOR SOLUTION ORAL at 08:16

## 2020-01-23 RX ADMIN — DIVALPROEX SODIUM 500 MG: 500 TABLET, DELAYED RELEASE ORAL at 08:17

## 2020-01-23 RX ADMIN — DICYCLOMINE HYDROCHLORIDE 20 MG: 10 CAPSULE ORAL at 08:16

## 2020-01-23 RX ADMIN — PROPRANOLOL HYDROCHLORIDE 40 MG: 20 TABLET ORAL at 08:16

## 2020-01-23 RX ADMIN — POTASSIUM CHLORIDE 20 MEQ: 1500 TABLET, EXTENDED RELEASE ORAL at 08:16

## 2020-01-23 RX ADMIN — ARFORMOTEROL TARTRATE 15 MCG: 15 SOLUTION RESPIRATORY (INHALATION) at 08:16

## 2020-01-23 RX ADMIN — LEVOTHYROXINE SODIUM 50 MCG: 100 TABLET ORAL at 06:00

## 2020-01-23 NOTE — DISCHARGE INSTRUCTIONS
Patient Education   Patient Education        Preventing Falls: Care Instructions  Your Care Instructions    Getting around your home safely can be a challenge if you have injuries or health problems that make it easy for you to fall. Loose rugs and furniture in walkways are among the dangers for many older people who have problems walking or who have poor eyesight. People who have conditions such as arthritis, osteoporosis, or dementia also have to be careful not to fall. You can make your home safer with a few simple measures. Follow-up care is a key part of your treatment and safety. Be sure to make and go to all appointments, and call your doctor if you are having problems. It's also a good idea to know your test results and keep a list of the medicines you take. How can you care for yourself at home? Taking care of yourself  · You may get dizzy if you do not drink enough water. To prevent dehydration, drink plenty of fluids, enough so that your urine is light yellow or clear like water. Choose water and other caffeine-free clear liquids. If you have kidney, heart, or liver disease and have to limit fluids, talk with your doctor before you increase the amount of fluids you drink. · Exercise regularly to improve your strength, muscle tone, and balance. Walk if you can. Swimming may be a good choice if you cannot walk easily. · Have your vision and hearing checked each year or any time you notice a change. If you have trouble seeing and hearing, you might not be able to avoid objects and could lose your balance. · Know the side effects of the medicines you take. Ask your doctor or pharmacist whether the medicines you take can affect your balance. Sleeping pills or sedatives can affect your balance. · Limit the amount of alcohol you drink. Alcohol can impair your balance and other senses. · Ask your doctor whether calluses or corns on your feet need to be removed.  If you wear loose-fitting shoes because of calluses or corns, you can lose your balance and fall. · Talk to your doctor if you have numbness in your feet. Preventing falls at home  · Remove raised doorway thresholds, throw rugs, and clutter. Repair loose carpet or raised areas in the floor. · Move furniture and electrical cords to keep them out of walking paths. · Use nonskid floor wax, and wipe up spills right away, especially on ceramic tile floors. · If you use a walker or cane, put rubber tips on it. If you use crutches, clean the bottoms of them regularly with an abrasive pad, such as steel wool. · Keep your house well lit, especially [facility], and outside walkways. Use night-lights in areas such as hallways and bathrooms. Add extra light switches or use remote switches (such as switches that go on or off when you clap your hands) to make it easier to turn lights on if you have to get up during the night. · Install sturdy handrails on stairways. · Move items in your cabinets so that the things you use a lot are on the lower shelves (about waist level). · Keep a cordless phone and a flashlight with new batteries by your bed. If possible, put a phone in each of the main rooms of your house, or carry a cell phone in case you fall and cannot reach a phone. Or, you can wear a device around your neck or wrist. You push a button that sends a signal for help. · Wear low-heeled shoes that fit well and give your feet good support. Use footwear with nonskid soles. Check the heels and soles of your shoes for wear. Repair or replace worn heels or soles. · Do not wear socks without shoes on wood floors. · Walk on the grass when the sidewalks are slippery. If you live in an area that gets snow and ice in the winter, sprinkle salt on slippery steps and sidewalks. Preventing falls in the bath  · Install grab bars and nonskid mats inside and outside your shower or tub and near the toilet and sinks. · Use shower chairs and bath benches.   · Use a hand-held shower head that will allow you to sit while showering. · Get into a tub or shower by putting the weaker leg in first. Get out of a tub or shower with your strong side first.  · Repair loose toilet seats and consider installing a raised toilet seat to make getting on and off the toilet easier. · Keep your bathroom door unlocked while you are in the shower. Where can you learn more? Go to http://ramón-ayden.info/. Enter [id number] [id number] in the search box to learn more about \"Preventing Falls: Care Instructions. \"  Current as of: November 7, 2018  Content Version: 12.2  © 4474-2721 [employer]. Care instructions adapted under license by [employer] (which disclaims liability or warranty for this information). If you have questions about a medical condition or this instruction, always ask your healthcare professional. [place] [address] any warranty or liability for your use of this information. Seizure: Care Instructions  Your Care Instructions    Seizures are caused by abnormal patterns of electrical signals in the brain. They are different for each person. Seizures can affect movement, speech, vision, or awareness. Some people have only slight shaking of a hand and do not pass out. Other people may pass out and have violent shaking of the whole body. Some people appear to stare into space. They are awake, but they can't respond normally. Later, they may not remember what happened. You may need tests to identify the type and cause of the seizures. A seizure may occur only once, or you may have them more than one time. Taking medicines as directed and following up with your doctor may help keep you from having more seizures. The doctor has checked you carefully, but problems can develop later. If you notice any problems or new symptoms, get medical treatment right away. Follow-up care is a key part of your treatment and safety.  Be sure to make and go to all appointments, and call your doctor if you are having problems. It's also a good idea to know your test results and keep a list of the medicines you take. How can you care for yourself at home? · Be safe with medicines. Take your medicines exactly as prescribed. Call your doctor if you think you are having a problem with your medicine. · Do not do any activity that could be dangerous to you or others until your doctor says it is safe to do so. For example, do not drive a car, operate machinery, swim, or climb ladders. · Be sure that anyone treating you for any health problem knows that you have had a seizure and what medicines you are taking for it. · Identify and avoid things that may make you more likely to have a seizure. These may include lack of sleep, alcohol or drug use, stress, or not eating. · Make sure you go to your follow-up appointment. When should you call for help? Call 911 anytime you think you may need emergency care. For example, call if:    · You have another seizure.     · You have more than one seizure in 24 hours.     · You have new symptoms, such as trouble walking, speaking, or thinking clearly.    Call your doctor now or seek immediate medical care if:    · You are not acting normally.    Watch closely for changes in your health, and be sure to contact your doctor if you have any problems. Where can you learn more? Go to http://ramón-ayden.info/. Enter Y112 in the search box to learn more about \"Seizure: Care Instructions. \"  Current as of: March 28, 2019  Content Version: 12.2  © 6685-8784 [employer]. Care instructions adapted under license by [employer] ([employer]) (which disclaims liability or warranty for this information).  If you have questions about a medical condition or this instruction, always ask your healthcare professional. [address] any warranty or liability for your use of this information.

## 2020-01-23 NOTE — ROUTINE PROCESS
Patient remains alert and able to make needs known . No seizure activity noted. Discharge teaching rendered to patient and . Both parties verbalized an understanding of teaching rendered. All prescriptions and AVS paperwork taken bu . All personal belongings taken by .

## 2020-01-23 NOTE — PROGRESS NOTES
Community Care Team Documentation for Patient in [facility]     Patient discharged from [facility] [address] [facility] to [address] [facility], on 12/27/19. Hospital Discharge diagnosis:       Weakness   Guillain Arrington syndrome   Leg pain   GERD   DM   Asthma    SNF Attending Provider:   Dr. Cecil Mijares    Anticipated discharge date from SNF: 1/23/20 with [address]    PCP : Jalen Rose MD    CTN:     [facility] Team rounds completed, updates provided by facility. RRAT:  Low Risk            10       Total Score        2 . Living with Significant Other. Assisted Living. LTAC. SNF. or   Rehab    8 Charlson Comorbidity Score (Age + Comorbid Conditions)        Criteria that do not apply:    Has Seen PCP in Last 6 Months (Yes=3, No=0)    Patient Length of Stay (>5 days = 3)    IP Visits Last 12 Months (1-3=4, 4=9, >4=11)    Pt.  Coverage (Medicare=5 , Medicaid, or Self-Pay=4)        Active Ambulatory Problems     Diagnosis Date Noted    Breast cancer ([address]     Biliary obstruction 04/26/2016    Choledocholithiasis 06/07/2016    Lumbar stenosis 09/13/2017    Severe persistent asthma 11/07/2019    Leg pain, right 12/23/2019    DM (diabetes mellitus) ([address] 12/23/2019    Hx of Guillain-Arrington syndrome 12/23/2019    GERD (gastroesophageal reflux disease) 12/23/2019    Weakness 12/24/2019    Guillain Barré syndrome ([address] 12/24/2019     Resolved Ambulatory Problems     Diagnosis Date Noted    No Resolved Ambulatory Problems     Past Medical History:   Diagnosis Date    Asthma     Back pain     Bipolar 1 disorder ([address]     Cancer ([address]     Diabetes ([address]     History of blood transfusion     Hypertension     Ill-defined condition 09/12/2017    Lymphedema     Morbid obesity ([address] 09/12/2017    Seizures ([address]     Sleep apnea     Thromboembolus ([address]     Thyroid disease     UTI (lower urinary tract infection)

## 2020-01-23 NOTE — ROUTINE PROCESS
Bedside and Verbal shift change report given to DEBBIE HERNANDEZ LPN, (oncoming nurse) by Alicia Magana RN (offgoing nurse). Report included the following information SBAR, Kardex and Recent Results.

## 2020-01-23 NOTE — ROUTINE PROCESS
Patient in bed with resp non labored,02 @2l/nc on. HOB up,call bell in reach,liberty upper siderails up ,bed check alarn on and functional for safety. Incontinent. Kept clean and dry. Turned and repositioned in bed. Heels off bed surfaces.

## 2020-01-23 NOTE — PROGRESS NOTES
[facility]    Daily progress Note    Patient: Stephanie Vicente MRN: [id number]  CSN: [id number]    YOB: 1950  Age: 78 y.o. Sex: female    DOA: 12/27/2019 LOS:  LOS: 27 days                    Subjective:     CC: follow hypokalemia  Patient with ongoing hypokalemia episode, no diarrhea, NVD. Patient is on daily potassium. Patient repeat k is now 3.5. Patient to be discharge home today with [place] and . On examination she states that she is feeling much better and ready for discharge. PAST MEDICAL hx: Guillain- Rohith Jorjeitt, DM type 2, Hyperlipidemia, Hypothyroidism, HTN     PAST SURGICAL Hx: Back Surgery, cholecystectomy, mastectomy, foot surgery, partial hysterectomy       ALLERGIES:   VALIUM, CARAFATE, COREG, CEPHALEXIN, CIPRO, ELIQUIS, EPIPEN, MILK, VALIUM, PERCOCET, BEE STING, TALWIN, TETANUS TOXOID, TRILEPTAL.     FAMILY hx: CAD and [place]     SOCIAL hx: discussed with patient and  she doesn't smoke of drink.     ROS: discussion with patients  and nursing. No fever or chills.  No weight loss or headache.  No neck pain or sore throat.  No chest pain or palpitation.  No shortness of breath or cough.  No nausea, vomiting, or diarrhea.  No dysuria or polyuria.  No back pain, occasional leg pain.  No heat or cold intolerance.  No anxiety or depression.   weakness to ble      Objective:      Visit Vitals  /58   Pulse 66   Temp 97 °F (36.1 °C)   Resp 18   Ht 5' 4\" (1.626 m)   Wt 102.6 kg (226 lb 3.2 oz)   SpO2 95%   Breastfeeding No   BMI 38.83 kg/m²       Physical Exam:  General appearance: alert, cooperative, no distress, appears stated age  HEENT: negative  Neck: supple, symmetrical, trachea midline, no adenopathy, thyroid: not enlarged, symmetric, no tenderness/mass/nodules, no carotid bruit and no JVD  Lungs: clear to auscultation bilaterally  Heart: regular rate and rhythm, S1, S2 normal, no murmur, click, rub or gallop  Abdomen: soft, non-tender.  Bowel sounds normal. No masses,  no organomegaly  Pulses: 2+ and symmetric  Skin: Skin color, texture, turgor normal. No rashes or lesion    Intake and Output:  Current Shift:  No intake/output data recorded. Last three shifts:  No intake/output data recorded.     Recent Results (from the past 24 hour(s))   GLUCOSE, POC    Collection Time: 01/22/20 11:30 AM   Result Value Ref Range    Glucose (POC) 125 (H) 70 - 110 mg/dL   GLUCOSE, POC    Collection Time: 01/22/20  4:59 PM   Result Value Ref Range    Glucose (POC) 164 (H) 70 - 110 mg/dL   GLUCOSE, POC    Collection Time: 01/22/20  8:55 PM   Result Value Ref Range    Glucose (POC) 96 70 - 110 mg/dL   POTASSIUM    Collection Time: 01/23/20  3:55 AM   Result Value Ref Range    Potassium 3.5 3.5 - 5.5 mmol/L   GLUCOSE, POC    Collection Time: 01/23/20  5:51 AM   Result Value Ref Range    Glucose (POC) 100 70 - 110 mg/dL         Current Facility-Administered Medications:     potassium chloride (K-DUR, KLOR-CON) SR tablet 20 mEq, 20 mEq, Oral, TID, Debbie Rogers NP, 20 mEq at 01/23/20 0816    magnesium hydroxide (MILK OF MAGNESIA) 400 mg/5 mL oral suspension 30 mL, 30 mL, Oral, DAILY PRN, Jacobo Jacobson MD, 30 mL at 01/12/20 1754    acetaminophen (TYLENOL) tablet 650 mg, 650 mg, Oral, Q6H PRN, Jacobo Jacobson MD    divalproex  (DEPAKOTE) tablet 1,250 mg, 1,250 mg, Oral, QHS, Luis Dow MD, 1,250 mg at 01/22/20 2140    docusate sodium (COLACE) capsule 100 mg, 100 mg, Oral, DAILY, Luis Dow MD, 100 mg at 01/23/20 0817    divalproex DR (DEPAKOTE) tablet 500 mg, 500 mg, Oral, DAILY, Debbie Weber NP, 500 mg at 01/23/20 0817    [Held by provider] gabapentin (NEURONTIN) capsule 200 mg, 200 mg, Oral, BID, Debbie Weber NP, 200 mg at 01/08/20 0805    atorvastatin (LIPITOR) tablet 40 mg, 40 mg, Oral, QHS, Debbie Weber NP, 40 mg at 01/22/20 2140    dicyclomine (BENTYL) capsule 20 mg, 20 mg, Oral, TID, Capo COLEMAN NP, 20 mg at 01/23/20 0816    ergocalciferol capsule 50,000 Units, 50,000 Units, Oral, Q7D, Debbie Weber NP, 50,000 Units at 01/18/20 8546    citalopram (CELEXA) tablet 40 mg, 40 mg, Oral, QHS, Debbie Weber NP, 40 mg at 01/22/20 2140    [Held by provider] methylcellulose (CITRUCEL) tablet 500 mg, 1 Tab, Oral, BID, Conchetta Debbie Wen NP, 500 mg at 01/07/20 6660    levothyroxine (SYNTHROID) tablet 50 mcg, 50 mcg, Oral, 6am, Debbie Weber NP, 50 mcg at 01/23/20 0600    calcium-vitamin D (OS-MARICEL) 500 mg-200 unit tablet, 1 Tab, Oral, DAILY WITH BREAKFAST, Linus COLEMAN NP, 1 Tab at 01/23/20 0816    [Held by provider] topiramate (TOPAMAX) tablet 100 mg, 100 mg, Oral, BID WITH MEALS, Debbie Weber NP, 100 mg at 01/07/20 0839    chlorthalidone (HYGROTEN) tablet 25 mg, 25 mg, Oral, EVERY OTHER DAY, Debbie Weber NP, 25 mg at 01/23/20 0817    [Held by provider] losartan (COZAAR) tablet 100 mg, 100 mg, Oral, DAILY, Debbie Weber NP, 100 mg at 01/07/20 0839    polyethylene glycol (MIRALAX) packet 17 g, 17 g, Oral, DAILY, Luis Dow MD, 17 g at 01/23/20 0816    pantoprazole (PROTONIX) tablet 40 mg, 40 mg, Oral, ACB, Yun Hicks MD, 40 mg at 01/23/20 0817    arformoterol (BROVANA) neb solution 15 mcg, 15 mcg, Nebulization, BID RT, 15 mcg at 01/23/20 0816 **AND** budesonide (PULMICORT) 500 mcg/2 ml nebulizer suspension, 500 mcg, Nebulization, BID RT, Yun Hicks MD, 500 mcg at 01/23/20 8396    iron polysacch complex-b12-fa (FERREX 150 FORTE) capsule 1 Cap (Pawel Forte generic), 1 Cap, Oral, DAILY, Luis Dow MD, 1 Cap at 01/23/20 0817    albuterol-ipratropium (DUO-NEB) 2.5 MG-0.5 MG/3 ML, 3 mL, Nebulization, QID PRN, Yun Hicks MD    EPINEPHrine (EPIPEN) injection 0.3 mg (Patient Supplied), 0.3 mg, IntraMUSCular, PRN, Yun Hicks MD    insulin lispro (HUMALOG) injection, , SubCUTAneous, AC&HS, Yun Hicks MD, Stopped at 01/22/20 2200    propranolol (INDERAL) tablet 40 mg, 40 mg, Oral, BID, Debbie Mitchell, NP, 40 mg at 01/23/20 0816    Lab Results   Component Value Date/Time    Glucose 94 01/20/2020 04:45 AM    Glucose 92 01/13/2020 04:30 AM    Glucose 88 01/09/2020 05:30 AM    Glucose 84 01/06/2020 04:40 AM    Glucose 78 01/03/2020 05:10 AM        Assessment/Plan   Hypokalemia: resolved  Patient is stable for discharge home today    Evens Ma NP  1/23/2020, 9:21 AM

## 2020-01-25 ENCOUNTER — HOME CARE VISIT (OUTPATIENT)
Dept: SCHEDULING | Facility: HOME HEALTH | Age: 70
End: 2020-01-25
Payer: MEDICARE

## 2020-01-25 VITALS
HEART RATE: 70 BPM | TEMPERATURE: 97.5 F | SYSTOLIC BLOOD PRESSURE: 124 MMHG | RESPIRATION RATE: 16 BRPM | OXYGEN SATURATION: 95 % | DIASTOLIC BLOOD PRESSURE: 76 MMHG

## 2020-01-25 PROCEDURE — G0299 HHS/HOSPICE OF RN EA 15 MIN: HCPCS

## 2020-01-25 PROCEDURE — 400013 HH SOC

## 2020-01-27 ENCOUNTER — HOME CARE VISIT (OUTPATIENT)
Dept: SCHEDULING | Facility: HOME HEALTH | Age: 70
End: 2020-01-27
Payer: MEDICARE

## 2020-01-27 ENCOUNTER — HOME CARE VISIT (OUTPATIENT)
Dept: HOME HEALTH SERVICES | Facility: HOME HEALTH | Age: 70
End: 2020-01-27
Payer: MEDICARE

## 2020-01-27 PROCEDURE — G0152 HHCP-SERV OF OT,EA 15 MIN: HCPCS

## 2020-01-27 PROCEDURE — G0151 HHCP-SERV OF PT,EA 15 MIN: HCPCS

## 2020-01-28 ENCOUNTER — HOME CARE VISIT (OUTPATIENT)
Dept: HOME HEALTH SERVICES | Facility: HOME HEALTH | Age: 70
End: 2020-01-28
Payer: MEDICARE

## 2020-01-28 VITALS
HEART RATE: 82 BPM | OXYGEN SATURATION: 95 % | SYSTOLIC BLOOD PRESSURE: 137 MMHG | TEMPERATURE: 97.5 F | DIASTOLIC BLOOD PRESSURE: 85 MMHG

## 2020-01-29 ENCOUNTER — HOME CARE VISIT (OUTPATIENT)
Dept: HOME HEALTH SERVICES | Facility: HOME HEALTH | Age: 70
End: 2020-01-29
Payer: MEDICARE

## 2020-01-29 ENCOUNTER — HOME CARE VISIT (OUTPATIENT)
Dept: SCHEDULING | Facility: HOME HEALTH | Age: 70
End: 2020-01-29
Payer: MEDICARE

## 2020-01-29 VITALS
SYSTOLIC BLOOD PRESSURE: 132 MMHG | OXYGEN SATURATION: 97 % | DIASTOLIC BLOOD PRESSURE: 84 MMHG | HEART RATE: 78 BPM | TEMPERATURE: 99.4 F

## 2020-01-29 VITALS
HEART RATE: 100 BPM | RESPIRATION RATE: 24 BRPM | TEMPERATURE: 99 F | SYSTOLIC BLOOD PRESSURE: 136 MMHG | OXYGEN SATURATION: 93 % | DIASTOLIC BLOOD PRESSURE: 82 MMHG

## 2020-01-29 PROCEDURE — G0496 LPN CARE TRAIN/EDU IN HH: HCPCS

## 2020-01-30 ENCOUNTER — HOME CARE VISIT (OUTPATIENT)
Dept: SCHEDULING | Facility: HOME HEALTH | Age: 70
End: 2020-01-30
Payer: MEDICARE

## 2020-01-30 VITALS
SYSTOLIC BLOOD PRESSURE: 142 MMHG | DIASTOLIC BLOOD PRESSURE: 89 MMHG | TEMPERATURE: 98.3 F | HEART RATE: 72 BPM | OXYGEN SATURATION: 93 %

## 2020-01-30 PROCEDURE — G0158 HHC OT ASSISTANT EA 15: HCPCS

## 2020-01-31 ENCOUNTER — HOME CARE VISIT (OUTPATIENT)
Dept: SCHEDULING | Facility: HOME HEALTH | Age: 70
End: 2020-01-31
Payer: MEDICARE

## 2020-01-31 VITALS
TEMPERATURE: 97.7 F | SYSTOLIC BLOOD PRESSURE: 145 MMHG | DIASTOLIC BLOOD PRESSURE: 86 MMHG | HEART RATE: 81 BPM | OXYGEN SATURATION: 91 %

## 2020-01-31 PROCEDURE — G0157 HHC PT ASSISTANT EA 15: HCPCS

## 2020-01-31 PROCEDURE — G0299 HHS/HOSPICE OF RN EA 15 MIN: HCPCS

## 2020-02-01 ENCOUNTER — HOME CARE VISIT (OUTPATIENT)
Dept: SCHEDULING | Facility: HOME HEALTH | Age: 70
End: 2020-02-01
Payer: MEDICARE

## 2020-02-01 VITALS — SYSTOLIC BLOOD PRESSURE: 154 MMHG | DIASTOLIC BLOOD PRESSURE: 85 MMHG

## 2020-02-03 ENCOUNTER — HOME CARE VISIT (OUTPATIENT)
Dept: HOME HEALTH SERVICES | Facility: HOME HEALTH | Age: 70
End: 2020-02-03
Payer: MEDICARE

## 2020-02-05 DIAGNOSIS — J45.50 SEVERE PERSISTENT ASTHMA, UNSPECIFIED WHETHER COMPLICATED: ICD-10-CM

## 2020-02-06 ENCOUNTER — HOSPITAL ENCOUNTER (OUTPATIENT)
Dept: INFUSION THERAPY | Age: 70
End: 2020-02-06

## 2020-02-19 DIAGNOSIS — J45.50 SEVERE PERSISTENT ASTHMA, UNSPECIFIED WHETHER COMPLICATED: ICD-10-CM

## 2020-02-20 ENCOUNTER — HOSPITAL ENCOUNTER (OUTPATIENT)
Dept: INFUSION THERAPY | Age: 70
End: 2020-02-20

## 2020-03-04 DIAGNOSIS — J45.50 SEVERE PERSISTENT ASTHMA, UNSPECIFIED WHETHER COMPLICATED: ICD-10-CM

## 2020-03-05 ENCOUNTER — HOSPITAL ENCOUNTER (OUTPATIENT)
Dept: INFUSION THERAPY | Age: 70
End: 2020-03-05

## 2020-03-18 DIAGNOSIS — J45.50 SEVERE PERSISTENT ASTHMA, UNSPECIFIED WHETHER COMPLICATED: ICD-10-CM

## 2020-03-24 ENCOUNTER — HOME CARE VISIT (OUTPATIENT)
Dept: HOME HEALTH SERVICES | Facility: HOME HEALTH | Age: 70
End: 2020-03-24

## 2020-04-01 DIAGNOSIS — J45.50 SEVERE PERSISTENT ASTHMA, UNSPECIFIED WHETHER COMPLICATED: ICD-10-CM

## 2020-04-14 ENCOUNTER — HOSPITAL ENCOUNTER (OUTPATIENT)
Age: 70
Setting detail: OBSERVATION
Discharge: HOME | End: 2020-04-15
Attending: EMERGENCY MEDICINE | Admitting: INTERNAL MEDICINE
Payer: MEDICARE

## 2020-04-14 ENCOUNTER — APPOINTMENT (OUTPATIENT)
Dept: GENERAL RADIOLOGY | Age: 70
End: 2020-04-14
Attending: PHYSICIAN ASSISTANT
Payer: MEDICARE

## 2020-04-14 ENCOUNTER — APPOINTMENT (OUTPATIENT)
Dept: CT IMAGING | Age: 70
End: 2020-04-14
Attending: EMERGENCY MEDICINE
Payer: MEDICARE

## 2020-04-14 DIAGNOSIS — R94.31 T WAVE INVERSION IN EKG: ICD-10-CM

## 2020-04-14 DIAGNOSIS — R06.02 SOB (SHORTNESS OF BREATH): ICD-10-CM

## 2020-04-14 DIAGNOSIS — R07.9 CHEST PAIN, UNSPECIFIED TYPE: Primary | ICD-10-CM

## 2020-04-14 DIAGNOSIS — J45.901 EXACERBATION OF ASTHMA, UNSPECIFIED ASTHMA SEVERITY, UNSPECIFIED WHETHER PERSISTENT: ICD-10-CM

## 2020-04-14 LAB
ALBUMIN SERPL-MCNC: 3.3 G/DL (ref 3.4–5)
ALBUMIN/GLOB SERPL: 0.9 {RATIO} (ref 0.8–1.7)
ALP SERPL-CCNC: 125 U/L (ref 45–117)
ALT SERPL-CCNC: 45 U/L (ref 13–56)
ANION GAP SERPL CALC-SCNC: 4 MMOL/L (ref 3–18)
AST SERPL-CCNC: 24 U/L (ref 10–38)
BASOPHILS # BLD: 0 K/UL (ref 0–0.1)
BASOPHILS NFR BLD: 1 % (ref 0–2)
BILIRUB SERPL-MCNC: 0.5 MG/DL (ref 0.2–1)
BNP SERPL-MCNC: 67 PG/ML (ref 0–900)
BUN SERPL-MCNC: 15 MG/DL (ref 7–18)
BUN/CREAT SERPL: 21 (ref 12–20)
CALCIUM SERPL-MCNC: 8.8 MG/DL (ref 8.5–10.1)
CHLORIDE SERPL-SCNC: 107 MMOL/L (ref 100–111)
CO2 SERPL-SCNC: 30 MMOL/L (ref 21–32)
CREAT SERPL-MCNC: 0.73 MG/DL (ref 0.6–1.3)
DIFFERENTIAL METHOD BLD: ABNORMAL
EOSINOPHIL # BLD: 0.2 K/UL (ref 0–0.4)
EOSINOPHIL NFR BLD: 4 % (ref 0–5)
ERYTHROCYTE [DISTWIDTH] IN BLOOD BY AUTOMATED COUNT: 13.8 % (ref 11.6–14.5)
GLOBULIN SER CALC-MCNC: 3.5 G/DL (ref 2–4)
GLUCOSE SERPL-MCNC: 120 MG/DL (ref 74–99)
HCT VFR BLD AUTO: 41.8 % (ref 35–45)
HGB BLD-MCNC: 13.5 G/DL (ref 12–16)
LYMPHOCYTES # BLD: 1.6 K/UL (ref 0.9–3.6)
LYMPHOCYTES NFR BLD: 28 % (ref 21–52)
MCH RBC QN AUTO: 33.3 PG (ref 24–34)
MCHC RBC AUTO-ENTMCNC: 32.3 G/DL (ref 31–37)
MCV RBC AUTO: 103.2 FL (ref 74–97)
MONOCYTES # BLD: 0.5 K/UL (ref 0.05–1.2)
MONOCYTES NFR BLD: 9 % (ref 3–10)
NEUTS SEG # BLD: 3.4 K/UL (ref 1.8–8)
NEUTS SEG NFR BLD: 58 % (ref 40–73)
PLATELET # BLD AUTO: 188 K/UL (ref 135–420)
PMV BLD AUTO: 9.3 FL (ref 9.2–11.8)
POTASSIUM SERPL-SCNC: 4.2 MMOL/L (ref 3.5–5.5)
PROT SERPL-MCNC: 6.8 G/DL (ref 6.4–8.2)
RBC # BLD AUTO: 4.05 M/UL (ref 4.2–5.3)
SODIUM SERPL-SCNC: 141 MMOL/L (ref 136–145)
TROPONIN I SERPL-MCNC: <0.02 NG/ML (ref 0–0.04)
TROPONIN I SERPL-MCNC: <0.02 NG/ML (ref 0–0.04)
WBC # BLD AUTO: 5.7 K/UL (ref 4.6–13.2)

## 2020-04-14 PROCEDURE — 84484 ASSAY OF TROPONIN QUANT: CPT

## 2020-04-14 PROCEDURE — 74011000250 HC RX REV CODE- 250: Performed by: EMERGENCY MEDICINE

## 2020-04-14 PROCEDURE — 74011636320 HC RX REV CODE- 636/320: Performed by: EMERGENCY MEDICINE

## 2020-04-14 PROCEDURE — 74011250637 HC RX REV CODE- 250/637: Performed by: EMERGENCY MEDICINE

## 2020-04-14 PROCEDURE — 94640 AIRWAY INHALATION TREATMENT: CPT

## 2020-04-14 PROCEDURE — 83036 HEMOGLOBIN GLYCOSYLATED A1C: CPT

## 2020-04-14 PROCEDURE — 71045 X-RAY EXAM CHEST 1 VIEW: CPT

## 2020-04-14 PROCEDURE — 80053 COMPREHEN METABOLIC PANEL: CPT

## 2020-04-14 PROCEDURE — 74011250636 HC RX REV CODE- 250/636: Performed by: EMERGENCY MEDICINE

## 2020-04-14 PROCEDURE — 96374 THER/PROPH/DIAG INJ IV PUSH: CPT

## 2020-04-14 PROCEDURE — 85025 COMPLETE CBC W/AUTO DIFF WBC: CPT

## 2020-04-14 PROCEDURE — 93005 ELECTROCARDIOGRAM TRACING: CPT

## 2020-04-14 PROCEDURE — 71275 CT ANGIOGRAPHY CHEST: CPT

## 2020-04-14 PROCEDURE — 83880 ASSAY OF NATRIURETIC PEPTIDE: CPT

## 2020-04-14 PROCEDURE — 99285 EMERGENCY DEPT VISIT HI MDM: CPT

## 2020-04-14 PROCEDURE — 74011250637 HC RX REV CODE- 250/637: Performed by: PHYSICIAN ASSISTANT

## 2020-04-14 PROCEDURE — 96375 TX/PRO/DX INJ NEW DRUG ADDON: CPT

## 2020-04-14 PROCEDURE — 74011000258 HC RX REV CODE- 258: Performed by: EMERGENCY MEDICINE

## 2020-04-14 PROCEDURE — 74011636637 HC RX REV CODE- 636/637: Performed by: EMERGENCY MEDICINE

## 2020-04-14 RX ORDER — ACETAMINOPHEN 500 MG
1000 TABLET ORAL
Status: COMPLETED | OUTPATIENT
Start: 2020-04-14 | End: 2020-04-14

## 2020-04-14 RX ORDER — SODIUM CHLORIDE 9 MG/ML
100 INJECTION, SOLUTION INTRAVENOUS
Status: COMPLETED | OUTPATIENT
Start: 2020-04-14 | End: 2020-04-14

## 2020-04-14 RX ORDER — ALBUTEROL SULFATE 90 UG/1
2 AEROSOL, METERED RESPIRATORY (INHALATION)
Status: DISCONTINUED | OUTPATIENT
Start: 2020-04-14 | End: 2020-04-14 | Stop reason: CLARIF

## 2020-04-14 RX ORDER — GUAIFENESIN 600 MG/1
600 TABLET, EXTENDED RELEASE ORAL
Status: COMPLETED | OUTPATIENT
Start: 2020-04-14 | End: 2020-04-14

## 2020-04-14 RX ORDER — IPRATROPIUM BROMIDE AND ALBUTEROL SULFATE 2.5; .5 MG/3ML; MG/3ML
3 SOLUTION RESPIRATORY (INHALATION)
Status: DISPENSED | OUTPATIENT
Start: 2020-04-14 | End: 2020-04-14

## 2020-04-14 RX ORDER — PREDNISONE 20 MG/1
60 TABLET ORAL
Status: COMPLETED | OUTPATIENT
Start: 2020-04-14 | End: 2020-04-14

## 2020-04-14 RX ORDER — ALBUTEROL SULFATE 0.83 MG/ML
2.5 SOLUTION RESPIRATORY (INHALATION)
Status: DISPENSED | OUTPATIENT
Start: 2020-04-14 | End: 2020-04-15

## 2020-04-14 RX ORDER — ONDANSETRON 2 MG/ML
4 INJECTION INTRAMUSCULAR; INTRAVENOUS
Status: COMPLETED | OUTPATIENT
Start: 2020-04-14 | End: 2020-04-14

## 2020-04-14 RX ADMIN — SODIUM CHLORIDE 100 ML: 900 INJECTION, SOLUTION INTRAVENOUS at 22:27

## 2020-04-14 RX ADMIN — ONDANSETRON 4 MG: 2 INJECTION INTRAMUSCULAR; INTRAVENOUS at 23:01

## 2020-04-14 RX ADMIN — ACETAMINOPHEN 1000 MG: 500 TABLET ORAL at 21:05

## 2020-04-14 RX ADMIN — AZITHROMYCIN MONOHYDRATE 500 MG: 500 INJECTION, POWDER, LYOPHILIZED, FOR SOLUTION INTRAVENOUS at 22:38

## 2020-04-14 RX ADMIN — LIDOCAINE HYDROCHLORIDE 40 ML: 20 SOLUTION ORAL; TOPICAL at 23:53

## 2020-04-14 RX ADMIN — PREDNISONE 60 MG: 20 TABLET ORAL at 22:38

## 2020-04-14 RX ADMIN — IPRATROPIUM BROMIDE AND ALBUTEROL SULFATE 3 ML: .5; 3 SOLUTION RESPIRATORY (INHALATION) at 22:39

## 2020-04-14 RX ADMIN — IPRATROPIUM BROMIDE AND ALBUTEROL SULFATE 3 ML: .5; 3 SOLUTION RESPIRATORY (INHALATION) at 23:53

## 2020-04-14 RX ADMIN — IOPAMIDOL 75 ML: 755 INJECTION, SOLUTION INTRAVENOUS at 22:27

## 2020-04-14 RX ADMIN — GUAIFENESIN 600 MG: 600 TABLET, EXTENDED RELEASE ORAL at 22:38

## 2020-04-14 NOTE — ED NOTES
Pt  in ED lobby, informed of no visitors per hospital policy. Spouse contact information verified.  Spouse states he is POA and wants to be kept UTD on pt care

## 2020-04-14 NOTE — PROGRESS NOTES
Pharmacy: Non-Formulary Medication [facility]    Please note that medication Albuterol Inhaler is not on the formulary and has been changed to Albuterol Nebulizer Solution. Inhalers are reserved for COVID-19 persons under investigation or positive. Nebs are preferred over hospital stock inhalers due to shortage. If patient prefers to take home medication, please have family member bring from home. Please call pharmacy for questions.     Thanks,  Abigail Hu, PharmD

## 2020-04-15 VITALS
WEIGHT: 235 LBS | TEMPERATURE: 98.2 F | HEIGHT: 63 IN | DIASTOLIC BLOOD PRESSURE: 98 MMHG | HEART RATE: 84 BPM | SYSTOLIC BLOOD PRESSURE: 177 MMHG | OXYGEN SATURATION: 96 % | BODY MASS INDEX: 41.64 KG/M2 | RESPIRATION RATE: 20 BRPM

## 2020-04-15 DIAGNOSIS — J45.50 SEVERE PERSISTENT ASTHMA, UNSPECIFIED WHETHER COMPLICATED: ICD-10-CM

## 2020-04-15 PROBLEM — G47.33 OSA ON CPAP: Status: ACTIVE | Noted: 2020-04-15

## 2020-04-15 PROBLEM — R56.9 PSEUDOSEIZURES (HCC): Status: ACTIVE | Noted: 2020-04-15

## 2020-04-15 PROBLEM — Z86.711 HISTORY OF PULMONARY EMBOLISM: Status: ACTIVE | Noted: 2020-04-15

## 2020-04-15 PROBLEM — Z86.718 HISTORY OF DVT (DEEP VEIN THROMBOSIS): Status: ACTIVE | Noted: 2020-04-15

## 2020-04-15 PROBLEM — E03.9 ACQUIRED HYPOTHYROIDISM: Status: ACTIVE | Noted: 2020-04-15

## 2020-04-15 PROBLEM — I10 HTN (HYPERTENSION): Status: ACTIVE | Noted: 2020-04-15

## 2020-04-15 PROBLEM — Z99.89 OSA ON CPAP: Status: ACTIVE | Noted: 2020-04-15

## 2020-04-15 PROBLEM — F31.9 BIPOLAR MOOD DISORDER (HCC): Status: ACTIVE | Noted: 2020-04-15

## 2020-04-15 PROBLEM — E78.5 HLD (HYPERLIPIDEMIA): Status: ACTIVE | Noted: 2020-04-15

## 2020-04-15 PROBLEM — J45.901 ASTHMA EXACERBATION: Status: ACTIVE | Noted: 2020-04-15

## 2020-04-15 LAB
ATRIAL RATE: 62 BPM
ATRIAL RATE: 78 BPM
CALCULATED P AXIS, ECG09: 44 DEGREES
CALCULATED P AXIS, ECG09: 50 DEGREES
CALCULATED R AXIS, ECG10: -31 DEGREES
CALCULATED R AXIS, ECG10: -36 DEGREES
CALCULATED T AXIS, ECG11: 30 DEGREES
CALCULATED T AXIS, ECG11: 36 DEGREES
DIAGNOSIS, 93000: NORMAL
DIAGNOSIS, 93000: NORMAL
EST. AVERAGE GLUCOSE BLD GHB EST-MCNC: 97 MG/DL
GLUCOSE BLD STRIP.AUTO-MCNC: 181 MG/DL (ref 70–110)
HBA1C MFR BLD: 5 % (ref 4.2–5.6)
P-R INTERVAL, ECG05: 148 MS
P-R INTERVAL, ECG05: 174 MS
Q-T INTERVAL, ECG07: 416 MS
Q-T INTERVAL, ECG07: 418 MS
QRS DURATION, ECG06: 98 MS
QRS DURATION, ECG06: 98 MS
QTC CALCULATION (BEZET), ECG08: 424 MS
QTC CALCULATION (BEZET), ECG08: 474 MS
TROPONIN I SERPL-MCNC: <0.02 NG/ML (ref 0–0.04)
TSH SERPL DL<=0.05 MIU/L-ACNC: 1.12 UIU/ML (ref 0.36–3.74)
VENTRICULAR RATE, ECG03: 62 BPM
VENTRICULAR RATE, ECG03: 78 BPM

## 2020-04-15 PROCEDURE — 82962 GLUCOSE BLOOD TEST: CPT

## 2020-04-15 PROCEDURE — 84443 ASSAY THYROID STIM HORMONE: CPT

## 2020-04-15 PROCEDURE — 84484 ASSAY OF TROPONIN QUANT: CPT

## 2020-04-15 PROCEDURE — 74011250637 HC RX REV CODE- 250/637: Performed by: INTERNAL MEDICINE

## 2020-04-15 PROCEDURE — 74011250636 HC RX REV CODE- 250/636: Performed by: INTERNAL MEDICINE

## 2020-04-15 PROCEDURE — 99218 HC RM OBSERVATION: CPT

## 2020-04-15 PROCEDURE — 74011000250 HC RX REV CODE- 250: Performed by: INTERNAL MEDICINE

## 2020-04-15 PROCEDURE — 94640 AIRWAY INHALATION TREATMENT: CPT

## 2020-04-15 PROCEDURE — 94761 N-INVAS EAR/PLS OXIMETRY MLT: CPT

## 2020-04-15 PROCEDURE — 36415 COLL VENOUS BLD VENIPUNCTURE: CPT

## 2020-04-15 PROCEDURE — 77030029684 HC NEB SM VOL KT MONA -A

## 2020-04-15 PROCEDURE — 96375 TX/PRO/DX INJ NEW DRUG ADDON: CPT

## 2020-04-15 PROCEDURE — 93005 ELECTROCARDIOGRAM TRACING: CPT

## 2020-04-15 PROCEDURE — 77030038269 HC DRN EXT URIN PURWCK BARD -A

## 2020-04-15 RX ORDER — DOCUSATE SODIUM 100 MG/1
100 CAPSULE, LIQUID FILLED ORAL
Status: DISCONTINUED | OUTPATIENT
Start: 2020-04-15 | End: 2020-04-15 | Stop reason: HOSPADM

## 2020-04-15 RX ORDER — POTASSIUM CHLORIDE 20 MEQ/1
20 TABLET, EXTENDED RELEASE ORAL 2 TIMES DAILY
Status: DISCONTINUED | OUTPATIENT
Start: 2020-04-15 | End: 2020-04-15 | Stop reason: HOSPADM

## 2020-04-15 RX ORDER — DICYCLOMINE HYDROCHLORIDE 10 MG/1
20 CAPSULE ORAL
Status: DISCONTINUED | OUTPATIENT
Start: 2020-04-15 | End: 2020-04-15 | Stop reason: HOSPADM

## 2020-04-15 RX ORDER — PANTOPRAZOLE SODIUM 40 MG/10ML
40 INJECTION, POWDER, LYOPHILIZED, FOR SOLUTION INTRAVENOUS
Status: DISCONTINUED | OUTPATIENT
Start: 2020-04-15 | End: 2020-04-15 | Stop reason: HOSPADM

## 2020-04-15 RX ORDER — BUDESONIDE 0.5 MG/2ML
500 INHALANT ORAL
Status: DISCONTINUED | OUTPATIENT
Start: 2020-04-15 | End: 2020-04-15 | Stop reason: HOSPADM

## 2020-04-15 RX ORDER — AZITHROMYCIN 500 MG/1
500 TABLET, FILM COATED ORAL DAILY
Qty: 3 TAB | Refills: 0 | Status: SHIPPED | OUTPATIENT
Start: 2020-04-15 | End: 2020-04-18

## 2020-04-15 RX ORDER — CITALOPRAM 20 MG/1
40 TABLET, FILM COATED ORAL
Status: DISCONTINUED | OUTPATIENT
Start: 2020-04-15 | End: 2020-04-15 | Stop reason: HOSPADM

## 2020-04-15 RX ORDER — CHLORTHALIDONE 25 MG/1
25 TABLET ORAL EVERY OTHER DAY
Status: DISCONTINUED | OUTPATIENT
Start: 2020-04-15 | End: 2020-04-15 | Stop reason: HOSPADM

## 2020-04-15 RX ORDER — ARFORMOTEROL TARTRATE 15 UG/2ML
15 SOLUTION RESPIRATORY (INHALATION)
Status: DISCONTINUED | OUTPATIENT
Start: 2020-04-15 | End: 2020-04-15 | Stop reason: HOSPADM

## 2020-04-15 RX ORDER — LEVETIRACETAM 750 MG/1
750 TABLET ORAL 2 TIMES DAILY
COMMUNITY
End: 2021-06-22

## 2020-04-15 RX ORDER — DIVALPROEX SODIUM 500 MG/1
500 TABLET, DELAYED RELEASE ORAL DAILY
Status: DISCONTINUED | OUTPATIENT
Start: 2020-04-15 | End: 2020-04-15

## 2020-04-15 RX ORDER — AZITHROMYCIN 250 MG/1
500 TABLET, FILM COATED ORAL DAILY
Status: DISCONTINUED | OUTPATIENT
Start: 2020-04-15 | End: 2020-04-15 | Stop reason: HOSPADM

## 2020-04-15 RX ORDER — ATORVASTATIN CALCIUM 20 MG/1
40 TABLET, FILM COATED ORAL DAILY
Status: DISCONTINUED | OUTPATIENT
Start: 2020-04-15 | End: 2020-04-15 | Stop reason: HOSPADM

## 2020-04-15 RX ORDER — LEVOTHYROXINE SODIUM 25 UG/1
50 TABLET ORAL
Status: DISCONTINUED | OUTPATIENT
Start: 2020-04-15 | End: 2020-04-15 | Stop reason: HOSPADM

## 2020-04-15 RX ORDER — ACETAMINOPHEN 325 MG/1
650 TABLET ORAL
Status: DISCONTINUED | OUTPATIENT
Start: 2020-04-15 | End: 2020-04-15 | Stop reason: HOSPADM

## 2020-04-15 RX ORDER — PREDNISONE 50 MG/1
50 TABLET ORAL DAILY
Qty: 5 TAB | Refills: 0 | Status: SHIPPED | OUTPATIENT
Start: 2020-04-15 | End: 2020-04-20

## 2020-04-15 RX ORDER — ONDANSETRON 2 MG/ML
4 INJECTION INTRAMUSCULAR; INTRAVENOUS
Status: DISCONTINUED | OUTPATIENT
Start: 2020-04-15 | End: 2020-04-15 | Stop reason: HOSPADM

## 2020-04-15 RX ORDER — LANOLIN ALCOHOL/MO/W.PET/CERES
12 CREAM (GRAM) TOPICAL
Status: DISCONTINUED | OUTPATIENT
Start: 2020-04-15 | End: 2020-04-15 | Stop reason: HOSPADM

## 2020-04-15 RX ORDER — IPRATROPIUM BROMIDE AND ALBUTEROL SULFATE 2.5; .5 MG/3ML; MG/3ML
3 SOLUTION RESPIRATORY (INHALATION)
Status: DISCONTINUED | OUTPATIENT
Start: 2020-04-15 | End: 2020-04-15 | Stop reason: HOSPADM

## 2020-04-15 RX ORDER — GUAIFENESIN 600 MG/1
600 TABLET, EXTENDED RELEASE ORAL EVERY 12 HOURS
Status: DISCONTINUED | OUTPATIENT
Start: 2020-04-15 | End: 2020-04-15 | Stop reason: HOSPADM

## 2020-04-15 RX ADMIN — ACETAMINOPHEN 650 MG: 325 TABLET, FILM COATED ORAL at 05:55

## 2020-04-15 RX ADMIN — ARFORMOTEROL TARTRATE 15 MCG: 15 SOLUTION RESPIRATORY (INHALATION) at 07:42

## 2020-04-15 RX ADMIN — DICYCLOMINE HYDROCHLORIDE 20 MG: 10 CAPSULE ORAL at 11:33

## 2020-04-15 RX ADMIN — ATORVASTATIN CALCIUM 40 MG: 20 TABLET, FILM COATED ORAL at 08:57

## 2020-04-15 RX ADMIN — PSYLLIUM HUSK 1 PACKET: 3.4 POWDER ORAL at 11:33

## 2020-04-15 RX ADMIN — CITALOPRAM HYDROBROMIDE 40 MG: 20 TABLET ORAL at 01:08

## 2020-04-15 RX ADMIN — BUDESONIDE 500 MCG: 0.5 INHALANT RESPIRATORY (INHALATION) at 07:42

## 2020-04-15 RX ADMIN — CHLORTHALIDONE 25 MG: 25 TABLET ORAL at 08:57

## 2020-04-15 RX ADMIN — LEVETIRACETAM 750 MG: 500 TABLET ORAL at 08:57

## 2020-04-15 RX ADMIN — GUAIFENESIN 600 MG: 600 TABLET, EXTENDED RELEASE ORAL at 02:28

## 2020-04-15 RX ADMIN — LEVOTHYROXINE SODIUM 50 MCG: 0.03 TABLET ORAL at 05:55

## 2020-04-15 RX ADMIN — METHYLPREDNISOLONE SODIUM SUCCINATE 40 MG: 40 INJECTION, POWDER, FOR SOLUTION INTRAMUSCULAR; INTRAVENOUS at 08:56

## 2020-04-15 RX ADMIN — GUAIFENESIN 600 MG: 600 TABLET, EXTENDED RELEASE ORAL at 08:57

## 2020-04-15 RX ADMIN — POTASSIUM CHLORIDE 20 MEQ: 1500 TABLET, EXTENDED RELEASE ORAL at 08:57

## 2020-04-15 NOTE — PROGRESS NOTES
Problem: Discharge Planning  Goal: *Discharge to safe environment  Outcome: Resolved/Met     Home    Reason for Admission:   Asthma exacerbation / Pre-syncope                   RUR Score:  None recorded                   Plan for utilizing home health:    Not indicated      PCP:First and Last name:  Dr. Phu Arana   Name of Practice:    Are you a current patient: Yes/No:  Yes   Approximate date of last visit:  About 1 month ago    Dr. Vladimir Cruz is her pulmonologist, last visit yesterday 4/14/2020. Current Advanced Directive/Advance Care Plan:   Has ADR, states will call her  to bring it in, made her aware that he will need to drop off at information desk, verbalized understanding. Transition of Care Plan:   Home with spouse & out-pt follow up. Chart reviewed. Met with pt., verified all demographics. States has [facility] MCR & [employer] ins. NOK:  Tacho Davidson, spouse, with whom she lives with & will pick her up @ discharge. Has cane & home O2 (can't recall name of co.), states [facility]. Independent with ADL's prior to admit. Will cont to follow for any needs. Saida PereiraRN,ext [phone]. Patient has designated her spouse to participate in his/her discharge plan and to receive any needed information. Name: Tacho Davidson  Address:  Phone number:  [phone]    Patient and/or next of kin has been given and has signed the [facility] Outpatient Observation  Notification letter and all questions answered. Copy of this notice given to patient and copy placed on chart. Patient and/or next of kin has been given the Outpatient Observation Information and Notification letter and all questions answered. Care Management Interventions  PCP Verified by CM: (Dr. Gloria Yoder, last seen about 1 month ago)  Palliative Care Criteria Met (RRAT>21 & CHF Dx)?: No  Mode of Transport at Discharge:  Other (see comment)(family)  Transition of Care Consult (CM Consult): Discharge Planning  Discharge Durable Medical Equipment: No  Physical Therapy Consult: No  Occupational Therapy Consult: No  Speech Therapy Consult: No  Current Support Network: Lives with Spouse  Confirm Follow Up Transport: Family  Discharge Location  Discharge Placement: Home

## 2020-04-15 NOTE — ACP (ADVANCE CARE PLANNING)
Chart reviewed. CM has interviewed patient who indicates she has an Advanced Directive. CM has requested a copy be brought by family to information desk so we can have for our records.      ER ARPITAK:   Name: Molly Wylie (spouse)  Phone number:  [phone]    Joann Sanders RN    Outcomes Manager  [phone]  [phone]

## 2020-04-15 NOTE — PROGRESS NOTES
Respiratory Therapy Assessment Care Plan    Patient:  Curt Lucio 78 y.o. female 4/15/2020 8:31 AM    Asthma exacerbation [J45.901]      Chest X-RAY:   Results from Hospital Encounter encounter on 01/06/20   XR SWALLOW FUNC VIDEO    Impression IMPRESSION:     1. Episode of silent aspiration with thin liquids administered for clearance of  solid consistency. There was a delay in initiation of swallowing with premature  spillage with all consistencies. 2.  Please refer to the speech pathologist's report for additional discussion of  the results and recommendations. Results from [place] encounter on 12/22/19   XR CHEST PORT    Impression IMPRESSION:    No acute cardiopulmonary disease. Results from [place] encounter on 12/01/19   XR CHEST PORT    Impression IMPRESSION:    No active cardiopulmonary disease.           Vital Signs:   Visit Vitals  BP (!) 137/96   Pulse 75   Temp 98.2 °F (36.8 °C)   Resp 19   Ht 5' 3\" (1.6 m)   Wt 106.6 kg (235 lb)   SpO2 96%   BMI 41.63 kg/m²         Indications for treatment: Asthma exacerbation      Plan of care: Duarte Pate BID        Goal: Maintain adequate gas exchange

## 2020-04-15 NOTE — PROGRESS NOTES
conducted an initial consultation and Spiritual Assessment for Curt Lucio, who is a 78 y.o.,female. Patients Primary Language is: CSMG. According to the patients EMR Taoism Affiliation is: Non Gnosticism.     The reason the Patient came to the hospital is:   Patient Active Problem List    Diagnosis Date Noted    Asthma exacerbation 04/15/2020    Acquired hypothyroidism 04/15/2020    HTN (hypertension) 04/15/2020    HLD (hyperlipidemia) 04/15/2020    History of pulmonary embolism 04/15/2020    History of DVT (deep vein thrombosis) 04/15/2020    RIMA on CPAP 04/15/2020    Bipolar mood disorder ([address] 04/15/2020    Pseudoseizures 04/15/2020    Weakness 12/24/2019    Guillain Barré syndrome ([facility] 12/24/2019    Leg pain, right 12/23/2019    Diabetes mellitus, type II ([facility] 12/23/2019    Hx of Guillain-[place] syndrome 12/23/2019    GERD (gastroesophageal reflux disease) 12/23/2019    Severe persistent asthma 11/07/2019    Lumbar stenosis 09/13/2017    Choledocholithiasis 06/07/2016    Biliary obstruction 04/26/2016    Breast cancer ([facility]         The  provided the following Interventions:  Initiated a relationship of care and support with patient in room 3009 this morning. Listened empathically as patient talked about her illness and her being here directly from her doctors office. Provided information about Spiritual Care Services. Offered prayer and assurance of continued prayers on patients behalf. The following outcomes were achieved:  Patient shared limited information about her medical narrative and spiritual journey/beliefs. Patient processed feeling about current hospitalization. Patient expressed gratitude for pastoral care visit. Assessment:  Patient does not have any Samaritan/cultural needs that will affect patients preferences in health care.   There are no further spiritual or [facility]aritan issues which require Spiritual Care Services interventions at this time. Plan:  Chaplains will continue to follow and will provide pastoral care on an as needed/requested basis    . Mason Darek   Spiritual Care   [phone]

## 2020-04-15 NOTE — PROGRESS NOTES
Pt to room 3009 in [facility], transported by wheelchair. VSS, NAD. During medication review pt stated that she no longer took Depakote due to renal complications. Pt now takes Keppra 750 mg BID. Medication orders updated as directed by Dr. Kathryn Palumbo. 0315 Repeat 12 lead obtained per orders. Pt c/o pain with respiration, tylenol given. Orders for home dosage of bentyl obtained at pt request.    Bedside and Verbal shift change report given to Medhat Bucio (oncoming nurse) by José Miguel Barton RN (offgoing nurse). Report included the following information SBAR, Intake/Output, MAR and Recent Results.

## 2020-04-15 NOTE — DISCHARGE SUMMARY
Internal Medicine Discharge Summary        Patient: Merlin Motes    YOB: 1950    Age:  78 y.o. Admit Date: 4/14/2020    Discharge Date: 4/15/2020    LOS:  LOS: 0 days     Discharge To: Home    Consults: None    Admission Diagnoses: Asthma exacerbation [J45.901]    Discharge Diagnoses:    Problem List as of 4/15/2020 Date Reviewed: 4/14/2020          Codes Class Noted - Resolved    * (Principal) Asthma exacerbation ICD-10-CM: J45.901  ICD-9-CM: 493.92  4/15/2020 - Present        Acquired hypothyroidism ICD-10-CM: E03.9  ICD-9-CM: 244.9  4/15/2020 - Present        HTN (hypertension) ICD-10-CM: I10  ICD-9-CM: 401.9  4/15/2020 - Present        HLD (hyperlipidemia) ICD-10-CM: E78.5  ICD-9-CM: 272.4  4/15/2020 - Present        History of pulmonary embolism ICD-10-CM: Z86.711  ICD-9-CM: V12.55  4/15/2020 - Present        History of DVT (deep vein thrombosis) ICD-10-CM: Z86.718  ICD-9-CM: V12.51  4/15/2020 - Present    Overview Signed 4/15/2020  1:41 AM by Suresh Pedroza DO     Left Lower Extremity, Left Eye, Unspecified Ovary. RIMA on CPAP ICD-10-CM: G47.33, Z99.89  ICD-9-CM: 327.23, V46.8  4/15/2020 - Present    Overview Signed 4/15/2020  1:42 AM by Suresh Pedroza DO     Patient Compliant, but thinks home setting is \"4. 2. \"             Bipolar mood disorder ([facility] ICD-10-CM: F31.9  ICD-9-CM: 296.80  4/15/2020 - Present        Pseudoseizures ICD-10-CM: F44.5  ICD-9-CM: 780.39  4/15/2020 - Present        Weakness ICD-10-CM: R53.1  ICD-9-CM: 780.79  12/24/2019 - Present        Guillain Barré syndrome (HCC) ICD-10-CM: G61.0  ICD-9-CM: 357.0  12/24/2019 - Present        Leg pain, right ICD-10-CM: M79.604  ICD-9-CM: 729.5  12/23/2019 - Present        Diabetes mellitus, type II ([facility] ICD-10-CM: E11.9  ICD-9-CM: 250.00  12/23/2019 - Present        Hx of Guillain-[place] syndrome ICD-10-CM: Z86.69  ICD-9-CM: V12.49  12/23/2019 - Present        GERD (gastroesophageal reflux disease) ICD-10-CM: K21.9  ICD-9-CM: 530.81  12/23/2019 - Present        Severe persistent asthma ICD-10-CM: J45.50  ICD-9-CM: 493.90  11/7/2019 - Present        Lumbar stenosis ICD-10-CM: M48.061  ICD-9-CM: 724.02  9/13/2017 - Present        Choledocholithiasis ICD-10-CM: K80.50  ICD-9-CM: 574.50  6/7/2016 - Present        Biliary obstruction ICD-10-CM: K83.1  ICD-9-CM: 576.2  4/26/2016 - Present        Breast cancer ([facility] ICD-10-CM: C50.919  ICD-9-CM: 174.9  Unknown - Present              Discharge Condition:  Improved    Procedures: None         HPI: Reyna Avilez is a 78 y.o. female who presents to [facility] ER with complaint of Shortness of Breath. Patient states that she has been has been wheezing and experiencing gradually worsening shortness of breath for the last two weeks, which she attributes to the rapid change in weather recently. Patient states that she has been experiencing some generalized weakness and fatigue with this. Patient reports a \"burning\" in her lungs occasionally over the last two weeks, but reports that this occasionally happens with her asthma attacks in the past.  Patient reports a baseline cough that is non-productive and denies fever, aching muscles or joints, vomiting, diarrhea, dysuria, and sick contacts. Patient reports chronic back pain. Patient admits to some mild Nausea in [facility] ER after taking some therapeutics administered to her. Notably, Patient has a significant medical history and is on Home Oxygen as needed. Patient's  is also her [address] ([id number] [facility]).    In [facility] ER, Patient was placed on Droplet Plus Precautions ostensibly for Shortness of Breath. Therapeutics ordered for Patient were not administered due to safety concerns and, due to a delay in their administration, Patient attempted to leave Against Medical Advice (AMA). However, Patient reportedly developed some chest pain and pre-syncope and decided to remain for evaluation.   Droplet Plus Precautions discontinued by [facility] ER Physician.     Patient is placed on Observation for management of Acute Exacerbation of Asthma. Hospital Course:    Acute asthma exacerbation 2/2 bronchitis - Treated with azithromycin and discharged home on 3 day supply. Patient no longer hypoxic in the AM and not on O2. She was not wheezing and felt better. She was transitioned to oral prednisone for 5 days. Chronic PE - Seen on CTA without evidence of right heart strain nor acute findings. Patient has a history of DVT/PE and was treated full course on coumadin. Discussed with pulmonology and they did not believe [facility] was indicated given chronic description on imaging with history of treatment. The rest of the patient's chronic conditions were managed appropriately during their admission. They were medically stable at the time of discharge. Visit Vitals  BP (!) 137/96   Pulse 75   Temp 98.2 °F (36.8 °C)   Resp 19   Ht 5' 3\" (1.6 m)   Wt 106.6 kg (235 lb)   SpO2 96%   BMI 41.63 kg/m²       Physical Exam at Discharge:  General Appearance: NAD, conversant  HENT: normocephalic/atraumatic, moist mucus membranes  Lungs: CTA with normal respiratory effort  CV: RRR, no m/r/g  Abdomen: soft, non-tender, normal bowel sounds  Extremities: no cyanosis, no peripheral edema  Neuro: moves all extremities, no focal deficits  Psych: appropriate affect, alert and oriented to person, place and time    Labs Prior to Discharge:  Labs: Results:       Chemistry Recent Labs     04/14/20  1816   *      K 4.2      CO2 30   BUN 15   CREA 0.73   CA 8.8   AGAP 4   BUCR 21*   *   TP 6.8   ALB 3.3*   GLOB 3.5   AGRAT 0.9      CBC w/Diff Recent Labs     04/14/20  1816   WBC 5.7   RBC 4.05*   HGB 13.5   HCT 41.8      GRANS 58   LYMPH 28   EOS 4      Cardiac Enzymes No results for input(s): CPK, CKND1, RADHA in the last 72 hours.     No lab exists for component: CKRMB, TROIP   Coagulation No results for input(s): PTP, INR, APTT, INREXT in the last 72 hours. Lipid Panel No results found for: CHOL, CHOLPOCT, CHOLX, CHLST, CHOLV, 553668, HDL, HDLP, LDL, LDLC, DLDLP, 572263, VLDLC, VLDL, TGLX, TRIGL, TRIGP, TGLPOCT, CHHD, CHHDX   BNP No results for input(s): BNPP in the last 72 hours. Liver Enzymes Recent Labs     04/14/20  1816   TP 6.8   ALB 3.3*   *   SGOT 24      Thyroid Studies Lab Results   Component Value Date/Time    TSH 1.12 04/15/2020 03:08 AM            Significant Imaging:  Cta Chest W Or W Wo Cont    Result Date: 4/15/2020  EXAM: CTA Chest INDICATION: Shortness of breath and chest pain. Evaluation for potential pulmonary embolism. COMPARISON: Comparison is made to CT scan of the chest 10/18/2013. TECHNIQUE: Axial CT imaging from the thoracic inlet through the diaphragm with intravenous contrast utilizing CTA study for pulmonary artery evaluation. Coronal and sagittal MIP reformations were generated at a separate workstation. One or more dose reduction techniques were used on this CT: automated exposure control, adjustment of the mAs and/or kVp according to patient size, and iterative reconstruction techniques. The specific techniques used on this CT exam have been documented in the patient's electronic medical record. Digital Imaging and Communications in Medicine (DICOM) format image data are available to nonaffiliated external healthcare facilities or entities on a secure, media free, reciprocally searchable basis with patient authorization for at least a 12-month period after this study. _______________ FINDINGS: EXAM QUALITY: Overall exam quality is adequate. Pulmonary arterial enhancement is adequate. The breath hold is satisfactory. PULMONARY ARTERIES: There are small linear intraluminal filling defects present within the right upper lobe pulmonary artery (series 3, image 157); as well as within the distal portion of the right lower lobe pulmonary artery. No occlusive pulmonary embolism is identified.  The main pulmonary arterial size is stably enlarged, estimated at approximately 3.2 cm in size. There is no evidence of septal bowing. The RV/LV size relationship is within normal limits. MEDIASTINUM: Included thyroid gland unremarkable. Thoracic aorta normal in course and caliber. Separate aortic origin for the left vertebral artery. Normal cardiac size without evidence of pericardial effusion. LYMPH NODES: No supraclavicular, axillary, or mediastinal adenopathy. Prior left axillary karlee dissection. No enlarged hilar lymph nodes. AIRWAY: Mild bronchial wall thickening noted without evidence of endobronchial mucoid impaction. LUNGS: No evidence of alveolar consolidation. No significant groundglass abnormality or septal line thickening. Linear areas of subpleural scarring within the anterior aspect of the left upper lobe without significant change in appearance from prior examination. Stable benign 2 mm nodule in the posterior right lung apex (series 604B, image 18). PLEURA: No pleural effusion or pneumothorax. UPPER ABDOMEN: Small left hepatic lobe hypodensity without change, favored to reflect small cysts. Small hiatal hernia. No acute upper abdominal abnormality. . OTHER: No acute or aggressive osseous abnormalities identified. _______________     IMPRESSION: 1. Chronic appearing pulmonary emboli within the right upper and right lower lobe pulmonary arterial branches as described. No acute/occlusive appearing embolism noted. No CT findings to suggest right heart strain. > Chart review demonstrates history of prior DVT. 2.  Mild bronchial wall thickening. No endobronchial mucoid impaction or pneumonia/pulmonary edema. 3. Small hiatal hernia. Note: Preliminary report sent to the Emergency Department by the radiology resident at the time of the study. Additional findings of chronic pulmonary emboli called to Dr. Gibson Bloch, hospitalist caring for the patient prior to dictation at 8:05 AM on 4/15/2020.     Xr Chest Port    Result Date: 4/15/2020  EXAM: XR CHEST PORT CLINICAL INDICATION/HISTORY: Shortness of breath -Additional: None COMPARISON: Radiograph obtained 12/26/2019 TECHNIQUE: Frontal view of the chest _______________ FINDINGS: HEART AND MEDIASTINUM: Cardiac size is mildly enlarged, without significant change in appearance from prior with stable mediastinal contours. LUNGS AND PLEURAL SPACES: No focal pneumonic consolidation, pneumothorax, or pleural effusion. BONY THORAX AND SOFT TISSUES: No acute osseous abnormality. Prior left axillary karlee dissection. _______________     IMPRESSION: Mild cardiac enlargement without superimposed acute radiographic abnormality. Discharge Medications:     Current Discharge Medication List      START taking these medications    Details   azithromycin (ZITHROMAX) 500 mg tab Take 1 Tab by mouth daily for 3 days. Qty: 3 Tab, Refills: 0    Associated Diagnoses: Exacerbation of asthma, unspecified asthma severity, unspecified whether persistent      predniSONE (DELTASONE) 50 mg tablet Take 1 Tab by mouth daily for 5 days. Qty: 5 Tab, Refills: 0         CONTINUE these medications which have NOT CHANGED    Details   levETIRAcetam (Keppra) 750 mg tablet Take 750 mg by mouth two (2) times a day. FIBER, PSYLLIUM HUSK, PO Take 2 Tabs by mouth daily. propranolol (INDERAL) 40 mg tablet Take 1 Tab by mouth two (2) times a day. Qty: 60 Tab, Refills: 0      potassium chloride SR (K-TAB) 20 mEq tablet Take 1 Tab by mouth two (2) times a day. Qty: 60 Tab, Refills: 0      levothyroxine (SYNTHROID) 50 mcg tablet Take 1 Tab by mouth Daily (before breakfast). Qty: 30 Tab, Refills: 0      atorvastatin (LIPITOR) 40 mg tablet Take 40 mg by mouth daily. dicyclomine (BENTYL) 20 mg tablet Take 20 mg by mouth three (3) times daily. ergocalciferol (VITAMIN D2) 50,000 unit capsule Take 50,000 Units by mouth every seven (7) days.       Calcium-Cholecalciferol, D3, 500 mg(1,250mg) -400 unit tab Take 1 Tab by mouth two (2) times a day. citalopram (CELEXA) 10 mg tablet Take 40 mg by mouth nightly. fluticasone-salmeterol (ADVAIR) 250-50 mcg/dose diskus inhaler Take 1 Puff by inhalation two (2) times a day. Omeprazole delayed release (PRILOSEC D/R) 20 mg tablet Take 40 mg by mouth daily. IPRATROPIUM/ALBUTEROL SULFATE (COMBIVENT IN) Take 1 Puff by inhalation four (4) times daily. chlorthalidone (HYGROTEN) 25 mg tablet Take 25 mg by mouth every other day. !! divalproex DR (DEPAKOTE) 500 mg tablet Take 1 Tab by mouth daily. Qty: 30 Tab, Refills: 0      !! divalproex DR (DEPAKOTE) 250 mg tablet Take 5 Tabs by mouth nightly. Qty: 150 Tab, Refills: 0      glipiZIDE SR (GLUCOTROL XL) 5 mg CR tablet Take 1 Tab by mouth daily. Qty: 30 Tab, Refills: 0      iron bg,ps/vitC/B12/FA/calcium (ALIRIO FORTE PO) Take 1 Tab by mouth daily. letrozole (FEMARA) 2.5 mg tablet Take 2.5 mg by mouth daily. Indications: per patient, \"I stopped 7 days before surgery scheduled for 9-13-17. \"      EPINEPHrine (EPIPEN) 0.3 mg/0.3 mL (1:1,000) injection 0.3 mg by IntraMUSCular route once as needed for Allergic Response. !! - Potential duplicate medications found. Please discuss with provider. STOP taking these medications       docusate sodium (COLACE) 100 mg capsule Comments:   Reason for Stopping:               Activity: Activity as tolerated    Diet: Resume previous diet    Wound Care: None needed    Follow-up:   Please follow up with your PCP within 7 days to discuss your recent hospitalization. Patient to arrange.          Total time spent including time spent on final examination and discharge discussion, discharge documentation and records reviewed and medication reconciliation: > 30 minutes    Audelia Moon DO  Internal Medicine, Hospitalist  Pager: [phone] [facility]

## 2020-04-15 NOTE — ED PROVIDER NOTES
[facility] EMERGENCY DEPT      Date: 4/14/2020  Patient Name: Dalila Hess    History of Presenting Illness     Chief Complaint   Patient presents with    Shortness of Breath       78 y.o. female with PMH as noted including asthma, bipolar, diabetes, and hypertension presents to the ED c/o cough, sob and chest pain intermittent over the past 2 weeks. Patient notes that her cough brings up clear sputum and that her pain comes on and lasts for about 15 to 20 minutes at a time. States that she is currently having pain described as a squeezing. States she was at her doctor's office, Dr. Carly Cuenca, when she felt more SOB than usual and came here. Pt has an inhaler at home. Pt has home O2, which she uses 2L nightly. Denies fever, chills, body aches, known covid exposure, travel, or other symptoms. Patient denies any other associated signs or symptoms. Patient denies any other complaints. Nursing notes regarding the HPI and triage nursing notes were reviewed. Prior medical records were reviewed. Current Facility-Administered Medications   Medication Dose Route Frequency Provider Last Rate Last Dose    albuterol (PROVENTIL VENTOLIN) nebulizer solution 2.5 mg  2.5 mg Nebulization NOW Kathia Briggs PA        acetaminophen (TYLENOL) tablet 1,000 mg  1,000 mg Oral NOW Kathia Edwadrs PA         Current Outpatient Medications   Medication Sig Dispense Refill    FIBER, PSYLLIUM HUSK, PO Take 2 Tabs by mouth daily.  propranolol (INDERAL) 40 mg tablet Take 1 Tab by mouth two (2) times a day. (Patient not taking: Reported on 1/29/2020) 60 Tab 0    potassium chloride SR (K-TAB) 20 mEq tablet Take 1 Tab by mouth two (2) times a day. 60 Tab 0    levothyroxine (SYNTHROID) 50 mcg tablet Take 1 Tab by mouth Daily (before breakfast). 30 Tab 0    divalproex DR (DEPAKOTE) 500 mg tablet Take 1 Tab by mouth daily. 30 Tab 0    divalproex DR (DEPAKOTE) 250 mg tablet Take 5 Tabs by mouth nightly.  150 Tab 0  glipiZIDE SR (GLUCOTROL XL) 5 mg CR tablet Take 1 Tab by mouth daily. 30 Tab 0    docusate sodium (COLACE) 100 mg capsule Take 1 Cap by mouth daily for 90 days. 30 Cap 0    iron bg,ps/vitC/B12/FA/calcium (ALIRIO FORTE PO) Take 1 Tab by mouth daily.  atorvastatin (LIPITOR) 40 mg tablet Take 40 mg by mouth daily.  dicyclomine (BENTYL) 20 mg tablet Take 20 mg by mouth three (3) times daily.  ergocalciferol (VITAMIN D2) 50,000 unit capsule Take 50,000 Units by mouth every seven (7) days.  Calcium-Cholecalciferol, D3, 500 mg(1,250mg) -400 unit tab Take 1 Tab by mouth two (2) times a day.  citalopram (CELEXA) 10 mg tablet Take 40 mg by mouth nightly.  fluticasone-salmeterol (ADVAIR) 250-50 mcg/dose diskus inhaler Take 1 Puff by inhalation two (2) times a day.  Omeprazole delayed release (PRILOSEC D/R) 20 mg tablet Take 40 mg by mouth daily.  IPRATROPIUM/ALBUTEROL SULFATE (COMBIVENT IN) Take 1 Puff by inhalation four (4) times daily.  chlorthalidone (HYGROTEN) 25 mg tablet Take 25 mg by mouth every other day.  letrozole (FEMARA) 2.5 mg tablet Take 2.5 mg by mouth daily. Indications: per patient, \"I stopped 7 days before surgery scheduled for 9-13-17. \"      EPINEPHrine (EPIPEN) 0.3 mg/0.3 mL (1:1,000) injection 0.3 mg by IntraMUSCular route once as needed for Allergic Response. Past History     Past Medical History:  Past Medical History:   Diagnosis Date    Asthma     Back pain     Bipolar 1 disorder ([address]     Breast cancer ([facility] 2012    left side    Cancer ([address]     Diabetes ([facility]     GERD (gastroesophageal reflux disease)     Guillain Barré syndrome ([facility]     History of blood transfusion     \"in my 20's\" per patient    Hypertension     Ill-defined condition 09/12/2017    \"liver problem,\" per patient, \"not liver disease. I go to a gastroenterologist for it. \"    Lymphedema     left arm and hand and wears a sleeve.      Morbid obesity ([facility] 09/12/2017    BMI = 40.4    Seizures (HCC)     Sleep apnea     left apap machine at home    Thromboembolus [facility])     history of in left leg    Thyroid disease     UTI (lower urinary tract infection)        Past Surgical History:  Past Surgical History:   Procedure Laterality Date    Alla Tapia, july 30 2013    HX CHOLECYSTECTOMY      HX MASTECTOMY Left 03/29/2012    complete    HX ORTHOPAEDIC      LEFT FOOT SX.    HX PARTIAL HYSTERECTOMY         Family History:  Family History   Problem Relation Age of Onset    Cancer Mother     Cancer Maternal Grandmother     Heart Attack Father     Migraines Father     Sudden Death Father        Social History:  Social History     Tobacco Use    Smoking status: Never Smoker    Smokeless tobacco: Never Used   Substance Use Topics    Alcohol use: No    Drug use: No       Allergies: Allergies   Allergen Reactions    Valium [Diazepam] Anaphylaxis    Carafate [Sucralfate] Nausea and Vomiting    Carvedilol Other (comments)    Cephalexin Hives    Ciprofloxacin Hives    Eliquis [Apixaban] Unknown (comments)    Epipen [Epinephrine] Unknown (comments)     Pt states she is not allergic to Epipen    Milk Containing Products Other (comments)     GI distress    Other Medication Other (comments)     Per pt  Valium and morphine causes pt to become nervous    Percocet [Oxycodone-Acetaminophen] Other (comments)     hallucinate    Stings [Sting, Bee] Other (comments)    Talwin [Pentazocine Lactate] Shortness of Breath    Tetanus Toxoid, Adsorbed Shortness of Breath    Trileptal [Oxcarbazepine] Rash       Patient's primary care provider (as noted in EPIC):  Donal Rodriguez MD    Review of Systems   Constitutional:  Denies malaise, fever, chills. Chest:  Denies injury. Cardiac:  + intermittent CP. Denies palpitations. Respiratory: + cough with clear sputum, SOB. GI/ABD:  Denies injury, pain, distention, nausea, vomiting, diarrhea. Pelvis:  Denies injury or pain. Extremity/MS:  Denies injury or pain. Neuro:  Denies headache, LOC, dizziness, neurologic symptoms/deficits/paresthesias. Skin: Denies injury, rash, itching or skin changes. All other systems negative as reviewed. Visit Vitals  /78 (BP 1 Location: Right arm, BP Patient Position: At rest)   Pulse 65   Temp 98.5 °F (36.9 °C)   Resp 18   Ht 5' 3\" (1.6 m)   Wt 100.7 kg (222 lb)   SpO2 98%   BMI 39.33 kg/m²       PHYSICAL EXAM:    CONSTITUTIONAL:  Alert, in no apparent distress;  well developed;  well nourished. HEAD:  Normocephalic, atraumatic. EYES:  EOMI. Non-icteric sclera. Normal conjunctiva. ENTM:  Nose:  no rhinorrhea. Throat:  no erythema or exudate, mucous membranes moist.  NECK: Supple  RESPIRATORY:  Wheezes noted to bilateral lungs. Speaking in full sentences. Without accessory muscle use. Without rhonchi or rales. CARDIOVASCULAR:  Regular rate and rhythm. No murmurs, rubs, or gallops. NEURO:  Moves all four extremities, and grossly normal motor exam.  SKIN:  No rashes;  Normal for age. PSYCH:  Alert and normal affect. DIFFERENTIAL DIAGNOSES/ MEDICAL DECISION MAKING:   Shortness of breath etiologies include chronic obstructive pulmonary disease (COPD), acute asthma exacerbation, congestive heart failure, pneumonia, acute bronchitis, upper respiratory infection, cardiac event to include acute coronary syndrome, acute myocardial infarction or a combination of the above (ex URI on top of COPD thus causing respiratory distress).     ED COURSE:      Recent Results (from the past 12 hour(s))   EKG, 12 LEAD, INITIAL    Collection Time: 04/14/20  5:42 PM   Result Value Ref Range    Ventricular Rate 62 BPM    Atrial Rate 62 BPM    P-R Interval 148 ms    QRS Duration 98 ms    Q-T Interval 418 ms    QTC Calculation (Bezet) 424 ms    Calculated P Axis 44 degrees    Calculated R Axis -36 degrees    Calculated T Axis 30 degrees    Diagnosis       Normal sinus rhythm  Left axis deviation  Voltage criteria for left ventricular hypertrophy ( R in aVL , Sokolow-Chang ,   Sherif product )  ST elevation, consider early repolarization, pericarditis, or injury  T wave abnormality, consider anterior ischemia  Abnormal ECG  When compared with ECG of 26-DEC-2019 16:44,  T wave inversion now evident in Anterior leads  T wave amplitude has increased in Lateral leads     NT-PRO BNP    Collection Time: 04/14/20  6:15 PM   Result Value Ref Range    NT pro-BNP 67 0 - 900 PG/ML   CBC WITH AUTOMATED DIFF    Collection Time: 04/14/20  6:16 PM   Result Value Ref Range    WBC 5.7 4.6 - 13.2 K/uL    RBC 4.05 (L) 4.20 - 5.30 M/uL    HGB 13.5 12.0 - 16.0 g/dL    HCT 41.8 35.0 - 45.0 %    .2 (H) 74.0 - 97.0 FL    MCH 33.3 24.0 - 34.0 PG    MCHC 32.3 31.0 - 37.0 g/dL    RDW 13.8 11.6 - 14.5 %    PLATELET 329 796 - 572 K/uL    MPV 9.3 9.2 - 11.8 FL    NEUTROPHILS 58 40 - 73 %    LYMPHOCYTES 28 21 - 52 %    MONOCYTES 9 3 - 10 %    EOSINOPHILS 4 0 - 5 %    BASOPHILS 1 0 - 2 %    ABS. NEUTROPHILS 3.4 1.8 - 8.0 K/UL    ABS. LYMPHOCYTES 1.6 0.9 - 3.6 K/UL    ABS. MONOCYTES 0.5 0.05 - 1.2 K/UL    ABS. EOSINOPHILS 0.2 0.0 - 0.4 K/UL    ABS. BASOPHILS 0.0 0.0 - 0.1 K/UL    DF AUTOMATED     METABOLIC PANEL, COMPREHENSIVE    Collection Time: 04/14/20  6:16 PM   Result Value Ref Range    Sodium 141 136 - 145 mmol/L    Potassium 4.2 3.5 - 5.5 mmol/L    Chloride 107 100 - 111 mmol/L    CO2 30 21 - 32 mmol/L    Anion gap 4 3.0 - 18 mmol/L    Glucose 120 (H) 74 - 99 mg/dL    BUN 15 7.0 - 18 MG/DL    Creatinine 0.73 0.6 - 1.3 MG/DL    BUN/Creatinine ratio 21 (H) 12 - 20      GFR est AA >60 >60 ml/min/1.73m2    GFR est non-AA >60 >60 ml/min/1.73m2    Calcium 8.8 8.5 - 10.1 MG/DL    Bilirubin, total 0.5 0.2 - 1.0 MG/DL    ALT (SGPT) 45 13 - 56 U/L    AST (SGOT) 24 10 - 38 U/L    Alk.  phosphatase 125 (H) 45 - 117 U/L    Protein, total 6.8 6.4 - 8.2 g/dL    Albumin 3.3 (L) 3.4 - 5.0 g/dL    Globulin 3.5 2.0 - 4.0 g/dL    A-G Ratio 0.9 0.8 - 1.7     TROPONIN I    Collection Time: 04/14/20  6:16 PM   Result Value Ref Range    Troponin-I, QT <0.02 0.0 - 0.045 NG/ML      CXR: NAD    Attempted MDI order, however, changed to neb per pharmacy. Plan to avoid aerosolizing any possible virus, attempted again to order MDI. However, then patient was asking to go home. EKG: Twave inversion noted to V1-V3, new since ekg from 12/26/19. Plan to repeat Trop 3 hours from initial per Dr. Shashi Elias direction. Pt was informed of plan and was requesting pain medicine. I recommended Tylenol for the patient at which time she became angry and started talking very loudly/yelling regarding not getting narcotic medication. Patient states she wanted to leave at this time. I filled out an [place] form for her, which she signed with Isabelle Sorensen RN as a witness. Several minutes later Isabelle Sorensen RN informed me that when the patient was having her IV removed she saw some blood and passed out. Once patient recovered she then changed her mind and said she wanted to stay and get the repeat testing done. 9:03 PM : Pt care transferred to Dr. Mora Quiros, ED provider. History of patient complaint(s), available diagnostic reports and current treatment plan has been discussed thoroughly.    Pending diagnostics reports and/or labs (please list): BNP, repeat troponin     FEMI Baca

## 2020-04-15 NOTE — PROGRESS NOTES
Problem: Falls - Risk of  Goal: *Absence of Falls  Description: Document Earma Rick Fall Risk and appropriate interventions in the flowsheet.   Outcome: Progressing Towards Goal  Note: Fall Risk Interventions:            Medication Interventions: Bed/chair exit alarm, Evaluate medications/consider consulting pharmacy, Patient to call before getting OOB, Teach patient to arise slowly    Elimination Interventions: Call light in reach, Patient to call for help with toileting needs, Stay With Me (per policy), Toilet paper/wipes in reach              Problem: Pain  Goal: *Control of Pain  Outcome: Progressing Towards Goal     Problem: Asthma: Day 1  Goal: Off Pathway (Use only if patient is Off Pathway)  Outcome: Progressing Towards Goal     Problem: Asthma: Day 2  Goal: Off Pathway (Use only if patient is Off Pathway)  Outcome: Progressing Towards Goal

## 2020-04-15 NOTE — DISCHARGE INSTRUCTIONS
DISCHARGE SUMMARY from Nurse    PATIENT INSTRUCTIONS:    After general anesthesia or intravenous sedation, for 24 hours or while taking prescription Narcotics:  · Limit your activities  · Do not drive and operate hazardous machinery  · Do not make important personal or business decisions  · Do  not drink alcoholic beverages  · If you have not urinated within 8 hours after discharge, please contact your surgeon on call. Report the following to your surgeon:  · Excessive pain, swelling, redness or odor of or around the surgical area  · Temperature over 100.5  · Nausea and vomiting lasting longer than 4 hours or if unable to take medications  · Any signs of decreased circulation or nerve impairment to extremity: change in color, persistent  numbness, tingling, coldness or increase pain  · Any questions    What to do at Home:  Recommended activity: Activity as tolerated    If you experience any of the following symptoms worsening shortness of breath, palpitations, fever, or chills, please follow up with primary care physician/pulmonolgist/emergency room. *  Please give a list of your current medications to your Primary Care Provider. *  Please update this list whenever your medications are discontinued, doses are      changed, or new medications (including over-the-counter products) are added. *  Please carry medication information at all times in case of emergency situations. These are general instructions for a healthy lifestyle:    No smoking/ No tobacco products/ Avoid exposure to second hand smoke  Surgeon General's Warning:  Quitting smoking now greatly reduces serious risk to your health.     Obesity, smoking, and sedentary lifestyle greatly increases your risk for illness    A healthy diet, regular physical exercise & weight monitoring are important for maintaining a healthy lifestyle    You may be retaining fluid if you have a history of heart failure or if you experience any of the following symptoms:  Weight gain of 3 pounds or more overnight or 5 pounds in a week, increased swelling in our hands or feet or shortness of breath while lying flat in bed. Please call your doctor as soon as you notice any of these symptoms; do not wait until your next office visit. The discharge information has been reviewed with the patient. The patient verbalized understanding. Discharge medications reviewed with the patient and appropriate educational materials and side effects teaching were provided. Patient armband removed and shredded.

## 2020-04-15 NOTE — ED NOTES
Primary nurse called this nurse into pt room to assist. Pt had insisted on signing out AMA, ED tech removed pt PIV. PIV site had some bleeding and pt had near syncopal episode in response. Pressure applied to PIV site, bleeding controlled and pt becomes more alert. Pt states she has some chest pain now and is scared and would like to stay to be fully evaluated. Pt spouse contacted by this nurse and updated on pt plan of care/status. Charge nurse and provider aware.

## 2020-04-15 NOTE — PROGRESS NOTES
Clinical Pharmacy Note: IV to PO Automatic Conversion    Please note: Patient's medication(s) Azithromycin has/have been changed from IV to PO based on the following criteria:    Patient is taking scheduled oral medications  Patient is tolerating tube feeds at goal rate or a full liquid, soft or regular diet  Patient is clinically stable (Afebrile and WBC WNL)    This IV to PO conversion is based on the [employer] approved automatic conversion policy for eligible patients. If the patient no longer meets all criteria for oral therapy, the pharmacist will switch back to IV therapy. Please call with questions.     Thanks,  Lea Suarez, PHARMD  [phone]

## 2020-04-15 NOTE — H&P
History and Physical    Patient: Jose David Clement MRN: [id number]  SSN: [id number]    YOB: 1950  Age: 78 y.o. Sex: female      Subjective:      Jose David Clement is a 78 y.o. female who presents to [facility] ER with complaint of Shortness of Breath. Patient states that she has been has been wheezing and experiencing gradually worsening shortness of breath for the last two weeks, which she attributes to the rapid change in weather recently. Patient states that she has been experiencing some generalized weakness and fatigue with this. Patient reports a \"burning\" in her lungs occasionally over the last two weeks, but reports that this occasionally happens with her asthma attacks in the past.  Patient reports a baseline cough that is non-productive and denies fever, aching muscles or joints, vomiting, diarrhea, dysuria, and sick contacts. Patient reports chronic back pain. Patient admits to some mild Nausea in [facility] ER after taking some therapeutics administered to her. Notably, Patient has a significant medical history and is on Home Oxygen as needed. Patient's  is also her [address] ([address] [place] [address]). In [facility] ER, Patient was placed on Droplet Plus Precautions ostensibly for Shortness of Breath. Therapeutics ordered for Patient were not administered due to safety concerns and, due to a delay in their administration, Patient attempted to leave Against Medical Advice (AMA). However, Patient reportedly developed some chest pain and pre-syncope and decided to remain for evaluation. Droplet Plus Precautions discontinued by [facility] ER Physician. Patient is placed on Observation for management of Acute Exacerbation of Asthma.      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  Obesity (BMI 30-39. 9)     BMI 39.3 on 4/15/2020; H/O BMI >40    On home oxygen therapy     O2 PRN; wears Nocturnal O2    RIMA on CPAP     Compliant with CPAP; home settings unknown to Patient    Osteopenia 06/25/2009    Pseudoseizures     \"Psychogenic, non-epileptic Seizures\"    Pulmonary embolism ([facility] 06/2017    Retinal vascular occlusion of left eye     \"Stroke of Left Eye\"    Severe persistent asthma     Thromboembolus ([facility]     Left Leg, Unspecified Ovary     Past Surgical History:   Procedure Laterality Date    HX BACK SURGERY  07/30/2013    Hardware Removal & Lumbar Posterior Discectomy/Laminectomy with Fusion of L4-S1    HX BACK SURGERY  2014    For treatment of an Unspecified \"Pinched Nerve\"    HX CHOLECYSTECTOMY  02/22/2014    HX ENDOSCOPY  ~2015    EGD    HX ERCP  03/02/2016    HX HEART CATHETERIZATION  08/26/2004    HX LUMBAR FUSION  2000, 2004    x2    HX MASTECTOMY Left 03/29/2012    Left    HX ORTHOPAEDIC Left     Left Foot - Bone Spur Removal    HX PARTIAL HYSTERECTOMY      HX SEPTOPLASTY      HX TUBAL LIGATION Bilateral     HX WISDOM TEETH EXTRACTION      HX WRIST FRACTURE TX Right 02/2011    Metal Plate (subsequently removed)      Family History   Problem Relation Age of Onset    Lung Cancer Mother     Cancer Maternal Grandmother     Heart Attack Father     Migraines Father     Sudden Death Father     Stroke Brother Social History     Tobacco Use    Smoking status: Never Smoker    Smokeless tobacco: Never Used   Substance Use Topics    Alcohol use: Not Currently     Comment: Remote Use in 1970s      Patient lives at home with her  and Brother. Patient is ambulatory at home with the assistance of a Cane. Patient is a Never Smoker (Smoked fewer than 100 cigarettes in youth) who denies Vaping. Patient reports remote ETOH consumption. Patient denies Illicit Drug Use. Patient has a Cane, Nebulizer, and Home Oxygen at home. Prior to Admission medications    Medication Sig Start Date End Date Taking? Authorizing Provider   FIBER, PSYLLIUM HUSK, PO Take 2 Tabs by mouth daily. Provider, Historical   propranolol (INDERAL) 40 mg tablet Take 1 Tab by mouth two (2) times a day. Patient not taking: Reported on 1/29/2020 1/20/20   Yoselyn COLEMAN NP   potassium chloride SR (K-TAB) 20 mEq tablet Take 1 Tab by mouth two (2) times a day. 1/20/20   Yoselyn COLEMAN NP   levothyroxine (SYNTHROID) 50 mcg tablet Take 1 Tab by mouth Daily (before breakfast). 1/20/20   Yoselyn COLEMAN NP   divalproex DR (DEPAKOTE) 500 mg tablet Take 1 Tab by mouth daily. 1/20/20   Yoselyn COLEMAN NP   divalproex DR (DEPAKOTE) 250 mg tablet Take 5 Tabs by mouth nightly. 1/20/20   Yoselyn COLEMAN NP   glipiZIDE SR (GLUCOTROL XL) 5 mg CR tablet Take 1 Tab by mouth daily. 1/14/20   Yoselyn COLEMAN NP   docusate sodium (COLACE) 100 mg capsule Take 1 Cap by mouth daily for 90 days. 1/15/20 4/14/20  Yoselyn COLEMAN NP   iron bg,ps/vitC/B12/FA/calcium (ALIRIO FORTE PO) Take 1 Tab by mouth daily. Other, MD Heather   atorvastatin (LIPITOR) 40 mg tablet Take 40 mg by mouth daily. Provider, Historical   dicyclomine (BENTYL) 20 mg tablet Take 20 mg by mouth three (3) times daily. Provider, Historical   ergocalciferol (VITAMIN D2) 50,000 unit capsule Take 50,000 Units by mouth every seven (7) days.     Provider, Historical   Calcium-Cholecalciferol, D3, 500 mg(1,250mg) -400 unit tab Take 1 Tab by mouth two (2) times a day. Provider, Historical   citalopram (CELEXA) 10 mg tablet Take 40 mg by mouth nightly. Heather Guerrero MD   fluticasone-salmeterol (ADVAIR) 250-50 mcg/dose diskus inhaler Take 1 Puff by inhalation two (2) times a day. Other, MD Heather   Omeprazole delayed release (PRILOSEC D/R) 20 mg tablet Take 40 mg by mouth daily. Provider, Historical   IPRATROPIUM/ALBUTEROL SULFATE (COMBIVENT IN) Take 1 Puff by inhalation four (4) times daily. Provider, Historical   chlorthalidone (HYGROTEN) 25 mg tablet Take 25 mg by mouth every other day. Other, MD Heather   letrozole [facility]) 2.5 mg tablet Take 2.5 mg by mouth daily. Indications: per patient, \"I stopped 7 days before surgery scheduled for 9-13-17. \"    Other, MD Heather   EPINEPHrine (EPIPEN) 0.3 mg/0.3 mL (1:1,000) injection 0.3 mg by IntraMUSCular route once as needed for Allergic Response.     Yolanda, MD Heather        Allergies   Allergen Reactions    Valium [Diazepam] Anaphylaxis    Carafate [Sucralfate] Nausea and Vomiting    Carvedilol Other (comments)    Cephalexin Hives    Ciprofloxacin Hives    Eliquis [Apixaban] Unknown (comments)    Epipen [Epinephrine] Unknown (comments)     Pt states she is not allergic to [address] Other (comments)     GI distress    Other Medication Other (comments)     Per pt  Valium and morphine causes pt to become nervous    Percocet [Oxycodone-Acetaminophen] Other (comments)     hallucinate    Stings [Sting, Bee] Other (comments)    Talwin [Pentazocine Lactate] Shortness of Breath    Tetanus Toxoid, Adsorbed Shortness of Breath    Trileptal [Oxcarbazepine] Rash       Review of Systems:  (+) Fatigue  (+) Generalized Weakness  (+) Cough  (+) Pain with Inspiration  (+) Wheezing  (+) Shortness of Breath  (+) Pre-Syncope (When seeing blood in [facility] ER)  (+) Chest Pain  (+) Nausea (In [facility] ER after receiving medication)  (+) Back Pain (Chronic)  (-) Fevers  (-) Chills  (-) Increased Sputum Production  (-) Dyspnea on Exertion  (-) Abdominal Pain  (-) Vomiting  (-) Diarrhea  (-) Dysuria  All other systems have been reviewed and are negative      Objective:     Vitals:    04/14/20 2330 04/14/20 2345 04/15/20 0039 04/15/20 0138   BP: 149/66 149/83 150/74 (!) 167/92   Pulse: 74 70 73 70   Resp: 21 21 20    Temp:   98.4 °F (36.9 °C) 98.1 °F (36.7 °C)   SpO2: 96% 100% 95% 94%   Weight:       Height:            Physical Exam:  General:  Older Adult female lying in bed in no acute distress  HEENT:  Atraumatic, normocephalic; Pupils equally round and reactive to light with accommodation; Extraocular muscles intact; Moist Oropharynx without erythema, edema, or exudates; (+) Procedural Mask in place  Neck:  No Bruits; No Lymphadenopathy  Chest:  No pectus carinatum; No pectus excavatum  Cardiovascular:  Regular rate and rhythm with (+) II/VI Systolic Murmur without rubs or gallops  Respiratory:  (+) Slight to Mild Wheeze worst over Bilateral lower lung-fields; (+) Slight to Mildly decreased air movement; no rales or rhonchi; normal effort of breathing  Abdominal:  Obese, non-tense, (+) Mildly tender LUQ and LLQ; BS present without guarding, rebound, or masses  :  Deferred  Extremities:  Pulses 2+ x4 without edema, clubbing, or cyanosis  Musculoskeletal:  Strength 5/5 and symmetrical in BUE and BLE  Integument:  No rash on face, forearms, or legs  Neurological:  A&O x4/4; No gross deficits of Visual Acuity, Eye Movement, Jaw Opening, Facial Expression, Hearing, Phonation, or Head Movement; No gross deficits of Tongue Movement or Slurring of Speech  Psychiatric:  Affect is appropriate; Language is present and fluent (+) with some Pressure of Speech; Behavior is (+) Minimally Eccentric, but otherwise appropriate.      Assessment:     Hospital Problems  Date Reviewed: 4/14/2020          Codes Class Noted POA    * (Principal) Asthma exacerbation ICD-10-CM: J45.901  ICD-9-CM: 493.92  4/15/2020 Yes        Acquired hypothyroidism ICD-10-CM: E03.9  ICD-9-CM: 244.9  4/15/2020 Yes        HTN (hypertension) ICD-10-CM: I10  ICD-9-CM: 401.9  4/15/2020 Yes        HLD (hyperlipidemia) ICD-10-CM: E78.5  ICD-9-CM: 272.4  4/15/2020 Yes        RIMA on CPAP ICD-10-CM: G47.33, Z99.89  ICD-9-CM: 327.23, V46.8  4/15/2020 Yes    Overview Signed 4/15/2020  1:42 AM by Maksim Mason, DO     Patient Compliant, but thinks home setting is \"4. 2. \"             Bipolar mood disorder ([facility] ICD-10-CM: F31.9  ICD-9-CM: 296.80  4/15/2020 Yes        Diabetes mellitus, type II ([facility] ICD-10-CM: E11.9  ICD-9-CM: 250.00  12/23/2019 Yes        GERD (gastroesophageal reflux disease) ICD-10-CM: K21.9  ICD-9-CM: 530.81  12/23/2019 Yes        Severe persistent asthma ICD-10-CM: J45.50  ICD-9-CM: 493.90  11/7/2019 Yes        Pseudoseizures ICD-10-CM: F44.5  ICD-9-CM: 780.39  4/15/2020 Yes        History of pulmonary embolism ICD-10-CM: J57.357  ICD-9-CM: V12.55  4/15/2020 Yes        History of DVT (deep vein thrombosis) ICD-10-CM: Z86.718  ICD-9-CM: V12.51  4/15/2020 Yes    Overview Signed 4/15/2020  1:41 AM by Maksim Mason, DO     Left Lower Extremity, Left Eye, Unspecified Ovary. Plan:     Pulse Oximetry, IV Azithromycin, IV Methylprednisolone, scheduled Duonebs, Guaifenesin, home COPD medications, Sputum Culture, and supportive oxygen as necessary. Patient is PRN Oxygen at home. Agree with discontinuation of Droplet Plus Precautions. Continue home medications for HLD, Depression, HTN, Bipolar Mood Disorder, and Hypothyroidism. POC Glucose qACHS with Corrective Insulin. Check HbA1c. DVT mechanoprophylaxis.     Signed By: Jessa Machado, DO     April 15, 2020

## 2020-04-15 NOTE — PROGRESS NOTES
0715-  Bedside and Verbal shift change report given to Prudence Spear RN (oncoming nurse) by [facility], RN (offgoing nurse). Report included the following information SBAR, Kardex, Intake/Output, MAR and Recent Results.

## 2020-04-16 ENCOUNTER — PATIENT OUTREACH (OUTPATIENT)
Dept: CASE MANAGEMENT | Age: 70
End: 2020-04-16

## 2020-04-16 NOTE — PROGRESS NOTES
COVID-19 ED/Flu Clinic Initial Outreach Note    Patient contacted regarding recent visit for viral symptoms. This Rakan Lipoma contacted the patient by telephone to perform post discharge call. Verified name and  with patient as identifiers. Provided introduction to self, and reason for call due to viral symptoms of infection and/or exposure to COVID-19. Patient presented to emergency department/flu clinic with complaints of viral symptoms/exposure to COVID. Patient reports symptoms are the same. Due to no new or worsening symptoms the RN CTN/HEMALATHA was not notified for escalation. Discussed exposure protocols and quarantine with CDC Guidelines What To Do If You Are Sick    Patient was given an opportunity for questions and concerns. Stay home except to get medical care  Separate yourself from other people and animals in your home  Call ahead before visiting your doctor  Wear a facemask  Cover your coughs and sneezes  Clean your hands often  Avoid sharing personal household items  Clean all high-touch surfaces everyday    Monitor your symptoms  Seek prompt medical attention if your illness is worsening (e.g., difficulty breathing). Before seeking care, call your healthcare provider and tell them that you have, or are being evaluated for, COVID-19. Put on a facemask before you enter the facility. These steps will help the healthcare provider's office to keep other people in the office or waiting room from getting infected or exposed. Ask your healthcare provider to call the local or [facility] health department. Persons who are placed under If you have a medical emergency and need to call 911, notify the dispatch personnel that you have, or are being evaluated for COVID-19. If possible, put on a facemask before emergency medical services arrive.     The patient agrees to contact the [facility] exposure line [phone], local health department [address]  ([phone] and PCP office for questions related to their healthcare. Author provided contact information for future reference. Patient/family/caregiver given information for [facility] and agrees to enroll no  Patient preferred e-mail: n/a  Patient preferred phone contact: n/a   Based on Loop alert triggers, patient will be contacted by nurse care manager for worsening symptoms. Patient indicated that using her oxygen at home has helped tremendously.

## 2020-04-20 ENCOUNTER — HOSPITAL ENCOUNTER (OUTPATIENT)
Dept: INFUSION THERAPY | Age: 70
Discharge: HOME | End: 2020-04-20
Payer: MEDICARE

## 2020-04-20 VITALS
HEART RATE: 92 BPM | OXYGEN SATURATION: 94 % | RESPIRATION RATE: 18 BRPM | TEMPERATURE: 98 F | DIASTOLIC BLOOD PRESSURE: 69 MMHG | SYSTOLIC BLOOD PRESSURE: 108 MMHG

## 2020-04-20 DIAGNOSIS — J45.50 SEVERE PERSISTENT ASTHMA, UNSPECIFIED WHETHER COMPLICATED: Primary | ICD-10-CM

## 2020-04-20 PROCEDURE — 74011250636 HC RX REV CODE- 250/636: Performed by: NURSE PRACTITIONER

## 2020-04-20 PROCEDURE — 96372 THER/PROPH/DIAG INJ SC/IM: CPT

## 2020-04-20 RX ADMIN — OMALIZUMAB 150 MG: 150 INJECTION, SOLUTION SUBCUTANEOUS at 14:20

## 2020-04-20 RX ADMIN — OMALIZUMAB 75 MG: 75 INJECTION, SOLUTION SUBCUTANEOUS at 14:20

## 2020-04-20 NOTE — PROGRESS NOTES
[facility] Progress Note    Date: 2020    Name: Eran Robison    MRN: [id number]         : 1950     Xolair injection      Ms. Clifford Osullivan was assessed and education was provided. Ms. Ricky Rodriguez vitals were reviewed and patient was observed for 5 minutes prior to treatment. Visit Vitals  /69 (BP 1 Location: Right arm, BP Patient Position: Sitting)   Pulse 92   Temp 98 °F (36.7 °C)   Resp 18   SpO2 94%       Xolair 150 mg was administered subcutaneous in  Left lower quadrant of abdomen. Xolair 150 mg was administered subcutaneous in  Right lower quadrant of abdomen. Xolair 75 mg was administered subcutaneous in right upper arm. Total dose of 375mg       Ms. Barillas tolerated well, and had no complaints. Patient armband removed and shredded. Ms. Clifford Osullivan was discharged from [place] [address] in stable condition at 1425. She is to return on 2020 at 1500 for her next appointment.     Fadi Crawford RN  2020  1420

## 2020-04-29 ENCOUNTER — PATIENT OUTREACH (OUTPATIENT)
Dept: CASE MANAGEMENT | Age: 70
End: 2020-04-29

## 2020-04-29 DIAGNOSIS — J45.50 SEVERE PERSISTENT ASTHMA, UNSPECIFIED WHETHER COMPLICATED: ICD-10-CM

## 2020-04-29 NOTE — PROGRESS NOTES
Patient contacted regarding COVID-19 risk and screening. Care Coordinator contacted the patient by telephone to perform follow-up assessment. Verified name and  with patient as identifiers. Patient has following risk factors of: asthma and diabetes. Symptoms reviewed with patient who verbalized the following symptoms: no new symptoms and no worsening symptoms. Due to no new or worsening symptoms encounter was not routed to provider for escalation. Education provided regarding infection prevention, and signs and symptoms of COVID-19 and when to seek medical attention with patient who verbalized understanding. Discussed exposure protocols and quarantine from [address] you at higher risk for severe illness  and given an opportunity for questions and concerns. The patient agrees to contact the COVID-19 hotline [phone] or PCP office for questions related to their healthcare. Care Coordinator provided contact information for future reference. From CDC: Are you at higher risk for severe illness?  Wash your hands often.  Avoid close contact (6 feet, which is about two arm lengths) with people who are sick.  Put distance between yourself and other people if COVID-19 is spreading in your community.  Clean and disinfect frequently touched surfaces.  Avoid all cruise travel and non-essential air travel.  Call your healthcare professional if you have concerns about COVID-19 and your underlying condition or if you are sick. For more information on steps you can take to protect yourself, see CDC's How to Protect Yourself      The patient informed of upcoming appointment with Pulmonologist in May.

## 2020-05-13 DIAGNOSIS — J45.50 SEVERE PERSISTENT ASTHMA, UNSPECIFIED WHETHER COMPLICATED: ICD-10-CM

## 2020-05-18 ENCOUNTER — HOSPITAL ENCOUNTER (OUTPATIENT)
Dept: INFUSION THERAPY | Age: 70
End: 2020-05-18

## 2020-05-27 ENCOUNTER — HOSPITAL ENCOUNTER (OUTPATIENT)
Dept: NON INVASIVE DIAGNOSTICS | Age: 70
Discharge: HOME | End: 2020-05-27
Attending: INTERNAL MEDICINE
Payer: MEDICARE

## 2020-05-27 VITALS
HEIGHT: 63 IN | WEIGHT: 235 LBS | DIASTOLIC BLOOD PRESSURE: 94 MMHG | SYSTOLIC BLOOD PRESSURE: 186 MMHG | BODY MASS INDEX: 41.64 KG/M2

## 2020-05-27 DIAGNOSIS — I26.99 OTHER PULMONARY EMBOLISM WITHOUT ACUTE COR PULMONALE (HCC): ICD-10-CM

## 2020-05-27 DIAGNOSIS — J45.50 SEVERE PERSISTENT ASTHMA, UNSPECIFIED WHETHER COMPLICATED: ICD-10-CM

## 2020-05-27 LAB
ECHO AO ARCH DIAM: 3.63 CM
ECHO AO ASC DIAM: 3.66 CM
ECHO AO ROOT DIAM: 3.17 CM
ECHO IVC SNIFF: 1.59 CM
ECHO LA AREA 2C: 16.53 CM2
ECHO LA AREA 4C: 16.2 CM2
ECHO LA MAJOR AXIS: 3.14 CM
ECHO LA TO AORTIC ROOT RATIO: 0.99
ECHO LA VOL 2C: 40.44 ML (ref 22–52)
ECHO LA VOL 4C: 40.26 ML (ref 22–52)
ECHO LA VOL BP: 43.02 ML (ref 22–52)
ECHO LA VOL/BSA BIPLANE: 20.77 ML/M2 (ref 16–28)
ECHO LA VOLUME INDEX A2C: 19.53 ML/M2 (ref 16–28)
ECHO LA VOLUME INDEX A4C: 19.44 ML/M2 (ref 16–28)
ECHO LV E' LATERAL VELOCITY: 5.64 CM/S
ECHO LV E' SEPTAL VELOCITY: 4.09 CM/S
ECHO LV EDV A2C: 89.6 ML
ECHO LV EDV A4C: 92.9 ML
ECHO LV EDV BP: 92.1 ML (ref 56–104)
ECHO LV EDV INDEX A4C: 44.9 ML/M2
ECHO LV EDV INDEX BP: 44.5 ML/M2
ECHO LV EDV NDEX A2C: 43.3 ML/M2
ECHO LV EDV TEICHHOLZ: 0.44 ML
ECHO LV EJECTION FRACTION A2C: 48 %
ECHO LV EJECTION FRACTION A4C: 64 %
ECHO LV EJECTION FRACTION BIPLANE: 56.7 % (ref 55–100)
ECHO LV ESV A2C: 46.5 ML
ECHO LV ESV A4C: 33.2 ML
ECHO LV ESV BP: 39.9 ML (ref 19–49)
ECHO LV ESV INDEX A2C: 22.5 ML/M2
ECHO LV ESV INDEX A4C: 16 ML/M2
ECHO LV ESV INDEX BP: 19.3 ML/M2
ECHO LV ESV TEICHHOLZ: 0.22 ML
ECHO LV INTERNAL DIMENSION DIASTOLIC: 4 CM (ref 3.9–5.3)
ECHO LV INTERNAL DIMENSION SYSTOLIC: 3 CM
ECHO LV IVSD: 1.03 CM (ref 0.6–0.9)
ECHO LV MASS 2D: 130.4 G (ref 67–162)
ECHO LV MASS INDEX 2D: 63 G/M2 (ref 43–95)
ECHO LV POSTERIOR WALL DIASTOLIC: 0.84 CM (ref 0.6–0.9)
ECHO LVOT DIAM: 1.9 CM
ECHO LVOT SV: 55.8 ML
ECHO LVOT VTI: 19.7 CM
ECHO MV A VELOCITY: 108.79 CM/S
ECHO MV E VELOCITY: 51.73 CM/S
ECHO MV E/A RATIO: 0.48
ECHO MV E/E' LATERAL: 9.17
ECHO MV E/E' RATIO (AVERAGED): 10.91
ECHO MV E/E' SEPTAL: 12.65
ECHO RA MINOR AXIS: 2.77 CM
ECHO RV TAPSE: 1.47 CM (ref 1.5–2)
LVFS 2D: 25.07 %
LVOT MG: 2.63 MMHG
LVOT MV: 0.78 CM/S
LVSV (MOD BI): 23.97 ML
LVSV (MOD SINGLE 4C): 27.42 ML
LVSV (MOD SINGLE): 19.82 ML
LVSV (TEICH): 16.15 ML

## 2020-05-27 PROCEDURE — 93306 TTE W/DOPPLER COMPLETE: CPT

## 2020-06-01 ENCOUNTER — APPOINTMENT (OUTPATIENT)
Dept: INFUSION THERAPY | Age: 70
End: 2020-06-01

## 2020-06-10 DIAGNOSIS — J45.50 SEVERE PERSISTENT ASTHMA, UNSPECIFIED WHETHER COMPLICATED: ICD-10-CM

## 2020-06-24 DIAGNOSIS — J45.50 SEVERE PERSISTENT ASTHMA, UNSPECIFIED WHETHER COMPLICATED: ICD-10-CM

## 2020-07-06 NOTE — PROGRESS NOTES
Bedside and Verbal shift change report given to Jackelyn Davis RN (oncoming nurse) by Justin Montez RN (offgoing nurse). Report included the following information SBAR, Kardex, Procedure Summary, Intake/Output and MAR. Last refilled on 6/5/20 for # 180 with 0 rf. Left message for Norina Lombard to call office back. Office phone number provided.

## 2020-07-08 DIAGNOSIS — J45.50 SEVERE PERSISTENT ASTHMA, UNSPECIFIED WHETHER COMPLICATED: ICD-10-CM

## 2020-07-22 DIAGNOSIS — J45.50 SEVERE PERSISTENT ASTHMA, UNSPECIFIED WHETHER COMPLICATED: ICD-10-CM

## 2020-08-05 DIAGNOSIS — J45.50 SEVERE PERSISTENT ASTHMA, UNSPECIFIED WHETHER COMPLICATED: ICD-10-CM

## 2020-08-19 DIAGNOSIS — J45.50 SEVERE PERSISTENT ASTHMA, UNSPECIFIED WHETHER COMPLICATED: ICD-10-CM

## 2020-09-02 DIAGNOSIS — J45.50 SEVERE PERSISTENT ASTHMA, UNSPECIFIED WHETHER COMPLICATED: ICD-10-CM

## 2020-09-16 DIAGNOSIS — J45.50 SEVERE PERSISTENT ASTHMA, UNSPECIFIED WHETHER COMPLICATED: ICD-10-CM

## 2021-05-03 PROBLEM — Z80.0 FAMILY HISTORY OF COLON CANCER REQUIRING SCREENING COLONOSCOPY: Status: ACTIVE | Noted: 2021-05-03

## 2021-11-19 ENCOUNTER — HOME HEALTH ADMISSION (OUTPATIENT)
Dept: HOME HEALTH SERVICES | Facility: HOME HEALTH | Age: 71
End: 2021-11-19
Payer: MEDICARE

## 2021-11-20 ENCOUNTER — HOME CARE VISIT (OUTPATIENT)
Dept: SCHEDULING | Facility: HOME HEALTH | Age: 71
End: 2021-11-20
Payer: MEDICARE

## 2021-11-20 PROCEDURE — 400013 HH SOC

## 2021-11-20 PROCEDURE — G0299 HHS/HOSPICE OF RN EA 15 MIN: HCPCS

## 2021-11-22 ENCOUNTER — HOME CARE VISIT (OUTPATIENT)
Dept: SCHEDULING | Facility: HOME HEALTH | Age: 71
End: 2021-11-22
Payer: MEDICARE

## 2021-11-22 VITALS
HEART RATE: 100 BPM | OXYGEN SATURATION: 94 % | SYSTOLIC BLOOD PRESSURE: 142 MMHG | RESPIRATION RATE: 18 BRPM | DIASTOLIC BLOOD PRESSURE: 80 MMHG | TEMPERATURE: 97.8 F

## 2021-11-22 VITALS
HEART RATE: 104 BPM | RESPIRATION RATE: 16 BRPM | TEMPERATURE: 98.9 F | DIASTOLIC BLOOD PRESSURE: 78 MMHG | SYSTOLIC BLOOD PRESSURE: 120 MMHG

## 2021-11-22 PROCEDURE — G0151 HHCP-SERV OF PT,EA 15 MIN: HCPCS

## 2021-11-22 NOTE — Clinical Note
Therapy Functional Score Assessment  Question   Score   Grooming  3   Upper Dressing 3   Lower Dressing 3   Bathing  6   Toilet Transfer  4   Transfer  5           Ambulation  6   Dyspnea                     4   Pain Interfering with activity 3  Est number therapy visits      6    1860 Ambulation            0 = Independent no human assist and no device on any surface and stairs with/without rail

## 2021-11-23 ENCOUNTER — HOME CARE VISIT (OUTPATIENT)
Dept: SCHEDULING | Facility: HOME HEALTH | Age: 71
End: 2021-11-23
Payer: MEDICARE

## 2021-11-23 PROCEDURE — G0300 HHS/HOSPICE OF LPN EA 15 MIN: HCPCS

## 2021-11-23 NOTE — HOME HEALTH
Skilled reason for visit: Skin assessment and wound tretment   Caregiver involvement: Patient's cg is family. They lives with patient and is available 24/7 for assistance with iadls, adls, meal prep, medication management, taking to md appointments. Medications reviewed and all medications are available in the home this visit. Medications  are effective at this time. Home health supplies by type and quantity ordered/delivered this visit include: supplies ordered for wound care     Patient education provided this visit: Pts spouse is unable to afford the santly medication so it will not be picked up from the pharmacy. Nurse called insurance to inquire about transportation to and from medical appointments and it was added to thier plan and will be available for use in 10-14 days. Educated spouse on how to turn and reposition pt to help keep pressure off her sacral area to help with wound healing, Wound vac arrived and will be put on wednesday at next visit to keep with the M/W/F change schedule. Supplies ordered for wound care. Spouse voiced pt is nt a diabetic so her glucose levels are not checked at home. Nurse educated spouse and pt that she has to get to the MD within 30 days of coming home so we can continue to keep her on service. Patient's Progress towards personal goals: Pt and spouse veralized understanding of imporance of getting an MD appointment.  verbalzied understanding of turning and repositioning     Home exercise program: pt conitnues to take  medication as ordered and follows up on all  md appointments  Continued need for the following skills: nursing, physical therapy and Occupational Therapy.       Patient and/or caregiver notified and agrees to changes in the Plan of Care yes    The following discharge planning was discussed with the pt/caregiver: when patient reaches goals and medication is managed, and disease processes are understood patient agrees and understand that discharge will take place.

## 2021-11-23 NOTE — Clinical Note
I called with the  and spoke with her insurance provider and they added transportation to their medicare plan so she can get to the DR office its going to be 14 days before the plan is effective and he is going to call the dr office and get an appointment so when transport is available they can get her to the Dr.    Yolanda Rota in advance  Joana Covarrubias LPN

## 2021-11-23 NOTE — Clinical Note
Well thanks, thats really helpful, MSW will be coming out next week, hopefully they can get medicaid.  ----- Message -----  From: Leoncio Doran  Sent: 11/23/2021   5:34 PM EST  To: Lucretia Hernandez, ROSLYN      I called with the  and spoke with her insurance provider and they added transportation to their medicare plan so she can get to the DR office its going to be 14 days before the plan is effective and he is going to call the dr office and get an appointment so when transport is available they can get her to the DrJessica Galan in advance  Travis Farrell LPN

## 2021-11-23 NOTE — HOME HEALTH
PMHx: H+P per hospitalizaton on 10/7/21   Ms. Ileana Hargrove is a 69 y.o. y/o female with PMH of myelodysplastic sydrome, Afib not on AC due to falls and risk of bleeding, hypothyroidism, essential tremor, Bipolar w/psychosis, RIMA on CPAP, Asthma, who presented via EMS from home with SOB, productive cough with sputum and swelling of right lower extremity and general deconditioning at home. Admitted for acute hypoxic resp failure in setting of positive COVID PNA  The SOB started suddenly at rest. Her cough and lower leg swelling had been going on for few days. she had a fall 2 days prior while being transferred to her wheelchair. She denied LOC or head trauma.  has found it increasingly difficult to care for her at home.   Patient Active Problem List   Diagnosis Date Noted   o Acute respiratory failure with hypoxia (HCC)   o Aspiration pneumonia due to gastric secretions ([address]   o Hypoxia   o COVID-19   o SOB (shortness of breath) 10/07/2021   o Suspected pulmonary embolism 10/07/2021   o Sepsis ([address] 08/05/2021   o Somnolence 08/05/2021   o Bipolar 1 disorder ([address] 06/26/2020   o Acute encephalopathy   o History of pseudoseizure   o Confusion 06/18/2020   o Psychosis ([address] 06/18/2020   o Gait disturbance 02/02/2020   o Hypoglycemia 02/02/2020   o Obese (BMI 42) 07/16/2019   o Hypothyroidism 07/16/2019   o Unspecified psychosis not due to a substance or known physiological condition 07/15/2019   o Renal stone 05/13/2019   o DM type 2 (diabetes mellitus, type 2) ([address] 05/13/2019   o Pyelonephritis 05/13/2019   o Hypotension 05/03/2019   o Septic shock ([address] 05/03/2019   o Stroke-like symptoms 03/14/2019   o TIA (transient ischemic attack) 03/14/2019   o Right arm numbness   o Stroke ([address] 08/21/2018   o Encephalopathy acute   o Urinary tract infection without hematuria 06/21/2018   o UTI (urinary tract infection) 06/21/2018   o Acute pulmonary embolism ([address] 02/13/2018   o Atypical pneumonia 01/27/2018   o Asthma exacerbation 01/27/2018   o Diarrhea 03/25/2017   o Psychogenic nonepileptic seizure 08/20/2016   o Headache 08/18/2016   o Ataxia 08/17/2016   o Abnormal LFTs 04/02/2016   o Ascending cholangitis 04/02/2016   o Biliary obstruction 04/01/2016   o Obstructive sleep apnea 02/28/2016   o Common bile duct dilatation 02/27/2016   o (250.70) Type II diabetes, peripheral circulatory disorders 02/27/2016   o Syncope and collapse 06/16/2015   o S/P hardware removal 06/09/2014   o Internal orthopedic device mechanical complication ([facility] 55/35/3203   o Retained orthopedic hardware 04/11/2014   o Symptomatic cholelithiasis 02/18/2014   o Bipolar disorder, current episode depressed, severe, without psychotic features ([facility] 10/22/2013   o S/P lumbar fusion 07/30/2013   o Lumbar stenosis 07/30/2013   o S/P left mastectomy-->Breast Cancer in remision 03/29/2012   o Bipolar disorder, current episode manic severe with psychotic features ([facility] 02/19/2012   o Infiltrating ductal carcinoma of left breast 02/08/2012   o 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 Pt has had complicated course. Pt was at hospital Aug 5 to Sept 10/2/21 for sepsis then she went to Rehab  Pt came home  from [address] [place] [address] [facility] Pt then went back to hosptial for COVID while in the hospital pt got bed sorres from the hospital pt then went back to t to Rehab . Pt  came home this past Fri.  Pt  reports that pt  came home via strecher. Pt  said that pt has been sleeping alot since she has been home. Pt  reports that he has not given pt any of her meds yet this mornign as pt has been sleeping.  reported that they sent pt home with a denise lift and wc but her has no idea on home to use it.  stated that he needs help to care for his wife. He said that her kids work during the day so it is only him. I said that I would request MSGILDA patton , if one was not already orderd, to see what services that they woudl qualify for. Sully reports that he is in the process or ordering more linens for the bed. LIVING SITUATION: Pt lives with  in a mulit level home with 4 steps to enter with wide railngs. Pt is in a hospital bed in the lving room. REQUIRES CAREGIVER ASSISTANCE FOR: transprotation, medications, ADLS, IADLS   PLOF: Pt was amb without A DEV prior to her first hospitalization. Pt  said that pt was getting up in wc at facility for periods of time, but they were using a lift  MEDICATIONS REVIEWED AND UPDATED: no changes   COGNITION:  Pt was easly aroused. Pt is alet to name and . Pt was able to follow simple one step commands 45% of time. Pt reported pain but was unable to quantify it    NEXT MD APPT:  Josselyn Hernandez, NP  TBD   ROM:ROM WFL B LE. Pt does have increased pain with L LE movement   STRENGTH: Unable to formaly assess pt guarding B LE as she is fearfull of pain. Pt was not able to move B LE agaist gravity  Pt did have increased pain with movement of L LE  WOUNDS:Pt has a scaral wound that her bandages are intact . Pt has a darkend spot on her L heet that is not open but it is painful to touch   BED MOBILITY:Rolling R annd L x 4  using bed railing. to assit with changing lines and to change pt brief as she was soiled with urine. Pt was dependent with movement.  Scooting up in bed dependent using a draw sheet   TRANSFERS:NA seondary to decreased O2 sat   BALANCE: Pt did not sit on the EOB because of decreased O2 sat   PATIENT EDUCATION PROVIDED THIS VISIT: safety, HEP, walking, deep breathing,  Pt resting O2 sat was going between 66% evangelina 84%  and with activity it remained the same. Pt was in no distress and was not breathing heavely, no increaed in SOB Pt reported that she did not feel winded . Pt HR would go between 104-109 BP was stable 120/78 with Resp of 16. I called Catalina Bence, NP office and spoke with Deandra and notified they  of pt decreased O2 sat at rest and with activity. Patty Bradshaw said that she would notify NP and call me back in needed. I educated pt and  if increase SOB should get worse of in there was any change in pt status to call 911. Pt  verbalized understanding. Educted pt and  how to float B heels to prevent any further pressure injury. Educated pt  that pt needs to be turned every Hr for pressure relief . Educated pt  how to use pillows for positioning to off load he bottom  Educated pt  that pt needs to sit up higher than 45 degrees when she is eating or drinking and needs to stay in that postion for 45 min after to prevent aspiration. I called primary admiting nurse Pauline Balderrama who stated that she is working on getting MSW consult to assit with resources that will be available to     HEP consisting of:  1. Change position every HR to assit with pressure relief   Written HEP issued, patient/caregiver verbalized understanding. CONTINUED NEED FOR THE FOLLOWING SKILLS: HH PT is medically necessary to  address pain, decreased ROM, decreased strength, i, impaired bed mobility, decreased independence with functional transfers,, and impaired balance in order to improve functional independence, quality of life, return to PLOF, and reduce the risk for falls. PLAN: Pt will be seen to establish and upgrade HEP   Establish a turning schedule Bed mobility training, transfer training.  Educated pt  in proper technique and guarding technique for bed moblity and transfers. Educated pt  how to use a denise lift. Reinforece general safety precautions. DISCHARGE PLANNING DISCUSSED: Discharge to self and family under MD supervision once all goals have been met or patient has reached max potential. Patient/caregiver verbalized understanding.

## 2021-11-24 ENCOUNTER — HOME CARE VISIT (OUTPATIENT)
Dept: SCHEDULING | Facility: HOME HEALTH | Age: 71
End: 2021-11-24
Payer: MEDICARE

## 2021-11-24 VITALS
TEMPERATURE: 98 F | DIASTOLIC BLOOD PRESSURE: 76 MMHG | SYSTOLIC BLOOD PRESSURE: 130 MMHG | RESPIRATION RATE: 18 BRPM | OXYGEN SATURATION: 94 % | HEART RATE: 71 BPM

## 2021-11-24 PROCEDURE — G0300 HHS/HOSPICE OF LPN EA 15 MIN: HCPCS

## 2021-11-24 NOTE — HOME HEALTH
Skilled reason for visit: Skin assessment education and wound vac application    Caregiver involvement: Patient's cg is family. They lives with patient and is available 24/7 for assistance with iadls, adls, meal prep, medication management, taking to md appointments. Medications reviewed and all medications are available in the home this visit. Medications  are effective at this time. Home health supplies by type and quantity ordered/delivered this visit include: supplies ordered    Patient education provided this visit: patient made aware to turn every 2 hours and to keep pressure off of bony prominences, to monitor for any pressure ulcer development/worsening. Increase protein n diet and monitor for signs and symptoms of infection to include increase drainage, fever over 101.1. Turned and clean patient as the brief she had on had been there since nurse changed her yesterday. Spouse reeducated on how to turn and clean pt. Spouse was able to get briefs and gowns for pt as well. Patient's Progress towards personal goals: SPouse verbalized understanding of turning and repositioning as well as importance of cleaning pt more frequently. Home exercise program: pt conitnues to take  medication as ordered and follows up on all  md appintments  Continued need for the following skills: nursing      Patient and/or caregiver notified and agrees to changes in the Plan of Care yes    The following discharge planning was discussed with the pt/caregiver: when patient reaches goals and medication is managed, and disease processes are understood patient agrees and understand that discharge will take place.

## 2021-11-25 ENCOUNTER — HOME CARE VISIT (OUTPATIENT)
Dept: HOME HEALTH SERVICES | Facility: HOME HEALTH | Age: 71
End: 2021-11-25
Payer: MEDICARE

## 2021-11-26 ENCOUNTER — HOME CARE VISIT (OUTPATIENT)
Dept: SCHEDULING | Facility: HOME HEALTH | Age: 71
End: 2021-11-26
Payer: MEDICARE

## 2021-11-26 VITALS — TEMPERATURE: 98.1 F | OXYGEN SATURATION: 98 % | RESPIRATION RATE: 18 BRPM | HEART RATE: 89 BPM

## 2021-11-26 PROCEDURE — G0300 HHS/HOSPICE OF LPN EA 15 MIN: HCPCS

## 2021-11-26 NOTE — HOME HEALTH
Skilled reason for visit: Skin assessment and wound treatment    Caregiver involvement: Patient's cg is family. They lives with patient and is available 24/7 for assistance with iadls, adls, meal prep, medication management, taking to md appointments. Medications reviewed and all medications are available in the home this visit. Medications  are effective at this time. Home health supplies by type and quantity ordered/delivered this visit include: supplies ordered    Patient education provided this visit: patient made aware to turn every 2 hours and to keep pressure off of bony prominences, to monitor for any pressure ulcer development/worsening. Reviewed to reported any redness, swelling, warmth, fever, chills, increased heart/respiratory rate, uncontrolled pain/tenderness, drainage:green/yellow/brown, or odor to MD immediately. Increase protein n diet and monitor for signs and symptoms of infection to include increase drainage, fever over 101.1  Patient's Progress towards personal goals: Pt and spouse verbalized understanding of increasing protein, turning and pts spouse is going to look at getting wedges to help relieve pressure from sacral area    Home exercise program: pt conitnues to take  medication as ordered and follows up on all  md appintments    Continued need for the following skills: nursing      Patient and/or caregiver notified and agrees to changes in the Plan of Care yes    The following discharge planning was discussed with the pt/caregiver: when patient reaches goals and medication is managed, and disease processes are understood patient agrees and understand that discharge will take place.

## 2021-11-27 ENCOUNTER — HOME CARE VISIT (OUTPATIENT)
Dept: HOME HEALTH SERVICES | Facility: HOME HEALTH | Age: 71
End: 2021-11-27
Payer: MEDICARE

## 2021-11-28 ENCOUNTER — HOME CARE VISIT (OUTPATIENT)
Dept: HOME HEALTH SERVICES | Facility: HOME HEALTH | Age: 71
End: 2021-11-28
Payer: MEDICARE

## 2021-11-29 ENCOUNTER — HOME CARE VISIT (OUTPATIENT)
Dept: SCHEDULING | Facility: HOME HEALTH | Age: 71
End: 2021-11-29
Payer: MEDICARE

## 2021-11-29 VITALS
OXYGEN SATURATION: 87 % | DIASTOLIC BLOOD PRESSURE: 72 MMHG | TEMPERATURE: 96.8 F | HEART RATE: 94 BPM | SYSTOLIC BLOOD PRESSURE: 108 MMHG

## 2021-11-29 VITALS
DIASTOLIC BLOOD PRESSURE: 70 MMHG | HEART RATE: 78 BPM | OXYGEN SATURATION: 95 % | RESPIRATION RATE: 18 BRPM | SYSTOLIC BLOOD PRESSURE: 124 MMHG | TEMPERATURE: 97.8 F

## 2021-11-29 PROCEDURE — A6212 FOAM DRG <=16 SQ IN W/BORDER: HCPCS

## 2021-11-29 PROCEDURE — G0157 HHC PT ASSISTANT EA 15: HCPCS

## 2021-11-29 PROCEDURE — A5120 SKIN BARRIER, WIPE OR SWAB: HCPCS

## 2021-11-29 PROCEDURE — G0300 HHS/HOSPICE OF LPN EA 15 MIN: HCPCS

## 2021-11-29 NOTE — HOME HEALTH
no  ursing visit needed wound vac on and due to change M W F and  changes dressing when  ursing nit avbailable

## 2021-11-29 NOTE — HOME HEALTH
SUBJECTIVE: The pts  states that he is attempting to get transportation set up to get the pt to the PCP so they can get the order signed for the PCA as the pt has been approved for 30 hours per week. Gladyspamelan Snare PAIN: see pain assessment  . NEXT MD APPT: no upcoming appts as the pt requires stretcher transportation  . CAREGIVER ASSISTANCE NEEDED FOR: The pt lives with her  who assists with all care. .  ASSESSMENT AND PROGRESS TOWARD GOALS:  The pt presents with weakness with the need for max care and increased risks of rehospitalizations. The pt will benefit from continued HHPT for pt and CG education and training for pressure relief and training with the denise lift as well as increasing the pts ability to get to and from medical appts to reduce the risks of rehospitalizations. Ladan LAST COMPLETED ON:  Michael Pollard, PT 11/29/21  left msg with the PCP in regards to the SpO2 85%-89%  . PLAN: Will plan for a supervisory visit with Michael Pollard for [facility] lift transfers next visit. DISCHARGE PLANNING DISCUSSED: Discharge to self and family under MD supervision once all goals have been met or patient has reached maximum potential. Discussed with the pt and the pts  the current POC to continue HHPT x 1 visit this week then 2w1, 1w1. Both are in agreement to the POC at this time.

## 2021-11-29 NOTE — HOME HEALTH
Skilled reason for visit: Skin assessment and wound treatment    Caregiver involvement: Patient's cg is family. They lives with patient and is available 24/7 for assistance with iadls, adls, meal prep, medication management, taking to md appointments. Medications reviewed and all medications are available in the home this visit. Medications  are effective at this time. Home health supplies by type and quantity ordered/delivered this visit include: supplies ordered    Patient education provided this visit: patient made aware to turn every 2 hours and to keep pressure off of bony prominences, to monitor for any pressure ulcer development/worsening. Increase protein n diet and monitor for signs and symptoms of infection to include increase drainage, fever over 101.1    Patient's Progress towards personal goals: spous verbalzied understanding of turning Q2hes to help relieve pressure from sacral wound. Home exercise program: pt conitnues to take  medication as ordered and follows up on all  md appintments    Continued need for the following skills: nursing      Patient and/or caregiver notified and agrees to changes in the Plan of Care yes    The following discharge planning was discussed with the pt/caregiver: when patient reaches goals and medication is managed, and disease processes are understood patient agrees and understand that discharge will take place.

## 2021-12-01 ENCOUNTER — HOME CARE VISIT (OUTPATIENT)
Dept: SCHEDULING | Facility: HOME HEALTH | Age: 71
End: 2021-12-01
Payer: MEDICARE

## 2021-12-01 VITALS
DIASTOLIC BLOOD PRESSURE: 88 MMHG | RESPIRATION RATE: 18 BRPM | SYSTOLIC BLOOD PRESSURE: 125 MMHG | HEART RATE: 86 BPM | TEMPERATURE: 97.5 F

## 2021-12-01 VITALS — OXYGEN SATURATION: 95 % | RESPIRATION RATE: 18 BRPM | TEMPERATURE: 98.2 F | HEART RATE: 78 BPM

## 2021-12-01 PROCEDURE — G0300 HHS/HOSPICE OF LPN EA 15 MIN: HCPCS

## 2021-12-01 PROCEDURE — G0152 HHCP-SERV OF OT,EA 15 MIN: HCPCS

## 2021-12-01 NOTE — HOME HEALTH
Clinical Condition per EPIC: per hospital stay 10. 7.21:  \"Ms. Jeffrey Bond is a 69 y.o. y/o female with PMH of myelodysplastic sydrome, Afib not on AC due to falls and risk of bleeding, hypothyroidism, essential tremor, Bipolar w/psychosis, RIMA on CPAP, Asthma, who presented via EMS from home with SOB, productive cough with sputum and swelling of right lower extremity and general deconditioning at home. Admitted for acute hypoxic resp failure in setting of positive COVID PNA  The SOB started suddenly at rest. Her cough and lower leg swelling had been going on for few days. she had a fall 2 days prior while being transferred to her wheelchair. She denied LOC or head trauma.  has found it increasingly difficult to care for her at home.   Patient Active Problem List   Diagnosis Date Noted   o Acute respiratory failure with hypoxia (HCC)   o Aspiration pneumonia due to gastric secretions ([address]   o Hypoxia   o COVID-19   o SOB (shortness of breath) 10/07/2021   o Suspected pulmonary embolism 10/07/2021   o Sepsis ([address] 08/05/2021   o Somnolence 08/05/2021   o Bipolar 1 disorder ([address] 06/26/2020   o Acute encephalopathy   o History of pseudoseizure   o Confusion 06/18/2020   o Psychosis ([address] 06/18/2020   o Gait disturbance 02/02/2020   o Hypoglycemia 02/02/2020   o Obese (BMI 42) 07/16/2019   o Hypothyroidism 07/16/2019   o Unspecified psychosis not due to a substance or known physiological condition 07/15/2019   o Renal stone 05/13/2019   o DM type 2 (diabetes mellitus, type 2) ([address] 05/13/2019   o Pyelonephritis 05/13/2019   o Hypotension 05/03/2019   o Septic shock ([address] 05/03/2019   o Stroke-like symptoms 03/14/2019   o TIA (transient ischemic attack) 03/14/2019   o Right arm numbness   o Stroke ([address] 08/21/2018   o Encephalopathy acute   o Urinary tract infection without hematuria 06/21/2018   o UTI (urinary tract infection) 06/21/2018   o Acute pulmonary embolism ([address] 02/13/2018   o Atypical pneumonia 01/27/2018   o Asthma exacerbation 01/27/2018   o Diarrhea 03/25/2017   o Psychogenic nonepileptic seizure 08/20/2016   o Headache 08/18/2016   o Ataxia 08/17/2016   o Abnormal LFTs 04/02/2016   o Ascending cholangitis 04/02/2016   o Biliary obstruction 04/01/2016   o Obstructive sleep apnea 02/28/2016   o Common bile duct dilatation 02/27/2016   o (250.70) Type II diabetes, peripheral circulatory disorders 02/27/2016   o Syncope and collapse 06/16/2015   o S/P hardware removal 06/09/2014   o Internal orthopedic device mechanical complication ([facility] 56/03/9630   o Retained orthopedic hardware 04/11/2014   o Symptomatic cholelithiasis 02/18/2014   o Bipolar disorder, current episode depressed, severe, without psychotic features ([facility] 10/22/2013   o S/P lumbar fusion 07/30/2013   o Lumbar stenosis 07/30/2013   o S/P left mastectomy-->Breast Cancer in remision 03/29/2012   o Bipolar disorder, current episode manic severe with psychotic features ([facility] 02/19/2012   o Infiltrating ductal carcinoma of left breast 02/08/2012   o 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 SUBJECTIVE:  Pt stated \"My ear hurts and I'm tired. \" pt supine hospital bed with HOB elevated 60* upon OT arrival. pt agreed tx. pt unable to stay awake during evaluation and had to be aroused from sleeping. pt spouse provided history and reports pt is not a morning person. pt spouse reports pt has been in and out of the hospital and rehab since 8/2021.  pt spouse reports is in the process of getting aide to A with pt. CAREGIVER INVOLVEMENT: pt spouse performs all ADL/IADL tasks, bed mobility, and transfers as well as shopping, transportation, and A with medications. MEDICATION RECONCILIATION:  OT reconcilled medications with pt with no changes   DME ORDERED/RECOMMENEDED: NA, pt has denise lift, WC, BSC, hosipital bed, and rollator walker. .  OBJECTIVE:  BATHING: D via sponge bath in hospital bed  TOILETING: D via brief  UB DRESSING: D  LB DRESSING: D  GROOMING: D  FEEDING: min to mod A due to tremors which are medicine induced. .  pt with minimal attempt at ADL task/assessment upon OT instruction. .  OT instructed/demonstrated pt caregiver the following with good understanding:    - energy conservation while performing bathing, dressing, and setup such as set clothing out night before, gather items required to perform task in one trip, take rest breaks as needed, perform shower/bathing on days with no other appointments or activities scheduled, etc.     -           change position at least every two hours and use pillows prevent skin break down/pressure ulcers. -ADL training performed for improved body mechanics, safety, and technique for reduced risk of falls and improved functional level and participation of tasks. [facility] IADL: NA, pt was not performing IADL tasks prior to illness  . AMBULATION: NA - pt has not been ambulatory since 8/2021. .  EOB/BED TRANSFER: NT per pt decline and decreased O2 sat. pt spouse reports pt is D rolling and bed positioning. WC: NT, pt is a denise lift transfer  TOILET/BSC: NT, due to safety, pt denise lift transfer, decreased O2 sat. pt has BSC. TUB SHOWER/SHOWER CHAIR/TUB BENCH: NA, pt did not perform bathing via tub shower prior to episode. pt only bathroom is upstairs of house. .  -transfer training performed for improved body mechanics and technique for fall prevention and safety while performing ADL tasks.     PATIENT EDUCATION PROVIDED THIS VISIT: UB HEP, OT role, energy conservation, fall prevention/safety training ADL tasks, transfer training   REHAB POTENTIAL: fair for stated goals   HOME EXERCISE PROGRAM: Written HEP of the following issued. DE LA GARZA will instruct/re-instruct pt next treatment. UB HEP includes the following: A/AA/PROM BUE (LUE within shoulder AROM limitations) shoulder press, shoulder flexion, shoulder abduction, chest press, elbow flexion/extension x 5, x 2 per day. OT instructed pt spouse importance pt participating BUE AROM and pt performing AAROM as tolerated to prevent decreased functional use BUE. pt verbalized good understanding. pt aware DE LA GARZA will update HEP throughout POC. UB HEP performed for pt increased endurance and strength to perform ADL tasks and transfers to perform tasks with improved safety, increased functional level, and for fall prevention. CONTINUED NEED FOR THE FOLLOWING SKILLS: HH OT is medically necessary to address pain, decreased ROM, decreased functional strength, increased swelling, impaired bed mobility to perform ADL tasks, decreased independence and safety with functional transfers, decreased independence and safety performing ADL tasks, decreased activity and standing tolerance, decreased functional endurance, and impaired balance in order to improve functional independence, obtain set goals, reduce risk of falls, reduce pain, improve quality of life, and return to PLOF. ASSESSMENT: pt presents with decreased endurance, decreased functional strength, decreased safety transfers, increased A with transfers, decreased safety/participation ADL tasks, decreased balance, increased burden of care, and no UB HEP.   PLAN: pt will be seen to address ther ex: establish and upgrade HEP, ther act: activity and standing tolerance training, endurance training, balance training, safety training, energy conservation training, transfer training, ADL training, and pt/caregiver education. DISCHARGE PLANNING DISCUSSED WITH PT/CAREGIVER: Anticipate D/C skilled OT week of 12.20.21 when goals met or when pt obtained maximum benefit. pt frequency 2w2, 1w2. discharge to self and family care under MD supervision once all goals have been met or patient has reached max potential. pt and caregiver verbalized understanding and agree POC.

## 2021-12-01 NOTE — HOME HEALTH
Skilled reason for visit: Skin assessment and wound vac treatment    Caregiver involvement: Patient's cg is family. They lives with patient and is available 24/7 for assistance with iadls, adls, meal prep, medication management, taking to md appointments. Medications reviewed and all medications are available in the home this visit. Medications  are effective at this time. Home health supplies by type and quantity ordered/delivered this visit include: supplies due to arrive 12/1/21    Patient education provided this visit: patient made aware to turn every 2 hours and to keep pressure off of bony prominences, to monitor for any pressure ulcer development/worsening. Increase protein n diet and monitor for signs and symptoms of infection to include increase drainage, fever over 101.1    Patient's Progress towards personal goals: Spouse verbalized understanding of turning and reposioning pt every 2 hours to keep pressure off sacral area. Educated spouse on wound vac and to keep it plugged in. verbalized understaninding as well    Home exercise program: pt conitnues to take  medication as ordered and follows up on all  md appintments  Continued need for the following skills: nursing      Patient and/or caregiver notified and agrees to changes in the Plan of Care yes    The following discharge planning was discussed with the pt/caregiver: when patient reaches goals and medication is managed, and disease processes are understood patient agrees and understand that discharge will take place.

## 2021-12-02 ENCOUNTER — HOME CARE VISIT (OUTPATIENT)
Dept: SCHEDULING | Facility: HOME HEALTH | Age: 71
End: 2021-12-02
Payer: MEDICARE

## 2021-12-02 ENCOUNTER — HOME CARE VISIT (OUTPATIENT)
Dept: HOME HEALTH SERVICES | Facility: HOME HEALTH | Age: 71
End: 2021-12-02
Payer: MEDICARE

## 2021-12-02 VITALS
DIASTOLIC BLOOD PRESSURE: 40 MMHG | SYSTOLIC BLOOD PRESSURE: 92 MMHG | TEMPERATURE: 97.2 F | HEART RATE: 92 BPM | OXYGEN SATURATION: 91 % | RESPIRATION RATE: 14 BRPM

## 2021-12-02 PROCEDURE — G0151 HHCP-SERV OF PT,EA 15 MIN: HCPCS

## 2021-12-03 ENCOUNTER — HOME CARE VISIT (OUTPATIENT)
Dept: SCHEDULING | Facility: HOME HEALTH | Age: 71
End: 2021-12-03
Payer: MEDICARE

## 2021-12-03 VITALS
HEART RATE: 92 BPM | RESPIRATION RATE: 18 BRPM | SYSTOLIC BLOOD PRESSURE: 98 MMHG | DIASTOLIC BLOOD PRESSURE: 63 MMHG | OXYGEN SATURATION: 93 % | TEMPERATURE: 97.8 F

## 2021-12-03 VITALS
RESPIRATION RATE: 18 BRPM | DIASTOLIC BLOOD PRESSURE: 68 MMHG | HEART RATE: 93 BPM | OXYGEN SATURATION: 92 % | SYSTOLIC BLOOD PRESSURE: 96 MMHG | TEMPERATURE: 97.6 F

## 2021-12-03 PROCEDURE — G0158 HHC OT ASSISTANT EA 15: HCPCS

## 2021-12-03 PROCEDURE — G0300 HHS/HOSPICE OF LPN EA 15 MIN: HCPCS

## 2021-12-03 NOTE — HOME HEALTH
Skilled reason for visit: Wound Vac application and skin assessment    Caregiver involvement: Patient's cg is family. They lives with patient and is available 24/7 for assistance with iadls, adls, meal prep, medication management, taking to md appointments. Medications reviewed and all medications are available in the home this visit. Medications  are effective at this time. Home health supplies by type and quantity ordered/delivered this visit include: supplies arrived as ordered    Patient education provided this visit: patient made aware to turn every 2 hours and to keep pressure off of bony prominences, to monitor for any pressure ulcer development/worsening. Increase protein n diet and monitor for signs and symptoms of infection to include increase drainage, fever over 101.1  Reviewed UTI S/SX: BURNING, FOUL ODOR, FEVER, CHILLS, INCREASED HEART/RESPIRATORY, BACK PAIN,ABDOMINAL PAIN,TENDERNESS AROUND NOÉ AREA, OR BLOOD/DARK URINE REPORT TO MD IMMEDIATELY/SEEK MEDICAL TREATMENT. Patient's Progress towards personal goals: Pt and spouse verbalized understanding of turning and repositiong pt , increasing protein and s/s of UTI due to incontinence    Home exercise program: pt conitnues to take  medication as ordered and follows up on all  md appintments  Continued need for the following skills: Nursing      Patient and/or caregiver notified and agrees to changes in the Plan of Care yes    The following discharge planning was discussed with the pt/caregiver: when patient reaches goals and medication is managed, and disease processes are understood patient agrees and understand that discharge will take place.

## 2021-12-03 NOTE — HOME HEALTH
SUBJECTIVE: Pt in supine in bed upon arrival, wound vac had just been changed by SN, pt in a lot of pain today and very fatigued, CG reported understanding of use of denise lift to help move pt but that she does not like it due to pain. Pt took tylenol for pain during visit but reported it does not help to reduce pain much at all. Pts  has been assisting with all feeding and self-care due to pt pain and fatigue. CAREGIVER ASSISTANCE NEEDED FOR: pts  present during visit for education  MEDICATIONS REVIEWED AND UPDATED: no medication changes reported this visit. .  OBJECTIVE: see interventions  . PATIENT/CAREGIVER EDUCATION PROVIDED THIS VISIT: Therapist re-educated CG on importance of bed mobility and pt participation with as much as she can to keep up her activity level and prevent BP from dropping, also recommended follow up with pulmonologist as pt missed visit when in hospital ad has consistent low O2 readings with it dropping during upright activity,CG verbalized good understanding. ASSESSMENT AND PROGRESS TOWARD GOALS:  Pt attempted to participate with HEP and self-feeding as much as possible with reports of increased pain when upright and in LUE during movement, pt will required continued training with mobility to reduce CG burden. Patient will benefit from continued intervention with progression of ADL, transfer, strength and balance training. CONTINUED NEED FOR THE FOLLOWING SKILLS: HH OT is medically necessary to address pain, decreased strength, impaired bed mobility, decreased independence with functional transfers, decreased I with ADLs, and impaired balance in order to improve functional independence, quality of life, return to PLOF, reduce the risk for falls, and reduce pain. PLAN: next visit will be for attempting ADL and further transfer training for safety and increased pt participation.     DISCHARGE PLANNING DISCUSSED: Discharge to self and family under MD supervision once all goals have been met or patient has reached maximum potential.  Frequency remaininw1, 1w2 remaining.

## 2021-12-03 NOTE — HOME HEALTH
SUBJECTIVE: I am doing ok today. Pt reports that she is sleepy. Pt  reported that he got transportation approved and the pt was approved for a Personal care aide and jsut needed NP Luana Bee to sign paperwork to get it started.  stated that she will not sign any paperwork unitl pt is seen by her. Marva LEAVITT called to set up NP visit now that they have transportation and was notifed that pt was to be placed on cancellation list as NP Luana Bee  did not have any opening until Feb. Eliezermariluz Helena discussed with office that pt would need 5 days notice for transprotation purposes and NP office stated that they would notify NP   REQUIRES CAREGIVER ASSISTANCE FOR: transportation, medicatons, ADLS. IALDS   MEDICATIONS REVIEWED AND UPDATED:  no changes   NEXT MD APPT: Tom Yi NP  ROM: B PROM was [facility] pti is very stff. she had decreaed L DF   STRENGTH:  R hip flexors 1/5 ABD/ADD -2/5 knee flexors/ extendors 1/5 DF/ PF 0/5   L hip flexors -1/5 ADD/ABD 0/5  L knee flexors 0/5 extendsors 1/5 DF/PF 1/5   WOUNDS:wound vac was inplace on pt scacral wound. a blood blister was noted on the posterior lateral side of pt R calf. Pt  noted that pt wound care nurse noted the other day on her visit  BED MOBILITY: Rolling R and L x 3 times each direction with MAX A pt was able to asist with reaching for the bed railing when asked each direction. HOB elevated with MAX vc pt was able to transition fom sidelying to sitting with MAX A for lower body management and MOD A for upper body management. From sitting to sidelying pt was [facility] for upper body management and MAX A for lower body management   TRANSFERS:Educated pt  how to use the denise lift to assit with transfers, we did not get pt into a wc but Educated him how to place the sling under the pt and how to use the [place] we were able to hover the pt over the bed to assit with changing the bottom sheet. Pt  verbalized proper sequencing and technique.  We reinforced that this was a 2 person taks and he would need someone one to asist his at all times when using the denise lift for safety. BALANCE: Pt was able to tolerate sitting on the EOB for 45 sec to 1 min with BUE suppot with S. Pt reported that she was begining to get dizzy and layed pt back down BP was 98/56 with HR of   O2 sat initial was 82% then after one 1 increased to 88%  PATIENT EDUCATION PROVIDED THIS VISIT: safety, HEP, walking, deep breathing, [employer] turning pt every Hr for pressure relief. Educated pt  how to float heels to prevent pressure injury   HEP consisting of:  diaphragmatic breathing techniques hourly  positional changes every 1-2 hours   WrittenEP issued, patient/caregiver verbalized understanding. CONTINUED NEED FOR THE FOLLOWING SKILLS: HH PT is medically necessary to address pain, decreased ROM, decreased strength, i impaired bed mobility, decreased independence with functional transfers,, and impaired balance in order to improve functional independence, quality of life, return to PLOF, and reduce the risk for falls. ASSESSMENT: Pt has made some progress . On [facility] 11/22/21 strength  was Unable to formaly assess pt guarding B LE as she is fearfull of pain. Pt was not able to move B LE agaist gravity  Pt did have increased pain with movement of L LE. Today Re Assessment strength  is  R hip flexors 1/5 ABD/ADD -2/5 knee flexors/ extendors 1/5 DF/ PF 0/5   L hip flexors -1/5 ADD/ABD 0/5  L knee flexors 0/5 extendsors 1/5 DF/PF 1/5 . On SOC bed mobility was Rolling R annd L x 4  using bed railing. to assit with changing lines and to change pt brief as she was soiled with urine. Pt was dependent with movement. Scooting up in bed dependent using a draw sheet  Today at Re Assessment bed mobility is . On [facility] transfers were NA seondary to decreased O2 sat.   Today at Re Assessment transfers are Educated pt  how to use the denise lift to assit with transfers, we did not get pt into a wc but Educated him how to place the sling under the pt and how to use the [place] we were able to hover the pt over the bed to assit with changing the bottom sheet. Pt  verbalized proper sequencing and technique. We reinforced that this was a 2 person taks and he would need someone one to asist his at all times when using the denise lift for safety. goal met. On SOC  Pt did not sit on the EOB because of decreased O2 sat . Today at Re Assessment Pt was able to tolerate sitting on the EOB for 45 sec to 1 min with MP verduzco with S. Pt reported that she was begining to get dizzy and layed pt back down BP was 98/56 with HR of   O2 sat initial was 82% then after one 1 increased to 88%. Pt would cont to benefit from HHPT services to Educated pt  in a B LE  PROM/AAROM program, To reinforce pressure relieving technqiue with . Cont to work on bed mobility skills. Work on static sitting if/ when appropriate due to low O2 sat and BP. All to decrease the burden of care of the caregiver   PLAN: Educated pt  in [facility] program for B LE   DISCHARGE PLANNING DISCUSSED: Discharge to self and family under MD supervision once all goals have been met or patient has reached max potential. Patient/caregiver verbalized understanding. Rolling R and L x 3 times each direction with MAX A pt was able to asist with reaching for the bed railing when asked each direction. HOB elevated with MAX vc pt was able to transition fom sidelying to sitting with MAX A for lower body management and MOD A for upper body management.  From sitting to sidelying pt was [facility] for upper body management and MAX A for lower body management

## 2021-12-04 NOTE — ROUTINE PROCESS
came by to assist patient with dinner. Says she didn't eat much. After  left, patient attempted to get out of bed but bed alarm went off. Explained to patient she could not be out of bed unassisted, she already had a fall today and her gait is unsteady. Patient upset. Keeps taking her oxygen off. Refusing to turn self in bed. Assisted CNA with incontinent care and repositioning. Bed alarm set. Call light within reach. XR CHEST 1V



History: Reason: cough / Spl. Instructions:  / History: 



Comparison: None.



Findings:

Mild Central paratracheal thickening. No pleural effusion. No pneumothorax. No consolidation. Normal 
heart size.



Impression: 

1.  Mild central bronchial thickening, can be seen with viral illness.



Electronically signed by: Leno Horan DO (12/4/2021 5:48 PM) [facility]

## 2021-12-06 ENCOUNTER — HOME CARE VISIT (OUTPATIENT)
Dept: SCHEDULING | Facility: HOME HEALTH | Age: 71
End: 2021-12-06
Payer: MEDICARE

## 2021-12-06 VITALS
DIASTOLIC BLOOD PRESSURE: 68 MMHG | TEMPERATURE: 97.6 F | HEART RATE: 98 BPM | OXYGEN SATURATION: 91 % | RESPIRATION RATE: 18 BRPM | SYSTOLIC BLOOD PRESSURE: 96 MMHG

## 2021-12-06 PROCEDURE — G0300 HHS/HOSPICE OF LPN EA 15 MIN: HCPCS

## 2021-12-06 NOTE — Clinical Note
thanks for the update  ----- Message -----  From: Marques Garrett  Sent: 12/6/2021   3:42 PM EST  To: Alexandria Badillo, PT      called Transportation # and set up transportation for MD appt on Dec 20th @ 3pm. Nurse wrote all information down in spouse notebook for furture scheduling needs. Spouse educated to get pt wedges to help position of her sacral area.  Nurse spoke with Magui Alicea pts daughter about transportation, caling insurance about the [employer] [facility] and  wound care

## 2021-12-06 NOTE — HOME HEALTH
Skilled reason for visit: Skin assessment and wound treatment    Caregiver involvement: Patient's cg is family. They lives with patient and is available 24/7 for assistance with iadls, adls, meal prep, medication management, taking to md appointments. Medications reviewed and all medications are available in the home this visit. Medications  are effective at this time. Home health supplies by type and quantity ordered/delivered this visit include: supplies arrived as ordered    Patient education provided this visit: patient made aware to turn every 2 hours and to keep pressure off of bony prominences, to monitor for any pressure ulcer development/worsening. Nurse called Transportation # and set up transportation for MD appt on Dec 20th @ 3pm. Nurse wrote all information down in spouse notebook for furture scheduling needs. Spouse educated to get pt wedges to help position of her sacral area. Nurse spoke with Xiang Salmeron pts daughter about transportation, caling insurance about the [employer] and  wound care. Spouse voiced pt has had a great appetitie     Patient's Progress towards personal goals: Pt and spouse both verbalized understanding of all education and voiced appreciation for help from [facility] staff. Home exercise program: pt conitnues to take  medication as ordered and follows up on all  md appintments  Continued need for the following skills: nursing      Patient and/or caregiver notified and agrees to changes in the Plan of Care yes    The following discharge planning was discussed with the pt/caregiver: when patient reaches goals and medication is managed, and disease processes are understood patient agrees and understand that discharge will take place.

## 2021-12-06 NOTE — Clinical Note
called Transportation # and set up transportation for MD appt on Dec 20th @ 3pm. Nurse wrote all information down in spouse notebook for furture scheduling needs. Spouse educated to get pt wedges to help position of her sacral area.  Nurse spoke with Cottonwood Rodney pts daughter about transportation, caling insurance about the [employer] and  wound care

## 2021-12-07 ENCOUNTER — HOME CARE VISIT (OUTPATIENT)
Dept: SCHEDULING | Facility: HOME HEALTH | Age: 71
End: 2021-12-07
Payer: MEDICARE

## 2021-12-07 VITALS
OXYGEN SATURATION: 94 % | HEART RATE: 78 BPM | DIASTOLIC BLOOD PRESSURE: 57 MMHG | RESPIRATION RATE: 16 BRPM | SYSTOLIC BLOOD PRESSURE: 112 MMHG | TEMPERATURE: 97.7 F

## 2021-12-07 PROCEDURE — G0157 HHC PT ASSISTANT EA 15: HCPCS

## 2021-12-07 PROCEDURE — G0158 HHC OT ASSISTANT EA 15: HCPCS

## 2021-12-07 NOTE — HOME HEALTH
SUBJECTIVE: Pt supine with head of bed elevated upon arrival, finishing LE mobility with PTA for improved circulation. Pt in lots of pain with mobility again today especially in LUE and L knee with movement. Pts  was working on transportation scheduling for pt appt this month and trying to get hours for an in-home aide to assist.  Pt had brief seizure of 90 seconds during visit, pt stares off in to space and is unresponsive, pts  aware and used to these and reported she would come back out of it quickly which she did with no change in orientation or pain, pt able to continued participating with visit with no issues. CAREGIVER ASSISTANCE NEEDED FOR: pts  present during visit for education and training  MEDICATIONS REVIEWED AND UPDATED: no medication changes reported this visit. .  OBJECTIVE: see interventions  . PATIENT/CAREGIVER EDUCATION PROVIDED THIS VISIT: Therapist educated CG on importance of carry over with HEP for pt circulation and joint mobility as well as continue with 2-hr turn schedule for skin integrity, pts  verbalized understanding and reported he was able to get pt an appt with MD his month and schedule transportation. ASSESSMENT AND PROGRESS TOWARD GOALS:  Pt demonstrated fair progress with HEP participation and UB rolling in bed, pt still very limited by pain and fatigue for mobility. Patient will benefit from continued intervention with progression of ADL participation training, CG training, bed mobility, core strengthening. CONTINUED NEED FOR THE FOLLOWING SKILLS: HH OT is medically necessary to address pain, decreased ROM, decreased strength, impaired bed mobility, decreased independence with functional transfers, decreased I with ADLs, and impaired balance in order to improve functional independence, quality of life, return to PLOF, reduce the risk for falls, and reduce pain.   PLAN: next visit will be for continued ADL and mobility training at bed level to reduce CG burden as best as possible. DISCHARGE PLANNING DISCUSSED: Discharge to self and family under MD supervision once all goals have been met or patient has reached maximum potential.  Frequency remaininw3 remaining.

## 2021-12-08 ENCOUNTER — HOME CARE VISIT (OUTPATIENT)
Dept: SCHEDULING | Facility: HOME HEALTH | Age: 71
End: 2021-12-08
Payer: MEDICARE

## 2021-12-08 VITALS
TEMPERATURE: 97.7 F | RESPIRATION RATE: 16 BRPM | HEART RATE: 78 BPM | DIASTOLIC BLOOD PRESSURE: 57 MMHG | SYSTOLIC BLOOD PRESSURE: 112 MMHG | OXYGEN SATURATION: 94 %

## 2021-12-08 VITALS
RESPIRATION RATE: 18 BRPM | OXYGEN SATURATION: 94 % | TEMPERATURE: 97.6 F | DIASTOLIC BLOOD PRESSURE: 60 MMHG | SYSTOLIC BLOOD PRESSURE: 112 MMHG | HEART RATE: 78 BPM

## 2021-12-08 PROCEDURE — G0300 HHS/HOSPICE OF LPN EA 15 MIN: HCPCS

## 2021-12-08 NOTE — HOME HEALTH
SUBJECTIVE: Patient reported that she did not have any pain and takes Tylenol for pain. .  OBJECTIVE: See Interventions. .  ASSESSMENT AND PROGRESS TOWARD GOALS: Patient was alert this visit but had instances of sleeping suddenly. Pt performed rolling in bed with Mod A using handrail to promote safe bed mobility and pressure relief to prevent bed sores. Pt also tolerated performing 1 set of 10 repetitions of PROM supine therapeutic exercises including APs, heel slides, quad sets, SAQ, and SLR. Instructed pt and pt's  to attempt exercises 3 times everyday. Pt's  was on the phone most of this visit and was unable to focus on education. Plan to reinforce teaching next visit with patient and pt's . Discussed with patient/CG plan to discharge pt from [facility] PT services next week. Pt/CG verbalized understanding and is in agreement with discharge plan. Enxert[address] 30 was not signed due to nursing is continuing with wound care. Indira Hendrix PLAN: Patient will be seen 1w2 to promote safe bed mobility, pressure relief training, and education on PROM therapeutic exercises. Indira Hendrix DISCHARGE PLANNING DISCUSSED: Discharge patient to family with MD supervision when all goals are met. Pt/Caregiver did verbalize understanding of discharge planning.

## 2021-12-08 NOTE — HOME HEALTH
Skilled reason for visit: Skin assessment and wound vac treatment    Caregiver involvement: Patient's cg is family. They lives with patient and is available 24/7 for assistance with iadls, adls, meal prep, medication management, taking to md appointments. Medications reviewed and all medications are available in the home this visit. Medications  are effective at this time. Home health supplies by type and quantity ordered/delivered this visit include: supplies arrived as ordered    Patient education provided this visit: patient made aware to turn every 2 hours and to keep pressure off of bony prominences, to monitor for any pressure ulcer development/worsening. Patient/CG educated on use of turning  as non-pharmacological form of pain treatment. Wound Vac treatment changed and educated spouse on floating pts heels to keep them protected as well as applied skin prep to heels to help with healing. Nurse called Dana Garcia about the [address] system and it is not covered under insurance. The [employer] and they will reimburse a % of the cost but they could not tell me how much. The system is $595.00 if paid for by pt and $195.00 a month for wick replacements. Patient's Progress towards personal goals: Pt verbazied pain during dressing change of 5/10. Pts spouse gave her 2 650mg tylenol in pudding to help with her pain . Spouse voiced heir daughter is going to order wedges to help with relieving pressure from sacral area. Home exercise program: pt conitnues to take  medication as ordered and follows up on all  md appintments  Continued need for the following skills: Nursing       Patient and/or caregiver notified and agrees to changes in the Plan of Care yes    The following discharge planning was discussed with the pt/caregiver: when patient reaches goals and medication is managed, and disease processes are understood patient agrees and understand that discharge will take place.

## 2021-12-09 ENCOUNTER — HOME CARE VISIT (OUTPATIENT)
Dept: SCHEDULING | Facility: HOME HEALTH | Age: 71
End: 2021-12-09
Payer: MEDICARE

## 2021-12-09 VITALS
TEMPERATURE: 98 F | SYSTOLIC BLOOD PRESSURE: 100 MMHG | HEART RATE: 81 BPM | RESPIRATION RATE: 17 BRPM | OXYGEN SATURATION: 92 % | DIASTOLIC BLOOD PRESSURE: 58 MMHG

## 2021-12-09 PROCEDURE — G0158 HHC OT ASSISTANT EA 15: HCPCS

## 2021-12-09 PROCEDURE — G0157 HHC PT ASSISTANT EA 15: HCPCS

## 2021-12-09 NOTE — HOME HEALTH
SUBJECTIVE: Pt in supine in bed upon arrival, pts  had lots of questions about [employer] system, MD orders and insurance, therapist notified  that home care cannot assist with those and he would have to try insurance call again. Towards end of visit therapist began to discuss pts lack of progression with therapy and that it will be coming to an end, therapist brought up Hospice to pt and , pts  reported he and his step daughter had already started looking in to it, therapist offered to contact Hospice coordinator to help with questions and potential transition. Pts  agreeable and would like more information regarding hospice. CAREGIVER ASSISTANCE NEEDED FOR: pts  present for education with v/c for ADL participation. MEDICATIONS REVIEWED AND UPDATED: no medication changes reported this visit. .  OBJECTIVE: see interventions  . PATIENT/CAREGIVER EDUCATION PROVIDED THIS VISIT: Therapist educated pt briefly on hospice option and will pass pt info on to hospice coordinator, therapist explained pt will need order from MD which  does not think he will sign until pt is seen. ASSESSMENT AND PROGRESS TOWARD GOALS:  Pt not progressing well with therapy at this time due to her increased pain, fatigue, difficulty with mobility, CG able to perform carry over of daily exercises and movement of UEs/LEs. Patient will benefit from continued intervention with progression of CG training for carry over with pt at bed level for prevention of further sores and to keep pt mobile to prevent skin breakdown. CONTINUED NEED FOR THE FOLLOWING SKILLS: HH OT is medically necessary to address pain, decreased ROM, decreased strength, impaired bed mobility, decreased independence with functional transfers, decreased I with ADLs, and impaired balance in order to improve functional independence, quality of life, return to PLOF, reduce the risk for falls, and reduce pain.   PLAN: next visit will be for final training with CG regarding safe bed mobility and ways to increase pt self-care participation for activity. DISCHARGE PLANNING DISCUSSED: Discharge to self and family under MD supervision once all goals have been met or patient has reached maximum potential.  Frequency remaininw2 remaining.

## 2021-12-10 ENCOUNTER — HOME CARE VISIT (OUTPATIENT)
Dept: SCHEDULING | Facility: HOME HEALTH | Age: 71
End: 2021-12-10
Payer: MEDICARE

## 2021-12-10 VITALS
OXYGEN SATURATION: 93 % | SYSTOLIC BLOOD PRESSURE: 98 MMHG | RESPIRATION RATE: 16 BRPM | HEART RATE: 84 BPM | DIASTOLIC BLOOD PRESSURE: 55 MMHG | TEMPERATURE: 97.1 F

## 2021-12-10 PROCEDURE — G0299 HHS/HOSPICE OF RN EA 15 MIN: HCPCS

## 2021-12-10 NOTE — HOME HEALTH
SUBJECTIVE: Patient was sleeping upon LPTA arrival. Pt's  was present during visit and available to demonstrate therapeutic activities. .  OBJECTIVE: See Interventions. .  ASSESSMENT AND PROGRESS TOWARD GOALS: Patient was sleeping most of this visit but was able to wake up with tactile cues for therapeutic exercises. Pt's  was able to assist patient perform supine therapeutic exercises for B LE including APs, heel slides, quad sets, SAQ, and SLR. Pt also performed rolling in bed with Mod A using handrail with 's help to promote safe bed mobility and pressure relief to prevent bed sores. Reinforced performing HEP 3 times everyday and repositioning every 2 hours. Discussed with patient/CG plan to discharge pt from [facility] PT services next week. Pt/CG verbalized understanding and is in agreement with discharge plan. Enxertos 30 was not signed due to nursing is continuing with wound care. Bruno Glohemal PLAN: Patient will be seen 1w1 to reassess strength, bed mobility, and safety. [place] DISCHARGE PLANNING DISCUSSED: Discharge patient to family with MD supervision when all goals are met. Pt/Caregiver did verbalize understanding of discharge planning.

## 2021-12-11 VITALS
OXYGEN SATURATION: 94 % | SYSTOLIC BLOOD PRESSURE: 110 MMHG | TEMPERATURE: 97.8 F | HEART RATE: 92 BPM | RESPIRATION RATE: 14 BRPM | DIASTOLIC BLOOD PRESSURE: 62 MMHG

## 2021-12-11 NOTE — HOME HEALTH
Skilled reason for visit: SACRAL WOUND VAC, VS, PAIN, MEDS, UPCOMING MD APPT    Caregiver involvement: patients cg is spouse and is available as needed or assistance with IADL's, ADL's, meal prep, medication management, and taking patient to all doctors appointments. Medications reviewed and all medications are available in the home this visit. The following education was provided regarding medications, medication interactions, and look alike medications (specify): tylenol   Medications  are ineffective at this time. Home health supplies by type and quantity ordered/delivered this visit include: pt has all supplies in home-wd vac supplies just arrived     Patient education provided this visit: sacral wound vac drsg removed, cleansed with dakins and ns and gauze, skin prep applied,  blk foam one piece applied to wound, and secured with drape, bridge to right hip and secured with drape, vac obtained at 125mmhg , demo how to chg canister , how to change canister if necessary, if wound vac not working properly do not leave drsg intact any longer then 2 hours, remove drsg and demo how to apply WTD drsg, and notify home health. patient made aware to monitor for s/s of infection [increased swelling, increased redness around site, increased pain, foul smelling drainage, fever] aware who to report to/when. patient encouraged to monitor for increase in pain and to continue with current pain management and to notify staff/md if pain becomes excrutiating/intolerable. patient made aware to turn every 2 hours and to keep pressure off of bony prominences, to monitor for any pressure ulcer development/worsening. patient aware to monitor for effectiveness and to notify staff of any adverse reactions to medications/any changes to medication regimen. reviewed side effects, purposes, dosage, frequencies.  THE PATIENT AND CG WERE RECOMMENDED TO CALL PCP OR TAKE PT TO THE ER WITH ANY FEVER 101 OR ABOVE, CHILLS, SOB, CHEST PAIN, SEVERE HEADACHE, PROFUSE VOMITING AND/OR DIARRHEA, ACUTE PAIN OR ANY OTHER ACUTE CHANGE    Progress toward goals: cont wound vac therapy, pt to see md on 12/20 for pain mgmt    Home exercise program: per PT AND OT     Continued need for the following skills: Nursing, Physical Therapy and Occupational Therapy    Patient and/or caregiver notified and agrees to changes in the Plan of Care YES      The following discharge planning was discussed with the pt/caregiver:   Patient will be discharged once education has completed, patient is medically stable and pt/cg are able to independently manage wound care/ wound has healed or no longer requires skilled care.

## 2021-12-13 ENCOUNTER — HOME CARE VISIT (OUTPATIENT)
Dept: SCHEDULING | Facility: HOME HEALTH | Age: 71
End: 2021-12-13
Payer: MEDICARE

## 2021-12-13 VITALS
HEART RATE: 97 BPM | DIASTOLIC BLOOD PRESSURE: 52 MMHG | TEMPERATURE: 97.6 F | OXYGEN SATURATION: 90 % | SYSTOLIC BLOOD PRESSURE: 108 MMHG | RESPIRATION RATE: 12 BRPM

## 2021-12-13 PROCEDURE — G0151 HHCP-SERV OF PT,EA 15 MIN: HCPCS

## 2021-12-13 PROCEDURE — G0158 HHC OT ASSISTANT EA 15: HCPCS

## 2021-12-13 PROCEDURE — G0299 HHS/HOSPICE OF RN EA 15 MIN: HCPCS

## 2021-12-13 NOTE — Clinical Note
PT WAS SENT TO ED:  70's/40's BP, 70% O2 SAT, 'S-190'S and pt becoming non-responsive and mental status changes. Pt VSS before wd care, pt was in excruciating pain during wound care, and turning on side with max assist. Upon returning pt from laying on her side to laying on her back, pt became unresponsive and all vitals were abnormal as stated above. Pt was not able to state name, , city, or answer any questions appropriately. Pt then started to open eyes and 911 was called. RN and PT cont to obtain vitals and try to reorient pt. Pt BP increased to 92'W systolic and HR eventually decreased to 130's. Pt oxygenation stayed low in the 80's, pt was very SOB at rest. Pt urine was dark brown in brief. Pt  stated pt is not drinking or eating much. Pt has several pressure wounds to left sacrum, heels, and side of left calf. During the visit, Pt went in and out of consciousness and pt verb she was going to die. Pt  was also educated on pt not being suitable for wound vac tx any longer due to pt not being to tolerate laying on her side and it was no longer safe for nursing to perform wound vac care due to pt being very unstable. Pt was showing all signs of being septic and sacral wound measured 2.5 x 2.5 x 12cm deep and had a very foul smell to it. Pt was watched closely by PT and RN until EMS arrived to provide hx of what took place with pt. Pt hx given to EMS, and pt was taken to [facility]. Director of home care Gilles Lanes and supervisor Gisselle Nelson were called to talk about home visit and changes in pt status.      Ryann Larios, RN, BSN

## 2021-12-13 NOTE — Clinical Note
Patient is s/p acute resp failure  and has been treated for ROM, strengthening, , HEP training, safety training, and balance training. On [facility] 11/22/21 strength  was Unable to formaly assess pt guarding B LE as she is fearfull of pain. Pt was not able to move B LE agaist gravity  Pt did have increased pain with movement of L LE. On Re Assessment  12/2/21 strength  is  R hip flexors 1/5 ABD/ADD -2/5 knee flexors/ extendors 1/5 DF/ PF 0/5   L hip flexors -1/5 ADD/ABD 0/5  L knee flexors 0/5 extendsors 1/5 DF/PF 1/5  Today at [place] strength is NA.secondary to medically unstable. On SOC bed mobility was Rolling R annd L x 4  using bed railing. to assit with changing lines and to change pt brief as she was soiled with urine. Pt was dependent with movement. Scooting up in bed dependent using a draw sheet  On Re Assessment bed mobility was Rolling R and L x 3 times each direction with MAX A pt was able to asist with reaching for the bed railing when asked each direction. HOB elevated with MAX vc pt was able to transition fom sidelying to sitting with MAX A for lower body management and MOD A for upper body management. From sitting to sidelying pt was [facility] for upper body management and MAX A for lower body management   Today at [place] bed mobilty is Pt participated with  rolling R and L with bed railing with MOD/MAX A using bed railings. Pt stayed on her L side while therapist A nursing with postioning,  while nurse performed wond vac change Theapist  A  with rolling pt  R and L to change linens under the pt. When we were completed with rolling from her side to be on her back pt became glassy eyed and unrespostive for a couple of seconds. When pt came too she was confused and unable to recall her name O2 sat was 77% with HR going between [id number]. Nursing took pt BP which was 77/42 in R LE in supine.  After about 2 min pt BP increased to 86/50 HR was going between  O2 sat incrased between 70-88% pt was able to follow josebeatriz and maintaind a conversation. Pt stated that she was extremely cold. after about 5 min BP was 96/56 O2 sat was 80-85 % and HR was going between . Pt did have another moment where she had increased confusion. EMS was called due to low O2 sat and elevated HR with low BP. Pt was taken to [address] [place] [address] for further care. MD was notifed of transfer along with Clinical Director and [address] [place] [address] Director . On [facility] transfers were NA seondary to decreased O2 sat. On Re Assessment transfers are Educated pt  how to use the denise lift to assit with transfers, we did not get pt into a wc but Educated him how to place the sling under the pt and how to use the Romilda Kotyk we were able to hover the pt over the bed to assit with changing the bottom sheet. Pt  verbalized proper sequencing and technique. We reinforced that this was a 2 person taks and he would need someone one to asist his at all times when using the denise lift for safety. goal met. On SOC  Pt did not sit on the EOB because of decreased O2 sat  Today at [place] transfers are NA On SOC balance was NA. . On Re Assessment Pt was able to tolerate sitting on the EOB for 45 sec to 1 min with BUE suppot with S. Pt reported that she was begining to get dizzy and layed pt back down BP was 98/56 with HR of   O2 sat initial was 82% then after one 1 increased to 88%. Today at [place] balance is NA as pt was medically unstable. Pt has made some progress and max potential for funtional gains has been met. PT services are not appropriate for therapy at this time due to low BP and O2 sat with elevated HR   Discharge plan: Discharge from [address] [place] [address] services as max potential for functional gains has been met. Nusrat Villalobos, MEENAKSHI office notifed of DC from [facility] as max potential for functional gain has been met and pt was taken to hospital secondary to low BP and O2 sat with elevated HR.

## 2021-12-13 NOTE — Clinical Note
thanks  ----- Message -----  From: Haja Stewart RN  Sent: 12/15/2021   8:40 PM EST  To: Carly Hackett, PT      PT WAS SENT TO ED:  70's/40's BP, 70% O2 SAT, 'S-190'S and pt becoming non-responsive and mental status changes. Pt VSS before wd care, pt was in excruciating pain during wound care, and turning on side with max assist. Upon returning pt from laying on her side to laying on her back, pt became unresponsive and all vitals were abnormal as stated above. Pt was not able to state name, , city, or answer any questions appropriately. Pt then started to open eyes and 911 was called. RN and PT cont to obtain vitals and try to reorient pt. Pt BP increased to 28'L systolic and HR eventually decreased to 130's. Pt oxygenation stayed low in the 80's, pt was very SOB at rest. Pt urine was dark brown in brief. Pt  stated pt is not drinking or eating much. Pt has several pressure wounds to left sacrum, heels, and side of left calf. During the visit, Pt went in and out of consciousness and pt verb she was going to die. Pt  was also educated on pt not being suitable for wound vac tx any longer due to pt not being to tolerate laying on her side and it was no longer safe for nursing to perform wound vac care due to pt being very unstable. Pt was showing all signs of being septic and sacral wound measured 2.5 x 2.5 x 12cm deep and had a very foul smell to it. Pt was watched closely by PT and RN until EMS arrived to provide hx of what took place with pt. Pt hx given to EMS, and pt was taken to [facility]. Director of home care Jorge Martinez and supervisor Stewart Patel were called to talk about home visit and changes in pt status.      Dariusz Clemente RN, BSN

## 2021-12-13 NOTE — Clinical Note
Pt was seen by PT for PTDC today. Nursing was present during tx session. Pt participated with  rolling R and L with bed railing with MOD/MAX A using bed railings. Pt stayed on her L side while therapist A nursing with postioning,  while nurse performed wond vac change Theapist  A  with rolling pt  R and L to change linens under the pt. When we were completed with rolling from her side to be on her back pt became glassy eyed and unrespostive for a couple of seconds. When pt came too she was confused and unable to recall her name O2 sat was 77% with HR going between [id number]. Nursing took pt BP which was 77/42 in R LE in supine. After about 2 min pt BP increased to 86/50 HR was going between  O2 sat incrased between 70-88% pt was able to follow direciton and maintaind a conversation. Pt stated that she was extremely cold. after about 5 min BP was 96/56 O2 sat was 80-85 % and HR was going between . Pt did have another moment where she had increased confusion. EMS was called due to low O2 sat and elevated HR with low BP. Pt was taken to [facility] for further care.  MD was notifed of transfer along with Clinical Director and [address] [place] [address] Director

## 2021-12-13 NOTE — Clinical Note
wow. thank you for the update. thank goodness you all were there and sent her out.   ----- Message -----  From: Harsha Driscoll RN  Sent: 12/15/2021   8:40 PM EST  To: Gilda Millan OTR/L      PT WAS SENT TO ED:  70's/40's BP, 70% O2 SAT, 'S-190'S and pt becoming non-responsive and mental status changes. Pt VSS before wd care, pt was in excruciating pain during wound care, and turning on side with max assist. Upon returning pt from laying on her side to laying on her back, pt became unresponsive and all vitals were abnormal as stated above. Pt was not able to state name, , city, or answer any questions appropriately. Pt then started to open eyes and 911 was called. RN and PT cont to obtain vitals and try to reorient pt. Pt BP increased to 92'T systolic and HR eventually decreased to 130's. Pt oxygenation stayed low in the 80's, pt was very SOB at rest. Pt urine was dark brown in brief. Pt  stated pt is not drinking or eating much. Pt has several pressure wounds to left sacrum, heels, and side of left calf. During the visit, Pt went in and out of consciousness and pt verb she was going to die. Pt  was also educated on pt not being suitable for wound vac tx any longer due to pt not being to tolerate laying on her side and it was no longer safe for nursing to perform wound vac care due to pt being very unstable. Pt was showing all signs of being septic and sacral wound measured 2.5 x 2.5 x 12cm deep and had a very foul smell to it. Pt was watched closely by PT and RN until EMS arrived to provide hx of what took place with pt. Pt hx given to EMS, and pt was taken to [facility]. Director of home care Maciel Rich and supervisor Malachi Rios were called to talk about home visit and changes in pt status.      Hali Granados, RN, BSN

## 2021-12-13 NOTE — Clinical Note
thank you   ----- Message -----  From: Chet Bhagat, PT  Sent: 12/13/2021   8:26 PM EST  To: Mayte Guevara OTR/L      Pt was seen by PT for [facility] today. Nursing was present during tx session. Pt participated with  rolling R and L with bed railing with MOD/MAX A using bed railings. Pt stayed on her L side while therapist A nursing with postioning,  while nurse performed wond vac change Theapist  A  with rolling pt  R and L to change linens under the pt. When we were completed with rolling from her side to be on her back pt became glassy eyed and unrespostive for a couple of seconds. When pt came too she was confused and unable to recall her name O2 sat was 77% with HR going between [id number]. Nursing took pt BP which was 77/42 in R LE in supine. After about 2 min pt BP increased to 86/50 HR was going between  O2 sat incrased between 70-88% pt was able to follow direciton and maintaind a conversation. Pt stated that she was extremely cold. after about 5 min BP was 96/56 O2 sat was 80-85 % and HR was going between . Pt did have another moment where she had increased confusion. EMS was called due to low O2 sat and elevated HR with low BP. Pt was taken to [facility] for further care.  MD was notifed of transfer along with Clinical Director and [address] [place] [address] Director

## 2021-12-13 NOTE — Clinical Note
ty :)  ----- Message -----  From: Dk Uriel, PT  Sent: 12/13/2021   8:26 PM EST  To: Elisha Bravo RN      Pt was seen by PT for [facility] today. Nursing was present during tx session. Pt participated with  rolling R and L with bed railing with MOD/MAX A using bed railings. Pt stayed on her L side while therapist A nursing with postioning,  while nurse performed wond vac change Theapist  A  with rolling pt  R and L to change linens under the pt. When we were completed with rolling from her side to be on her back pt became glassy eyed and unrespostive for a couple of seconds. When pt came too she was confused and unable to recall her name O2 sat was 77% with HR going between [id number]. Nursing took pt BP which was 77/42 in R LE in supine. After about 2 min pt BP increased to 86/50 HR was going between  O2 sat incrased between 70-88% pt was able to follow direciton and maintaind a conversation. Pt stated that she was extremely cold. after about 5 min BP was 96/56 O2 sat was 80-85 % and HR was going between . Pt did have another moment where she had increased confusion. EMS was called due to low O2 sat and elevated HR with low BP. Pt was taken to [facility] for further care.  MD was notifed of transfer along with Clinical Director and [address] [place] [address] Director

## 2021-12-13 NOTE — Clinical Note
thank you   ----- Message -----  From: Brittni Schafer, PT  Sent: 12/13/2021   8:26 PM EST  To: Charmaine Courtney, OTR/L      Patient is s/p acute resp failure  and has been treated for ROM, strengthening, , HEP training, safety training, and balance training. On [facility] 11/22/21 strength  was Unable to formaly assess pt guarding B LE as she is fearfull of pain. Pt was not able to move B LE agaist gravity  Pt did have increased pain with movement of L LE. On Re Assessment  12/2/21 strength  is  R hip flexors 1/5 ABD/ADD -2/5 knee flexors/ extendors 1/5 DF/ PF 0/5   L hip flexors -1/5 ADD/ABD 0/5  L knee flexors 0/5 extendsors 1/5 DF/PF 1/5  Today at [place] strength is NA.secondary to medically unstable. On SOC bed mobility was Rolling R annd L x 4  using bed railing. to assit with changing lines and to change pt brief as she was soiled with urine. Pt was dependent with movement. Scooting up in bed dependent using a draw sheet  On Re Assessment bed mobility was Rolling R and L x 3 times each direction with MAX A pt was able to asist with reaching for the bed railing when asked each direction. HOB elevated with MAX vc pt was able to transition fom sidelying to sitting with MAX A for lower body management and MOD A for upper body management. From sitting to sidelying pt was [facility] for upper body management and MAX A for lower body management   Today at [place] bed mobilty is Pt participated with  rolling R and L with bed railing with MOD/MAX A using bed railings. Pt stayed on her L side while therapist A nursing with postioning,  while nurse performed wond vac change Theapist  A  with rolling pt  R and L to change linens under the pt. When we were completed with rolling from her side to be on her back pt became glassy eyed and unrespostive for a couple of seconds. When pt came too she was confused and unable to recall her name O2 sat was 77% with HR going between [id number]. Nursing took pt BP which was 77/42 in R LE in supine. After about 2 min pt BP increased to 86/50 HR was going between  O2 sat incrased between 70-88% pt was able to follow direciton and maintaind a conversation. Pt stated that she was extremely cold. after about 5 min BP was 96/56 O2 sat was 80-85 % and HR was going between . Pt did have another moment where she had increased confusion. EMS was called due to low O2 sat and elevated HR with low BP. Pt was taken to [facility] for further care. MD was notifed of transfer along with Clinical Director and [address] [place] [address] Director . On [employer] transfers were NA seondary to decreased O2 sat. On Re Assessment transfers are Educated pt  how to use the denise lift to assit with transfers, we did not get pt into a wc but Educated him how to place the sling under the pt and how to use the [place] we were able to hover the pt over the bed to assit with changing the bottom sheet. Pt  verbalized proper sequencing and technique. We reinforced that this was a 2 person taks and he would need someone one to asist his at all times when using the denise lift for safety. goal met. On SOC  Pt did not sit on the EOB because of decreased O2 sat  Today at [place] transfers are NA On SOC balance was NA. . On Re Assessment Pt was able to tolerate sitting on the EOB for 45 sec to 1 min with BUE suppot with S. Pt reported that she was begining to get dizzy and layed pt back down BP was 98/56 with HR of   O2 sat initial was 82% then after one 1 increased to 88%. Today at [place] balance is NA as pt was medically unstable. Pt has made some progress and max potential for funtional gains has been met. PT services are not appropriate for therapy at this time due to low BP and O2 sat with elevated HR   Discharge plan: Discharge from [address] [place] [address] John Brambila services as max potential for functional gains has been met.  Alvaro Faustin, NP office notifed of DC from [facility] as max potential for functional gain has been met and pt was taken to hospital secondary to low BP and O2 sat with elevated HR.

## 2021-12-14 VITALS
DIASTOLIC BLOOD PRESSURE: 52 MMHG | OXYGEN SATURATION: 90 % | SYSTOLIC BLOOD PRESSURE: 108 MMHG | RESPIRATION RATE: 17 BRPM | HEART RATE: 97 BPM | TEMPERATURE: 97.6 F

## 2021-12-14 NOTE — HOME HEALTH
SUBJECTIVE: Pt in bed upon arrival, pts  had received wedge and half [place] pillows for improved positioning and pressure relief of pt at bed level, pt also had pressure relief boots present and on properly during visit. Pt speech very difficult to understand today and pt had diffculty following directions/answering questions today, very fatigued, Oxygen fluctuated between 88%-90% at rest in bed and BP was somewhat low upon arrival, pt not appropriate to sit up at EOB today. SN arrived towards end of visit, therapist updated on vitals and pt pain level today with attempt at participation. CAREGIVER ASSISTANCE NEEDED FOR: pts  present for final education  MEDICATIONS REVIEWED AND UPDATED: no medication changes reported this visit. .  OBJECTIVE: see interventions  . PATIENT/CAREGIVER EDUCATION PROVIDED THIS VISIT: Therapist educated CG on importance of pt bed mobility, continued exercise as much as she can tolerate to help with circulation and continued encouragement for pt participation to help with activity tolerance. Therapist also educated CG on need for MD order for hospice and to discuss this on appt next week if ppropriate, pts  verbalized understanding. Pt did not have a positive respose to therapy today due to pain exhibited through grimmacing and groaning, pt inability to tolerate much activity and continued nodding off. ASSESSMENT AND PROGRESS TOWARD GOALS:  Pt has not been able to make much progress with home care due to limitations with pain, fatigue, low O2 and BP readings each visit that often decline with activity, pts CG has done a good job with carry over of education and making sure pt is turned regularly in bed and encourages her to participate in ADL and HEP activity with him when she is able to. PLAN: next visit will be for OTDC as pt is unable to progress with therapy at this time due to pain and weakness.  CG thoroughly trained on bed mobility and UB/LB exercises, currently awaiting hospice order/evaluation following MD visit on  as MD will not sign new orders until pt is seen again. OT out next week. DISCHARGE PLANNING DISCUSSED: Discharge to self and family under MD supervision once all goals have been met or patient has reached maximum potential.  Frequency remaininw1 remaining with pt and CG aware of OTDC next week.

## 2021-12-14 NOTE — HOME HEALTH
SUBJECTIVE:HI it is nice to see you again   REQUIRES CAREGIVER ASSISTANCE FOR: transportation, medications, ADLS,IADLS   MEDICATIONS REVIEWED AND UPDATED: no changes  NEXT MD APPT: Leeozzy Randy, NP 12/20/21   ROM:PROM B LE WFL  STRENGTH: NA  WOUNDS:pt now has a blood blister on L heel. PT assited nursing with scaral wound vac change. Pt has 2 areas of breakdown on bottom and blood blistes on B heels and blood blister on R lateral calf  BED MOBILITY:Pt was seen by PT for PTDC today. Nursing was present during tx session. Pt participated with  rolling R and L with bed railing with MOD/MAX A using bed railings. Pt stayed on her L side while therapist A nursing with postioning,  while nurse performed wond vac change Theapist  A  with rolling pt  R and L to change linens under the pt. When we were completed with rolling from her side to be on her back pt became glassy eyed and unrespostive for a couple of seconds. When pt came too she was confused and unable to recall her name O2 sat was 77% with HR going between [id number]. Nursing took pt BP which was 77/42 in R LE in supine. After about 2 min pt BP increased to 86/50 HR was going between  O2 sat incrased between 70-88% pt was able to follow direciton and maintaind a conversation. Pt stated that she was extremely cold. after about 5 min BP was 96/56 O2 sat was 80-85 % and HR was going between . Pt did have another moment where she had increased confusion. EMS was called due to low O2 sat and elevated HR with low BP. Pt was taken to [facility] for further care.  MD was notifed of transfer along with Clinical Director and [address] [place] [address] Director   TRANSFERS:NA  BALANCE: NA  PATIENT EDUCATION PROVIDED THIS VISIT: safety, HEP, walking, deep breathing, PT and nursing discussed with pt  that wound vac care was too much stress on pt body and she was unable to tolerated the procedure and her BP and O2 sat decreased with elevated HR and pt had a small bout of confusion and unresposiveness. Pt  had talked withus about hospice services during tx session and we noted that it maybe appropriate at this time. HEP consisting of:  1. Supine PROM: APs, heel slides, quad sets, SAQ, and SLR x 10 reps each, 3x/day. Written HEP issued, patient/caregiver verbalized understanding. Patient is s/p acute resp failure  and has been treated for ROM, strengthening, , HEP training, safety training, and balance training. On [facility] 11/22/21 strength  was Unable to formaly assess pt guarding B LE as she is fearfull of pain. Pt was not able to move B LE agaist gravity  Pt did have increased pain with movement of L LE. On Re Assessment  12/2/21 strength  is  R hip flexors 1/5 ABD/ADD -2/5 knee flexors/ extendors 1/5 DF/ PF 0/5   L hip flexors -1/5 ADD/ABD 0/5  L knee flexors 0/5 extendsors 1/5 DF/PF 1/5  Today at CT strength is NA.secondary to medically unstable. On SOC bed mobility was Rolling R annd L x 4  using bed railing. to assit with changing lines and to change pt brief as she was soiled with urine. Pt was dependent with movement. Scooting up in bed dependent using a draw sheet  On Re Assessment bed mobility was Rolling R and L x 3 times each direction with MAX A pt was able to asist with reaching for the bed railing when asked each direction. HOB elevated with MAX vc pt was able to transition fom sidelying to sitting with MAX A for lower body management and MOD A for upper body management. From sitting to sidelying pt was [facility] for upper body management and MAX A for lower body management   Today at CT bed mobilty is Pt participated with  rolling R and L with bed railing with MOD/MAX A using bed railings. Pt stayed on her L side while therapist A nursing with postioning,  while nurse performed wond vac change Theapist  A  with rolling pt  R and L to change linens under the pt.   When we were completed with rolling from her side to be on her back pt became glassy eyed and unrespostive for a couple of seconds. When pt came too she was confused and unable to recall her name O2 sat was 77% with HR going between [id number]. Nursing took pt BP which was 77/42 in R LE in supine. After about 2 min pt BP increased to 86/50 HR was going between  O2 sat incrased between 70-88% pt was able to follow direciton and maintaind a conversation. Pt stated that she was extremely cold. after about 5 min BP was 96/56 O2 sat was 80-85 % and HR was going between . Pt did have another moment where she had increased confusion. EMS was called due to low O2 sat and elevated HR with low BP. Pt was taken to [facility] for further care. MD was notifed of transfer along with Clinical Director and [address] [place] [address] Director . On [facility] transfers were NA seondary to decreased O2 sat. On Re Assessment transfers are Educated pt  how to use the denise lift to assit with transfers, we did not get pt into a wc but Educated him how to place the sling under the pt and how to use the [place] we were able to hover the pt over the bed to assit with changing the bottom sheet. Pt  verbalized proper sequencing and technique. We reinforced that this was a 2 person taks and he would need someone one to asist his at all times when using the denise lift for safety. goal met. On SOC  Pt did not sit on the EOB because of decreased O2 sat  Today at [place] transfers are NA On SOC balance was NA. . On Re Assessment Pt was able to tolerate sitting on the EOB for 45 sec to 1 min with BUE suppot with S. Pt reported that she was begining to get dizzy and layed pt back down BP was 98/56 with HR of   O2 sat initial was 82% then after one 1 increased to 88%. Today at [facility] balance is NA as pt was medically unstable. Pt has made some progress and max potential for funtional gains has been met.  PT services are not appropriate for therapy at this time due to low BP and O2 sat with elevated HR   Discharge plan: Discharge from Home Health services as max potential for functional gains has been met. Arnoldo Burt, MEENAKSHI office notifed of DC from HHPT as max potential for functional gain has been met and pt was taken to hospital secondary to low BP and O2 sat with elevated HR.

## 2021-12-15 ENCOUNTER — HOME CARE VISIT (OUTPATIENT)
Dept: HOME HEALTH SERVICES | Facility: HOME HEALTH | Age: 71
End: 2021-12-15
Payer: MEDICARE

## 2021-12-15 VITALS
DIASTOLIC BLOOD PRESSURE: 60 MMHG | SYSTOLIC BLOOD PRESSURE: 88 MMHG | RESPIRATION RATE: 18 BRPM | OXYGEN SATURATION: 80 % | HEART RATE: 130 BPM | TEMPERATURE: 98.7 F

## 2021-12-15 NOTE — Clinical Note
Pt is currently in the hospital and was admitted at midnight last night. Missed visit note placed and scheduling notified.     Thanks in Chase Fernandes

## 2021-12-16 NOTE — HOME HEALTH
Skilled reason for visit: SACRAL WOUND VAC, VS, PAIN, MEDS, UPCOMING MD APPT    PT WAS SENT TO ED:  70's/40's BP, 70% O2 SAT, 'S-190'S and pt becoming non-responsive and mental status changes. Pt VSS before wd care, pt was in excruciating pain during wound care, and turning on side with max assist. Upon returning pt from laying on her side to laying on her back, pt became unresponsive and all vitals were abnormal as stated above. Pt was not able to state name, , city, or answer any questions appropriately. Pt then started to open eyes and 911 was called. RN and PT cont to obtain vitals and try to reorient pt. Pt BP increased to 80's and HR eventually decreased to 130's. Pt oxygenation stayed low in the 80's, pt was SOB at rest. Pt urine was dark brown in brief. Pt  stated pt is not drinking or eating much. Pt has several pressure wounds to heels, and side of left calf. Pt went in and out of consciousness and pt verb she was going to die. Pt  was also educated on pt not being suitable for wound vac tx any longer due to pt not being to tolerate laying on her side and it was no longer safe for nursing to perform wound vac care due to pt being very unstable. Pt was showing all signs of being septic and sacral wound measured 2.5 x 2.5 x 12cm deep and had a very foul smell to it. Pt was watched closely by PT and RN until EMS arrived to provide hx of what took place with pt. Pt hx given to EMS, and pt was taken to [facility]. Director of home care Mis Molina and supervisor Kimi Hdz were called to talk about home visit and changes in pt status. Caregiver involvement: patients cg is spouse and is available as needed or assistance with IADL's, ADL's, meal prep, medication management, and taking patient to all doctors appointments. Medications reviewed and all medications are available in the home this visit.       The following education was provided regarding medications, medication interactions, and look alike medications (specify): tylenol, depakote, and lipitor     Medications  are ineffective at this time. Home health supplies by type and quantity ordered/delivered this visit include: pt has all supplies in home          Patient education provided this visit: sacral wound vac drsg removed, cleansed with dakins and ns and gauze, skin prep applied,  blk foam one piece applied to wound, and secured with drape, bridge to right hip and secured with drape, vac obtained at 125mmhg , demo how to chg canister , how to change canister if necessary, if wound vac not working properly do not leave drsg intact any longer then 2 hours, remove drsg and demo how to apply WTD drsg, and notify home health. patient made aware to monitor for s/s of infection [increased swelling, increased redness around site, increased pain, foul smelling drainage, fever] aware who to report to/when. patient encouraged to monitor for increase in pain and to continue with current pain management and to notify staff/md if pain becomes excrutiating/intolerable. patient made aware to turn every 2 hours and to keep pressure off of bony prominences, to monitor for any pressure ulcer development/worsening. patient aware to monitor for effectiveness and to notify staff of any adverse reactions to medications/any changes to medication regimen. reviewed side effects, purposes, dosage, frequencies.  THE PATIENT AND CG WERE RECOMMENDED TO CALL PCP OR TAKE PT TO THE ER WITH ANY FEVER 101 OR ABOVE, CHILLS, SOB, CHEST PAIN, SEVERE HEADACHE, PROFUSE VOMITING AND/OR DIARRHEA, ACUTE PAIN OR ANY OTHER ACUTE CHANGE         Progress toward goals: cont wound vac therapy, pt to see md on 12/20 for pain mgmt         Home exercise program: per PT AND OT          Continued need for the following skills: no longer roseann for wound care         Patient and/or caregiver notified and agrees to changes in the Plan of Care YES           The following discharge planning was discussed with the pt/caregiver:     Patient will be discharged once education has completed, patient is medically stable and pt/cg are able to independently manage wound care/ wound has healed or no longer requires skilled care.

## 2021-12-29 ENCOUNTER — HOME CARE VISIT (OUTPATIENT)
Dept: SCHEDULING | Facility: HOME HEALTH | Age: 71
End: 2021-12-29
Payer: MEDICARE

## 2021-12-29 PROCEDURE — A5120 SKIN BARRIER, WIPE OR SWAB: HCPCS

## 2021-12-29 PROCEDURE — 400013 HH SOC

## 2021-12-29 PROCEDURE — A6212 FOAM DRG <=16 SQ IN W/BORDER: HCPCS

## 2021-12-29 PROCEDURE — A6213 FOAM DRG >16<=48 SQ IN W/BDR: HCPCS

## 2021-12-29 PROCEDURE — G0299 HHS/HOSPICE OF RN EA 15 MIN: HCPCS

## 2021-12-29 PROCEDURE — A6442 CONFORM BAND N/S W<3"/YD: HCPCS

## 2021-12-29 NOTE — Clinical Note
Saw pt for ENRRIQUE today 12/29. Re-educated and reinforced importance of keeping pt clean and dry. Pt CG still needs alot of reinforcement for education on pain management and turning and keeping pt clean and dry. CG is not giving her any tylenol to help with the pain. I showed him the bottle and told him to set an alarm to give her 2 tablets every 8 hours like the directions say in order to try and give pt some relief from pain. Please reinforce this as much as you can.   Thank you

## 2021-12-30 ENCOUNTER — HOME CARE VISIT (OUTPATIENT)
Dept: HOME HEALTH SERVICES | Facility: HOME HEALTH | Age: 71
End: 2021-12-30
Payer: MEDICARE

## 2021-12-31 ENCOUNTER — HOME CARE VISIT (OUTPATIENT)
Dept: SCHEDULING | Facility: HOME HEALTH | Age: 71
End: 2021-12-31
Payer: MEDICARE

## 2021-12-31 VITALS
DIASTOLIC BLOOD PRESSURE: 68 MMHG | SYSTOLIC BLOOD PRESSURE: 98 MMHG | HEART RATE: 73 BPM | OXYGEN SATURATION: 92 % | TEMPERATURE: 97.6 F | RESPIRATION RATE: 18 BRPM

## 2021-12-31 PROCEDURE — 400013 HH SOC

## 2021-12-31 PROCEDURE — G0300 HHS/HOSPICE OF LPN EA 15 MIN: HCPCS

## 2022-01-03 ENCOUNTER — HOME CARE VISIT (OUTPATIENT)
Dept: SCHEDULING | Facility: HOME HEALTH | Age: 72
End: 2022-01-03
Payer: MEDICARE

## 2022-01-03 PROCEDURE — G0300 HHS/HOSPICE OF LPN EA 15 MIN: HCPCS

## 2022-01-03 NOTE — HOME HEALTH
Skilled reason for visit: Skin assessment and wound treatment    Caregiver involvement: Patient's cg is family. They lives with patient and is available 24/7 for assistance with iadls, adls, meal prep, medication management, taking to md appointments. Medications reviewed and all medications are available in the home this visit. Medications  are effective at this time. Home health supplies by type and quantity ordered/delivered this visit include: Supplies ordered     Patient education provided this visit: Educated spouse t turn pt every 45 mins to an hour. to flaot heels and keep prevoln boots on to help with heels and calfs. Nurse cleaned pt gave bed bath and changed all lines and bed clothing. Pictures and measurements of wounds taken. Pt has an MD appointment tomorrow with the NP to be seen in the office. educated pt to decrease soda and sugar intake and increase protein and water. Educated spouse to look into help for pt and himself. Patient level of understanding of education provided: Pt and sposue verbalzied udneratanding but will be continue reinformcement at each visit. Skilled Care Performed this visit: Wound care and education    Patient response to procedure performed: Pt verbalzied some pain due to being hypersensative to pain    Patient's Progress towards personal goals: WOund are showing no signs of imporvement at this time    Home exercise program: pt conitnues to take  medication as ordered and follows up on all  md appintments    Continued need for the following skills: nursing     Plan for next visit: Wound care     Patient and/or caregiver notified and agrees to changes in the Plan of Care     The following discharge planning was discussed with the pt/caregiver: when patient reaches goals and medication is managed, and disease processes are understood patient agrees and understand that discharge will take place.

## 2022-01-03 NOTE — HOME HEALTH
Skilled reason for visit: Skin assessment and wound treatment     Caregiver involvement: Patient's cg is family. They lives with patient and is available 24/7 for assistance with iadls, adls, meal prep, medication management, taking to md appointments. pt is 100% dependent on spouse for help    Medications reviewed and all medications are available in the home this visit. Medications  are effective at this time. Home health supplies by type and quantity ordered/delivered this visit include: supplies ordered    Patient education provided this visit: patient made aware to turn every 2 hours and to keep pressure off of bony prominences, to monitor for any pressure ulcer development/worsening. Increase protein n diet and monitor for signs and symptoms of infection to include increase drainage, fever over 101.1   Reviewed to reported any redness, swelling, warmth, fever, chills, increased heart/respiratory rate, uncontrolled pain/tenderness, drainage:green/yellow/brown, or odor to MD immediately. spouse educated on changing ts brief every 3 hrs or more if needed if pt is wet. Pt due to see MD on tuesday medical transport was called and set up by     Patient level of understanding of education provided:  understood.     Skilled Care Performed this visit: Wound care ccompleted and education    Patient response to procedure performed: pt and spouse understood education    Patient's Progress towards personal goals: pts wound are getting worse    Home exercise program: pt conitnues to take  medication as ordered and follows up on all  md appintments    Continued need for the following skills: nursing, PT, OT, HHA    Plan for next visit: wound care     Patient and/or caregiver notified and agrees to changes in the Plan of Care     The following discharge planning was discussed with the pt/caregiver: when patient reaches goals and medication is managed, and disease processes are understood patient agrees and understand that discharge will take place.

## 2022-01-05 ENCOUNTER — HOME CARE VISIT (OUTPATIENT)
Dept: SCHEDULING | Facility: HOME HEALTH | Age: 72
End: 2022-01-05
Payer: MEDICARE

## 2022-01-05 ENCOUNTER — HOME CARE VISIT (OUTPATIENT)
Dept: HOME HEALTH SERVICES | Facility: HOME HEALTH | Age: 72
End: 2022-01-05
Payer: MEDICARE

## 2022-01-05 VITALS
SYSTOLIC BLOOD PRESSURE: 106 MMHG | HEART RATE: 78 BPM | TEMPERATURE: 97.9 F | OXYGEN SATURATION: 94 % | DIASTOLIC BLOOD PRESSURE: 68 MMHG | RESPIRATION RATE: 18 BRPM

## 2022-01-05 PROCEDURE — G0300 HHS/HOSPICE OF LPN EA 15 MIN: HCPCS

## 2022-01-07 ENCOUNTER — HOME CARE VISIT (OUTPATIENT)
Dept: HOME HEALTH SERVICES | Facility: HOME HEALTH | Age: 72
End: 2022-01-07
Payer: MEDICARE

## 2022-01-07 ENCOUNTER — HOME CARE VISIT (OUTPATIENT)
Dept: SCHEDULING | Facility: HOME HEALTH | Age: 72
End: 2022-01-07
Payer: MEDICARE

## 2022-01-07 PROCEDURE — A6212 FOAM DRG <=16 SQ IN W/BORDER: HCPCS

## 2022-01-07 PROCEDURE — A6213 FOAM DRG >16<=48 SQ IN W/BDR: HCPCS

## 2022-01-07 PROCEDURE — G0300 HHS/HOSPICE OF LPN EA 15 MIN: HCPCS

## 2022-01-08 VITALS
SYSTOLIC BLOOD PRESSURE: 106 MMHG | RESPIRATION RATE: 18 BRPM | TEMPERATURE: 97.6 F | HEART RATE: 76 BPM | DIASTOLIC BLOOD PRESSURE: 70 MMHG | OXYGEN SATURATION: 95 %

## 2022-01-10 ENCOUNTER — HOME CARE VISIT (OUTPATIENT)
Dept: SCHEDULING | Facility: HOME HEALTH | Age: 72
End: 2022-01-10
Payer: MEDICARE

## 2022-01-10 PROCEDURE — G0299 HHS/HOSPICE OF RN EA 15 MIN: HCPCS

## 2022-01-11 ENCOUNTER — HOME CARE VISIT (OUTPATIENT)
Dept: SCHEDULING | Facility: HOME HEALTH | Age: 72
End: 2022-01-11
Payer: MEDICARE

## 2022-01-11 PROCEDURE — G0156 HHCP-SVS OF AIDE,EA 15 MIN: HCPCS

## 2022-01-12 ENCOUNTER — HOME CARE VISIT (OUTPATIENT)
Dept: SCHEDULING | Facility: HOME HEALTH | Age: 72
End: 2022-01-12
Payer: MEDICARE

## 2022-01-12 PROCEDURE — G0300 HHS/HOSPICE OF LPN EA 15 MIN: HCPCS

## 2022-01-13 ENCOUNTER — HOME CARE VISIT (OUTPATIENT)
Dept: SCHEDULING | Facility: HOME HEALTH | Age: 72
End: 2022-01-13
Payer: MEDICARE

## 2022-01-13 VITALS
OXYGEN SATURATION: 98 % | DIASTOLIC BLOOD PRESSURE: 68 MMHG | HEART RATE: 78 BPM | RESPIRATION RATE: 18 BRPM | SYSTOLIC BLOOD PRESSURE: 106 MMHG | TEMPERATURE: 97.6 F

## 2022-01-13 VITALS
TEMPERATURE: 98.4 F | RESPIRATION RATE: 16 BRPM | TEMPERATURE: 98.3 F | OXYGEN SATURATION: 99 % | HEART RATE: 76 BPM | SYSTOLIC BLOOD PRESSURE: 120 MMHG | RESPIRATION RATE: 16 BRPM | DIASTOLIC BLOOD PRESSURE: 72 MMHG | HEART RATE: 71 BPM | SYSTOLIC BLOOD PRESSURE: 106 MMHG | OXYGEN SATURATION: 100 % | DIASTOLIC BLOOD PRESSURE: 64 MMHG

## 2022-01-13 PROCEDURE — G0156 HHCP-SVS OF AIDE,EA 15 MIN: HCPCS

## 2022-01-13 NOTE — HOME HEALTH
Skilled reason for visit: skin assessment and wound treatment     Caregiver involvement:Patient's cg is family. They lives with patient and is available 24/7 for assistance with iadls, adls, meal prep, medication management, taking to md appointments. Medications reviewed and all medications are available in the home this visit. Medications  are effective at this time. Home health supplies by type and quantity ordered/delivered this visit include: supplies arrived    Patient education provided this visit: patient made aware to turn every 2 hours and to keep pressure off of bony prominences, to monitor for any pressure ulcer development/worsening. Reviewed to reported any redness, swelling, warmth, fever, chills, increased heart/respiratory rate, uncontrolled pain/tenderness, drainage:green/yellow/brown, or odor to MD immediately. Called transportation to set up ride for oncology appt on the 20th and was informed that pts coverage was no longer available. called insurance company to find out they had changed carries since last ride and they needed to add it back on again. Whn nurse left the sppuse was still on the phone with the insurance setting everthing up.  Pt was given a bath and changed, cleaned wound care completed and pt wa repositioned    Patient level of understanding of education provided: pt and spouse verbazlied understanding of s/s of infection by repeating back what they were    Skilled Care Performed this visit: wound care and skin assessment    Patient response to procedure performed: pain 3/10 verbalized but pt is hypersensative to pain     Home exercise program: pt conitnues to take  medication as ordered and follows up on all  md appintments    Continued need for the following skills: nursing     Plan for next visit: wound care and skin assessment    Patient and/or caregiver notified and agrees to changes in the Plan of Care     The following discharge planning was discussed with the pt/caregiver: when patient reaches goals and medication is managed, and disease processes are understood patient agrees and understand that discharge will take place.

## 2022-01-13 NOTE — HOME HEALTH
Skilled reason for visit: Wound care, VS, MEDS    Caregiver involvement: Patient's cg is family. They lives with patient and is available 24/7 for assistance with iadls, adls, meal prep, medication management, taking to md appointments. Medications reviewed and all medications are available in the home this visit. Medications are effective at this time. Home health supplies by type and quantity ordered/delivered this visit include:   Patient education provided this visit: patient made aware to turn every 2 hours and to keep pressure off of bony prominences, to monitor for any pressure ulcer development/worsening. patient/cg instructed to monitor for edema/increase in edema, to elevate extremity when edema occurs and to notify md if edema exceeds normal limits for patient. deep breathing exercises, use inhaler, relaxation techniques. patient aware to monitor for effectiveness and to notify staff of any adverse reactions to medications/any changes to medication regimen. reviewed side effects, purposes, dosage, frequencies. INSTRUCTED PATIENT AND CG THAT SHOULD ANY NEEDS OR CONCERNS ARISE TO FIRST CALL OUR OFFICE, OR THE DR'S OFFICE  OR GO TO AN URGENT CARE CENTER AND NOT TO THE ED FOR NON-LIFE THREATENING EVENTS. IF IT IS LIFE THREATENING THEN CALL 911 OR GO TO THE CLOSEST ER. pt aware to keep dressing clean, dry and intact as ordered. patient made aware to monitor for s/s of infection [increased swelling, increased redness around site, increased pain, foul smelling drainage, fever] aware who to report to/when. Instruct patient/caregiver on good hygiene, complete bladder emptying, and avoiding use of powders or lotions. Instruct on how to recognize symptoms of urinary tract infection including elevated temperature, changes in mental status or confusion, frequency of urination, pain, hesitancy and urgency, malodorous urine, urine that has changed in color or clarity.  Instruct patient/caregiver on importance of adequate nutrition and hydration for wound healing. Healthy foods give your body the nutrients it needs to heal wounds. Protein foods like meat, fish, nuts, and soy products are important to wound healing. In addition to protein, calories, vitamin C, and zinc help wounds heal. Liquids prevent dehydration that can decrease the blood supply to wounds. Always follow physician recommended diet in regards to patient condition. Instruct on other factors that affect wound healing such as: maintaining general hygiene, not smoking or using tobacco products, offloading pressure and pressure relieving devices, and management of blood sugars. Patient/CG educated on use of turning as non-pharmacological form of pain treatment. Educated spouse on floating pts heels to keep them protected as well as applied skin prep to heels to help with healing. Patient's Progress towards personal goals: Pt experienced 6/10 during wound care, educated pt on relaxation techniques and after the dressing change was completed and pt practiced breathing techniques pain level decreased to 3/10. Pt stated that was a tolerable level  Home exercise program: pt conitnues to take medication as ordered and follows up on all md appintments   Continued need for the following skills: Nursing   Patient and/or caregiver notified and agrees to changes in the Plan of Care yes   The following discharge planning was discussed with the pt/caregiver: when patient reaches goals and medication is managed, and disease processes are understood patient agrees and understand that discharge will take place.

## 2022-01-13 NOTE — HOME HEALTH
Skilled reason for visit: SACRAL WOUND CARE, VS, PAIN, MEDS  Caregiver involvement: patients cg is spouse and is available as needed or assistance with IADL's, ADL's, meal prep, medication management, and taking patient to all doctors appointments. Medications reviewed and all medications are available in the home this visit. The following education was provided regarding medications, medication interactions, and look alike medications (specify): tylenol   Medications are ineffective at this time. Home health supplies by type and quantity ordered/delivered this visit include: 4x4, gauze, tape, abd pads    Education provided this visit:  All patient medications were reviewed, including side effects, safety, time to administer, purposes, dosages, and frequencies. INSTRUCTED PATIENT AND CG THAT SHOULD ANY NEEDS OR CONCERNS ARISE TO FIRST CALL OUR OFFICE, OR THE DR'S OFFICE  OR GO TO AN URGENT CARE CENTER AND NOT TO THE ED FOR NON-LIFE THREATENING EVENTS. IF IT IS LIFE THREATENING THEN CALL 911 OR GO TO THE CLOSEST ER. patient to follow eliquis regimen and to monitor for s/s of [EXCESSIVE BLEDDING, BRUSING, BLEEDING GUMS, BLACK TARY STOOLS, BLOODY STOOLS] and report to [facility] TO REDUCE CONSTIPATION, TURN Q2 HR TO PREVENT ANY NEW PRESSURE ULCERS, TAKE MEDS AS PRESCRIBED, REDUCE FALL RISKS AND INCREASE STRENGTH AND BALANCE. Pt educated to eat a nutritious, high protein diet [90g] and consume 5-6 small meals per day to promote wound healing. Progress toward goals: PATIENT IS STEADILY PROGRESSING TOWARDS GOALS, STILL NEEDS REINFORCEMENT/ENCOURAGEMENT. WILL CONTINUE TO MONITOR. Home exercise program: activity as tolerated, trying to get physical activity 4-5 x weekly. stopping activity if causing shortness of breath or chest pain, dizziness or weakness.     Continued need for the following skills: Nursing, Physical Therapy and Occupational Therapy   Patient and/or caregiver notified and agrees to changes in the Plan of Care YES  The following discharge planning was discussed with the pt/caregiver:   Patient will be discharged once education has completed, patient is medically stable and pt/cg are able to independently manage wound care/ wound has healed or no longer requires skilled care.

## 2022-01-14 ENCOUNTER — HOME CARE VISIT (OUTPATIENT)
Dept: SCHEDULING | Facility: HOME HEALTH | Age: 72
End: 2022-01-14
Payer: MEDICARE

## 2022-01-14 VITALS
DIASTOLIC BLOOD PRESSURE: 62 MMHG | RESPIRATION RATE: 18 BRPM | SYSTOLIC BLOOD PRESSURE: 108 MMHG | TEMPERATURE: 97.6 F | OXYGEN SATURATION: 98 % | HEART RATE: 78 BPM

## 2022-01-14 PROCEDURE — G0300 HHS/HOSPICE OF LPN EA 15 MIN: HCPCS

## 2022-01-14 NOTE — HOME HEALTH
Skilled reason for visit: Skin assessment and wound care     Caregiver involvement: Patient's cg is family. They lives with patient and is available 24/7 for assistance with iadls, adls, meal prep, medication management, taking to md appointments. Medications reviewed and all medications are available in the home this visit. Medications  are effective at this time. Home health supplies by type and quantity ordered/delivered this visit include: Supplies ordered 1/14/22    Patient education provided this visit: patient made aware to turn every 2 hours and to keep pressure off of bony prominences, to monitor for any pressure ulcer development/worsening. Reviewed to reported any redness, swelling, warmth, fever, chills, increased heart/respiratory rate, uncontrolled pain/tenderness, drainage:green/yellow/brown, or odor to MD immediately. Spouse verbalzied he is tring to get the [address] for the pt to help with the incontinence still waiting to get the paperwork in the mail. Educated spouse to call the insurance on monday to see if transportation was added so he an get up transport for her appt on the 20th. Inquired with the office about pt continuing to get 2x a week HHA visits communication note sent to RN doing recert on monday. Patient level of understanding of education provided:  pt and spouse verbalzied understanding of turning and repositoning to keep pressure off wounds.      Skilled Care Performed this visit: wound care     Patient response to procedure performed: minimal pain voiced 2/10 during dressing change    Home exercise program: pt conitnues to take  medication as ordered and follows up on all  md appintments    Continued need for the following skills: nursing HHA     Plan for next visit: wound care     Patient and/or caregiver notified and agrees to changes in the Plan of Care     The following discharge planning was discussed with the pt/caregiver: when patient reaches goals and medication is managed, and disease processes are understood patient agrees and understand that discharge will take place.

## 2022-01-18 ENCOUNTER — HOME CARE VISIT (OUTPATIENT)
Dept: SCHEDULING | Facility: HOME HEALTH | Age: 72
End: 2022-01-18
Payer: MEDICARE

## 2022-01-18 VITALS
RESPIRATION RATE: 16 BRPM | DIASTOLIC BLOOD PRESSURE: 62 MMHG | TEMPERATURE: 98.7 F | SYSTOLIC BLOOD PRESSURE: 100 MMHG | OXYGEN SATURATION: 97 % | HEART RATE: 78 BPM

## 2022-01-18 PROCEDURE — G0299 HHS/HOSPICE OF RN EA 15 MIN: HCPCS

## 2022-01-18 PROCEDURE — G0156 HHCP-SVS OF AIDE,EA 15 MIN: HCPCS

## 2022-01-20 NOTE — HOME HEALTH
Physician Notification and Justification to Continue Services:     Justification for continued intermittent care: patient with wound care concerns as follows:  RIGHT BUTTOCKS-wound care Pack RT buttock wound with Kerlix moistened with 1/4 Dakin's solution and cover with Allevyn foam dressing. Change every 12 hours and as needed. SACRAL WOUND-Apply Allevyn to sacral wound after cleaning. Seal edges with skin prep to protect from stool. Change daily and PRN if soiled. BILATERAL HEELS-Apply Allevyn foam to bilateral heels. Change every 3 days and PRN. RIGHT CALF-Apply allevyn foam to right calf unstageable eschar wounds. Change every 3 days and prn as needed. Theresa Bond NP notified of the following: the need for continued Skilled Nursing and [id number] [facility] home health services for above reasons, plan of care including visit frequency of 1w1, 3w8, 2prn Skilled Nursing for : additional assessment and education on s/s of infecion and sepsis due to extensive, large wounds, medication concerns as follows: pt is only taking tylenol for pain mgmt. Pt verbalizes that her pain is very intense and is not under control and additional assessment and wound care services. Skilled reason for visit: RECERT, PAIN, MEDS, WOUND CARE, VS, PT F/U WITH ONCOLOGY 1/20    Caregiver involvement: patients cg is spouse and they are available as needed or assistance with IADL's, ADL's, meal prep, medication management, and taking patient to all doctors appointments. Medications reviewed and all medications are available in the home this visit. The following education was provided regarding medications, medication interactions, and look alike medications (specify): tylenol, depokote, iron, lipitor  Medications are effective at this time.   Medications reconciled and all meds in home    Home health supplies by type and quantity ordered/delivered this visit include: PT [address]      Patient education provided this visit: Discussed extensively with patient and spouse reg. pain and that pt is not getting any prescription medication for pain. Her PCP is Heide Gaines NP, and she will not prescribe any pain meds to patient. Pt has been told to take tylenol and pt verb it doesnt help. Pt would like to talk to hospice concerning her pain and any assistance that they can provide for her. RN will also call PCP and discuss other avenues to address pts pain to make her more comfortable during wound care, bathing, and turning pt. Pt is bedbound and is dependent on all levels of care. Pt spouse is main caregiver. 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 Pt is taking FE x3 daily. Pt sees oncologist on 1/20/22. Continue wound care to sacrum, and multiple pressure wounds, assist pt with finding pain management resource. Wound care orders as follows:   RIGHT BUTTOCK WOUND: Pack with Kerlix moistened with 1/4 Dakin's solution and cover with Allevyn foam dressing. Change every 12 hours and as needed. SACRAL WOUND: Apply Allevyn to sacral wound after cleaning. Seal edges with skin prep to protect from stool. Change daily and PRN if soiled. RIGHT AND LEFT HEEL WOUNDS: Apply Allevyn foam to bilateral heels. Change every 3 days and PRN. RIGHT CALF WOUND: Apply Allevyn foam to right calf eschar unstageable wound. Change every 3 days and prn.      Teach patient and caregiver the signs and symptoms of infection (fever > 100.4°F, chills, confusion, lethargy, cough, increased amount or discolored mucus, body aches, urine appears bloody or purulent, foul odor, urinary frequency or pain and wound has increased pain, redness, swelling, purulent drainage or foul odor) and when to report to physician. patient made aware to turn every 2 hours and to keep pressure off of bony prominences, to monitor for any pressure ulcer development/worsening. patient/cg instructed to monitor for edema/increase in edema, to elevate extremity when edema occurs and to notify md if edema exceeds normal limits for patient. patient encouraged to monitor for increase in pain and to continue with current pain management and to notify staff/md if pain becomes excrutiating/intolerable. THE PATIENT AND CG WERE RECOMMENDED TO CALL PCP OR TAKE PT TO THE ER WITH ANY FEVER 101 OR ABOVE, CHILLS, SOB, CHEST PAIN, SEVERE HEADACHE, PROFUSE VOMITING AND/OR DIARRHEA, ACUTE PAIN OR ANY OTHER ACUTE CHANGE. Patient level of understanding of education provided: pt is understanding of all procedures performed. Skilled Care Performed this visit: Recert, wound care and addressed patients pain 10/10 during wound care, cleaning, bathing, turning, and touching patient. Patient response to procedure performed: Pt was in excruciating pain, vocalizing intense pain when touching her, moving her extremities, and turning side to side for wound care. Pt is also getting right buttocks wound packed and verb it is very painful and horrible smell. Agency Progress toward goals: address pts pain    Patient's Progress towards personal goals: pt wants her pain to be controlled better    Home exercise program: continue home exercises program as developed by physical therapy     Continued need for the following skills: Nursing and Physical Therapy    Plan for next visit: wound care, and possible eval/hospice phone consult. Patient and/or caregiver notified and agrees to changes in the Plan of Care YES      The following discharge planning was discussed with the pt/caregiver: Patient will be discharged once wound care education has completed, patient is medically stable and pt/cg are able to independently manage medications and disease process.

## 2022-01-21 ENCOUNTER — HOME CARE VISIT (OUTPATIENT)
Dept: SCHEDULING | Facility: HOME HEALTH | Age: 72
End: 2022-01-21
Payer: MEDICARE

## 2022-01-21 VITALS
OXYGEN SATURATION: 96 % | HEART RATE: 78 BPM | SYSTOLIC BLOOD PRESSURE: 110 MMHG | TEMPERATURE: 97.6 F | DIASTOLIC BLOOD PRESSURE: 60 MMHG | RESPIRATION RATE: 18 BRPM

## 2022-01-21 PROCEDURE — G0156 HHCP-SVS OF AIDE,EA 15 MIN: HCPCS

## 2022-01-21 PROCEDURE — A6213 FOAM DRG >16<=48 SQ IN W/BDR: HCPCS

## 2022-01-21 PROCEDURE — A6212 FOAM DRG <=16 SQ IN W/BORDER: HCPCS

## 2022-01-21 PROCEDURE — G0300 HHS/HOSPICE OF LPN EA 15 MIN: HCPCS

## 2022-01-21 PROCEDURE — 400014 HH F/U

## 2022-01-21 NOTE — HOME HEALTH
Skilled reason for visit: Skin assessment and wound treatment     Caregiver involvement: Patient's cg is family. They lives with patient and is available 24/7 for assistance with iadls, adls, meal prep, medication management, taking to md appointments. Medications reviewed and all medications are available in the home this visit. Medications  are effective at this time. Home health supplies by type and quantity ordered/delivered this visit include: supplies ordered but have not arrived as of yet. Patient education provided this visit: Pt went to Oncology on the 20th they are startting her on Lenalidomide and to follow up on the 24h of Feb with oncology. Supplies still not arrived. Educated on turning and repositioning pt often to relieve pressure off sacral area. Pain today was 8/10 in lower back once pt was moved up inbed she voiced it was feeling better. Patient level of understanding of education provided:     Skilled Care Performed this visit: wound care     Patient response to procedure performed: pain 8/10 during dressing changes     Patient's Progress towards personal goals: pts wound are not getting better at this time     Home exercise program: pt conitnues to take  medication as ordered and follows up on all  md appintments    Continued need for the following skills: nursing / [address] [place] [address] Pkwy for next visit: wound care     Patient and/or caregiver notified and agrees to changes in the Plan of Care     The following discharge planning was discussed with the pt/caregiver: when patient reaches goals and medication is managed, and disease processes are understood patient agrees and understand that discharge will take place.

## 2022-01-24 ENCOUNTER — HOME CARE VISIT (OUTPATIENT)
Dept: SCHEDULING | Facility: HOME HEALTH | Age: 72
End: 2022-01-24
Payer: MEDICARE

## 2022-01-24 VITALS
HEART RATE: 78 BPM | TEMPERATURE: 97.6 F | RESPIRATION RATE: 18 BRPM | OXYGEN SATURATION: 96 % | SYSTOLIC BLOOD PRESSURE: 106 MMHG | DIASTOLIC BLOOD PRESSURE: 68 MMHG

## 2022-01-24 PROCEDURE — G0156 HHCP-SVS OF AIDE,EA 15 MIN: HCPCS

## 2022-01-24 PROCEDURE — G0300 HHS/HOSPICE OF LPN EA 15 MIN: HCPCS

## 2022-01-24 NOTE — HOME HEALTH
Skilled reason for visit:     Caregiver involvement:    Medications reviewed and all medications are available in the home this visit. Medications  are effective at this time. Home health supplies by type and quantity ordered/delivered this visit include: supplies due to arrive tomorrow     Patient education provided this visit:     Patient level of understanding of education provided:     Skilled Care Performed this visit:     Patient response to procedure performed:     Patient's Progress towards personal goals:     Home exercise program: pt conitnues to take  medication as ordered and follows up on all  md appintments    Continued need for the following skills:     Plan for next visit: wound care     Patient and/or caregiver notified and agrees to changes in the Plan of Care     The following discharge planning was discussed with the pt/caregiver: when patient reaches goals and medication is managed, and disease processes are understood patient agrees and understand that discharge will take place.

## 2022-01-26 ENCOUNTER — HOME CARE VISIT (OUTPATIENT)
Dept: SCHEDULING | Facility: HOME HEALTH | Age: 72
End: 2022-01-26
Payer: MEDICARE

## 2022-01-26 VITALS
RESPIRATION RATE: 18 BRPM | TEMPERATURE: 97.6 F | OXYGEN SATURATION: 95 % | DIASTOLIC BLOOD PRESSURE: 60 MMHG | HEART RATE: 78 BPM | SYSTOLIC BLOOD PRESSURE: 102 MMHG

## 2022-01-26 PROCEDURE — G0300 HHS/HOSPICE OF LPN EA 15 MIN: HCPCS

## 2022-01-26 NOTE — Clinical Note
I called MD office to request Medihoney, abd pad and kerlix to tright calf wound no response at this time. I am off Friday and Monday so if no communication received before I leave will someone please follow up. They also recieved the periwick today I hooked it all up and educated spouse how to change. She also strated on Remilade (sp) I have entered it into her medications already.     Thanks in advance  Zhanna

## 2022-01-28 ENCOUNTER — HOME CARE VISIT (OUTPATIENT)
Dept: HOME HEALTH SERVICES | Facility: HOME HEALTH | Age: 72
End: 2022-01-28
Payer: MEDICARE

## 2022-01-28 ENCOUNTER — HOME CARE VISIT (OUTPATIENT)
Dept: SCHEDULING | Facility: HOME HEALTH | Age: 72
End: 2022-01-28
Payer: MEDICARE

## 2022-01-28 VITALS
TEMPERATURE: 97.8 F | RESPIRATION RATE: 16 BRPM | HEART RATE: 76 BPM | DIASTOLIC BLOOD PRESSURE: 62 MMHG | OXYGEN SATURATION: 95 % | SYSTOLIC BLOOD PRESSURE: 104 MMHG

## 2022-01-28 PROCEDURE — G0156 HHCP-SVS OF AIDE,EA 15 MIN: HCPCS

## 2022-01-28 PROCEDURE — G0299 HHS/HOSPICE OF RN EA 15 MIN: HCPCS

## 2022-01-29 NOTE — HOME HEALTH
Skilled reason for visit: wound care. Pt saw her oncolgist on 1/20 and wasn't prescribed any pain meds at her appt. I called Dr Ludivina Pryor oncologist and spoke with RN. Orders given by Debbi Juárez NP for Tramadol 50mg q 6 hrs for pain.  was notified of patients pain meds called into pharmacy. Pt was at 10/10 with pain today. Pt verb if her pain cant be addressed by her oncolgist then she wants a Hospice eval.       Caregiver involvement: patients cg is spouse and they are available as needed or assistance with IADL's, ADL's, meal prep, medication management, and taking patient to all doctors appointments. Medications reviewed and all medications are available in the home this visit. The following education was provided regarding medications, medication interactions, and look alike medications (specify): Revlimid for blood disorder and Iron   Medications are effective at this time. Medications reconciled and all meds in home    Home health supplies by type and quantity ordered/delivered this visit include: PT [address]      Patient education provided this visit: Continue wound care to sacrum, and multiple pressure wounds, assist pt with getting pain med RX    Wound care orders as follows:     RIGHT BUTTOCK WOUND: Pack with Kerlix moistened with 1/4 Dakin's solution and cover with Allevyn foam dressing. Change every 12 hours and as needed. SACRAL WOUND: Apply Allevyn to sacral wound after cleaning. Seal edges with skin prep to protect from stool. Change daily and PRN if soiled. RIGHT AND LEFT HEEL WOUNDS: Apply Allevyn foam to bilateral heels. Change every 3 days and PRN. RIGHT CALF WOUND: Apply Allevyn foam to right calf eschar unstageable wound. Change every 3 days and prn. Patient level of understanding of education provided: pt is understanding of all procedures performed.      Skilled Care Performed this visit: wound care, pain mgmt, vs, and meds    Patient response to procedure performed: Pt was in unbearable pain during wound care. Pt was crying and verb how she can take the pain when her  just moves her leg or she has to turn for wound care. Agency Progress toward goals: See interventions    Patient's Progress towards personal goals: address pain with oncologist    Home exercise program: continue home exercises program as developed by physical therapy     Continued need for the following skills: Nursing and Physical Therapy    Plan for next visit: wound care, assess tramadol meds to see if it is helping pts pain    Patient and/or caregiver notified and agrees to changes in the Plan of Care YES      The following discharge planning was discussed with the pt/caregiver: Patient will be discharged once education has completed, patient is medically stable and pt/cg are able to independently manage medications and disease process.

## 2022-01-31 ENCOUNTER — HOME CARE VISIT (OUTPATIENT)
Dept: SCHEDULING | Facility: HOME HEALTH | Age: 72
End: 2022-01-31
Payer: MEDICARE

## 2022-01-31 PROCEDURE — MED10158 APPLICATOR, COTTON-TIP, WOOD, 6, STRL

## 2022-01-31 PROCEDURE — G0299 HHS/HOSPICE OF RN EA 15 MIN: HCPCS

## 2022-01-31 PROCEDURE — A6216 NON-STERILE GAUZE<=16 SQ IN: HCPCS

## 2022-01-31 PROCEDURE — A6442 CONFORM BAND N/S W<3"/YD: HCPCS

## 2022-01-31 PROCEDURE — A6212 FOAM DRG <=16 SQ IN W/BORDER: HCPCS

## 2022-02-01 ENCOUNTER — HOME CARE VISIT (OUTPATIENT)
Dept: SCHEDULING | Facility: HOME HEALTH | Age: 72
End: 2022-02-01
Payer: MEDICARE

## 2022-02-01 PROCEDURE — G0156 HHCP-SVS OF AIDE,EA 15 MIN: HCPCS

## 2022-02-02 ENCOUNTER — HOME CARE VISIT (OUTPATIENT)
Dept: SCHEDULING | Facility: HOME HEALTH | Age: 72
End: 2022-02-02
Payer: MEDICARE

## 2022-02-02 VITALS
DIASTOLIC BLOOD PRESSURE: 60 MMHG | SYSTOLIC BLOOD PRESSURE: 102 MMHG | OXYGEN SATURATION: 93 % | TEMPERATURE: 97.6 F | HEART RATE: 78 BPM | RESPIRATION RATE: 18 BRPM

## 2022-02-02 PROCEDURE — G0300 HHS/HOSPICE OF LPN EA 15 MIN: HCPCS

## 2022-02-02 NOTE — HOME HEALTH
Skilled reason for visit: Skin assessment and wound care     Caregiver involvement: Patient's cg is family. They lives with patient and is available 24/7 for assistance with iadls, adls, meal prep, medication management, taking to md appointments. Medications reviewed and all medications are available in the home this visit. Medications  are effective at this time. Home health supplies by type and quantity ordered/delivered this visit include: supplies available     Patient education provided this visit: patient made aware to turn every 2 hours and to keep pressure off of bony prominences, to monitor for any pressure ulcer development/worsening. Increase protein n diet and monitor for signs and symptoms of infection to include increase drainage, fever over 101.1  Educated pt that due to insurance he is going to have to learn to do her wound care. He verbalzied he coud do all of them exceptt the one that has to be packed. Nurse also eduated that [employer] denied all HHA visits and he was going to call them and find out why. Nurse gave pt a bed bath and changed al beulha sheets and helped with the periwick . Called MD office with new wound care order suggestion was given VO to change wound care as needed to help pt. Pt was concerned that she wanted to get up and wal. Nurse educated that she would beed intense rehab in order to do that its been 8 months since she walked.  Pt mentioned hospice Nuse explaned to her that at this time she does not have a diagnosis that is terminal and she may not qualify     Patient level of understanding of education provided: 100% understanding     Skilled Care Performed this visit: Wound care     Patient response to procedure performed: pain 3/10 verbalized     Home exercise program: pt conitnues to take  medication as ordered and follows up on all  md appintments    Continued need for the following skills: nursing     Plan for next visit: wound care     Patient and/or caregiver notified and agrees to changes in the Plan of Care     The following discharge planning was discussed with the pt/caregiver: when patient reaches goals and medication is managed, and disease processes are understood patient agrees and understand that discharge will take place.

## 2022-02-03 VITALS
DIASTOLIC BLOOD PRESSURE: 60 MMHG | RESPIRATION RATE: 16 BRPM | TEMPERATURE: 97.1 F | SYSTOLIC BLOOD PRESSURE: 110 MMHG | OXYGEN SATURATION: 95 % | HEART RATE: 76 BPM

## 2022-02-03 NOTE — HOME HEALTH
Skilled reason for visit: wound care to sacral, assess effectiveness of pain meds, vs, meds    Caregiver involvement: patients cg is spouse and they are available as needed or assistance with IADL's, ADL's, meal prep, medication management, and taking patient to all doctors appointments. Medications reviewed and all medications are available in the home this visit. The following education was provided regarding medications, medication interactions, and look alike medications (specify): tramodol   Medications are effective at this time. Medications reconciled and all meds in home    Home health supplies by type and quantity ordered/delivered this visit include: ordered kerlix, q tips, gauze 4x4      Patient education provided this visit: see interventions    Patient level of understanding of education provided: pt is understanding of all procedures performed. Skilled Care Performed this visit: wound care    Patient response to procedure performed: pt was in alot of pain and verb it during wound care. pt had taken tramadol 1 hr before wound care    Agency Progress toward goals: cont to treat wounds. Patient's Progress towards personal goals: pain mgmt    Home exercise program: continue home exercises program as developed by physical therapy     Continued need for the following skills: Nursing     Plan for next visit: wound care     Patient and/or caregiver notified and agrees to changes in the Plan of Care YES      The following discharge planning was discussed with the pt/caregiver: Patient will be discharged once education has completed, patient is medically stable and pt/cg are able to independently manage medications and disease process.

## 2022-02-04 ENCOUNTER — HOME CARE VISIT (OUTPATIENT)
Dept: SCHEDULING | Facility: HOME HEALTH | Age: 72
End: 2022-02-04
Payer: MEDICARE

## 2022-02-04 PROCEDURE — G0300 HHS/HOSPICE OF LPN EA 15 MIN: HCPCS

## 2022-02-05 VITALS
SYSTOLIC BLOOD PRESSURE: 100 MMHG | TEMPERATURE: 98.4 F | RESPIRATION RATE: 20 BRPM | HEART RATE: 92 BPM | DIASTOLIC BLOOD PRESSURE: 58 MMHG | OXYGEN SATURATION: 94 %

## 2022-02-05 NOTE — HOME HEALTH
Skilled reason for visit: Skin assessment and wound care     Caregiver involvement: Patient's cg is family. They lives with patient and is available 24/7 for assistance with iadls, adls, meal prep, medication management, taking to md appointments. Medications reviewed and all medications are available in the home this visit. Medications  are effective at this time. Home health supplies by type and quantity ordered/delivered this visit include: supplies ordered    Patient education provided this visit: patient made aware to turn every 2 hours and to keep pressure off of bony prominences, to monitor for any pressure ulcer development/worsening. Increase protein n diet and monitor for signs and symptoms of infection to include increase drainage, fever over 101.1  Reviewed to increase fiber/fruits/vegetables in diet, hydration,ambulation and OTC stool softner to improve constipation. verbalized understanding. Patient level of understanding of education provided: pt and caregiver verbalzied understanding by repeating s/s of infection     Skilled Care Performed this visit: wound care     Patient response to procedure performed: pain vrbalized 5/10 when turning     Home exercise program: pt conitnues to take  medication as ordered and follows up on all  md appintments    Continued need for the following skills: nursing     Plan for next visit: wond care and ADL assistance     Patient and/or caregiver notified and agrees to changes in the Plan of Care     The following discharge planning was discussed with the pt/caregiver: when patient reaches goals and medication is managed, and disease processes are understood patient agrees and understand that discharge will take place.

## 2022-02-07 ENCOUNTER — HOME CARE VISIT (OUTPATIENT)
Dept: SCHEDULING | Facility: HOME HEALTH | Age: 72
End: 2022-02-07
Payer: MEDICARE

## 2022-02-07 VITALS
OXYGEN SATURATION: 95 % | SYSTOLIC BLOOD PRESSURE: 102 MMHG | HEART RATE: 78 BPM | TEMPERATURE: 97.8 F | RESPIRATION RATE: 20 BRPM | DIASTOLIC BLOOD PRESSURE: 68 MMHG

## 2022-02-07 PROCEDURE — G0300 HHS/HOSPICE OF LPN EA 15 MIN: HCPCS

## 2022-02-07 NOTE — HOME HEALTH
Skilled reason for visit: Wound Care and skin assessment     Caregiver involvement: Patient's cg is family. They lives with patient and is available 24/7 for assistance with iadls, adls, meal prep, medication management, taking to md appointments. Medications reviewed and all medications are available in the home this visit. Medications  are effective at this time. Home health supplies by type and quantity ordered/delivered this visit include: supplies ordered for wound care    Patient education provided this visit: patient made aware to turn every 2 hours and to keep pressure off of bony prominences, to monitor for any pressure ulcer development/worsening. Reviewed to reported any redness, swelling, warmth, fever, chills, increased heart/respiratory rate, uncontrolled pain/tenderness, drainage:green/yellow/brown, or odor to MD immediately. Constipation: Reviewed to increase fiber/fruits/vegetables in diet, hydration,ambulation and OTC stool softner to improve constipation. verbalized understanding. WOund care completed, bed bath completed and changed all sheets and linens. Patient level of understanding of education provided: pt and spouse verbalzied undertanding of turning and repositioning pt to help with wound healing.      Skilled Care Performed this visit: wound care and skin assesment     Patient response to procedure performed: pain 4/10 verbalized during turning and repositiong     Home exercise program: pt conitnues to take  medication as ordered and follows up on all  md appintments    Continued need for the following skills: nursing     Plan for next visit: skin care and wound treatment     Patient and/or caregiver notified and agrees to changes in the Plan of Care     The following discharge planning was discussed with the pt/caregiver: when patient reaches goals and medication is managed, and disease processes are understood patient agrees and understand that discharge will take place.

## 2022-02-09 ENCOUNTER — HOME CARE VISIT (OUTPATIENT)
Dept: SCHEDULING | Facility: HOME HEALTH | Age: 72
End: 2022-02-09
Payer: MEDICARE

## 2022-02-09 VITALS
DIASTOLIC BLOOD PRESSURE: 60 MMHG | HEART RATE: 67 BPM | TEMPERATURE: 97.8 F | RESPIRATION RATE: 19 BRPM | OXYGEN SATURATION: 93 % | SYSTOLIC BLOOD PRESSURE: 102 MMHG

## 2022-02-09 PROCEDURE — A6443 CONFORM BAND N/S W>=3"<5"/YD: HCPCS

## 2022-02-09 PROCEDURE — G0300 HHS/HOSPICE OF LPN EA 15 MIN: HCPCS

## 2022-02-09 PROCEDURE — A4649 SURGICAL SUPPLIES: HCPCS

## 2022-02-09 PROCEDURE — A6252 ABSORPT DRG >16 <=48 W/O BDR: HCPCS

## 2022-02-09 PROCEDURE — A6442 CONFORM BAND N/S W<3"/YD: HCPCS

## 2022-02-11 ENCOUNTER — HOME CARE VISIT (OUTPATIENT)
Dept: SCHEDULING | Facility: HOME HEALTH | Age: 72
End: 2022-02-11
Payer: MEDICARE

## 2022-02-11 PROCEDURE — G0300 HHS/HOSPICE OF LPN EA 15 MIN: HCPCS

## 2022-02-14 ENCOUNTER — HOME CARE VISIT (OUTPATIENT)
Dept: SCHEDULING | Facility: HOME HEALTH | Age: 72
End: 2022-02-14
Payer: MEDICARE

## 2022-02-14 VITALS
OXYGEN SATURATION: 98 % | SYSTOLIC BLOOD PRESSURE: 102 MMHG | TEMPERATURE: 98.7 F | DIASTOLIC BLOOD PRESSURE: 60 MMHG | HEART RATE: 69 BPM | RESPIRATION RATE: 18 BRPM

## 2022-02-14 VITALS
RESPIRATION RATE: 16 BRPM | DIASTOLIC BLOOD PRESSURE: 70 MMHG | OXYGEN SATURATION: 100 % | SYSTOLIC BLOOD PRESSURE: 130 MMHG | HEART RATE: 80 BPM | TEMPERATURE: 98.6 F

## 2022-02-14 PROCEDURE — G0299 HHS/HOSPICE OF RN EA 15 MIN: HCPCS

## 2022-02-14 NOTE — HOME HEALTH
Skilled reason for visit:     Caregiver involvement:    Medications reviewed and all medications are available in the home this visit. Medications  are effective at this time. Home health supplies by type and quantity ordered/delivered this visit include: supplies ordered    Patient education provided this visit:     Patient level of understanding of education provided:     Skilled Care Performed this visit:     Patient response to procedure performed:     Patient's Progress towards personal goals:     Home exercise program: pt conitnues to take  medication as ordered and follows up on all  md appintments    Continued need for the following skills: nursing     Plan for next visit: wound care     Patient and/or caregiver notified and agrees to changes in the Plan of Care     The following discharge planning was discussed with the pt/caregiver: when patient reaches goals and medication is managed, and disease processes are understood patient agrees and understand that discharge will take place.

## 2022-02-15 NOTE — HOME HEALTH
Skilled reason for visit: WOUND CARE, VS, PAIN, MENTAL STATUS    Caregiver involvement: patients cg is Kamlesh Cevallos and they are available as needed or assistance with IADL's, ADL's, meal prep, medication management, and taking patient to all doctors appointments. Medications reviewed and all medications are available in the home this visit. The following education was provided regarding medications, medication interactions, and look alike medications (specify):  Tramadol for pain, depakote, BENTYL  Medications are effective at this time. Medications reconciled and all meds in home    Home health supplies by type and quantity ordered/delivered this visit include: pt has ample supplies in home     Patient education provided this visit: pt has purewick in place. Pt diaper was full of urine and rt buttocks dressing was   wound care performed. see wound add. patient made aware to turn every 2 hours and to keep pressure off of bony prominences, to monitor for any pressure ulcer development/worsening. patient made aware to monitor for s/s of infection [increased swelling, increased redness around site, increased pain, foul smelling drainage, fever] aware who to report to/when. patient encouraged to monitor for increase in pain and to continue with current pain management and to notify staff/md if pain becomes excrutiating/intolerable. THE PATIENT AND CG WERE RECOMMENDED TO CALL PCP OR TAKE PT TO THE ER WITH ANY FEVER 101 OR ABOVE, CHILLS, SOB, CHEST PAIN, SEVERE HEADACHE, PROFUSE VOMITING AND/OR DIARRHEA, ACUTE PAIN OR ANY OTHER ACUTE CHANGE        Patient level of understanding of education provided: pt is understanding of all procedures performed. Skilled Care Performed this visit: wound care    Patient response to procedure performed: pt took tramadol before wound care. pt verb she was in alot of pain during wound care.      Agency Progress toward goals: cont to treat pts wounds     Patient's Progress towards personal goals: pt is failing to make progress with wounds healling     Home exercise program: continue home exercises program as developed by physical therapy     Continued need for the following skills: Nursing and Physical Therapy    Plan for next visit: wound care     Patient and/or caregiver notified and agrees to changes in the Plan of Care YES      The following discharge planning was discussed with the pt/caregiver: Patient will be discharged once education has completed, patient is medically stable and pt/cg are able to independently manage medications and disease process.

## 2022-02-16 ENCOUNTER — HOME CARE VISIT (OUTPATIENT)
Dept: SCHEDULING | Facility: HOME HEALTH | Age: 72
End: 2022-02-16
Payer: MEDICARE

## 2022-02-16 VITALS
RESPIRATION RATE: 18 BRPM | TEMPERATURE: 97.6 F | OXYGEN SATURATION: 94 % | SYSTOLIC BLOOD PRESSURE: 104 MMHG | DIASTOLIC BLOOD PRESSURE: 60 MMHG | HEART RATE: 78 BPM

## 2022-02-16 PROCEDURE — G0300 HHS/HOSPICE OF LPN EA 15 MIN: HCPCS

## 2022-02-16 NOTE — HOME HEALTH
Skilled reason for visit: Wound care and skn assessment     Caregiver involvement:Patient's cg is family. They lives with patient and is available 24/7 for assistance with iadls, adls, meal prep, medication management, taking to md appointments. Medications reviewed and all medications are available in the home this visit. Medications  are effective at this time. Home health supplies by type and quantity ordered/delivered this visit include: supplies available     Patient education provided this visit: pt has oncology appt on monday at 2:30pm. Educated to turn and reposition frequesntly to help get pressure off wound. Vital signs within range, no excessive drainage noted this visit. Patient level of understanding of education provided: pt vrbalized underatanding     Skilled Care Performed this visit: wound care completed     Patient response to procedure performed: minimal pain verbalzed durigng dressing change    Home exercise program: pt conitnues to take  medication as ordered and follows up on all  md appintments    Continued need for the following skills: nursing     Plan for next visit: [facility] care     Patient and/or caregiver notified and agrees to changes in the Plan of Care     The following discharge planning was discussed with the pt/caregiver: when patient reaches goals and medication is managed, and disease processes are understood patient agrees and understand that discharge will take place.

## 2022-02-18 ENCOUNTER — HOME CARE VISIT (OUTPATIENT)
Dept: SCHEDULING | Facility: HOME HEALTH | Age: 72
End: 2022-02-18
Payer: MEDICARE

## 2022-02-18 VITALS
RESPIRATION RATE: 20 BRPM | OXYGEN SATURATION: 90 % | SYSTOLIC BLOOD PRESSURE: 104 MMHG | TEMPERATURE: 97.8 F | HEART RATE: 78 BPM | DIASTOLIC BLOOD PRESSURE: 60 MMHG

## 2022-02-18 PROCEDURE — G0300 HHS/HOSPICE OF LPN EA 15 MIN: HCPCS

## 2022-02-18 PROCEDURE — 400014 HH F/U

## 2022-02-18 NOTE — HOME HEALTH
Skilled reason for visit: Skin assessmnt and wound Care     Caregiver involvement: Patient's cg is family. They lives with patient and is available 24/7 for assistance with iadls, adls, meal prep, medication management, taking to md appointments. Medications reviewed and all medications are available in the home this visit. Medications  are effective at this time. Home health supplies by type and quantity ordered/delivered this visit include: supplies available     Patient education provided this visit: patient made aware to turn every 2 hours and to keep pressure off of bony prominences, to monitor for any pressure ulcer development/worsening. Increase protein n diet and monitor for signs and symptoms of infection to include increase drainage, fever over 101. 1  pt has supplies for treatments no supplies needed    Patient level of understanding of education provided: pt and spouse verbalzied understanding     Skilled Care Performed this visit: Wound care     Patient response to procedure performed: pt verbalzied pain 6/10     Home exercise program: pt conitnues to take  medication as ordered and follows up on all  md appintments    Continued need for the following skills: nursing     Plan for next visit: skin care and wound treatment     Patient and/or caregiver notified and agrees to changes in the Plan of Care     The following discharge planning was discussed with the pt/caregiver: when patient reaches goals and medication is managed, and disease processes are understood patient agrees and understand that discharge will take place.

## 2022-02-21 ENCOUNTER — HOME CARE VISIT (OUTPATIENT)
Dept: SCHEDULING | Facility: HOME HEALTH | Age: 72
End: 2022-02-21
Payer: MEDICARE

## 2022-02-21 PROCEDURE — G0300 HHS/HOSPICE OF LPN EA 15 MIN: HCPCS

## 2022-02-21 NOTE — HOME HEALTH
Skilled reason for visit: Skin assessment and wound care     Caregiver involvement: Patient's cg is family. They lives with patient and is available 24/7 for assistance with iadls, adls, meal prep, medication management, taking to md appointments. Medications reviewed and all medications are available in the home this visit. Medications  are effective at this time. Home health supplies by type and quantity ordered/delivered this visit include: supplies ordered     Patient education provided this visit: Educated to turn and reposition every 2 hours to help with     Patient level of understanding of education provided: pt and  verbalzied understanding    Skilled Care Performed this visit: skin assessment and wound care     Patient response to procedure performed: pain verbalzied generalized 2/10    Home exercise program: pt conitnues to take  medication as ordered and follows up on all  md appintments    Continued need for the following skills: nursing     Plan for next visit: wound care     Patient and/or caregiver notified and agrees to changes in the Plan of Care     The following discharge planning was discussed with the pt/caregiver: when patient reaches goals and medication is managed, and disease processes are understood patient agrees and understand that discharge will take place.

## 2022-02-23 ENCOUNTER — HOME CARE VISIT (OUTPATIENT)
Dept: SCHEDULING | Facility: HOME HEALTH | Age: 72
End: 2022-02-23
Payer: MEDICARE

## 2022-02-23 VITALS
TEMPERATURE: 97.6 F | SYSTOLIC BLOOD PRESSURE: 102 MMHG | DIASTOLIC BLOOD PRESSURE: 68 MMHG | RESPIRATION RATE: 19 BRPM | HEART RATE: 87 BPM | OXYGEN SATURATION: 94 %

## 2022-02-23 PROCEDURE — G0300 HHS/HOSPICE OF LPN EA 15 MIN: HCPCS

## 2022-02-24 NOTE — HOME HEALTH
Skilled reason for visit: Wound Care and skin assessment     Caregiver involvement: Patient's cg is family. They lives with patient and is available 24/7 for assistance with iadls, adls, meal prep, medication management, taking to md appointments. Medications reviewed and all medications are available in the home this visit. Medications  are effective at this time. Home health supplies by type and quantity ordered/delivered this visit include: Supplies avaiable     Patient education provided this visit: patient made aware to turn every 2 hours and to keep pressure off of bony prominences, to monitor for any pressure ulcer development/worsening. Reviewed to increase fiber/fruits/vegetables in diet, hydration,ambulation and OTC stool softner to improve constipation. verbalized understanding. Pt due to have Oncology appointment tomorrow. transportation due to  pt. Patient level of understanding of education provided: pt and spouse verbalzied understanding. Spouse manually evacuatd pts bowels due to hard ball like stool. Pt is currently on Iron pills and taking stool softners    Skilled Care Performed this visit: Skin assessment and wound care     Patient response to procedure performed: Pt verbalied pain during wound care dressing change. Home exercise program: pt conitnues to take  medication as ordered and follows up on all  md appintments    Continued need for the following skills: Nursing     Plan for next visit: Wound care and bed bathing     Patient and/or caregiver notified and agrees to changes in the Plan of Care     The following discharge planning was discussed with the pt/caregiver: when patient reaches goals and medication is managed, and disease processes are understood patient agrees and understand that discharge will take place.

## 2022-02-25 ENCOUNTER — HOME CARE VISIT (OUTPATIENT)
Dept: SCHEDULING | Facility: HOME HEALTH | Age: 72
End: 2022-02-25
Payer: MEDICARE

## 2022-02-25 VITALS
RESPIRATION RATE: 19 BRPM | HEART RATE: 78 BPM | SYSTOLIC BLOOD PRESSURE: 102 MMHG | DIASTOLIC BLOOD PRESSURE: 60 MMHG | TEMPERATURE: 97.8 F | OXYGEN SATURATION: 96 %

## 2022-02-25 PROCEDURE — G0300 HHS/HOSPICE OF LPN EA 15 MIN: HCPCS

## 2022-02-28 ENCOUNTER — HOME CARE VISIT (OUTPATIENT)
Dept: SCHEDULING | Facility: HOME HEALTH | Age: 72
End: 2022-02-28
Payer: MEDICARE

## 2022-02-28 PROCEDURE — G0299 HHS/HOSPICE OF RN EA 15 MIN: HCPCS

## 2022-03-02 ENCOUNTER — HOME CARE VISIT (OUTPATIENT)
Dept: HOME HEALTH SERVICES | Facility: HOME HEALTH | Age: 72
End: 2022-03-02
Payer: MEDICARE

## 2022-03-03 VITALS
TEMPERATURE: 97.3 F | OXYGEN SATURATION: 99 % | HEART RATE: 67 BPM | SYSTOLIC BLOOD PRESSURE: 100 MMHG | RESPIRATION RATE: 16 BRPM | DIASTOLIC BLOOD PRESSURE: 60 MMHG

## 2022-03-03 NOTE — HOME HEALTH
Skilled reason for visit: Multiple wounds to calves, heels and tunneling buttocks wound, vs, pain, pts f/u with oncology, pt recieved more tramadol meds for pain. Caregiver involvement: patients cg is Hajaycee Hernandezmartina and they are available as needed or assistance with IADL's, ADL's, meal prep, medication management, and taking patient to all doctors appointments. Medications reviewed and all medications are available in the home this visit. The following education was provided regarding medications, medication interactions, and look alike medications (specify):  Tramadol for pain, depakote, BENTYL    Medications are effective at this time. Medications reconciled and all meds in home    Home health supplies by type and quantity ordered/delivered this visit include: pt has ample supplies in home          Patient education provided this visit: pt has purewick in place. Pt diaper was full of urine and rt buttocks dressing was     wound care performed. see wound add. pt has purewick. pt has not been turned and pts backside and back of thighs were marroon/dark red due to pressure. stessed importance of turning pt. Pt states she is in 10/10 pain daily with wounds and moving legs side to side when turning for her bath. Patient level of understanding of education provided: pt is understanding of all procedures performed. Skilled Care Performed this visit: wound care         Patient response to procedure performed: pt took tramadol before wound care. pt verb she was in alot of pain during wound care.           Agency Progress toward goals: cont to treat pts wounds          Patient's Progress towards personal goals: pt is failing to make progress with wounds healling          Home exercise program: continue home exercises program as developed by physical therapy          Continued need for the following skills: Nursing for wound care          Plan for next visit: wound care          Patient and/or caregiver notified and agrees to changes in the Plan of Care YES           The following discharge planning was discussed with the pt/caregiver: Patient will be discharged once education has completed, patient is medically stable and pt/cg are able to independently manage medications and disease process.

## 2022-03-04 ENCOUNTER — HOME CARE VISIT (OUTPATIENT)
Dept: SCHEDULING | Facility: HOME HEALTH | Age: 72
End: 2022-03-04
Payer: MEDICARE

## 2022-03-04 VITALS
DIASTOLIC BLOOD PRESSURE: 60 MMHG | SYSTOLIC BLOOD PRESSURE: 100 MMHG | HEART RATE: 78 BPM | RESPIRATION RATE: 18 BRPM | TEMPERATURE: 97.6 F | OXYGEN SATURATION: 95 %

## 2022-03-04 PROCEDURE — A6213 FOAM DRG >16<=48 SQ IN W/BDR: HCPCS

## 2022-03-04 PROCEDURE — G0300 HHS/HOSPICE OF LPN EA 15 MIN: HCPCS

## 2022-03-04 PROCEDURE — A6197 ALGINATE DRSG >16 <=48 SQ IN: HCPCS

## 2022-03-04 NOTE — HOME HEALTH
Skilled reason for visit: Skin assessment and Wound Care     Caregiver involvement: Patient's cg is family. They lives with patient and is available 24/7 for assistance with iadls, adls, meal prep, medication management, taking to md appointments. Medications reviewed and all medications are available in the home this visit. Medications  are effective at this time. Home health supplies by type and quantity ordered/delivered this visit include:  supplies ordered    Patient education provided this visit: Educated  to turn pt to get her off her sacral area. Pt had a dr appt yesterday for a blood trnsfusion and today she states she feels better. Wound care supplies ordered today. Pt verbalzied her appetitie is still good and she is still hoping that she wants to walk. Patient level of understanding of education provided: pt and  understood education    Skilled Care Performed this visit: Wound care, bath and changed bed linens    Patient response to procedure performed: minimal pain 3/10 verbalzied     Home exercise program: pt conitnues to take  medication as ordered and follows up on all  md appintments    Continued need for the following skills: nursing     Plan for next visit: wound care    Patient and/or caregiver notified and agrees to changes in the Plan of Care     The following discharge planning was discussed with the pt/caregiver: when patient reaches goals and medication is managed, and disease processes are understood patient agrees and understand that discharge will take place.

## 2022-03-07 ENCOUNTER — HOME CARE VISIT (OUTPATIENT)
Dept: SCHEDULING | Facility: HOME HEALTH | Age: 72
End: 2022-03-07
Payer: MEDICARE

## 2022-03-07 VITALS
HEART RATE: 78 BPM | RESPIRATION RATE: 18 BRPM | SYSTOLIC BLOOD PRESSURE: 102 MMHG | DIASTOLIC BLOOD PRESSURE: 60 MMHG | TEMPERATURE: 97.6 F | OXYGEN SATURATION: 98 %

## 2022-03-07 PROCEDURE — G0300 HHS/HOSPICE OF LPN EA 15 MIN: HCPCS

## 2022-03-08 NOTE — HOME HEALTH
Skilled reason for visit: Wound care and education    Caregiver involvement: Patient's cg is family. They lives with patient and is available 24/7 for assistance with iadls, adls, meal prep, medication management, taking to md appointments. Medications reviewed and all medications are available in the home this visit. Medications  are effective at this time. Home health supplies by type and quantity ordered/delivered this visit include: wound care supplies ordered    Patient education provided this visit: Educated pt and  to continue to turn and reposition pt to help relieve he pressure off her sacral area. Educated to continue to increas protein to help with woud healing     Patient level of understanding of education provided: Pt and  bt verbalzied understanding of education    Skilled Care Performed this visit: wound care and education    Patient response to procedure performed: no pain verbalzied during wound care treatment    Patient's Progress towards personal goals: Wounds showing signs of granulated tissue     Home exercise program: pt conitnues to take  medication as ordered and follows up on all  md appintments    Continued need for the following skills: nursing     Plan for next visit: wound care     Patient and/or caregiver notified and agrees to changes in the Plan of Care     The following discharge planning was discussed with the pt/caregiver: when patient reaches goals and medication is managed, and disease processes are understood patient agrees and understand that discharge will take place.

## 2022-03-09 ENCOUNTER — HOME CARE VISIT (OUTPATIENT)
Dept: SCHEDULING | Facility: HOME HEALTH | Age: 72
End: 2022-03-09
Payer: MEDICARE

## 2022-03-09 VITALS
RESPIRATION RATE: 18 BRPM | SYSTOLIC BLOOD PRESSURE: 104 MMHG | TEMPERATURE: 97.6 F | OXYGEN SATURATION: 98 % | HEART RATE: 87 BPM | DIASTOLIC BLOOD PRESSURE: 70 MMHG

## 2022-03-09 PROCEDURE — G0300 HHS/HOSPICE OF LPN EA 15 MIN: HCPCS

## 2022-03-09 NOTE — HOME HEALTH
Skilled reason for visit: Sudeep Noland, SKIN ASSESSMENT AND WOUND TREATMENT     Caregiver involvement: Patient's cg is family. They lives with patient and is available 24/7 for assistance with iadls, adls, meal prep, medication management, taking to md appointments. Medications reviewed and all medications  ARE available in the home this visit. The following education was provided regarding medications:      MD notified of any discrepancies/look a-like medications/medication interactions: YES  Medications are EFFECTIVE at this time. Home health supplies by type and quantity ordered/delivered this visit include: SUPPLIES ARRIVED AS ORDERED    Patient education provided this visit: patient made aware to turn every 2 hours and to keep pressure off of bony prominences, to monitor for any pressure ulcer development/worsening. Increase protein n diet and monitor for signs and symptoms of infection to include increase drainage, fever over 101.5    Sharps education provided: NA    Patient level of understanding of education provided: PT VERBALIZED UNDERSTANDING OF Gloria Performed this visit: [id number]     Patient response to procedure performed:  MINIMAL PAIN VOICED BY PT     Home exercise program: pt conitnues to take  medication as ordered and follows up on all  md appointments    Continued need for the following skills: NURSING    Plan for next visit: EDUCATION AND wound CARE  Patient and/or caregiver notified and agrees to changes in the Plan of Care: NA    The following discharge planning was discussed with the pt/caregiver: when patient reaches goals and medication is managed, and disease processes are understood patient agrees and understand that discharge will take place.

## 2022-03-11 ENCOUNTER — HOME CARE VISIT (OUTPATIENT)
Dept: SCHEDULING | Facility: HOME HEALTH | Age: 72
End: 2022-03-11
Payer: MEDICARE

## 2022-03-11 PROCEDURE — A6212 FOAM DRG <=16 SQ IN W/BORDER: HCPCS

## 2022-03-11 PROCEDURE — G0300 HHS/HOSPICE OF LPN EA 15 MIN: HCPCS

## 2022-03-11 PROCEDURE — A4649 SURGICAL SUPPLIES: HCPCS

## 2022-03-11 NOTE — HOME HEALTH
Skilled reason for visit: Talib Henning, SKIN ASSESSMENT AND WOUND TREATMENT     Caregiver involvement: Patient's cg is family. They lives with patient and is available 24/7 for assistance with iadls, adls, meal prep, medication management, taking to md appointments. Medications reviewed and all medications  ARE available in the home this visit. The following education was provided regarding medications:      MD notified of any discrepancies/look a-like medications/medication interactions: YES  Medications are EFFECTIVE at this time. Home health supplies by type and quantity ordered/delivered this visit include: SUPPLIES ORDERED     Patient education provided this visit: patient made aware to turn every 2 hours and to keep pressure off of bony prominences, to monitor for any pressure ulcer development/worsening. Increase protein n diet and monitor for signs and symptoms of infection to include increase drainage, fever over 101.1      Sharps education provided: NA    Patient level of understanding of education provided: PT VERBALIZED UNDERSTANDING OF Cresc[address] Performed this visit: [facility]    Patient response to procedure performed:  Corrina Barajas DURING DRESSING CHANGE     Home exercise program: pt conitnues to take  medication as ordered and follows up on all  md appintments    Continued need for the following skills: NURSING    Plan for next visit: EDUCATION AND wound CARE    Patient and/or caregiver notified and agrees to changes in the Plan of Care: NA    The following discharge planning was discussed with the pt/caregiver: when patient reaches goals and medication is managed, and disease processes are understood patient agrees and understand that discharge will take place.

## 2022-03-14 ENCOUNTER — HOME CARE VISIT (OUTPATIENT)
Dept: SCHEDULING | Facility: HOME HEALTH | Age: 72
End: 2022-03-14
Payer: MEDICARE

## 2022-03-14 VITALS
OXYGEN SATURATION: 93 % | RESPIRATION RATE: 18 BRPM | TEMPERATURE: 97.6 F | HEART RATE: 87 BPM | DIASTOLIC BLOOD PRESSURE: 70 MMHG | SYSTOLIC BLOOD PRESSURE: 130 MMHG

## 2022-03-14 PROCEDURE — G0300 HHS/HOSPICE OF LPN EA 15 MIN: HCPCS

## 2022-03-14 NOTE — HOME HEALTH
Skilled reason for visit: Sudeep Necessary and SKIN ASSESSMENT     Caregiver involvement: Patient's cg is family. They lives with patient and is available 24/7 for assistance with iadls, adls, meal prep, medication management, taking to md appointments. Medications reviewed and all medications  ARE available in the home this visit. The following education was provided regarding medications:      MD notified of any discrepancies/look a-like medications/medication interactions: YES  Medications are EFFECTIVE at this time. Home health supplies by type and quantity ordered/delivered this visit include: supplies arrived as ordered     Patient education provided this visit: Nurse arrived and pt verbalzied she has not been feeling welel x2 days. no fever vitals good at this time. Pt verbalzied she wants to go to the ER as she is just not feeling right. Called [employer] # pt was picked up  and transported to [address] [place] [address]. No wound care completed as pt did not want to be turned due to sever abdominal pain verbalized. Sharps education provided: NA    Patient level of understanding of education provided: PT VERBALIZED UNDERSTANDING OF ALL EDUCATION BY REEATING BACK TEACHING     Skilled Care Performed this visit: Pt sent to ER due to sever abdominal pain verbalzied. Patient response to procedure performed:  pain 10/10 during palpation of abdomin    Home exercise program: pt conitnues to take  medication as ordered and follows up on all  md appintments    Continued need for the following skills: [facility] for next visit: [address] wound CARE    Patient and/or caregiver notified and agrees to changes in the Plan of Care: NA    The following discharge planning was discussed with the pt/caregiver: when patient reaches goals and medication is managed, and disease processes are understood patient agrees and understand that discharge will take place.

## 2022-03-16 ENCOUNTER — HOME CARE VISIT (OUTPATIENT)
Dept: HOME HEALTH SERVICES | Facility: HOME HEALTH | Age: 72
End: 2022-03-16
Payer: MEDICARE

## 2022-03-21 ENCOUNTER — HOME CARE VISIT (OUTPATIENT)
Dept: HOME HEALTH SERVICES | Facility: HOME HEALTH | Age: 72
End: 2022-03-21

## 2022-03-28 ENCOUNTER — HOME HEALTH ADMISSION (OUTPATIENT)
Dept: HOME HEALTH SERVICES | Facility: HOME HEALTH | Age: 72
End: 2022-03-28
Payer: MEDICARE

## 2022-03-30 ENCOUNTER — HOME CARE VISIT (OUTPATIENT)
Dept: HOME HEALTH SERVICES | Facility: HOME HEALTH | Age: 72
End: 2022-03-30
Payer: MEDICARE

## 2022-03-30 ENCOUNTER — HOME CARE VISIT (OUTPATIENT)
Dept: SCHEDULING | Facility: HOME HEALTH | Age: 72
End: 2022-03-30
Payer: MEDICARE

## 2022-03-30 VITALS
OXYGEN SATURATION: 99 % | RESPIRATION RATE: 20 BRPM | DIASTOLIC BLOOD PRESSURE: 60 MMHG | SYSTOLIC BLOOD PRESSURE: 123 MMHG | TEMPERATURE: 97.6 F | HEART RATE: 75 BPM

## 2022-03-30 PROCEDURE — A6213 FOAM DRG >16<=48 SQ IN W/BDR: HCPCS

## 2022-03-30 PROCEDURE — A6216 NON-STERILE GAUZE<=16 SQ IN: HCPCS

## 2022-03-30 PROCEDURE — 400013 HH SOC

## 2022-03-30 PROCEDURE — G0299 HHS/HOSPICE OF RN EA 15 MIN: HCPCS

## 2022-03-30 NOTE — HOME HEALTH
Skilled services/Home bound verification:     Skilled Reason for admission/summary of clinical condition: Acute osteomyelitis of sacrum, Pressure injury of sacral region,pressure ulcers, wound care, and medication management. No new medications concerning HTN, Bipolar and AFIB. home care will continue to assess/monitor disease processes. called and spoke to Vladislav HENSON for Dr Vance Singletary for new wound care orders for right sacrum as wound had foul smell and requested Dakins packing. faxed MD and waiting response for wound care orders and PT eval and treat. RLE stage III pressure ulcer are granulated, dry/intact. This patient is homebound for the following reasons Patient is bedridden . Caregiver: spouse. Caregiver assists with ADLS/IADLS/meal prep and medication management/ wound care. Medications reconciled and all medications are available in the home this visit. The following education was provided regarding medications: tramadol reviewed side effects and frequency. verbalized understanding. Medications  are to early to assess at this time. High risk medication teaching regarding anticoagulants, hyperglycemic agents or opoid narcotics performed (specify) tramadol reviewed side effects and frequency. verbalized understanding. Home health supplies by type and quantity ordered/delivered this visit include:wound care supplies in the home and ordered. Patient education provided this visit to include:Reviewed UTI S/SX: BURNING, FOUL ODOR, FEVER, CHILLS, INCREASED HEART/RESPIRATORY, BACK PAIN,ABDOMINAL PAIN,TENDERNESS AROUND NOÉ AREA, OR BLOOD/DARK URINE REPORT TO MD IMMEDIATELY/SEEK MEDICAL TREATMENT.advised to call medical provider for non-life-threatening concerns before going to ER/urgent care.  .Reported any s/sx related to AFIB complications:Palpitations, which are sensations of a racing, uncomfortable, irregular heartbeat or a flip-flopping in your chest, Weakness, Reduced ability to exercise, Fatigue, Lightheadedness, Dizziness, Shortness of breath, AND Chest pain. reviewed HTN: to notify home health/provider for: Feeling faint, dizzy, confused, or drowsy; continually high blood pressure readings despite taking medications; questions or concerns about condition or care. Notify emergency services for signs of a heart attack: Squeezing, pressure, or pain in chest; discomfort or pain in back, neck, jaw, stomach, or arm; shortness of breath; nausea or vomiting; lightheadedness or a sudden cold sweat. Notify emergency services for signs of a stroke: Numbness or drooping on one side of face, weakness in an arm or leg, confusion or difficulty speaking, dizziness, severe headache, or vision loss. REVIEWED TO KEEP SKIN CLEAN, DRY, SKIN PROTECTANT, TURN REPOSITION EVERY 2 HOURS AND IMPORTANCE OF PROTEIN IN DIET TO PREVENT SKIN BREAKDOWN. USE SKIN BARRIER AS PRESCRIBED AND INCREASE PROTEIN IN DIET. Reviewed to reported any redness, swelling, warmth, fever, chills, increased heart/respiratory rate, uncontrolled pain/tenderness, drainage:green/yellow/brown, or odor to MD immediately. Patient level of understanding of education provided:verbalized understanding repeat back HTN/AFIB and Pressure ulcer/UTI. needs reinforcement with UTI/Pressure ulcer. Sharps Education Provided: Clinician instructed patient/CG on proper disposal of sharps: Containers should be made of hard plastic, be puncture-resistant and leakproof,   such as a laundry detergent or bleach bottle.  When the container is ¾ full, it should be sealed with tape and labeled   DO NOT RECYCLE prior to discarding in the regular trash.      Patient response to procedure performed:  complaints of pain during right heel and right sacrum wound care. reported that pain has decreased slightly after wound care. will take tramadol if pain continues.      Home exercise program/Homework provided: CONT ALL MEDICATIONS AS PRESCRIBED, DIET AS PRESCRIBED, IMPLEMENT FALL/INFECTION CONTROL/PRESSURE ULCER INTERVENTIONS,MONITOR FOR ABNORMAL S/SX/ infection/Afib/HTN (listed above) REPORT TO MD or ER IMMEDIATELY, AND PERFORM  WOUND CARE AS PRESCRIBED. KEEP ALL FOLLOW UP APPTS AS PRESCRIBED. READ ALL TEACHING PACKETS FOR EDUCATION OF DISEASE PROCESS & TO PREVENT COMPLICATIONS. perform purewick cath care daily or as needed. verbalized understanding. Pt/Caregiver instructed on plan of care and are agreeable to plan of care at this time. Physician Dr Latonia Sandoval and Dr Mando Saldana notified of patient admission to home health and plan of care including anticipated frequency of 2 week 1, 3 week 7, 2 prn and treatments/interventions/modalities of Acute osteomyelitis of sacrum, Pressure injury of sacral region,pressure ulcers, wound care, and medication management. Discharge planning discussed with patient and caregiver. Discharge planning as follows: Will discharge when all Acute osteomyelitis of right sacrum, pressure ulcers, and wound care disease process, complication and dietary goals are met and understands all medication purpose/frequencies/side effects. Pt/Caregiver did verbalize understanding of discharge planning. Next MD appointment 4/18 (date) with Dr Mando Saldana MD/NP/PA. Patient/caregiver encouraged/instructed to keep appointment as lack of follow through with physician appointment could result in discontinuation of home care services for non-compliance.

## 2022-03-30 NOTE — Clinical Note
admited for sacrum and RLE/ B heel wound care. Dr Jose M Patel contacted and requested new wound care orders for right sacrum and B heel wounds. waiting on response. next wound care visit needed is Friday. wound care supplies in the home from previous home care episode. requested PT eval and treat.

## 2022-03-31 ENCOUNTER — HOME CARE VISIT (OUTPATIENT)
Dept: HOME HEALTH SERVICES | Facility: HOME HEALTH | Age: 72
End: 2022-03-31
Payer: MEDICARE

## 2022-04-01 ENCOUNTER — HOME CARE VISIT (OUTPATIENT)
Dept: SCHEDULING | Facility: HOME HEALTH | Age: 72
End: 2022-04-01
Payer: MEDICARE

## 2022-04-01 PROCEDURE — G0300 HHS/HOSPICE OF LPN EA 15 MIN: HCPCS

## 2022-04-02 VITALS
SYSTOLIC BLOOD PRESSURE: 104 MMHG | OXYGEN SATURATION: 96 % | TEMPERATURE: 97.6 F | DIASTOLIC BLOOD PRESSURE: 58 MMHG | RESPIRATION RATE: 18 BRPM | HEART RATE: 78 BPM

## 2022-04-02 NOTE — HOME HEALTH
Skilled reason for visit: Wound care and Education    Caregiver involvement: Patient's cg is family. They lives with patient and is available 24/7 for assistance with iadls, adls, meal prep, medication management, taking to md appointments. Medications reviewed and all medications are available in the home this visit. The following education was provided regarding medications:  ZEUS   MD notified of any discrepancies/look a-like medications/medication interactions: NA  Medications are effective at this time. Home health supplies by type and quantity ordered/delivered this visit include: supplies available     Patient education provided this visit: patient made aware to turn every 2 hours and to keep pressure off of bony prominences, to monitor for any pressure ulcer development/worsening. Reviewed to reported any redness, swelling, warmth, fever, chills, increased heart/respiratory rate, uncontrolled pain/tenderness, drainage:green/yellow/brown, or odor to MD immediately. Sharps education provided: ZEUS    Patient level of understanding of education provided: Vibha Romero all understanding to above education    Skilled Care Performed this visit: Wound Care     Patient response to procedure performed:  no pain verbalzied during dressing change  Agency Progress toward goals: Progressing toward interventions above      Patient's Progress towards personal goals: Progressing toward interventions above      Home exercise program: pt conitnues to take  medication as ordered and follows up on all  md appintments  Continued need for the following skills: nursing     Plan for next visit: Wound Care     Patient and/or caregiver notified and agrees to changes in the Plan of Care yes      The following discharge planning was discussed with the pt/caregiver: when patient reaches goals and medication is managed, and disease processes are understood patient agrees and understand that discharge will take place.

## 2022-04-04 ENCOUNTER — HOME CARE VISIT (OUTPATIENT)
Dept: SCHEDULING | Facility: HOME HEALTH | Age: 72
End: 2022-04-04
Payer: MEDICARE

## 2022-04-04 VITALS
RESPIRATION RATE: 18 BRPM | SYSTOLIC BLOOD PRESSURE: 102 MMHG | DIASTOLIC BLOOD PRESSURE: 60 MMHG | TEMPERATURE: 97.5 F | OXYGEN SATURATION: 94 % | HEART RATE: 78 BPM

## 2022-04-04 PROCEDURE — G0300 HHS/HOSPICE OF LPN EA 15 MIN: HCPCS

## 2022-04-04 NOTE — HOME HEALTH
Skilled reason for visit: Wound Care and education    Caregiver involvement: Patient's cg is family. They lives with patient and is available 24/7 for assistance with iadls, adls, meal prep, medication management, taking to md appointments. Medications reviewed and all medications are available in the home this visit. The following education was provided regarding medications:  NA  MD notified of any discrepancies/look a-like medications/medication interactions: NA  Medications are effective  at this time. Home health supplies by type and quantity ordered/delivered this visit include: supplies avaialble    Patient education provided this visit: Increase protein n diet and monitor for signs and symptoms of infection to include increase drainage, fever over 101.1  Educated pt to turn and reposition every 2 hours or so to help with pressure on sacral area. Educated pt to hydrate t help with wound healing. Pt due to see Md next wednesday to follow up from hospital stay. Sharps education provided: ZEUS    Patient level of understanding of education provided: Jose Lindsey all understanding to above education    Skilled Care Performed this visit: wound care     Patient response to procedure performed:  no pain verbalized    Agency Progress toward goals: Progressing toward interventions above      Patient's Progress towards personal goals: Progressing toward interventions above      Home exercise program: pt conitnues to take  medication as ordered and follows up on all  md appintments    Continued need for the following skills: nursing    Plan for next visit: wound care and education    Patient and/or caregiver notified and agrees to changes in the Plan of Care yes    The following discharge planning was discussed with the pt/caregiver: when patient reaches goals and medication is managed, and disease processes are understood patient agrees and understand that discharge will take place.

## 2022-04-06 ENCOUNTER — HOME CARE VISIT (OUTPATIENT)
Dept: SCHEDULING | Facility: HOME HEALTH | Age: 72
End: 2022-04-06
Payer: MEDICARE

## 2022-04-06 VITALS
HEART RATE: 76 BPM | RESPIRATION RATE: 18 BRPM | OXYGEN SATURATION: 98 % | DIASTOLIC BLOOD PRESSURE: 68 MMHG | TEMPERATURE: 97.3 F | SYSTOLIC BLOOD PRESSURE: 112 MMHG

## 2022-04-06 PROCEDURE — G0300 HHS/HOSPICE OF LPN EA 15 MIN: HCPCS

## 2022-04-06 NOTE — HOME HEALTH
Skilled reason for visit: Angel Zimmer, SKIN ASSESSMENT AND WOUND TREATMENT     Caregiver involvement: Patient's cg is family. They lives with patient and is available 24/7 for assistance with iadls, adls, meal prep, medication management, taking to md appointments. Medications reviewed and all medications  ARE available in the home this visit. The following education was provided regarding medications:      MD notified of any discrepancies/look a-like medications/medication interactions: YES  Medications are EFFECTIVE at this time. Home health supplies by type and quantity ordered/delivered this visit include: supplies available     Patient education provided this visit: Pt to turn and reposition every 2 hours to pressure relieve jorge alberto areas to prevent skin breakdown  Increase protein n diet and monitor for signs and symptoms of infection to include increase drainage, fever over 101.1      Sharps education provided: NA    Patient level of understanding of education provided: PT VERBALIZED UNDERSTANDING OF [facility] Performed this visit: Wound Care     Patient response to procedure performed:  no pain verbalzied during dressing change    Home exercise program: pt conitnues to take  medication as ordered and follows up on all  md appintments    Continued need for the following skills: NURSING    Plan for next visit: EDUCATION AND wound CARE    Patient and/or caregiver notified and agrees to changes in the Plan of Care: NA    The following discharge planning was discussed with the pt/caregiver: when patient reaches goals and medication is managed, and disease processes are understood patient agrees and understand that discharge will take place.

## 2022-04-08 ENCOUNTER — HOME CARE VISIT (OUTPATIENT)
Dept: SCHEDULING | Facility: HOME HEALTH | Age: 72
End: 2022-04-08
Payer: MEDICARE

## 2022-04-08 VITALS
RESPIRATION RATE: 20 BRPM | DIASTOLIC BLOOD PRESSURE: 70 MMHG | HEART RATE: 89 BPM | OXYGEN SATURATION: 95 % | SYSTOLIC BLOOD PRESSURE: 102 MMHG | TEMPERATURE: 97.6 F

## 2022-04-08 PROCEDURE — G0300 HHS/HOSPICE OF LPN EA 15 MIN: HCPCS

## 2022-04-11 ENCOUNTER — HOME CARE VISIT (OUTPATIENT)
Dept: SCHEDULING | Facility: HOME HEALTH | Age: 72
End: 2022-04-11
Payer: MEDICARE

## 2022-04-11 PROCEDURE — G0300 HHS/HOSPICE OF LPN EA 15 MIN: HCPCS

## 2022-04-11 NOTE — HOME HEALTH
Skilled reason for visit: SKin assessment and wound care     Caregiver involvement: Patient's cg is family. They lives with patient and is available 24/7 for assistance with iadls, adls, meal prep, medication management, taking to md appointments. Medications reviewed and all medications are  available in the home this visit. The following education was provided regarding medications:  ZEUS  MD notified of any discrepancies/look a-like medications/medication interactions: NA  Medications are effective  at this time. Home health supplies by type and quantity ordered/delivered this visit include: supplies available for wound care no ordering needed at this time    Patient education provided this visit: Increase protein n diet and monitor for signs and symptoms of infection to include increase drainage, fever over 101.1  Educate cregiver to turn and reposoiton pt to help with pressure relief of sacral area. Pts left heel is almost healed and right heel is healing well as evident by decrease in size . Reviewed to reported any redness, swelling, warmth, fever, chills, increased heart/respiratory rate, uncontrolled pain/tenderness, drainage:green/yellow/brown, or odor to MD immediately.     Nay education provided: ZEUS    Patient level of understanding of education provided: Agnieszka Messer all understanding to above education      Skilled Care Performed this visit: Skin assessment and wound care     Patient response to procedure performed:  no pain verbalzied during dressing changes     Agency Progress toward goals: Progressing toward interventions above      Patient's Progress towards personal goals: Progressing toward interventions above      Home exercise program: pt conitnues to take  medication as ordered and follows up on all  md appintments    Continued need for the following skills: nursing    Plan for next visit: wound care and education    Patient and/or caregiver notified and agrees to changes in the Plan of Care yes    The following discharge planning was discussed with the pt/caregiver: when patient reaches goals and medication is managed, and disease processes are understood patient agrees and understand that discharge will take place.

## 2022-04-14 ENCOUNTER — HOME CARE VISIT (OUTPATIENT)
Dept: SCHEDULING | Facility: HOME HEALTH | Age: 72
End: 2022-04-14
Payer: MEDICARE

## 2022-04-14 PROCEDURE — G0299 HHS/HOSPICE OF RN EA 15 MIN: HCPCS

## 2022-04-15 VITALS
OXYGEN SATURATION: 95 % | HEART RATE: 77 BPM | RESPIRATION RATE: 16 BRPM | SYSTOLIC BLOOD PRESSURE: 122 MMHG | DIASTOLIC BLOOD PRESSURE: 56 MMHG | TEMPERATURE: 97.9 F

## 2022-04-15 NOTE — HOME HEALTH
Skilled reason for visit: assessment, teaching, vitals, wound care BLE and sacral pressure ulcers    Caregiver involvement: spouse present for visit and assists in turning and holding pt during SN visit for wound care. Spouse provides 24/7 care at home as pt is dependent for ADLS. Medications reviewed and all medications are available in the home this visit. The following education was provided regarding medications:  all meds reviewed with patient including purpose of each, how and when to take. MD notified of any discrepancies/look a-like medications/medication interactions:NA  Medications are effective at this time. Home health supplies by type and quantity ordered/delivered this visit include: has supplies in home     Patient education provided this visit: per care plan. Home safety and fall prevention. Infection control, hand washing and hygeine. Importance of med compliance and MD follow up. S/S infection to watch for and report- increase drainage, redness around incision site, increase pain, fever, foul smelling drainage. Reviewed turning and repositioning every 2 hours to prevent skin breakdown, keeping skin clean and dry and promptly removing soiled diapers and linens to protect skin integrity. Using pillows and wedges to prop patient up, floating heels. Nutrition and hydration for wound healing. Sharps education provided: NA    Patient level of understanding of education provided: pt and spouse verbalize understanding of teaching, heels are being floated, specialty mattress pad in use. Skilled Care Performed this visit: assessment, teaching, vitals, wound care, see wound addendum     Patient response to procedure performed:  pt tolerated procedure well, slight temporary increase of pain with wound care and turning/repositioning.     Agency Progress toward goals: progressing    Patient's Progress towards personal goals: progressing    Home exercise program: sent message to previous visiting nurses to follow up on obtaining PT and or OT orders for pt. HEP for breathing/incontinence exerces provided/reviewed with admit handbook. Using IS every hour while awake. Reviewed turning and repositioning every 2 hours to prevent skin breakdown, keeping skin clean and dry and promptly removing soiled diapers and linens to protect skin integrity. Using pillows and wedges to prop patient up, floating heels. Nutrition and hydration for wound healing.     Continued need for the following skills: Nursing    Plan for next visit: assessment, teaching, vitals, wound care    Patient and/or caregiver notified and agrees to changes in the Plan of Care N/A      The following discharge planning was discussed with the pt/caregiver: Pt will be dc when goals met, teaching complete, pt is knowledgable regarding medications and disease process and managment, pt or caregiver will be independent with wound care, and wound is healed or healing with no s/s infection

## 2022-04-16 ENCOUNTER — HOME CARE VISIT (OUTPATIENT)
Dept: SCHEDULING | Facility: HOME HEALTH | Age: 72
End: 2022-04-16
Payer: MEDICARE

## 2022-04-16 PROCEDURE — G0299 HHS/HOSPICE OF RN EA 15 MIN: HCPCS

## 2022-04-18 ENCOUNTER — HOME CARE VISIT (OUTPATIENT)
Dept: SCHEDULING | Facility: HOME HEALTH | Age: 72
End: 2022-04-18
Payer: MEDICARE

## 2022-04-18 VITALS
TEMPERATURE: 97.4 F | HEART RATE: 65 BPM | SYSTOLIC BLOOD PRESSURE: 118 MMHG | RESPIRATION RATE: 16 BRPM | DIASTOLIC BLOOD PRESSURE: 70 MMHG | OXYGEN SATURATION: 97 %

## 2022-04-18 PROCEDURE — G0299 HHS/HOSPICE OF RN EA 15 MIN: HCPCS

## 2022-04-18 NOTE — HOME HEALTH
Caregiver involvement:  IS CAREGIVER, HE  Assists with ADLs, Medication management, Transportation to appointments, Meal prep and assists with ambulation. Medications reconciled and all medications are available in the home this visit. Medications  are effective at this time. Home health supplies by type and quantity ordered/delivered this visit include: All supplies are in the home. Patient education provided this visit:Patient is a fall risk, went over the need of having someone with her when transferring, keep hallways and living areas free of clutter and throw rugs. Went over the importance of keeping dressing dry clean and intact. Went over the signs of infection and when to call the Doctor. I went over the importance of  a high protein diet to promote healing. Went over the importance of keeping bowels moving regularly, eating high fiber foods and drinking lots of water, stool softners and laxatives ie: Miralax for constipation. Continue to turn patient frequently and keep skin clean and dry to prevent Pressure Injuries. Patient still take Tramadol for discomfort. I went over the danger of opioid's addictive nature as well as propensity for respiratory depression. Progress toward goals: all wound are decrasing in size, no signs of infection, drainage is less also. Home exercise program: Continue high Protein diet, Continue frequent ambulation, Keep wound clean dry and dressing intact. continue observing for signs of infection. Discussed s/s of infection to monitor for, s/s of UTI, who to report to/when. Instructed cg to notify staff/md/seek tx if complications occur.    Patient instructed to maintain clear pathways in home and to minimize clutter to prevent falls from occurring/minimize fall potential.   Patient needing a well balanced diet with the 5 food groups, patient to increase fiber in diet, fresh fruits and vegetables, whole grains and increase water intake. I went over the importance of taking all prescriptions as ordered. I discussed how to avoid extra sodium in her diet. We discussed the signs of infection and when to call MD.  We discussed the high risk for falls and ways to prevent falls in the future. We discussed taking B/P daily and keeping a log. We also discussed the need of a heart healthy diet, and the need to change positions frequently. Continued need for the following skills: Nursing    The following discharge planning was discussed with the pt/caregiver: Will discharge patient when medically stable and education is completed. Will discharge from nursing when dressing is no longer needed or when can be managed by caregiver independently. Wing Zuniga

## 2022-04-19 NOTE — HOME HEALTH
Skilled reason for visit:WOUND CARE, VS, MEDS, PAIN    Caregiver involvement: patients cg is spouse and is availale as needed or asistance with IADL's, ADL's, meal prep, medication management, and taking patient to all doctors appointments. Medications reviewed and all medications are available in the home this visit. The following education was provided regarding medications: All patient medications were reviewed, including side effects, safety, time to administer, purposes, dosages, and frequencies. .    MD notified of any discrepancies/look a-like medications/medication interactions: NONE  Medications are EFFECTIVE at this time. Home health supplies by type and quantity ordered/delivered this visit include: [email or web address] [address].    Patient education provided this visit: SEE INTERVENTIONS    Sharps education provided:PROPER HANDLING AND DISPOSAL  Skilled Care Performed this visit: SEE REASON    Patient response to procedure performed:  COMPLAINED OF PAIN DURING PACKING, PATIENT STATED PAIN WENT AWAY AFTER WOUND CARE WAS COMPLETE     Agency Progress toward goals: SEE INTERVENTIONS    Patient's Progress towards personal goals: PATIENT IS STEADILY PROGRESSING TOWARDS GOALS, STILL NEEDS REINFORCEMENT/ENCOURAGEMENT. WILL CONTINUE TO MONITOR. Home exercise program: activity as tolerated, trying to get physical activity 4-5 x weekly. stopping activity if causing shortness of breath or chest pain, dizziness or weakness. Continued need for the following skills: Nursing    Plan for next visit: [facility]    Patient and/or caregiver notified and agrees to changes in the Plan of Care N/A      The following discharge planning was discussed with the pt/caregiver:  Patient will be discharged once education is complete, and pt is medically stable.

## 2022-04-20 ENCOUNTER — HOME CARE VISIT (OUTPATIENT)
Dept: SCHEDULING | Facility: HOME HEALTH | Age: 72
End: 2022-04-20
Payer: MEDICARE

## 2022-04-20 VITALS
OXYGEN SATURATION: 98 % | RESPIRATION RATE: 17 BRPM | TEMPERATURE: 97.3 F | SYSTOLIC BLOOD PRESSURE: 116 MMHG | HEART RATE: 62 BPM | DIASTOLIC BLOOD PRESSURE: 64 MMHG

## 2022-04-20 PROCEDURE — G0299 HHS/HOSPICE OF RN EA 15 MIN: HCPCS

## 2022-04-20 NOTE — HOME HEALTH
Skilled reason for visit: wound care, pressure ulcer/medication teaching. Caregiver involvement: spouse provides all ADLS/IADLS/meal prep and incont care. Medications reviewed and all medications are available in the home this visit. The following education was provided regarding medications:  Depakote, cogentin, and Lipitor: reviewed purpose,side effects and reason to contact MD. verbalized understanding. MD notified of any discrepancies/look a-like medications/medication interactions:   Medications are effective at this time. Home health supplies by type and quantity ordered/delivered this visit include: wound care supplies in the home. Patient education provided this visit: REVIEWED TO KEEP SKIN CLEAN, DRY, SKIN PROTECTANT, TURN REPOSITION EVERY 2 HOURS AND IMPORTANCE OF PROTEIN IN DIET TO PREVENT SKIN BREAKDOWN. USE SKIN BARRIER AS PRESCRIBED AND INCREASE PROTEIN IN DIET. Reviewed to reported any redness, swelling, warmth, fever, chills, increased heart/respiratory rate, uncontrolled pain/tenderness, drainage:green/yellow/brown, or odor to MD immediately. advised to call medical provider for non-life-threatening concerns before going to ER/urgent care. verbalized understanding to all above. see intervention tab for additional education. Sharps education provided:Clinician instructed patient/CG on proper disposal of sharps: Containers should be made of hard plastic, be puncture-resistant and leakproof,   such as a laundry detergent or bleach bottle.  When the container is ¾ full, it should be sealed with tape and labeled   DO NOT RECYCLE prior to discarding in the regular trash.        Patient level of understanding of education provided:  see intervention tab for level of understanding. Skilled Care Performed this visit: see wound addend. Called Dr Narinder Brandon office LM for PT/OT eval and treat order.  reported that they spoke TO MD office yesterday and reported that office was going to send order. intake reported no therapy order received. Called and LM on spouse line that no order received and to call office as home care if waiting on call back for verbal order. Patient response to procedure performed:  see intervention tab. Agency Progress toward goals: see intervention tab for PROGRESSING TOWARD GOALS. Patient's Progress towards personal goals:see intervention tab to progression toward goals         Home exercise program: CONT ALL MEDICATIONS AS PRESCRIBED, DIET AS PRESCRIBED, IMPLEMENT FALL/INFECTION CONTROL/PRESSURE ULCER INTERVENTIONS,MONITOR FOR ABNORMAL S/SX/ infection (listed above) REPORT TO MD IMMEDIATELY, AND PERFORM wound care as prescribed non nursing days if dressing is soiled or comes off. KEEP ALL FOLLOW UP APPTS AS PRESCRIBED. READ ALL TEACHING PACKETS FOR EDUCATION OF DISEASE PROCESS & TO PREVENT COMPLICATIONS. Continued need for the following skills: Nursing    Plan for next visit: wound care    Patient and/or caregiver notified and agrees to changes in the Plan of Care N/A      The following discharge planning was discussed with the pt/caregiver:  Will discharge when all wound care/pressure ulcer disease process, complication and dietary goals are met and understands all medication purpose/frequencies/side effects

## 2022-04-21 ENCOUNTER — HOME CARE VISIT (OUTPATIENT)
Dept: HOME HEALTH SERVICES | Facility: HOME HEALTH | Age: 72
End: 2022-04-21
Payer: MEDICARE

## 2022-04-22 ENCOUNTER — HOME CARE VISIT (OUTPATIENT)
Dept: SCHEDULING | Facility: HOME HEALTH | Age: 72
End: 2022-04-22
Payer: MEDICARE

## 2022-04-22 PROCEDURE — G0300 HHS/HOSPICE OF LPN EA 15 MIN: HCPCS

## 2022-04-25 ENCOUNTER — HOME CARE VISIT (OUTPATIENT)
Dept: SCHEDULING | Facility: HOME HEALTH | Age: 72
End: 2022-04-25
Payer: MEDICARE

## 2022-04-25 VITALS
OXYGEN SATURATION: 94 % | SYSTOLIC BLOOD PRESSURE: 122 MMHG | RESPIRATION RATE: 14 BRPM | TEMPERATURE: 97.2 F | DIASTOLIC BLOOD PRESSURE: 60 MMHG | HEART RATE: 64 BPM

## 2022-04-25 VITALS
TEMPERATURE: 98.1 F | RESPIRATION RATE: 18 BRPM | SYSTOLIC BLOOD PRESSURE: 101 MMHG | OXYGEN SATURATION: 98 % | HEART RATE: 89 BPM | DIASTOLIC BLOOD PRESSURE: 68 MMHG

## 2022-04-25 PROCEDURE — G0151 HHCP-SERV OF PT,EA 15 MIN: HCPCS

## 2022-04-25 PROCEDURE — G0300 HHS/HOSPICE OF LPN EA 15 MIN: HCPCS

## 2022-04-25 NOTE — Clinical Note
Discussed PLOC with LPTA working establising HEP B LE. work on bed mobility skills. work on static and dynamic sitting balance on the EOB all to increase pt functional mobility and decrease the burden of care of the cargivers.

## 2022-04-25 NOTE — HOME HEALTH
Abiel Bolden is a 67 yo F with PMHx of Bipolar Disorder, Asthma, A-fib not on anticoagulation due to fall risk, COVID Hospitalization, Sacral Decubiti, breast cancer in remission, anasarca, myelodysplastic syndrome with severe anemia. Reported 10/10 abd onset a 2-3 days ago. Predominantly lower, central/left abdomen. States she was lying in bed when it started. No N/V/D. Cannot remember when last BM was. Normal BMs, no pain with BMs. States her appetite is normal. She endorses she has had similar pain a year ago when she had COVID. She states she has stayed at home since her recent admission and has not been out to eat or eaten any different meals recently. She has not had fever, chills, cough, SOB, chest pain, increased swelling, burning with urination. List of Comorbitites: Bipolar 2 disorder ([address]    Myelodysplastic syndrome ([address]    Paroxysmal atrial fibrillation (HCC)    Severe anemia    Skin ulcer of sacrum (HCC)    Hypokalemia    Abdominal pain    Decubitus ulcer    Diverticulitis    Pancytopenia (HCC)    Hypercalcemia    Acute osteomyelitis of sacrum ([address]    SUBJECTIVE: It is nice to see you again. I went to see the NP last week and she is the one who recommeded that you come back out . I went to the NP by amubulance. Pt and  report that pt has not been up in a wc since last year   LIVING SITUATION: Pt lives with  in a mulit level home with 4 step to enter. Pt is in a hospital bed in the lving room   REQUIRES CAREGIVER ASSISTANCE FOR: transpotation, medications, ADLS, IADLS   PLOF: Pt was dependend for all functional mobility   MEDICATIONS REVIEWED AND RECONCILED: Tylenol #3 added   NEXT MD APPT: Avinash Ivory NP   COGNITION: alert to name and place not time.  Pt was able to follow simple one step commands 100%  EQUIPMENT:hospital bed,with an air mattress  denise lift, wc.with cushion, wedges and B heel bow for pressure relief   ROM:B LE WFL except pt had decresed R DF to neutral STRENGTH: R hip flexors 3/5 R quad and hamstrings -3/5 ADD/ ABD 3/5  DF 0/5 PF 1/5  L LE hip flexors +3/5 L quads and hamstrings +3/5 ADD/ ABD 3/5 DF 3/5 PF -3/5  WOUNDS:scaral wound. Pt has bandages on B heels and reports that nursing is still working on L wound R is healed. Pt also has a healed wound on the R lateral lower leg   BED MOBILITY:Rolling R and L with bedrailing with MOD A with MOD vc needed for technqiue and sequencing. Supine to sit with MOD/ MAX A with MOD vc needed for technique and sequencing. Pt transfered sit to suine with MAX A for B LE pt was S for upper body managment. Pt is dependent for scooting herself up in bed   TRANSFERS:NA  BALANCE: Pt was able to sit on the EOB for 1 min and was able to maintain a static unsupportive sitting posture with B UE support for 30 sec. Pt reported she then needed to sit back down O2 sat was 97% with HR of 86  PATIENT RESPONE TO TX: Pt reported that she felt good sitting on the EOB she reported that her pain level remained the same throughout tx session   PATIENT LEVEL OF UNDERSTANDING OF EDUCATION PROVIDED Discussed with pt and  the importance co  PATIENT EDUCATION PROVIDED THIS VISIT: safety, HEP, walking, deep breathing, Educated that pt needed to sit with the [facility] elevated at all times when drinking and eating   HEP consisting of:  1. Changing position  every hour during the day  2. supine therex RB LE,QS,HS, gluteal squeezes 3 daily x 10  Written HEP issued, patient/caregiver verbalized understanding. CONTINUED NEED FOR THE FOLLOWING SKILLS: HH PT is medically necessary to *address pain, decreased ROM, decreased strength,  impaired bed mobility, decreased independence with functional transfers, impaired gait, impaired stair negotiation, and impaired balance in order to improve functional independence, quality of life, return to PLOF, and reduce the risk for falls.   PLAN: Pt  reports that he has been training in how to use denise lift, but he is unable to use it on his own and he is the only one with the pt. Pt  stated that he is doing the bet that he can but there is alot going on and he is limited physical in what he can do, Do to his own healt limations. Pt will be seen to establish HEP, work on bed mobility skills and static sitting balance on the EOB all to increase pt functional I and to reduce the burden of care of the caregiver. DISCHARGE PLANNING DISCUSSED: Discharge to self and family under MD supervision once all goals have been met or patient has reached max potential. Patient/caregiver verbalized understanding.

## 2022-04-27 ENCOUNTER — HOME CARE VISIT (OUTPATIENT)
Dept: SCHEDULING | Facility: HOME HEALTH | Age: 72
End: 2022-04-27
Payer: MEDICARE

## 2022-04-27 VITALS
TEMPERATURE: 98.2 F | DIASTOLIC BLOOD PRESSURE: 76 MMHG | SYSTOLIC BLOOD PRESSURE: 110 MMHG | RESPIRATION RATE: 18 BRPM | HEART RATE: 71 BPM | OXYGEN SATURATION: 95 %

## 2022-04-27 PROCEDURE — G0300 HHS/HOSPICE OF LPN EA 15 MIN: HCPCS

## 2022-04-27 PROCEDURE — G0152 HHCP-SERV OF OT,EA 15 MIN: HCPCS

## 2022-04-27 NOTE — Clinical Note
dx: Acute osteomyelitis of sacrum, Pressure injury of sacral region, unstageable, zip: [id number], freq: 1w2, 2w2, focus: increased independence ADL tasks, performing ADL tasks sitting upright, increased sitting balance and tolerance, increased participation and safey bed mobility and transfers, R shoulder strengthening, maintain L shoulder ROM, pt/caregiver education. thank you.

## 2022-04-27 NOTE — HOME HEALTH
Skilled reason for visit: Wound care and education    Caregiver involvement: Patient's cg is family. They lives with patient and is available 24/7 for assistance with iadls, adls, meal prep, medication management, taking to md appointments. Medications reviewed and all medications are available in the home this visit. The following education was provided regarding medications, medication interactions, and look alike medications NA  Medications  are effective at this time. Home health supplies by type and quantity ordered/delivered this visit include: supplies ordered    Patient education provided this visit:  Reviewed to reported any redness, swelling, warmth, fever, chills, increased heart/respiratory rate, uncontrolled pain/tenderness, drainage:green/yellow/brown, or odor to MD immediately. Increase protein n diet and monitor for signs and symptoms of infection to include increase drainage, fever over 101.1    Patient's Progress towards personal goals:Progressing toward interventions above      Home exercise program: pt conitnues to take  medication as ordered and follows up on all  md appintments    Continued need for the following skills: nursing and physical therapy      Patient and/or caregiver notified and agrees to changes in the Plan of Care yes    The following discharge planning was discussed with the pt/caregiver: when patient reaches goals and medication is managed, and disease processes are understood patient agrees and understand that discharge will take place.

## 2022-04-28 VITALS
OXYGEN SATURATION: 97 % | DIASTOLIC BLOOD PRESSURE: 76 MMHG | RESPIRATION RATE: 16 BRPM | SYSTOLIC BLOOD PRESSURE: 110 MMHG | TEMPERATURE: 98.2 F | HEART RATE: 71 BPM

## 2022-04-28 NOTE — HOME HEALTH
Clinical Condition per EPIC:  \"PMHx:Joseline Roe is a 67 yo F with PMHx of Bipolar Disorder, Asthma, A-fib not on anticoagulation due to fall risk, COVID Hospitalization, Sacral Decubiti, breast cancer in remission, anasarca, myelodysplastic syndrome with severe anemia. Reported 10/10 abd onset a 2-3 days ago. Predominantly lower, central/left abdomen. States she was lying in bed when it started. No N/V/D. Cannot remember when last BM was. Normal BMs, no pain with BMs. States her appetite is normal. She endorses she has had similar pain a year ago when she had COVID. She states she has stayed at home since her recent admission and has not been out to eat or eaten any different meals recently. She has not had fever, chills, cough, SOB, chest pain, increased swelling, burning with urination. List of Comorbitites: Bipolar 2 disorder ([address]   Myelodysplastic syndrome ([address]   Paroxysmal atrial fibrillation (HCC)   Severe anemia   Skin ulcer of sacrum (HCC)   Hypokalemia   Abdominal pain   Decubitus ulcer   Diverticulitis   Pancytopenia (HCC)   Hypercalcemia   Acute osteomyelitis of sacrum (HCC)\"  . SUBJECTIVE:  Pt stated \"I want to be able to sit up on the edge of the bed. \" pt supine hospital bed upon OT arrival. pt agreed tx. CAREGIVER INVOLVEMENT: pt spouse provides A ADL tasks and bed mobility PRN, performs all IADL tasks, shopping, transportation, and A with medications. MEDICATION RECONCILIATION: OT reconcilled medications with pt with no changes   DME ORDERED/RECOMMENEDED: pt has denise lift, WC, BSC, hosipital bed, and rollator walker. .  OBJECTIVE:  BATHING: D lying hospital bed  TOILETING: pt has purewick catheter for urination. pt D bowel movement via brief. UB DRESSING: D  LB DRESSING: D  GROOMING: max A  FEEDING: setup A  .  pt reports Modified Susanne RPE 9/10 after performing bed mobility/transfers and ADL assessment.   .  OT instructed/demonstrated pt and caregiver the following with good understanding:    - energy conservation while performing bathing, dressing, and setup such as set clothing out night before, gather items required to perform task in one trip, sit while performing tasks, take rest breaks as needed, perform bathing on days with no other appointments or activities scheduled, etc.     -            importance pt sitting upright to perform ADL tasks and pt participating as much as possible ADL tasks for increased strength, endurance, and improved functional level. -ADL training performed for improved body mechanics, safety, and technique for reduced risk of falls and improved functional level and participation of tasks. Ilene Kirkland IADL: NA  .  BALANCE:   STATIC SITTING: poor+. pt sat EOB x 25 seconds with min A for most the duration, 5 seconds pt was able to sit unsupported (without A per OT) with use BUE support, min A to correct LOB posteriorly with min v/c engage core muscles. DYNAMIC SITTING: NT due to safety  STATIC STANDING: NT as pt is not able to stand  DYNAMIC STANDING: NT as pt is not able to stand  . AMBULATION: pt non-ambulatory. pt has not been in [facility] since 8/[facility]21. .  EOB/BED TRANSFER: pt supine to/from sit max A to D mod v/c hand placement and technique. roll L side mod A, roll R side max A   CHAIR: NT  TOILET/BSC: NT. pt is not able to access bathroom to use commode and not safe to transfer to [facility] at this time. TUB SHOWER/SHOWER CHAIR/TUB BENCH: NA, only shower in the house is upstairs. pt is not able to access bathroom. .  -gait and transfer training performed for improved body mechanics and technique for fall prevention and safety while performing ADL/IADL tasks. Ilene Kirkland PATIENT RESPONSE TO TREATMENT:  pt reports increased pain B knees with movement/while performing bed mobility and transfers. pt pain subsided with rest    PATIENT EDUCATION PROVIDED THIS VISIT: UB HEP, OT role, energy conservation, fall prevention/safety training ADL tasks, transfer training   .   PATIENT LEVEL OF UNDERSTANDING OF EDUCATION PROVIDED: pt and caregiver verbalized good understanding energy conservation and UB HEP. REHAB POTENTIAL: fair+ for stated goals   HOME EXERCISE PROGRAM: Written HEP of the following issued. DE LA GARZA will instruct/re-instruct pt next treatment. UB HEP includes the following: BUE shoulder press, shoulder flexion, shoulder abduction, shoulder extension, chest press, elbow flexion/extension x 10, x 2 per day. pt aware DE LA GARZA will update HEP throughout POC. UB HEP performed for pt increased endurance and strength to perform ADL tasks and ambulation/transfers to perform tasks with improved safety, increased functional level, and for fall prevention. CONTINUED NEED FOR THE FOLLOWING SKILLS: HH OT is medically necessary to address pain, decreased ROM, decreased functional strength, increased swelling, impaired bed mobility to perform ADL tasks, decreased independence and safety with functional transfers, decreased independence and safety performing ADL tasks, decreased activity and standing tolerance, decreased functional endurance, and impaired balance in order to improve functional independence, obtain set goals, reduce risk of falls, reduce pain, improve quality of life, and return to PLOF. ASSESSMENT: pt presents with decreased endurance, decreased functional strength, decreased safety transfers, increased A with transfers, decreased safety/participation ADL tasks, decreased balance, increased burden of care, and no UB HEP. PLAN: pt will be seen to address ther ex: establish and upgrade HEP, ther act: activity and standing tolerance training, endurance training, balance training, safety training, energy conservation training, transfer training, ADL training, and pt/caregiver education. DISCHARGE PLANNING DISCUSSED WITH PT/CAREGIVER: Anticipate D/C skilled OT week of 5.16.22 when goals met or when pt obtained maximum benefit. pt frequency 1w2, 2w2.  discharge to self and family care under MD supervision once all goals have been met or patient has reached max potential. pt and caregiver verbalized understanding and agree POC.

## 2022-04-29 ENCOUNTER — HOME CARE VISIT (OUTPATIENT)
Dept: SCHEDULING | Facility: HOME HEALTH | Age: 72
End: 2022-04-29
Payer: MEDICARE

## 2022-04-29 PROCEDURE — 400013 HH SOC

## 2022-04-29 PROCEDURE — G0157 HHC PT ASSISTANT EA 15: HCPCS

## 2022-04-29 PROCEDURE — G0300 HHS/HOSPICE OF LPN EA 15 MIN: HCPCS

## 2022-04-30 VITALS
OXYGEN SATURATION: 98 % | TEMPERATURE: 98 F | DIASTOLIC BLOOD PRESSURE: 67 MMHG | HEART RATE: 60 BPM | RESPIRATION RATE: 18 BRPM | SYSTOLIC BLOOD PRESSURE: 107 MMHG

## 2022-04-30 NOTE — HOME HEALTH
Skilled reason for visit: Wound care and education    Caregiver involvement: Patient's cg is family. They lives with patient and is available 24/7 for assistance with iadls, adls, meal prep, medication management, taking to md appointments. Medications reviewed and all medication are available in the home this visit. The following education was provided regarding medications:  ZEUS  MD notified of any discrepancies/look a-like medications/medication interactions: NA  Medications are effective at this time. Home health supplies by type and quantity ordered/delivered this visit include: suplies ordered    Patient education provided this visit: Pt to turn and reposition every 2 hours to pressure relieve jorge alberto areas to prevent skin breakdown   Reviewed to reported any redness, swelling, warmth, fever, chills, increased heart/respiratory rate, uncontrolled pain/tenderness, drainage:green/yellow/brown, or odor to MD immediately. Sharps education provided: ZEUS    Patient level of understanding of education provided: Joceline Espinoza all understanding to above education        Skilled Care Performed this visit: wound care    Patient response to procedure performed:  no pain verbalized    Agency Progress toward goals: Progressing toward interventions above      Patient's Progress towards personal goals: Progressing toward interventions above      Home exercise program: pt conitnues to take  medication as ordered and follows up on all  md appintments      Continued need for the following skills: nursing/pt    Plan for next visit: wound care education    Patient and/or caregiver notified and agrees to changes in the Plan of Care ys    The following discharge planning was discussed with the pt/caregiver: when patient reaches goals and medication is managed, and disease processes are understood patient agrees and understand that discharge will take place.

## 2022-05-02 VITALS
DIASTOLIC BLOOD PRESSURE: 67 MMHG | TEMPERATURE: 98 F | OXYGEN SATURATION: 98 % | HEART RATE: 60 BPM | SYSTOLIC BLOOD PRESSURE: 107 MMHG

## 2022-05-02 NOTE — HOME HEALTH
Subjective:  states that the nurse is coming today. Pt c/o pain in several areas. She has LBP, and pain in her liberty knees. When discussing how the pt gets to appointments,  reports that she travels via medical transport. She is transferred to a stretchers and carried out to the medical transport. Future MD appointments: TBD  Caregiver involvement/assistance needed for: Pt lives with her . Her medical bed is in the living room. Pt assists with ADLs, woundcare, meals, and errands. Objective: See interventions. Pt response to treatment: The wound on her R calf is uncovered and draining. Pt's bandage on her R heel is saturated and will be changed by SN. It has odor. Pt level of understanding of education provided: Pt verbalizes a good understanding of pt education regarding bed mobility and changing positions every 1-2 hours. Assessment of Progress toward goals: Pt's progress is limited due to multiple pressure sores. Continued need for the following skills: There is continued need for skilled PT to address supine exercises and bed mobility  Communications with all co-treating staff regarding pt status/progress during staff meeting. Equipment Needs:  Plan for Next visit: There is acontinued need for skilled PT to address strength and bed mobility. Frequency: 2 w 3. Pt has two visits next week. The Following Discharge Planning was Discussed with the Pt/Caregiver: Pt to receive HHPT until goals are met or max benefit has been achieved.

## 2022-05-03 ENCOUNTER — HOME CARE VISIT (OUTPATIENT)
Dept: SCHEDULING | Facility: HOME HEALTH | Age: 72
End: 2022-05-03
Payer: MEDICARE

## 2022-05-03 PROCEDURE — G0300 HHS/HOSPICE OF LPN EA 15 MIN: HCPCS

## 2022-05-03 PROCEDURE — G0158 HHC OT ASSISTANT EA 15: HCPCS

## 2022-05-03 PROCEDURE — G0157 HHC PT ASSISTANT EA 15: HCPCS

## 2022-05-03 NOTE — HOME HEALTH
Skilled reason for visit:  Wound Care Treatment and education     Caregiver involvement: Patient's cg is family. They lives with patient and is available 24/7 for assistance with iadls, adls, meal prep, medication management, taking to md appointments. Medications reviewed and all medications are available in the home this visit. The following education was provided regarding medications:  ZEUS  MD notified of any discrepancies/look a-like medications/medication interactions: NA  Medications are effecrtive at this time. Home health supplies by type and quantity ordered/delivered this visit include:  Supplies available     Patient education provided this visit: Pt to turn and reposition every 2 hours to pressure relieve jorge alberto areas to prevent skin breakdown   Reviewed to reported any redness, swelling, warmth, fever, chills, increased heart/respiratory rate, uncontrolled pain/tenderness, drainage:green/yellow/brown, or odor to MD immediately.     Patient level of understanding of education provided: Dre Seen all understanding to above education    Skilled Care Performed this visit: Education and Wound Care    Patient response to procedure performed:  NO pain during dressing change     Agency Progress toward goals: Progressing toward interventions above      Patient's Progress towards personal goals: Progressing toward interventions above

## 2022-05-04 ENCOUNTER — HOME CARE VISIT (OUTPATIENT)
Dept: HOME HEALTH SERVICES | Facility: HOME HEALTH | Age: 72
End: 2022-05-04
Payer: MEDICARE

## 2022-05-04 VITALS
TEMPERATURE: 96.9 F | HEART RATE: 73 BPM | SYSTOLIC BLOOD PRESSURE: 138 MMHG | OXYGEN SATURATION: 94 % | RESPIRATION RATE: 16 BRPM | DIASTOLIC BLOOD PRESSURE: 62 MMHG

## 2022-05-04 VITALS
DIASTOLIC BLOOD PRESSURE: 75 MMHG | SYSTOLIC BLOOD PRESSURE: 120 MMHG | OXYGEN SATURATION: 94 % | TEMPERATURE: 98.2 F | HEART RATE: 80 BPM

## 2022-05-04 NOTE — HOME HEALTH
SUBJECTIVE: Upon arrival pt was supine in hospital bed; pt reported no pain, moved B feet demonstrating an improvement in health and CG reported he recently changed her brief. CG reported no changes in medication; therapist informed pt and CG of denial from insurance company for [facility] and that therapist was going to address bathing this visit. Pt agreed to sponge bathing and to change her gown. Pt reported she would like to work on sitting at EOB with OT. Pt demonstrated two UB exercises due to fatigue and was instructed to perform all UB HEP daily to increase UB strength. CAREGIVER ASSISTANCE NEEDED FOR: pts  present during visit for assistance with bathing and chaning pt prior to SN visit for wound care. MEDICATIONS REVIEWED AND UPDATED: no medication changes reported this visit. .  OBJECTIVE: see interventions  PATIENT RESPONSE TO TREATMENT:  Pt demonstrated positive response to treatment with min increase in pain with R knee during bed moblity and good participation. Dulce Hernandez PATIENT/CAREGIVER EDUCATION PROVIDED THIS VISIT: therapist educated pt and CG on importance of setting pt up to participate as much as possible as show during UB bathing and to continue working on pressure relief through rotation and bed mobility. PATIENT LEVEL OF UNDERSTANDING OF EDUCATION PROVIDED: Pt demonstrated good participation with UB bathing to teachback understanding of technique and CG present to observe to improve carry over. ASSESSMENT AND PROGRESS TOWARD GOALS:  Pt demonstrated fair progress towards goals for increasing bed mobility and ADL participation for increased activity tolerance and I. Patient will benefit from continued intervention with progression of ADL, transfer, balance and UE strength training.   CONTINUED NEED FOR THE FOLLOWING SKILLS[facility] is medically necessary to address pain, decreased ROM, decreased strength, impaired bed mobility, decreased independence with functional transfers, decreased I with ADLs, and impaired balance in order to improve functional independence, quality of life, return to PLOF, reduce the risk for falls, and reduce pain. PLAN: next visit will be for sitting balance at EOB and upgrade of HEP with CG present for carry over. DISCHARGE PLANNING DISCUSSED: Discharge to self and family under MD supervision once all goals have been met or patient has reached maximum potential.  Frequency remaininw2 remaining.

## 2022-05-05 ENCOUNTER — HOME CARE VISIT (OUTPATIENT)
Dept: SCHEDULING | Facility: HOME HEALTH | Age: 72
End: 2022-05-05
Payer: MEDICARE

## 2022-05-05 PROCEDURE — G0157 HHC PT ASSISTANT EA 15: HCPCS

## 2022-05-05 NOTE — HOME HEALTH
Subjective: Pt reports having pain in her bottom. At its worst it's an 8/10. At its best, its a  2/10. Future MD appointments: TBD  Caregiver involvement/assistance needed for: Pt lives in a multistory home with her . She is dependent for almost all ADLs. Objective: See interventions. Pt response to treatment: Pt has relief from change in position to take pressure off of her sacrum. Pt level of understanding of education provided: Emphasize the importance of changing positions every 1-2 hours to allow for healing of bed sores. Assessment of Progress toward goals: Pt is progressing slowly with pressure relief. Pt is completely dependent with bed mobility and AROM is combination of AAROM  and PROM. Pt has been in supine for both PT visits upon arrival.  Continued need for the following skills: There is a continued need for skilled PT to address bed mobility and attempt sitting on the EOB. Communications with all co-treating staff regarding pt status/progress during staff meeting. Equipment Needs:  Plan for Next visit: Continue to address bed mobility and attempt sitting on EOB. Frequency: 2 w 3. Pt has one remaining visit this week. The Following Discharge Planning was Discussed with the Pt/Caregiver: Pt to receive HHPT until goals are met or max benefit has been achieved.

## 2022-05-06 ENCOUNTER — HOME CARE VISIT (OUTPATIENT)
Dept: SCHEDULING | Facility: HOME HEALTH | Age: 72
End: 2022-05-06
Payer: MEDICARE

## 2022-05-06 PROCEDURE — G0300 HHS/HOSPICE OF LPN EA 15 MIN: HCPCS

## 2022-05-06 NOTE — HOME HEALTH
Skilled reason for visit: WOund Care and skin assessment    Caregiver involvement: Patient's cg is family. They lives with patient and is available 24/7 for assistance with iadls, adls, meal prep, medication management, taking to md appointments. Medications reviewed and all medications are available in the home this visit. The following education was provided regarding medications:  ZEUS    MD notified of any discrepancies/look a-like medications/medication interactions: NA  Medications are effective at this time. Home health supplies by type and quantity ordered/delivered this visit include: ZEUS     Patient education provided this visit: Pt to turn and reposition every 2 hours to pressure relieve jorge alberto areas to prevent skin breakdown   Reviewed to reported any redness, swelling, warmth, fever, chills, increased heart/respiratory rate, uncontrolled pain/tenderness, drainage:green/yellow/brown, or odor to MD immediately. Patient/CG educated on use of rest as non-pharmacological form of pain treatment.   . Opiod risk and education: Tolerance-meaning you might need to take more of a medication for the same pain relief  Physical dependence-meaning you have symptoms of withdrawal when a medication is stopped  Increased sensitivity to pain  Constipation Nausea, vomiting, and dry mouth Sleepiness and dizziness  Confusion  Depression o Low levels of testosterone that can result in lower sex drive, energy, and strength /Itching and sweating    Sharps education provided: ZEUS  Patient level of understanding of education provided: Ramez Kearns all understanding to above education    Skilled Care Performed this visit: skin assessment and wound care     Patient response to procedure performed:  minimal pain vocied this visit    Agency Progress toward goals: Progressing toward interventions above    Patient's Progress towards personal goals: Progressing toward interventions above    Home exercise program: pt conitnues to take  medication as ordered and follows up on all  md appintments    Continued need for the following skills: nursing    Plan for next visit: wound care     Patient and/or caregiver notified and agrees to changes in the Plan of Care yes     The following discharge planning was discussed with the pt/caregiver: when patient reaches goals and medication is managed, and disease processes are understood patient agrees and understand that discharge will take place.

## 2022-05-08 VITALS
SYSTOLIC BLOOD PRESSURE: 123 MMHG | OXYGEN SATURATION: 93 % | TEMPERATURE: 98 F | HEART RATE: 60 BPM | DIASTOLIC BLOOD PRESSURE: 70 MMHG

## 2022-05-09 ENCOUNTER — HOME CARE VISIT (OUTPATIENT)
Dept: SCHEDULING | Facility: HOME HEALTH | Age: 72
End: 2022-05-09
Payer: MEDICARE

## 2022-05-09 ENCOUNTER — HOME CARE VISIT (OUTPATIENT)
Dept: HOME HEALTH SERVICES | Facility: HOME HEALTH | Age: 72
End: 2022-05-09
Payer: MEDICARE

## 2022-05-09 PROCEDURE — G0157 HHC PT ASSISTANT EA 15: HCPCS

## 2022-05-09 NOTE — HOME HEALTH
Subjective: Pt complains of liberty knee pain and LBP that are sharp. When discussing HEP, caregiver reports that they are only performing HEP once a day. Future MD appointments: TBD  Caregiver involvement/assistance needed for: Pt's  assists with ADLs, meals and errands. Objective: See interventions. Pt response to treatment: Pt is able to participate fully with bed mobility. Pt is able to move her RLE herself, when before she required assistance. Pt level of understanding of education provided: Pt and spouse verbalize a good understanding of pt education  with bed mobility and positioning to reduce the likelihood of skin breakdown. Assessment of Progress toward goals: Pt is progressing slowly with strength to reduce the burden on her caregiver. Continued need for the following skills: There is a continued need for skilled PT   Communications with all co-treating staff regarding pt status/progress via email. Equipment Needs:  Plan for Next visit: Continue with strength and bed mobility to improve independence with ADLs  Frequency: 2 w 3. Pt has two visits next week. The Following Discharge Planning was Discussed with the Pt/Caregiver: Pt to receive HHPT until goals are met or max benefit has been achieved.

## 2022-05-10 ENCOUNTER — HOME CARE VISIT (OUTPATIENT)
Dept: SCHEDULING | Facility: HOME HEALTH | Age: 72
End: 2022-05-10
Payer: MEDICARE

## 2022-05-10 VITALS
RESPIRATION RATE: 17 BRPM | TEMPERATURE: 97.8 F | OXYGEN SATURATION: 96 % | DIASTOLIC BLOOD PRESSURE: 78 MMHG | SYSTOLIC BLOOD PRESSURE: 140 MMHG | HEART RATE: 73 BPM

## 2022-05-10 PROCEDURE — G0158 HHC OT ASSISTANT EA 15: HCPCS

## 2022-05-10 NOTE — HOME HEALTH
SUBJECTIVE: Pt laying in bed upon arrival, therapist and pts  planned to address UB bathing at EOB and LB bathing in supine. CAREGIVER ASSISTANCE NEEDED FOR: pts  present during visit to assist as needed. MEDICATIONS REVIEWED AND UPDATED: no medication changes reported this visit. .  OBJECTIVE: see interventions  PATIENT RESPONSE TO TREATMENT:  Pt demonstrated positive response to treatment with min increase in pain in R knee and good participation. Jacy Crawford PATIENT/CAREGIVER EDUCATION PROVIDED THIS VISIT: Therapist educated pt and  on importance of pt taking opportunity to participate in UB bathing at bed level, wringing washcloth out and performing functional reach daily to promote core strength. PATIENT LEVEL OF UNDERSTANDING OF EDUCATION PROVIDED: Pt and CG verbalized good understanding and pt demonstrated good teachback through participation with educated tasks. ASSESSMENT AND PROGRESS TOWARD GOALS:  Pt demonstrated good progress towards goals for UB ADL participation and increased I at EOB level with decreased assistance. Patient will benefit from continued intervention with progression of ADL, bed mobility and HEP training to increase carry over with CG and promote pt participation. CONTINUED NEED FOR THE FOLLOWING SKILLS: HH OT is medically necessary to address pain, decreased ROM, decreased strength, impaired bed mobility, decreased independence with functional transfers, decreased I with ADLs, and impaired balance in order to improve functional independence, quality of life, return to PLOF, reduce the risk for falls, and reduce pain. PLAN: next visit will be for continued bed mobility training with CG to address sitting EOB for increased participation with self-care.     DISCHARGE PLANNING DISCUSSED: Discharge to self and family under MD supervision once all goals have been met or patient has reached maximum potential.  Frequency remaininw1, 2w1 remaining with pt and CG aware of OTDC next week.

## 2022-05-11 ENCOUNTER — HOME CARE VISIT (OUTPATIENT)
Dept: SCHEDULING | Facility: HOME HEALTH | Age: 72
End: 2022-05-11
Payer: MEDICARE

## 2022-05-11 VITALS
SYSTOLIC BLOOD PRESSURE: 108 MMHG | DIASTOLIC BLOOD PRESSURE: 70 MMHG | OXYGEN SATURATION: 96 % | TEMPERATURE: 98.1 F | RESPIRATION RATE: 18 BRPM | HEART RATE: 87 BPM

## 2022-05-11 PROCEDURE — G0300 HHS/HOSPICE OF LPN EA 15 MIN: HCPCS

## 2022-05-11 NOTE — HOME HEALTH
Skilled reason for visit: Wound care and education    Caregiver involvement: Pt independent with care      Medications reviewed and all medications are available in the home this visit. The following education was provided regarding medications:  ZEUS   MD notified of any discrepancies/look a-like medications/medication interactions: NA  Medications are effective at this time. Home health supplies by type and quantity ordered/delivered this visit include: supplies ordered    Patient education provided this visit: patient made aware to turn every 2 hours and to keep pressure off of bony prominences, to monitor for any pressure ulcer development/worsening. Reviewed to reported any redness, swelling, warmth, fever, chills, increased heart/respiratory rate, uncontrolled pain/tenderness, drainage:green/yellow/brown, or odor to MD immediately. Nay education provided: ZEUS    Patient level of understanding of education provided: Kori Richardson all understanding to above education    Skilled Care Performed this visit: Wound Care     Patient response to procedure performed:  no pain during dresing change    Agency Progress toward goals: Progressing toward interventions above      Patient's Progress towards personal goals: Progressing toward interventions above      Home exercise program: pt conitnues to take  medication as ordered and follows up on all  md appintments      Continued need for the following skills: nursing    Plan for next visit: wound care and education    Patient and/or caregiver notified and agrees to changes in the Plan of Care yes    The following discharge planning was discussed with the pt/caregiver: when patient reaches goals and medication is managed, and disease processes are understood patient agrees and understand that discharge will take place.

## 2022-05-12 ENCOUNTER — HOME CARE VISIT (OUTPATIENT)
Dept: SCHEDULING | Facility: HOME HEALTH | Age: 72
End: 2022-05-12
Payer: MEDICARE

## 2022-05-12 VITALS
SYSTOLIC BLOOD PRESSURE: 129 MMHG | TEMPERATURE: 98 F | OXYGEN SATURATION: 94 % | HEART RATE: 68 BPM | DIASTOLIC BLOOD PRESSURE: 72 MMHG

## 2022-05-12 VITALS
SYSTOLIC BLOOD PRESSURE: 115 MMHG | DIASTOLIC BLOOD PRESSURE: 65 MMHG | RESPIRATION RATE: 14 BRPM | HEART RATE: 60 BPM | OXYGEN SATURATION: 96 % | TEMPERATURE: 96 F

## 2022-05-12 VITALS
SYSTOLIC BLOOD PRESSURE: 128 MMHG | TEMPERATURE: 98.1 F | HEART RATE: 83 BPM | OXYGEN SATURATION: 95 % | DIASTOLIC BLOOD PRESSURE: 77 MMHG | RESPIRATION RATE: 17 BRPM

## 2022-05-12 PROCEDURE — G0151 HHCP-SERV OF PT,EA 15 MIN: HCPCS

## 2022-05-12 PROCEDURE — G0158 HHC OT ASSISTANT EA 15: HCPCS

## 2022-05-12 NOTE — HOME HEALTH
SUBJECTIVE: I am doing ok today. I am stiff today because I slept in a ball last night   REQUIRES CAREGIVER ASSISTANCE FOR: medications, transportation, IADLS, [address] RECONCILED no changes   NEXT MD APPT:  Charlie Bose NP  in 4months    ROM:B LE WFL except pt had decresed R DF to neutral  STRENGTH: R hip flexors 3/5 R quad and hamstrings -3/5 ADD/ ABD 3/5  DF 0/5 PF 1/5  L LE hip flexors +3/5 L quads and hamstrings +3/5 ADD/ ABD 3/5 DF 3/5 PF -3/5  WOUNDS:scaral wound . Pt has a wound on R heel that is bandaged no drainage noted   BED MOBILITY: Rolling to the R with bed railing with MOD A . sideliying to sit with the bed at 45 degree angle with MAX A for B LE managment and MOD/ A for upper body mangment. Sit to sidelying pt was I with upper body mangment and dependent for LE managment. Per PTA visit 5/9/22 Pt performs rolling to R x 3 MOD A with hips and shoulders. Pt performs rolling to L x 3 MOD A with hips and shoulders. Pt is able to pull on the bed rail with each attempt. This reduces the burden on the clinician  BALANCE:Pt was able to sit on the EOB with BU E support for 2 min with S  MIN vc needed for erect posture as pt has throrcic kyphois and lists to the L pt was able to self correct with MIN vc but unable to maintain postion O2 sat was 96% with HR of 62  PATIENT RESPONE TO TX: Pt pain increaed to 5 with movement and would go back to 3 when sitting   PATIENT LEVEL OF UNDERSTANDING OF EDUCATION PROVIDED pt and  verbalized the importance of changing position every HR for pressure relief  PATIENT EDUCATION PROVIDED THIS VISIT: safety, HEP, walking, deep breathing,     HEP consisting of:  1. Changing position every hour during the day  Supine x 10 MIN/MOD with RLE. Toe wiggles, APs, Hip FLEX while allowing gentle knee FLEX, Hip ABD. Written HEP issued, patient/caregiver verbalized understanding.    Patient is s/p acute osteomyelitis of the sacrum and has been treated for strengthening, , HEP training, safety training, and balance training. On [facility] 4/25/22  strength was R hip flexors 3/5 R quad and hamstrings -3/5 ADD/ ABD 3/5  DF 0/5 PF 1/5  L LE hip flexors +3/5 L quads and hamstrings +3/5 ADD/ ABD 3/5 DF 3/5 PF -3/5. Today at [place] strength is R hip flexors 3/5 R quad and hamstrings -3/5 ADD/ ABD 3/5  DF 0/5 PF 1/5  L LE hip flexors +3/5 L quads and hamstrings +3/5 ADD/ ABD 3/5 DF 3/5 PF -3/5. Pt did not make any progress in strength. . On SOC bed mobility was Rolling R and L with bedrailing with MOD A with MOD vc needed for technqiue and sequencing. Supine to sit with MOD/ MAX A with MOD vc needed for technique and sequencing. Pt transfered sit to suine with MAX A for B LE pt was S for upper body managment. Pt is dependent for scooting herself up in bed. Today at [place] bed mobility is olling to the R with bed railing with MOD A . sideliying to sit with the bed at 45 degree angle with MAX A for B LE managment and MOD/ A for upper body mangment. Sit to sidelying pt was I with upper body mangment and dependent for LE managment. Per PTA visit 5/9/22 Pt performs rolling to R x 3 MOD A with hips and shoulders. Pt performs rolling to L x 3 MOD A with hips and shoulders. Pt is able to pull on the bed rail with each attempt. This reduces the burden on the clinician. Pt did not make any real progress in bed mobility skills. On SOC  Pt was able to sit on the EOB for 1 min and was able to maintain a static unsupportive sitting posture with B UE support for 30 sec. Pt reported she then needed to sit back down O2 sat was 97% with HR of 86  Today at [place] balance isPt was able to sit on the EOB with BU E support for 2 min with S  MIN vc needed for erect posture as pt has throrcic kyphois and lists to the L pt was able to self correct with MIN vc but unable to maintain postion O2 sat was 96% with HR of 62. Pt did make prgoress but did not met goal of 3 min on EOB .   Discharge plan: Discharge from Home Health PT as pt has reached her MAX potential for functional improvments.   Dr Abiel Lerner, NP office has been notified of DC from [address] as pt has reach her Max potential for functional improvements

## 2022-05-12 NOTE — Clinical Note
Patient is s/p acute osteomyelitis of the sacrum and has been treated for  strengthening, , HEP training, safety training, and balance training. On [facility] 4/25/22  strength was R hip flexors 3/5 R quad and hamstrings -3/5 ADD/ ABD 3/5  DF 0/5 PF 1/5  L LE hip flexors +3/5 L quads and hamstrings +3/5 ADD/ ABD 3/5 DF 3/5 PF -3/5. Today at [place] strength is R hip flexors 3/5 R quad and hamstrings -3/5 ADD/ ABD 3/5  DF 0/5 PF 1/5  L LE hip flexors +3/5 L quads and hamstrings +3/5 ADD/ ABD 3/5 DF 3/5 PF -3/5. Pt did not make any progress in strength. . On SOC bed mobility was Rolling R and L with bedrailing with MOD A with MOD vc needed for technqiue and sequencing. Supine to sit with MOD/ MAX A with MOD vc needed for technique and sequencing. Pt transfered sit to suine with MAX A for B LE pt was S for upper body managment. Pt is dependent for scooting herself up in bed. Today at [place] bed mobility is olling to the R with bed railing with MOD A . sideliying to sit with the bed at 45 degree angle with MAX A for B LE managment and MOD/ A for upper body mangment. Sit to sidelying pt was I with upper body mangment and dependent for LE managment. Per PTA visit 5/9/22 Pt performs rolling to R x 3 MOD A with hips and shoulders. Pt performs rolling to L x 3 MOD A with hips and shoulders. Pt is able to pull on the bed rail with each attempt. This reduces the burden on the clinician. Pt did not make any real progress in bed mobility skills. On SOC  Pt was able to sit on the EOB for 1 min and was able to maintain a static unsupportive sitting posture with B UE support for 30 sec. Pt reported she then needed to sit back down O2 sat was 97% with HR of 86  Today at [place] balance isPt was able to sit on the EOB with BU E support for 2 min with S  MIN vc needed for erect posture as pt has throrcic kyphois and lists to the L pt was able to self correct with MIN vc but unable to maintain postion O2 sat was 96% with HR of 62.  Pt did make prgoress but did not met goal of 3 min on EOB . Discharge plan: Discharge from [address] [facility] as pt has reached her MAX potential for functional improvments.   Dr Dariusz Shen, NP office has been notified of DC from [address] as pt has reach her Max potential for functional improvements

## 2022-05-12 NOTE — HOME HEALTH
Subjective: Pt and caregiver report increased use of wedges to change pt's position, even though she is always supine upon arrival.  Future MD appointments: TBD  Caregiver involvement/assistance needed for: Pt's  assists with ADLs, meals and errands. Objective: See interventions. Pt response to treatment: Pt is able to perform LE exercises with less assistance. RLE still requires MIN-MOD A with Hip ABD. Pt level of understanding of education provided: Pt and caregiver repeat back the importance of changing positions to reduce the risk of skin breakdown. Assessment of Progress toward goals: Pt is able to tolerate PROM of liberty LEs with fewer complaints of pain. Continued need for the following skills: There is a continued need for skilled PT to assess pt's progress toward goals. Communications with all co-treating staff regarding pt status/progress during staff meeting/phone call/text/email. Equipment Needs:  Plan for Next visit: Pt to receive therapy based assessment to determine progress towards goals. Frequency: 2 w 3. Pt has one more visit this week for impending DC. The Following Discharge Planning was Discussed with the Pt/Caregiver: Pt to receive HHPT until goals are met or max benefit has been achieved.

## 2022-05-12 NOTE — Clinical Note
thank you   ----- Message -----  From: Lexie Randall, PT  Sent: 5/12/2022   6:09 PM EDT  To: Nael Castaneda, OTR/L      Patient is s/p acute osteomyelitis of the sacrum and has been treated for  strengthening, , HEP training, safety training, and balance training. On [facility] 4/25/22  strength was R hip flexors 3/5 R quad and hamstrings -3/5 ADD/ ABD 3/5  DF 0/5 PF 1/5  L LE hip flexors +3/5 L quads and hamstrings +3/5 ADD/ ABD 3/5 DF 3/5 PF -3/5. Today at [place] strength is R hip flexors 3/5 R quad and hamstrings -3/5 ADD/ ABD 3/5  DF 0/5 PF 1/5  L LE hip flexors +3/5 L quads and hamstrings +3/5 ADD/ ABD 3/5 DF 3/5 PF -3/5. Pt did not make any progress in strength. . On SOC bed mobility was Rolling R and L with bedrailing with MOD A with MOD vc needed for technqiue and sequencing. Supine to sit with MOD/ MAX A with MOD vc needed for technique and sequencing. Pt transfered sit to suine with MAX A for B LE pt was S for upper body managment. Pt is dependent for scooting herself up in bed. Today at [place] bed mobility is olling to the R with bed railing with MOD A . sideliying to sit with the bed at 45 degree angle with MAX A for B LE managment and MOD/ A for upper body mangment. Sit to sidelying pt was I with upper body mangment and dependent for LE managment. Per PTA visit 5/9/22 Pt performs rolling to R x 3 MOD A with hips and shoulders. Pt performs rolling to L x 3 MOD A with hips and shoulders. Pt is able to pull on the bed rail with each attempt. This reduces the burden on the clinician. Pt did not make any real progress in bed mobility skills. On SOC  Pt was able to sit on the EOB for 1 min and was able to maintain a static unsupportive sitting posture with B UE support for 30 sec.  Pt reported she then needed to sit back down O2 sat was 97% with HR of 86  Today at [place] balance isPt was able to sit on the EOB with BU E support for 2 min with S  MIN vc needed for erect posture as pt has throrcic kyphois and lists to the L pt was able to self correct with MIN vc but unable to maintain postion O2 sat was 96% with HR of 62. Pt did make prgoress but did not met goal of 3 min on EOB . Discharge plan: Discharge from  [facility] as pt has reached her MAX potential for functional improvments.   Dr Jerry Holguin, NP office has been notified of DC from [address] as pt has reach her Max potential for functional improvements

## 2022-05-12 NOTE — HOME HEALTH
SUBJECTIVE: Pt sitting up in bed upon arrival, pt stated \"I thought they were already here\" and therapist clarified that PT had been here and done her discharge, pt was upste about the tought of discharge, therapist explained that home care comes in to teach and address things and then pt and CG are to carry them over to progress until they are ready to work at the next level. Pt then verbalized understanding and that her and her  will need to continue with all exercises and bed mobility as trained, set-up for self-care to improve pt participation before eventually progressing to standing at EOB. CAREGIVER ASSISTANCE NEEDED FOR: pts  present during visit to assist with self-care set-up  MEDICATIONS REVIEWED AND UPDATED: no medication changes reported this visit. .  OBJECTIVE: see interventions  PATIENT RESPONSE TO TREATMENT:  Pt demonstrated positive response to treatment with some increased pain in B knees with bed mobility but good participation. Duncan Piedra PATIENT/CAREGIVER EDUCATION PROVIDED THIS VISIT: Therapist educated pt and CG on importance of sitting EOB daily to work on pt ad CG bed mobility carry over, sitting tolerance and I with UB self-care at EOB to show improvement. PATIENT LEVEL OF UNDERSTANDING OF EDUCATION PROVIDED: Pt and CG verbalized understanding and were able to demo good carry over during visit with EOB sitting for 3.5 min. ASSESSMENT AND PROGRESS TOWARD GOALS:  Pt demonstrated fair progress towards goals for increasing I with bed mobility and HEP carry over but is limited by her weakness and pain in B knees and wound, CG trying his best to perform carry over but is also having difficulty keeping himself healthy. Patient will benefit from continued intervention with progression of bed mobility, self-care and endurance training.   CONTINUED NEED FOR THE FOLLOWING SKILLS: [facility] OT is medically necessary to address pain, decreased ROM, decreased strength, impaired bed mobility, decreased independence with functional transfers, decreased I with ADLs, and impaired balance in order to improve functional independence, quality of life, return to PLOF, reduce the risk for falls, and reduce pain. PLAN: next visit will be for final ADL and bed mobility training with CG present to improve carry over and safety. DISCHARGE PLANNING DISCUSSED: Discharge to self and family under MD supervision once all goals have been met or patient has reached maximum potential.  Frequency remaininw1 remaining with pt aware of OTDC next week.

## 2022-05-13 ENCOUNTER — HOME CARE VISIT (OUTPATIENT)
Dept: SCHEDULING | Facility: HOME HEALTH | Age: 72
End: 2022-05-13
Payer: MEDICARE

## 2022-05-13 VITALS
HEART RATE: 87 BPM | RESPIRATION RATE: 18 BRPM | TEMPERATURE: 97.3 F | SYSTOLIC BLOOD PRESSURE: 106 MMHG | OXYGEN SATURATION: 95 % | DIASTOLIC BLOOD PRESSURE: 78 MMHG

## 2022-05-13 PROCEDURE — A6212 FOAM DRG <=16 SQ IN W/BORDER: HCPCS

## 2022-05-13 PROCEDURE — A4649 SURGICAL SUPPLIES: HCPCS

## 2022-05-13 PROCEDURE — G0300 HHS/HOSPICE OF LPN EA 15 MIN: HCPCS

## 2022-05-13 NOTE — HOME HEALTH
Skilled reason for visit: Diabetic education and Wound Care Treatment     Caregiver involvement: Pt independent with care      Medications reviewed and all medications are available in the home this visit. The following education was provided regarding medications:  ZEUS OSUNA notified of any discrepancies/look a-like medications/medication interactions: ZEUS  Medications are effecrtive at this time. Home health supplies by type and quantity ordered/delivered this visit include:  Supplies ordered  Patient education provided this visit: Called MD to change bottom wound changed to [address] Educated pts  that he needs to make an appointment to be seen due to his cought inabiliy to drink and continues coughing. [facility]  has lost weight and is neglecting his own persnal hygeine. Spouse promised that he would call and make an appointment to be seen. Spoke with Jh Wong with concerns and will continue to follow up after vacation. Pt is due to be recertified for wound care. NEw wound care orders placed and changed. Nya education provided: ZEUS  Patient level of understanding of education provided: Vibha Romero all understanding to above education    Skilled Care Performed this visit: Education and Wound Care    Patient response to procedure performed:  NO pain during dressing change     Agency Progress toward goals: Progressing toward interventions above      Patient's Progress towards personal goals: when patient reaches goals and medication is managed, and disease processes are understood patient agrees and understand that discharge will take place.

## 2022-05-16 ENCOUNTER — HOME CARE VISIT (OUTPATIENT)
Dept: SCHEDULING | Facility: HOME HEALTH | Age: 72
End: 2022-05-16
Payer: MEDICARE

## 2022-05-16 VITALS
TEMPERATURE: 97.2 F | SYSTOLIC BLOOD PRESSURE: 132 MMHG | DIASTOLIC BLOOD PRESSURE: 78 MMHG | RESPIRATION RATE: 17 BRPM | OXYGEN SATURATION: 94 % | HEART RATE: 54 BPM

## 2022-05-16 VITALS
HEART RATE: 67 BPM | OXYGEN SATURATION: 95 % | RESPIRATION RATE: 18 BRPM | TEMPERATURE: 97.5 F | SYSTOLIC BLOOD PRESSURE: 120 MMHG | DIASTOLIC BLOOD PRESSURE: 58 MMHG

## 2022-05-16 PROCEDURE — G0300 HHS/HOSPICE OF LPN EA 15 MIN: HCPCS

## 2022-05-16 PROCEDURE — G0158 HHC OT ASSISTANT EA 15: HCPCS

## 2022-05-16 NOTE — HOME HEALTH
Skilled reason for visit: Wound Care, Safety Precautions, Infection Prevention, Medication Education and Disease Management    Caregiver involvement: Patients  prepares meals, schedules appointments, orders medical transport for patient to get to her appointments,  medications, and assist with toileting and bathing if needed. Medications reviewed and all medications ARE available in the home this visit. The following education was provided regarding medications: Depakote-Patient/Caregiver was educated on this medication and the purpose of it. Patient/Caregiver verbalized understanding. MD notified of any discrepancies/look a-like medications/medication interactions: NA    Medications are EFFECTIVE at this time. Home health supplies by type and quantity ordered/delivered this visit include: NA    Patient education provided this visit: Patient was educated on items checked in interventions tab. Sharps education provided: NA    Patient level of understanding of education provided: Patient verbalized all understanding in interventions tab. Skilled Care Performed this visit: Wound Care, Medication Education and Vital Sign Check    Patient response to procedure performed:  Patient tolerated vital assessment well and no facial grimaces or complaints of pain or discomfort. Patient was lying in bed answering question that this nurse was asking her and was able to turn to her left side with assistance for wound care to be completed. Patient tolerated wound care well, no distress noted. Agency Progress toward goals:  Agency staff is progressing well with patient as wound to left heel has healed. Patient's Progress towards personal goals:  Progressing towards all goals in interventions tab. Home exercise program: Patient will increase protein intake in diet to help with wound healing.     Continued need for the following skills: Nursing will continue to follow patient until wound is healed and education is understood and able to be verbalized by patient/caregiver. Plan for next visit: Complete wound care and educate on skin impairment prevention    Patient and/or caregiver notified and agrees to changes in the Plan of Care NA      The following discharge planning was discussed with the pt/caregiver: when patient reaches goals and medication is managed, and disease processes are understood patient agrees and understand that discharge will take place.

## 2022-05-16 NOTE — HOME HEALTH
SUBJECTIVE: Pt in bed upon arrival, reported increased R knee pain that made bed mobility difficult, therapist again educated pts  on proper placement of bolsters under her knees for support so they are not hanging, he verbalized understanding. CAREGIVER ASSISTANCE NEEDED FOR: pts  present during visit to assist as needed. MEDICATIONS REVIEWED AND UPDATED: no medication changes reported this visit. .  OBJECTIVE: see interventions  PATIENT RESPONSE TO TREATMENT:  Pt demonstrated fair response to treatment with increased R knee pain during all mobility but good participation and repeatedly stated \"I'm going to push through this. \"  . PATIENT/CAREGIVER EDUCATION PROVIDED THIS VISIT: therapist again educated pt and CG on need to work on sitting EOB every single day to help pt improve bed mobility further, help with retraining body to change positions daily and provide pressure relief. PATIENT LEVEL OF UNDERSTANDING OF EDUCATION PROVIDED: Pt and CG verbalized good understanding and pt demonstrated improved carry over with postural control at EOB. ASSESSMENT AND PROGRESS TOWARD GOALS:  Pt demonstrated good progress towards sitting goals and self-care goals, she has not quite met them at this time but is at her max potenital with need for continued CG carry over daily for further improvement. PLAN: next visit will be for OTDC, pt has reached max potential at this time, pt and CG educated on areas to carry over for pt to reach potential for more therapy intervention and have good understanding. DISCHARGE PLANNING DISCUSSED: Discharge to self and family under MD supervision once all goals have been met or patient has reached maximum potential.  Frequency remaininw1 remaining with pt and CG aware of OTDC next visit.

## 2022-05-18 ENCOUNTER — HOME CARE VISIT (OUTPATIENT)
Dept: SCHEDULING | Facility: HOME HEALTH | Age: 72
End: 2022-05-18
Payer: MEDICARE

## 2022-05-18 VITALS
RESPIRATION RATE: 16 BRPM | SYSTOLIC BLOOD PRESSURE: 128 MMHG | TEMPERATURE: 97.1 F | HEART RATE: 58 BPM | DIASTOLIC BLOOD PRESSURE: 80 MMHG | OXYGEN SATURATION: 94 %

## 2022-05-18 PROCEDURE — G0300 HHS/HOSPICE OF LPN EA 15 MIN: HCPCS

## 2022-05-18 PROCEDURE — G0152 HHCP-SERV OF OT,EA 15 MIN: HCPCS

## 2022-05-18 NOTE — Clinical Note
summary looks great! thank you!  ----- Message -----  From: Miguel Payton  Sent: 5/19/2022   8:05 AM EDT  To: Andrew Bennett OTR/ORA      Mrs Kely Floyd was seen from 4/27/22 - 5/18/22 to educate pt and CG on ways to increase pt participation with self-care, UE strengthening, bed mobility and energy conservation. At this time pt has reached max benefits and CG has been thoroughly educated on carry over needed for pt to progress to point where therapy can return. Pt set-up A for grooming and UB bathing at EOB requires assistance to wash back. Pt mod A for UB dressing with hospital gown to set-up threading of BUE and head holes due to pt instability while seated at EOB. Pt dependent for toileting in brief at bed level. Pt demonstrated fair static sitting balance at EOB during UB bathing with intermittent CGA for posterior lean/fall prevention. Pt fair- for dynamic balance to reach across body for washing and to reach wash basin with CGA for stability and mod v/c for core engagement to help increase control of trunk. Pt mod A to roll to L and R side today with increased support needed to move R knee to reduce pain, pt max A for supine to sit at EOB for bathing but with improved carry over of proper technique and hand placement with decreased v/c reminders. Pt able to perform UB HEP at bed level for A/AROM/PROM to LUE to increase functional use and strength with S from CG and reports daily compliance. Pt R shoulder strength remains grossly 3+/5 MMT at this time. Pt tolerated sitting EOB for 6 min during UB bathing and dressing with 5/10 fatigue reported when laid back down and improved core engagement for postural fatigue. Pt required 2 rest breaks during HEP at bed level with good use of deep breathing following v/c. Care team and MD notified of OTDC this visit with CG education for carry over and potential future therapy visits.

## 2022-05-18 NOTE — HOME HEALTH
Skilled reason for visit: Wound Care, Safety Precautions, Infection Prevention, Medication Education and Disease Management    Caregiver involvement: Patients  prepares meals, schedules appointments, orders medical transport for patient to get to her appointments,  medications, and assist with toileting and bathing if needed. Medications reviewed and all medications ARE available in the home this visit. The following education was provided regarding medications: Cogentin-Patient/Caregiver was educated on this medication and the purpose of it. Patient/Caregiver verbalized understanding. MD notified of any discrepancies/look a-like medications/medication interactions: NA    Medications are EFFECTIVE at this time. Home health supplies by type and quantity ordered/delivered this visit include: NA    Patient education provided this visit: Patient was educated on items checked in interventions tab. Sharps education provided: NA    Patient level of understanding of education provided: Patient verbalized all understanding in interventions tab. Skilled Care Performed this visit: Wound Care, Medication Education and Vital Sign Check    Patient response to procedure performed:  Patient tolerated vital assessment well and no facial grimaces or complaints of pain or discomfort. Patient was lying in bed answering question that this nurse was asking her and was able to turn to her left side with assistance for wound care to be completed. Patient tolerated wound care well, no distress noted. Agency Progress toward goals:  Agency staff is progressing well with patient as wound to left heel has healed. Patient's Progress towards personal goals:  Progressing towards all goals in interventions tab.     Home exercise program: Patient will continue to keep pressure off of open areas, and continue to follow medication regimen as ordered by MD.    Continued need for the following skills: Nursing will continue to follow patient until wound is healed and education is understood and able to be verbalized by patient/caregiver. Plan for next visit: Complete wound care and educate on skin impairment prevention    Patient and/or caregiver notified and agrees to changes in the Plan of Care NA      The following discharge planning was discussed with the pt/caregiver: when patient reaches goals and medication is managed, and disease processes are understood patient agrees and understand that discharge will take place.

## 2022-05-18 NOTE — Clinical Note
thank you  ----- Message -----  From: Galileo Morton  Sent: 5/19/2022   8:05 AM EDT  To: CHANDU De Souza/ORA      Mrs Juan Rodriguez was seen from 4/27/22 - 5/18/22 to educate pt and CG on ways to increase pt participation with self-care, UE strengthening, bed mobility and energy conservation. At this time pt has reached max benefits and CG has been thoroughly educated on carry over needed for pt to progress to point where therapy can return. Pt set-up A for grooming and UB bathing at EOB requires assistance to wash back. Pt mod A for UB dressing with hospital gown to set-up threading of BUE and head holes due to pt instability while seated at EOB. Pt dependent for toileting in brief at bed level. Pt demonstrated fair static sitting balance at EOB during UB bathing with intermittent CGA for posterior lean/fall prevention. Pt fair- for dynamic balance to reach across body for washing and to reach wash basin with CGA for stability and mod v/c for core engagement to help increase control of trunk. Pt mod A to roll to L and R side today with increased support needed to move R knee to reduce pain, pt max A for supine to sit at EOB for bathing but with improved carry over of proper technique and hand placement with decreased v/c reminders. Pt able to perform UB HEP at bed level for A/AROM/PROM to LUE to increase functional use and strength with S from CG and reports daily compliance. Pt R shoulder strength remains grossly 3+/5 MMT at this time. Pt tolerated sitting EOB for 6 min during UB bathing and dressing with 5/10 fatigue reported when laid back down and improved core engagement for postural fatigue. Pt required 2 rest breaks during HEP at bed level with good use of deep breathing following v/c. Care team and MD notified of OTDC this visit with CG education for carry over and potential future therapy visits.

## 2022-05-18 NOTE — Clinical Note
Mrs Alvaro Stringer was seen from 4/27/22 - 5/18/22 to educate pt and CG on ways to increase pt participation with self-care, UE strengthening, bed mobility and energy conservation. At this time pt has reached max benefits and CG has been thoroughly educated on carry over needed for pt to progress to point where therapy can return. Pt set-up A for grooming and UB bathing at EOB requires assistance to wash back. Pt mod A for UB dressing with hospital gown to set-up threading of BUE and head holes due to pt instability while seated at EOB. Pt dependent for toileting in brief at bed level. Pt demonstrated fair static sitting balance at EOB during UB bathing with intermittent CGA for posterior lean/fall prevention. Pt fair- for dynamic balance to reach across body for washing and to reach wash basin with CGA for stability and mod v/c for core engagement to help increase control of trunk. Pt mod A to roll to L and R side today with increased support needed to move R knee to reduce pain, pt max A for supine to sit at EOB for bathing but with improved carry over of proper technique and hand placement with decreased v/c reminders. Pt able to perform UB HEP at bed level for A/AROM/PROM to LUE to increase functional use and strength with S from CG and reports daily compliance. Pt R shoulder strength remains grossly 3+/5 MMT at this time. Pt tolerated sitting EOB for 6 min during UB bathing and dressing with 5/10 fatigue reported when laid back down and improved core engagement for postural fatigue. Pt required 2 rest breaks during HEP at bed level with good use of deep breathing following v/c. Care team and MD notified of OTDC this visit with CG education for carry over and potential future therapy visits.

## 2022-05-19 ENCOUNTER — HOME CARE VISIT (OUTPATIENT)
Dept: HOME HEALTH SERVICES | Facility: HOME HEALTH | Age: 72
End: 2022-05-19
Payer: MEDICARE

## 2022-05-19 VITALS
OXYGEN SATURATION: 96 % | HEART RATE: 72 BPM | DIASTOLIC BLOOD PRESSURE: 61 MMHG | TEMPERATURE: 97.6 F | SYSTOLIC BLOOD PRESSURE: 128 MMHG | RESPIRATION RATE: 18 BRPM

## 2022-05-19 NOTE — HOME HEALTH
Pt to be discharged from [facility] this visit as she has met max potential at this time, pt and CG thoroughly educated on carry over required for continued progression after DC.

## 2022-05-20 ENCOUNTER — HOME CARE VISIT (OUTPATIENT)
Dept: SCHEDULING | Facility: HOME HEALTH | Age: 72
End: 2022-05-20
Payer: MEDICARE

## 2022-05-20 VITALS
DIASTOLIC BLOOD PRESSURE: 68 MMHG | TEMPERATURE: 98.6 F | HEART RATE: 74 BPM | SYSTOLIC BLOOD PRESSURE: 112 MMHG | OXYGEN SATURATION: 99 % | RESPIRATION RATE: 16 BRPM

## 2022-05-20 PROCEDURE — G0299 HHS/HOSPICE OF RN EA 15 MIN: HCPCS

## 2022-05-20 NOTE — HOME HEALTH
Skilled reason for visit: WOUND CARE, VS, MEDS    Caregiver involvement: patients cg is spouse and is availale as needed or asistance with IADL's, ADL's, meal prep, medication management, and taking patient to all doctors appointments. Medications reviewed and all medications are available in the home this visit. The following education was provided regarding medications: All patient medications were reviewed, including side effects, safety, time to administer, purposes, dosages, and frequencies. .    MD notified of any discrepancies/look a-like medications/medication interactions: NONE  Medications are EFFECTIVE at this time. Home health supplies by type and quantity ordered/delivered this visit include: [id number]    Patient education provided this visit: SEE INTERVENTIONS    Sharps education provided: NA    Patient level of understanding of education provided: VERBALIZED UNDERSTANDING    Skilled Care Performed this visit: SEE REASON    Patient response to procedure performed:  PATIENT STATED SHE WAS UNCOMFORTABLE WHILE SHE WAS ON HER SIDE    Agency Progress toward goals: SE INTERVENTIONS    Patient's Progress towards personal goals: PATIENT IS STEADILY PROGRESSING TOWARDS GOALS, STILL NEEDS REINFORCEMENT/ENCOURAGEMENT. WILL CONTINUE TO MONITOR. Home exercise program: activity as tolerated, trying to get physical activity 4-5 x weekly. stopping activity if causing shortness of breath or chest pain, dizziness or weakness. Continued need for the following skills: Nursing    Plan for next visit: [facility]    Patient and/or caregiver notified and agrees to changes in the Plan of Care N/A      The following discharge planning was discussed with the pt/caregiver:  Patient will be discharged once education is complete, and pt is medically stable.

## 2022-05-23 ENCOUNTER — HOME CARE VISIT (OUTPATIENT)
Dept: SCHEDULING | Facility: HOME HEALTH | Age: 72
End: 2022-05-23
Payer: MEDICARE

## 2022-05-23 VITALS
SYSTOLIC BLOOD PRESSURE: 102 MMHG | RESPIRATION RATE: 18 BRPM | OXYGEN SATURATION: 94 % | HEART RATE: 75 BPM | TEMPERATURE: 97.6 F | DIASTOLIC BLOOD PRESSURE: 60 MMHG

## 2022-05-23 PROCEDURE — G0300 HHS/HOSPICE OF LPN EA 15 MIN: HCPCS

## 2022-05-23 NOTE — HOME HEALTH
Skilled reason for visit: Education and Wound Treatment     Caregiver involvement: Patient's cg is family. They lives with patient and is available 24/7 for assistance with iadls, adls, meal prep, medication management, taking to md appointments. Medications reviewed and all medications are available in the home this visit. The following education was provided regarding medications: yes  MD notified of any discrepancies/look a-like medications/medication interactions: NA  Medications are Effetive at this time. Home health supplies by type and quantity ordered/delivered this visit include: Supplies ordered    Patient education provided this visit: Nurse arrived pt was in bed resting watching tv. when nurse turned pt to start her wound care her sheets were soiled with urine and stool. Nurse completed a full bed bath and changed all her bed linen. In nurses opinion pts  is unable to turn and care for pt at this time. MDS office was notified. Increase protein n diet and monitor for signs and symptoms of infection to include increase drainage, fever over 101.1  Educated pts  that he needs to make sure pt has access to a cell phone in case somthing was to happen that she would be able to contact 911 or a family memeber. Pts spouse is very ill, this nurse called NP Matias office and set up an appt for him due to his inability to do so. Pts spouse was more confused today then normal. Pts brother is now living with them he has been hospitalized for 7 plus months with health issues himself ut he is a bit stronget then pt spouse. Nurse spoke with MD office with concerns of living situation and condition of pts .      Sharps education provided: NA    Patient level of understanding of education provided: Verbalzied all understanding to above education    Skilled Care Performed this visit: Education and wound care     Patient response to procedure performed:  Verbalzied all understanding to above education    Agency Progress toward goals: Progressing toward interventions above      Patient's Progress towards personal goals: Progressing toward interventions above      Home exercise program: pt conitnues to take  medication as ordered and follows up on all  md appintments    Continued need for the following skills: Nursing for wound care     Plan for next visit: Wound care    Patient and/or caregiver notified and agrees to changes in the Plan of Care YES  The following discharge planning was discussed with the pt/caregiver: when patient reaches goals and medication is managed, and disease processes are understood patient agrees and understand that discharge will take place.

## 2022-05-25 ENCOUNTER — HOME CARE VISIT (OUTPATIENT)
Dept: SCHEDULING | Facility: HOME HEALTH | Age: 72
End: 2022-05-25
Payer: MEDICARE

## 2022-05-25 VITALS
TEMPERATURE: 97.6 F | RESPIRATION RATE: 18 BRPM | OXYGEN SATURATION: 96 % | SYSTOLIC BLOOD PRESSURE: 108 MMHG | HEART RATE: 78 BPM | DIASTOLIC BLOOD PRESSURE: 70 MMHG

## 2022-05-25 PROCEDURE — G0300 HHS/HOSPICE OF LPN EA 15 MIN: HCPCS

## 2022-05-25 NOTE — HOME HEALTH
Skilled reason for visit: Education and Wound Care     Caregiver involvement: Patient's cg is family. They lives with patient and is available 24/7 for assistance with iadls, adls, meal prep, medication management, taking to md appointments. Medications reviewed and all medications are available in the home this visit. The following education was provided regarding medications:  ZEUS  MD notified of any discrepancies/look a-like medications/medication interactions: NA  Medications are Effective at this time. Home health supplies by type and quantity ordered/delivered this visit include: [employer]    Patient education provided this visit: Nurse showed up and was greeted by Shanna HUBBARD. Tabby Wife from [place]. Marni Kwan took pt  to the hospital where they currently are having pts spouse checked out. Nurse educated DIL of the last few weeks finding with Pts . All siblings have the had the same concerns in the last 2 weeks with pts spouse. Shanna and Marni Kwan are due to go back to [facility] on tuesday and [facility] [place] [facility]. Nurse spoke with Janie pts other daughter who has POA along with Maverick about concerns with pt and noone long are for her. Nurse educated them all about APS and they will come in and help pt and spouse with what they can help with. Everyone understood APS role. Nurse completed wound care and educated Shanna as to how to clean pt and turn her to pressure relieve. Bob was able to demonstrtate changing pts brief and periwick and how to turn her. Nurse also educated to leave a cell phone with the pt so she can call for help if needed. Nurse called APS and left a message. Ashley aMria from [place] [id number] called me back took the report and advised it would be assigned today and hopefully someone would be out tomorrow. Nurse notified management of situation and we will all follow up.     Shanna called me back to let me know that Pts spouse has been DX with Severe Bronchitis was given an Antibiotic Injection a few breathing treatments as well as IV and Oral Antibiotics while in the ER and they Drs are discussing admiting spouse for a few days to get better before sending home. DIL advised would keep in touch to let me know whatthey are going to do. At this time if he is admitted they will probably call 911 to have Pt transfered to the hospital due to noone being there tonight to care for the Pt at home. Sharps education provided: NA    Patient level of understanding of education provided: Joelle Lomeli all understanding to above education    Skilled Care Performed this visit: Wound Care and Education     Patient response to procedure performed:  No pain verbalized     Agency Progress toward goals:     Patient's Progress towards personal goals: Progressing toward interventions above      Home exercise program: pt conitnues to take  medication as ordered and follows up on all  md appointments    Continued need for the following skills: Nursing     Plan for next visit: Wound Care     Patient and/or caregiver notified and agrees to changes in the Plan of Care : yes      The following discharge planning was discussed with the pt/caregiver: when patient reaches goals and medication is managed, and disease processes are understood patient agrees and understand that discharge will take place.

## 2022-05-27 ENCOUNTER — HOME CARE VISIT (OUTPATIENT)
Dept: SCHEDULING | Facility: HOME HEALTH | Age: 72
End: 2022-05-27
Payer: MEDICARE

## 2022-05-27 VITALS
HEART RATE: 62 BPM | SYSTOLIC BLOOD PRESSURE: 112 MMHG | RESPIRATION RATE: 16 BRPM | OXYGEN SATURATION: 98 % | DIASTOLIC BLOOD PRESSURE: 68 MMHG | TEMPERATURE: 98.2 F

## 2022-05-27 PROCEDURE — G0299 HHS/HOSPICE OF RN EA 15 MIN: HCPCS

## 2022-05-27 NOTE — HOME HEALTH
Arrived and patients daughter, Kenneth Norris from [place] and daughter in [place], [place] from [place] were with patient and spouse. Informed me that patient was having periods of confusion and uncontrolled pain. Assessed patient and called 911. Patient taken to [facility] to be evaluated. APS arrived during visit and Kylee Blair will be handling the case going forward, her number is [phone]. She stated that APS will be advocating for patient to be admitted into the hospital since there is no caregiver available to take care of patient in the home.

## 2022-05-29 ENCOUNTER — HOME CARE VISIT (OUTPATIENT)
Dept: HOME HEALTH SERVICES | Facility: HOME HEALTH | Age: 72
End: 2022-05-29

## 2023-10-19 NOTE — HOME HEALTH
See Flowsheet for immunotherapy administration. Patient waited in clinic 30 min for observation. Pt had epi pen on hand.     Skilled reason for visit:     Caregiver involvement:    Medications reviewed and all medications are available in the home this visit. Medications  are effective at this time.       Home health supplies by type and quantity ordered/delivered this visit include: supplies ordered for wound care    Patient education provided this visit:     Patient level of understanding of education provided:     Skilled Care Performed this visit:     Patient response to procedure performed:     Patient's Progress towards personal goals:     Home exercise program: pt conitnues to take  medication as ordered and follows up on all  md appintments    Continued need for the following skills:    Plan for next visit:     Patient and/or caregiver notified and agrees to changes in the Plan of Care     The following discharge planning was discussed with the pt/caregiver:

## 2024-06-07 NOTE — HOME HEALTH
NEUROLOGY OUT PATIENT FOLLOW UP NOTE:  6/7/20248:10 AM    Courtney Torres is here for follow up for multiple sclerosis.           No Known Allergies    Current Outpatient Medications:     tiZANidine (ZANAFLEX) 2 MG tablet, Take 1 tablet by mouth every 6 hours as needed, Disp: , Rfl:     glimepiride (AMARYL) 2 MG tablet, Take 1 tablet by mouth every morning (before breakfast), Disp: , Rfl:     empagliflozin (JARDIANCE) 10 MG tablet, Take 1 tablet by mouth daily, Disp: , Rfl:     diclofenac (VOLTAREN) 75 MG EC tablet, , Disp: , Rfl:     sodium chloride 0.9 % SOLN 100 mL with Teprotumumab-trbw 500 MG SOLR 10 mg/kg, Infuse 10 mg/kg intravenously once, Disp: , Rfl:     SITagliptin Phosphate (JANUVIA PO), Take by mouth, Disp: , Rfl:     sucralfate (CARAFATE) 1 GM tablet, Take 1 tablet by mouth 4 times daily, Disp: 120 tablet, Rfl: 3    lisinopril (PRINIVIL;ZESTRIL) 40 MG tablet, Take 1 tablet by mouth daily, Disp: , Rfl:     nystatin-triamcinolone (MYCOLOG II) 596578-0.1 UNIT/GM-% cream, Apply topically 4 times daily Apply topically 4 times daily., Disp: , Rfl:     clobetasol (TEMOVATE) 0.05 % cream, Apply topically 2 times daily Apply topically 2 times daily., Disp: , Rfl:     atorvastatin (LIPITOR) 10 MG tablet, Take 1 tablet by mouth daily, Disp: , Rfl:     Cholecalciferol (VITAMIN D3) 5000 units TABS, Take by mouth nightly, Disp: , Rfl:     Omega-3 Fatty Acids (OMEGA-3 CF PO), Take 4 tablets by mouth nightly, Disp: , Rfl:     NONFORMULARY, as needed Indications: C-Testosterone 2%>Petrolatum, Disp: , Rfl:     metFORMIN (GLUCOPHAGE) 500 MG tablet, Take 4 tablets by mouth daily (with breakfast) 3 tablets daily, Disp: , Rfl:     amLODIPine (NORVASC) 5 MG tablet, Take 1 tablet by mouth nightly 1/2 tab am, Disp: , Rfl:     EVENING PRIMROSE OIL PO, Take by mouth daily, Disp: , Rfl:     calcium carbonate-vitamin D 600-200 MG-UNIT TABS, Take 3 tablets by mouth daily.  , Disp: , Rfl:     citalopram (CELEXA) 20 MG tablet, Take  Skilled services/Home bound verification: Caregiver/patient understand homebound status. Patient is bed bound as of this visit. Skilled Reason for admission/summary of clinical condition: Recent hospitalization for Covid+ PNA/immobility with Pressure injuries. This patient is homebound for the following reasons Requires considerable and taxing effort to leave the home , Requires the assistance of 1 or more persons to leave the home , Only leaves the home for medical reasons or Islam services and are infrequent and of short duration for other reasons  and Patient is bedridden . Caregiver: spouse. Caregiver assists with all ADls, Patient is dependent for all movement and turning and bathing and care., Medication management, Transportation to appointments, Meal prep and assists with turning, Has Concha lift in the home. Medications reconciled and all medications are not  available in the home this visit. Santyl which was ordered discharge, no prescription found. I called Mojgan Bucio, discharge Planner at [facility], and spoke to her about no wound vac, or santyl, She is giving message to their wound care nurse for update. I have tried to also reach out to Donavon Cortez NP, PCP, for updated wound care orders, she is off today 11/22/21. Will try to clarify all wound care orders tomorrow. Sent Fax to Donavon Cortez NP also about same. Medications  are somewhat effective at this time. Home health supplies by type and quantity ordered/delivered this visit include: Awaiting updated orders before ordering supplies, I did bring in Calcium Alginate and Allevyn and Dakins , skin prep and 4x4s in to the home. Patient education provided this visit to include: the importance of regular blood sugar monitoring for good blood sugar control. MEDICATION ADHERENCE IS AN IMPORTANT COMPONENT OF HTN MANAGEMENT. PATIENT STATES THE IMPORTANCE TO TAKE HTN MEDS SAME TIME EACH DAY. Continue a heart Healthy diet. Watch out for high sodium foods, read labels. Observe for signs of infection. Monitor B/P, take meds as ordered and f/u with PCP. Discussed the importance of staying well hydrated with water 6-8 glasses a day. Discussed the need for keeping skin clean and dry, using a zinc product on bottom (where skin is intact) to prevent further pressure injuries as well as turning patient q2 hours as able. Keep dressing clean and dry. Regarding Covid 19 diagnosis, We also discussed the need for at least two more weeks of home quarantine, Social distancing when off quarantine and the knowledge to recognize signs of re-infection. Patient/caregiver degree of understanding:good    Home exercise program/Homework provided: ENCOURAGED PATIENT TO HAVE PROTEIN WITH EACH MEAL TO PROMOTE WOUND HEALING. Discussed s/s of infection to monitor for, s/s of UTI, who to report to/when. Instructed cg to notify staff/md/seek tx if complications occur. Patient instructed to maintain clear pathways in home and to minimize clutter to prevent falls from occurring/minimize fall potential.   Patient needing a well balanced diet with the 5 food groups, patient to increase fiber in diet, fresh fruits and vegetables, whole grains and increase water intake. Maintain healthy low sodium ADA diet, Continue to monitor B/P and Blood sugars, Observe for signs of infection. Work with PT to increase strength and f/u with PCP. I emphasized the need for frequent turning side to side every 2 hours and to keep her off her back while these wounds heal.    I went over the importance of taking all prescriptions as ordered. I discussed how to avoid extra sodium in her diet. We discussed the signs of infection and when to call MD.  We discussed the high risk for falls and ways to prevent falls in the future. We discussed taking B/P and blood sugars daily and keeping a log.   We also discussed the need of a heart healthy ADA diet, and the need to change positions frequently. Gaining strength with PT for increased mobility. Pt/Caregiver instructed on plan of care and are agreeable to plan of care at this time. NP Eliazar Davila  notified of patient admission to home health and plan of care including anticipated frequency of visits, I have had to change visits from consent form, due to increased need for visit due to daily dressings till wound vac arrives. and treatments/interventions/modalities of SN/PT/OT. I have also requested HHA and MSW for assistance with this very dependent patient. Awaiting orders. MEENAKSHI Krishnan is off 11/22/21 and will reach back out to her on Tuesday 11/23/21. Discharge planning discussed with patient and caregiver. Discharge planning as follows: Will discharge patient when medically stable and education is completed. Will discharge from nursing when dressing is no longer needed or can be managed by caregiver. Michelle Carson Pt/Caregiver did verbalize understanding of discharge planning. Next MD appointment TBD  with Eliazar Davila  NP. Patient/caregiver encouraged/instructed to keep appointment as lack of follow through with physician appointment could result in discontinuation of home care services for non-compliance.

## 2024-08-19 NOTE — ROUTINE PROCESS
Call placed to Dr Kathy Calzada office at [phone] to inquire as to when patient needs to be seen for her allergy shot. Per office staff nurse Corinne will have to return call . Quality 130: Documentation Of Current Medications In The Medical Record: Current Medications Documented Quality 431: Preventive Care And Screening: Unhealthy Alcohol Use - Screening: Patient not identified as an unhealthy alcohol user when screened for unhealthy alcohol use using a systematic screening method Quality 226: Preventive Care And Screening: Tobacco Use: Screening And Cessation Intervention: Patient screened for tobacco use and is an ex/non-smoker Detail Level: Detailed

## 2024-09-04 NOTE — HOME HEALTH
Referral for colonoscopy was ordered at her visit.  She may still get the information sent to her.  I will put the order in for the and it will come to her house probably in the next 2 weeks   Skilled reason for visit: Wound Care and education    Caregiver involvement: Pt independent with care      Medications reviewed and all medications are available in the home this visit. The following education was provided regarding medications, medication interactions, and look alike medications NA  Medications  are effective at this time. Home health supplies by type and quantity ordered/delivered this visit include: Supplies ordered    Patient education provided this visit: patient made aware to turn every 2 hours and to keep pressure off of bony prominences, to monitor for any pressure ulcer development/worsening. Reviewed to reported any redness, swelling, warmth, fever, chills, increased heart/respiratory rate, uncontrolled pain/tenderness, drainage:green/yellow/brown, or odor to MD immediately. Patient's Progress towards personal goals: Progressing toward interventions above      Home exercise program: pt conitnues to take  medication as ordered and follows up on all  md appintments    Continued need for the following skills: nursing      Patient and/or caregiver notified and agrees to changes in the Plan of Care yes    The following discharge planning was discussed with the pt/caregiver: when patient reaches goals and medication is managed, and disease processes are understood patient agrees and understand that discharge will take place.